# Patient Record
Sex: MALE | Race: WHITE | Employment: OTHER | URBAN - METROPOLITAN AREA
[De-identification: names, ages, dates, MRNs, and addresses within clinical notes are randomized per-mention and may not be internally consistent; named-entity substitution may affect disease eponyms.]

---

## 2017-02-21 ENCOUNTER — HOSPITAL ENCOUNTER (EMERGENCY)
Age: 82
Discharge: HOME | End: 2017-02-21
Attending: EMERGENCY MEDICINE | Admitting: EMERGENCY MEDICINE
Payer: MEDICARE

## 2017-02-21 ENCOUNTER — APPOINTMENT (OUTPATIENT)
Dept: CT IMAGING | Age: 82
End: 2017-02-21
Attending: EMERGENCY MEDICINE
Payer: MEDICARE

## 2017-02-21 VITALS
SYSTOLIC BLOOD PRESSURE: 137 MMHG | HEART RATE: 76 BPM | OXYGEN SATURATION: 96 % | BODY MASS INDEX: 27.85 KG/M2 | TEMPERATURE: 97.6 F | WEIGHT: 188 LBS | DIASTOLIC BLOOD PRESSURE: 39 MMHG | HEIGHT: 69 IN | RESPIRATION RATE: 21 BRPM

## 2017-02-21 DIAGNOSIS — R42 VERTIGO: Primary | ICD-10-CM

## 2017-02-21 LAB
ANION GAP BLD CALC-SCNC: 6 MMOL/L (ref 5–15)
BUN SERPL-MCNC: 25 MG/DL (ref 6–20)
BUN/CREAT SERPL: 26 (ref 12–20)
CALCIUM SERPL-MCNC: 8.9 MG/DL (ref 8.5–10.1)
CHLORIDE SERPL-SCNC: 99 MMOL/L (ref 97–108)
CO2 SERPL-SCNC: 32 MMOL/L (ref 21–32)
CREAT SERPL-MCNC: 0.97 MG/DL (ref 0.7–1.3)
GLUCOSE SERPL-MCNC: 149 MG/DL (ref 65–100)
POTASSIUM SERPL-SCNC: 4.3 MMOL/L (ref 3.5–5.1)
SODIUM SERPL-SCNC: 137 MMOL/L (ref 136–145)

## 2017-02-21 PROCEDURE — 99285 EMERGENCY DEPT VISIT HI MDM: CPT

## 2017-02-21 PROCEDURE — 74011250636 HC RX REV CODE- 250/636: Performed by: EMERGENCY MEDICINE

## 2017-02-21 PROCEDURE — 96361 HYDRATE IV INFUSION ADD-ON: CPT

## 2017-02-21 PROCEDURE — 70450 CT HEAD/BRAIN W/O DYE: CPT

## 2017-02-21 PROCEDURE — 96374 THER/PROPH/DIAG INJ IV PUSH: CPT

## 2017-02-21 PROCEDURE — 36415 COLL VENOUS BLD VENIPUNCTURE: CPT | Performed by: EMERGENCY MEDICINE

## 2017-02-21 PROCEDURE — 74011250637 HC RX REV CODE- 250/637: Performed by: EMERGENCY MEDICINE

## 2017-02-21 PROCEDURE — 80048 BASIC METABOLIC PNL TOTAL CA: CPT | Performed by: EMERGENCY MEDICINE

## 2017-02-21 RX ORDER — CETIRIZINE HCL 10 MG
10 TABLET ORAL DAILY
COMMUNITY

## 2017-02-21 RX ORDER — ONDANSETRON 4 MG/1
4 TABLET, ORALLY DISINTEGRATING ORAL
Qty: 30 TAB | Refills: 0 | Status: SHIPPED | OUTPATIENT
Start: 2017-02-21 | End: 2017-02-24 | Stop reason: SDUPTHER

## 2017-02-21 RX ORDER — ONDANSETRON 2 MG/ML
4 INJECTION INTRAMUSCULAR; INTRAVENOUS
Status: COMPLETED | OUTPATIENT
Start: 2017-02-21 | End: 2017-02-21

## 2017-02-21 RX ORDER — MECLIZINE HYDROCHLORIDE 25 MG/1
25 TABLET ORAL
Status: COMPLETED | OUTPATIENT
Start: 2017-02-21 | End: 2017-02-21

## 2017-02-21 RX ORDER — MECLIZINE HYDROCHLORIDE 25 MG/1
25 TABLET ORAL
Qty: 20 TAB | Refills: 0 | Status: SHIPPED | OUTPATIENT
Start: 2017-02-21 | End: 2017-02-24 | Stop reason: SDUPTHER

## 2017-02-21 RX ADMIN — MECLIZINE 25 MG: 25 TABLET ORAL at 12:57

## 2017-02-21 RX ADMIN — SODIUM CHLORIDE 1000 ML: 900 INJECTION, SOLUTION INTRAVENOUS at 13:12

## 2017-02-21 RX ADMIN — ONDANSETRON 4 MG: 2 INJECTION INTRAMUSCULAR; INTRAVENOUS at 13:18

## 2017-02-21 NOTE — DISCHARGE INSTRUCTIONS
Vertigo: Care Instructions  Your Care Instructions  Vertigo is the feeling that you or your surroundings are moving when there is no actual movement. It is often described as a feeling of spinning, whirling, falling, or tilting. Vertigo may make you vomit or feel nauseated. You may have trouble standing or walking and may lose your balance. Vertigo is often related to an inner ear problem, but it can have other more serious causes. If vertigo continues, you may need more tests to find its cause. Follow-up care is a key part of your treatment and safety. Be sure to make and go to all appointments, and call your doctor if you are having problems. Its also a good idea to know your test results and keep a list of the medicines you take. How can you care for yourself at home? · Do not lie flat on your back. Prop yourself up slightly. This may reduce the spinning feeling. Keep your eyes open. · Move slowly so that you do not fall. · If your doctor recommends medicine, take it exactly as directed. · Do not drive while you are having vertigo. Certain exercises, called Patton-Daroff exercises, can help decrease vertigo. To do Patton-Daroff exercises:  · Sit on the edge of a bed or sofa and quickly lie down on the side that causes the worst vertigo. Lie on your side with your ear down. · Stay in this position for at least 30 seconds or until the vertigo goes away. · Sit up. If this causes vertigo, wait for it to stop. · Repeat the procedure on the other side. · Repeat this 10 times. Do these exercises 2 times a day until the vertigo is gone. When should you call for help? Call 911 anytime you think you may need emergency care. For example, call if:  · You passed out (lost consciousness). · You have symptoms of a stroke. These may include:  ¨ Sudden numbness, tingling, weakness, or loss of movement in your face, arm, or leg, especially on only one side of your body. ¨ Sudden vision changes.   ¨ Sudden trouble speaking. ¨ Sudden confusion or trouble understanding simple statements. ¨ Sudden problems with walking or balance. ¨ A sudden, severe headache that is different from past headaches. Call your doctor now or seek immediate medical care if:  · Vertigo occurs with a fever, a headache, or ringing in your ears. · You have new or increased nausea and vomiting. Watch closely for changes in your health, and be sure to contact your doctor if:  · Vertigo gets worse or happens more often. · Vertigo has not gotten better after 2 weeks. Where can you learn more? Go to http://cierraPingMDshruthi.info/. Enter G175 in the search box to learn more about \"Vertigo: Care Instructions. \"  Current as of: July 29, 2016  Content Version: 11.1  © 9287-6992 [employer]. Care instructions adapted under license by [employer] (which disclaims liability or warranty for this information). If you have questions about a medical condition or this instruction, always ask your healthcare professional. [place] [address] any warranty or liability for your use of this information. We hope that we have addressed all of your medical concerns. The examination and treatment you received in the Emergency Department were for an emergent problem and were not intended as complete care. It is important that you follow up with your healthcare provider(s) for ongoing care. If your symptoms worsen or do not improve as expected, and you are unable to reach your usual health care provider(s), you should return to the Emergency Department. Today's healthcare is undergoing tremendous change, and patient satisfaction surveys are one of the many tools to assess the quality of medical care. You may receive a survey from the [employer] regarding your experience in the Emergency Department.   I hope that your experience has been completely positive, particularly the medical care that I provided. As such, please participate in the survey; anything less than excellent does not meet my expectations or intentions. [facility] and [facility] participate in nationally recognized quality of care measures. If your blood pressure is greater than 120/80, as reported below, we urge that you seek medical care to address the potential of high blood pressure, commonly known as hypertension. Hypertension can be hereditary or can be caused by certain medical conditions, pain, stress, or \"white coat syndrome. \"       Please make an appointment with your health care provider(s) for follow up of your Emergency Department visit. VITALS:   Patient Vitals for the past 8 hrs:   Temp Pulse Resp BP SpO2   02/21/17 1417 - 73 21 158/66 95 %   02/21/17 1345 - 72 20 (!) 121/39 97 %   02/21/17 1330 - 67 17 134/50 97 %   02/21/17 1315 - 70 23 151/63 95 %   02/21/17 1300 - 73 18 139/63 95 %   02/21/17 1245 - 72 21 150/62 94 %   02/21/17 1232 97.6 °F (36.4 °C) 71 17 133/62 97 %   02/21/17 1230 - - - 133/62 98 %          Thank you for allowing us to provide you with medical care today. We realize that you have many choices for your emergency care needs. Please choose us in the future for any continued health care needs. Negrita Siu, [address] [place] [address].   Office: [phone]            Recent Results (from the past 24 hour(s))   METABOLIC PANEL, BASIC    Collection Time: 02/21/17 12:53 PM   Result Value Ref Range    Sodium 137 136 - 145 mmol/L    Potassium 4.3 3.5 - 5.1 mmol/L    Chloride 99 97 - 108 mmol/L    CO2 32 21 - 32 mmol/L    Anion gap 6 5 - 15 mmol/L    Glucose 149 (H) 65 - 100 mg/dL    BUN 25 (H) 6 - 20 MG/DL    Creatinine 0.97 0.70 - 1.30 MG/DL    BUN/Creatinine ratio 26 (H) 12 - 20      GFR est AA >60 >60 ml/min/1.73m2    GFR est non-AA >60 >60 ml/min/1.73m2    Calcium 8.9 8.5 - 10.1 MG/DL Ct Head Wo Cont    Result Date: 2/21/2017  INDICATION:   acute vertigo, no hx of it EXAM:  HEAD CT WITHOUT CONTRAST COMPARISON:  July 28, 2015 TECHNIQUE:  Routine noncontrast axial head CT was performed. Sagittal and coronal reconstructions were generated. CT dose reduction was achieved through use of a standardized protocol tailored for this examination and automatic exposure control for dose modulation. FINDINGS: Ventricles are midline without hydrocephalus. Prominent extra-axial CSF in the bifrontal regions is similar to the prior study, as well as along the convexity of the brain. There is no acute intra or extra-axial hemorrhage. There is no significant white matter disease. The basal cisterns are patent. The paranasal sinuses are clear. IMPRESSION: No acute process.

## 2017-02-21 NOTE — ED TRIAGE NOTES
Pt arrived via EMS for dizziness that started today, worse with movement, +vomiting. Onset 0800 this AM. 4mg of Zofran given PTA via IV. Denies pain. Denies diarrhea. O2 6L via NC r/t pulmonary fibrosis.

## 2017-02-21 NOTE — ED NOTES
Patient continues to experience nausea and dizziness. HOB decreased to about 30 degrees for patient comfort. Lights dimmed.

## 2017-02-21 NOTE — ED PROVIDER NOTES
HPI Comments: 80 y.o. male with extensive past medical history, please see list, significant for pulmonary fibrosis, COPD, pneumothorax, DM, diverticulosis, CAD, GERD, PUD, and pneumonia who presents to the ED via EMS with chief complaint of dizziness. Pt reports dizziness exacerbated by standing onset today accompanied by nausea, 2 episodes of vomiting, and weakness. Pt reports when he stands he becomes nauseated, says he \"felt like he was spinning\" and was \"unable to ambulate because he could not maintain his balance. \" Pt states he called his PCP and was referred to the ED for further evaluation. Pt denies hx of similar sx. Pt denies hx of vertigo. Per EMS, pt was given 4 mg Zofran en route. Per EMS, pt wears 6L O2 NC at baseline. Pt denies chest pain or SOB. There are no other acute medical complaints voiced at this time. Social Hx: . PCP: Julieta Brooke MD    Note written by Prince Dennis, as dictated by Umm Tay MD 12:30 PM     The history is provided by the patient and the EMS personnel. Past Medical History:   Diagnosis Date    CAD (coronary artery disease) 2004     4 stents placed in heart    Chronic obstructive pulmonary disease ([address]      EMPHYSEMA SEEN ON CXR    Colon polyp      Three tubular adenomas excised colonoscopically on 3/17/2011.  COPD (chronic obstructive pulmonary disease) ([address] 2/4/2014    Diverticulosis of colon (without mention of hemorrhage)     DM (diabetes mellitus) ([address] 4/3/2012    GERD (gastroesophageal reflux disease)      OCC    History of pneumothorax 2/2014    Hypercholesteremia     Pneumonia 2004    PUD (peptic ulcer disease) 1955    Pulmonary fibrosis ([address]        Past Surgical History:   Procedure Laterality Date    Hx gi  circa 2000     Laparoscopic cholecystectomy.  Endoscopy, colon, diagnostic  3/2011     Polyps. Tics. Rep 3 yrs.     Hx hernia repair  1988     left inguinal hernia repair    Hx tonsillectomy  age 29    Hx coronary stent placement  2004     4 cardiac stents    Hx lumbar laminectomy  11/20/2013     L4 L5 Laminectomy    Hx other surgical  2/7/2014     LVATS, bronch, talc pleurodesis, Resection of ruptured bulla         Family History:   Problem Relation Age of Onset    Adopted: Yes    Other Other      patient was adopted       Social History     Social History    Marital status:      Spouse name: N/A    Number of children: N/A    Years of education: N/A     Occupational History    Not on file. Social History Main Topics    Smoking status: Former Smoker     Packs/day: 1.50     Years: 36.00     Types: Cigarettes     Quit date: 1/1/1987    Smokeless tobacco: Never Used    Alcohol use No    Drug use: No    Sexual activity: Not on file     Other Topics Concern    Not on file     Social History Narrative    ** Merged History Encounter **         ** Data from: 4/30/12 Enc Dept: [facility]         ** Data from: 4/5/12 Enc Dept: [facility]    ** Merged History Encounter **              ALLERGIES: Bactrim [sulfamethoprim]; Statins-hmg-coa reductase inhibitors; and Sulfa (sulfonamide antibiotics)    Review of Systems   Constitutional: Negative for chills and fever. HENT: Negative for ear pain and sore throat. Eyes: Negative for photophobia and pain. Respiratory: Negative for chest tightness and shortness of breath. Cardiovascular: Negative for chest pain and leg swelling. Gastrointestinal: Positive for nausea and vomiting. Negative for abdominal pain. Genitourinary: Negative for dysuria and flank pain. Musculoskeletal: Positive for gait problem (off balance). Negative for back pain and neck pain. Skin: Negative for rash and wound. Neurological: Positive for dizziness and weakness. Negative for light-headedness and headaches. All other systems reviewed and are negative.       Vitals:    02/21/17 1232   BP: 133/62   Pulse: 71   Resp: 17   Temp: 97.6 °F (36.4 °C)   SpO2: 97%   Weight: 85.3 kg (188 lb)   Height: 5' 9\" (1.753 m)            Physical Exam   Constitutional: He is oriented to person, place, and time. He appears well-developed and well-nourished. No distress. HENT:   Head: Normocephalic and atraumatic. Right Ear: Tympanic membrane and ear canal normal.   Left Ear: Tympanic membrane and ear canal normal.   Nose: Nose normal.   Mouth/Throat: Uvula is midline and oropharynx is clear and moist.   Eyes: Conjunctivae and EOM are normal. Pupils are equal, round, and reactive to light. Neck: Normal range of motion. Cardiovascular: Normal rate, regular rhythm, normal heart sounds and intact distal pulses. No murmur heard. Pulmonary/Chest: Effort normal and breath sounds normal. No stridor. No respiratory distress. Abdominal: Soft. Bowel sounds are normal. There is no tenderness. Musculoskeletal: Normal range of motion. He exhibits no edema or tenderness. Neurological: He is alert and oriented to person, place, and time. No cranial nerve deficit. Skin: Skin is warm and dry. He is not diaphoretic. Psychiatric: He has a normal mood and affect. Nursing note and vitals reviewed. MDM  Number of Diagnoses or Management Options  Vertigo:   Diagnosis management comments: Patient with acute onset dizziness today - seems more c/w vertigo than dehydration - though could be a combination. Check labs, give IVF and meds. Head CT as patient with no hx of this problem.     1445 - patient improved, but symptoms have not completely resolved, worse with head movement and turning, refer to ENT - Dr. Carlie Favre       Amount and/or Complexity of Data Reviewed  Clinical lab tests: ordered and reviewed  Tests in the radiology section of CPT®: ordered and reviewed  Obtain history from someone other than the patient: yes (spouse)  Independent visualization of images, tracings, or specimens: yes      ED Course       Procedures    INDICATION: acute vertigo, no hx of it     EXAM: HEAD CT WITHOUT CONTRAST     COMPARISON: July 28, 2015     TECHNIQUE: Routine noncontrast axial head CT was performed. Sagittal and  coronal reconstructions were generated.      CT dose reduction was achieved through use of a standardized protocol tailored  for this examination and automatic exposure control for dose modulation.     FINDINGS:     Ventricles are midline without hydrocephalus. Prominent extra-axial CSF in the  bifrontal regions is similar to the prior study, as well as along the convexity  of the brain. There is no acute intra or extra-axial hemorrhage. There is no  significant white matter disease. The basal cisterns are patent.  The paranasal  sinuses are clear.      IMPRESSION  IMPRESSION:

## 2017-02-21 NOTE — ED NOTES
Dr. NANCE reviewed discharge instructions with patient and spouse per AVS summary. Patient verbalized understanding of instructions and agrees to follow-up care as recommended by provider. Patient in no apparent distress and vital signs are within normal limits with reassessment of pain noted under 'vital signs' area of chart. Patient wheeled in wheelchair out of ED accompanied by wife/staff.

## 2017-05-10 ENCOUNTER — HOSPITAL ENCOUNTER (OUTPATIENT)
Dept: GENERAL RADIOLOGY | Age: 82
Discharge: HOME | End: 2017-05-10
Attending: INTERNAL MEDICINE
Payer: MEDICARE

## 2017-05-10 DIAGNOSIS — R05.9 COUGH: ICD-10-CM

## 2017-05-10 PROCEDURE — 71020 XR CHEST PA LAT: CPT

## 2018-01-01 ENCOUNTER — APPOINTMENT (OUTPATIENT)
Dept: GENERAL RADIOLOGY | Age: 83
DRG: 196 | End: 2018-01-01
Payer: MEDICARE

## 2018-01-01 ENCOUNTER — HOSPICE ADMISSION (OUTPATIENT)
Dept: HOSPICE | Facility: HOSPICE | Age: 83
End: 2018-01-01

## 2018-01-01 ENCOUNTER — APPOINTMENT (OUTPATIENT)
Dept: GENERAL RADIOLOGY | Age: 83
DRG: 196 | End: 2018-01-01
Attending: INTERNAL MEDICINE
Payer: MEDICARE

## 2018-01-01 ENCOUNTER — HOSPITAL ENCOUNTER (INPATIENT)
Age: 83
LOS: 28 days | DRG: 196 | End: 2018-09-23
Attending: EMERGENCY MEDICINE | Admitting: INTERNAL MEDICINE
Payer: MEDICARE

## 2018-01-01 VITALS
RESPIRATION RATE: 27 BRPM | DIASTOLIC BLOOD PRESSURE: 55 MMHG | OXYGEN SATURATION: 76 % | HEART RATE: 103 BPM | BODY MASS INDEX: 23.38 KG/M2 | SYSTOLIC BLOOD PRESSURE: 136 MMHG | WEIGHT: 163.3 LBS | TEMPERATURE: 97.1 F | HEIGHT: 70 IN

## 2018-01-01 DIAGNOSIS — R41.0 CONFUSION: ICD-10-CM

## 2018-01-01 DIAGNOSIS — J96.21 ACUTE ON CHRONIC RESPIRATORY FAILURE WITH HYPOXIA (HCC): Primary | ICD-10-CM

## 2018-01-01 DIAGNOSIS — R45.1 RESTLESSNESS AND AGITATION: ICD-10-CM

## 2018-01-01 DIAGNOSIS — J84.10 PULMONARY FIBROSIS (HCC): ICD-10-CM

## 2018-01-01 DIAGNOSIS — R94.31 ABNORMAL EKG: ICD-10-CM

## 2018-01-01 DIAGNOSIS — R06.02 SHORTNESS OF BREATH: ICD-10-CM

## 2018-01-01 DIAGNOSIS — R53.81 DEBILITY: ICD-10-CM

## 2018-01-01 DIAGNOSIS — Z71.89 GOALS OF CARE, COUNSELING/DISCUSSION: ICD-10-CM

## 2018-01-01 DIAGNOSIS — F41.9 ANXIETY: ICD-10-CM

## 2018-01-01 LAB
ANION GAP SERPL CALC-SCNC: 7 MMOL/L (ref 5–15)
ANION GAP SERPL CALC-SCNC: 7 MMOL/L (ref 5–15)
ANION GAP SERPL CALC-SCNC: 8 MMOL/L (ref 5–15)
ANION GAP SERPL CALC-SCNC: 8 MMOL/L (ref 5–15)
ANION GAP SERPL CALC-SCNC: 9 MMOL/L (ref 5–15)
ATRIAL RATE: 114 BPM
BASOPHILS # BLD: 0 K/UL (ref 0–0.1)
BASOPHILS NFR BLD: 1 % (ref 0–1)
BUN SERPL-MCNC: 15 MG/DL (ref 6–20)
BUN SERPL-MCNC: 16 MG/DL (ref 6–20)
BUN SERPL-MCNC: 20 MG/DL (ref 6–20)
BUN SERPL-MCNC: 24 MG/DL (ref 6–20)
BUN SERPL-MCNC: 28 MG/DL (ref 6–20)
BUN/CREAT SERPL: 18 (ref 12–20)
BUN/CREAT SERPL: 19 (ref 12–20)
BUN/CREAT SERPL: 21 (ref 12–20)
BUN/CREAT SERPL: 21 (ref 12–20)
BUN/CREAT SERPL: 23 (ref 12–20)
CALCIUM SERPL-MCNC: 8.5 MG/DL (ref 8.5–10.1)
CALCIUM SERPL-MCNC: 9 MG/DL (ref 8.5–10.1)
CALCIUM SERPL-MCNC: 9 MG/DL (ref 8.5–10.1)
CALCIUM SERPL-MCNC: 9.1 MG/DL (ref 8.5–10.1)
CALCIUM SERPL-MCNC: 9.3 MG/DL (ref 8.5–10.1)
CALCULATED P AXIS, ECG09: 19 DEGREES
CALCULATED R AXIS, ECG10: -146 DEGREES
CALCULATED T AXIS, ECG11: 12 DEGREES
CHLORIDE SERPL-SCNC: 100 MMOL/L (ref 97–108)
CHLORIDE SERPL-SCNC: 101 MMOL/L (ref 97–108)
CHLORIDE SERPL-SCNC: 98 MMOL/L (ref 97–108)
CHLORIDE SERPL-SCNC: 98 MMOL/L (ref 97–108)
CHLORIDE SERPL-SCNC: 99 MMOL/L (ref 97–108)
CO2 SERPL-SCNC: 26 MMOL/L (ref 21–32)
CO2 SERPL-SCNC: 26 MMOL/L (ref 21–32)
CO2 SERPL-SCNC: 28 MMOL/L (ref 21–32)
CO2 SERPL-SCNC: 29 MMOL/L (ref 21–32)
CO2 SERPL-SCNC: 29 MMOL/L (ref 21–32)
CREAT SERPL-MCNC: 0.7 MG/DL (ref 0.7–1.3)
CREAT SERPL-MCNC: 0.86 MG/DL (ref 0.7–1.3)
CREAT SERPL-MCNC: 0.89 MG/DL (ref 0.7–1.3)
CREAT SERPL-MCNC: 1.25 MG/DL (ref 0.7–1.3)
CREAT SERPL-MCNC: 1.32 MG/DL (ref 0.7–1.3)
DIAGNOSIS, 93000: NORMAL
DIFFERENTIAL METHOD BLD: NORMAL
EOSINOPHIL # BLD: 0.1 K/UL (ref 0–0.4)
EOSINOPHIL NFR BLD: 1 % (ref 0–7)
ERYTHROCYTE [DISTWIDTH] IN BLOOD BY AUTOMATED COUNT: 12.2 % (ref 11.5–14.5)
ERYTHROCYTE [DISTWIDTH] IN BLOOD BY AUTOMATED COUNT: 12.4 % (ref 11.5–14.5)
ERYTHROCYTE [DISTWIDTH] IN BLOOD BY AUTOMATED COUNT: 12.8 % (ref 11.5–14.5)
EST. AVERAGE GLUCOSE BLD GHB EST-MCNC: 148 MG/DL
GLUCOSE BLD STRIP.AUTO-MCNC: 148 MG/DL (ref 65–100)
GLUCOSE BLD STRIP.AUTO-MCNC: 157 MG/DL (ref 65–100)
GLUCOSE BLD STRIP.AUTO-MCNC: 158 MG/DL (ref 65–100)
GLUCOSE BLD STRIP.AUTO-MCNC: 199 MG/DL (ref 65–100)
GLUCOSE BLD STRIP.AUTO-MCNC: 223 MG/DL (ref 65–100)
GLUCOSE SERPL-MCNC: 154 MG/DL (ref 65–100)
GLUCOSE SERPL-MCNC: 160 MG/DL (ref 65–100)
GLUCOSE SERPL-MCNC: 164 MG/DL (ref 65–100)
GLUCOSE SERPL-MCNC: 251 MG/DL (ref 65–100)
GLUCOSE SERPL-MCNC: 78 MG/DL (ref 65–100)
HBA1C MFR BLD: 6.8 % (ref 4.2–6.3)
HCT VFR BLD AUTO: 39.4 % (ref 36.6–50.3)
HCT VFR BLD AUTO: 41 % (ref 36.6–50.3)
HCT VFR BLD AUTO: 42 % (ref 36.6–50.3)
HGB BLD-MCNC: 13.1 G/DL (ref 12.1–17)
HGB BLD-MCNC: 13.2 G/DL (ref 12.1–17)
HGB BLD-MCNC: 13.9 G/DL (ref 12.1–17)
IMM GRANULOCYTES # BLD: 0 K/UL (ref 0–0.04)
IMM GRANULOCYTES NFR BLD AUTO: 0 % (ref 0–0.5)
LYMPHOCYTES # BLD: 2.3 K/UL (ref 0.8–3.5)
LYMPHOCYTES NFR BLD: 30 % (ref 12–49)
MAGNESIUM SERPL-MCNC: 1.7 MG/DL (ref 1.6–2.4)
MAGNESIUM SERPL-MCNC: 1.7 MG/DL (ref 1.6–2.4)
MAGNESIUM SERPL-MCNC: 2 MG/DL (ref 1.6–2.4)
MAGNESIUM SERPL-MCNC: 2 MG/DL (ref 1.6–2.4)
MCH RBC QN AUTO: 30.4 PG (ref 26–34)
MCH RBC QN AUTO: 30.5 PG (ref 26–34)
MCH RBC QN AUTO: 31 PG (ref 26–34)
MCHC RBC AUTO-ENTMCNC: 32.2 G/DL (ref 30–36.5)
MCHC RBC AUTO-ENTMCNC: 33.1 G/DL (ref 30–36.5)
MCHC RBC AUTO-ENTMCNC: 33.2 G/DL (ref 30–36.5)
MCV RBC AUTO: 91.8 FL (ref 80–99)
MCV RBC AUTO: 91.9 FL (ref 80–99)
MCV RBC AUTO: 96.2 FL (ref 80–99)
MONOCYTES # BLD: 0.7 K/UL (ref 0–1)
MONOCYTES NFR BLD: 9 % (ref 5–13)
NEUTS SEG # BLD: 4.5 K/UL (ref 1.8–8)
NEUTS SEG NFR BLD: 60 % (ref 32–75)
NRBC # BLD: 0 K/UL (ref 0–0.01)
NRBC BLD-RTO: 0 PER 100 WBC
P-R INTERVAL, ECG05: 208 MS
PHOSPHATE SERPL-MCNC: 3.9 MG/DL (ref 2.6–4.7)
PLATELET # BLD AUTO: 267 K/UL (ref 150–400)
PLATELET # BLD AUTO: 274 K/UL (ref 150–400)
PLATELET # BLD AUTO: 297 K/UL (ref 150–400)
PMV BLD AUTO: 8.9 FL (ref 8.9–12.9)
PMV BLD AUTO: 9 FL (ref 8.9–12.9)
PMV BLD AUTO: 9.3 FL (ref 8.9–12.9)
POTASSIUM SERPL-SCNC: 3.6 MMOL/L (ref 3.5–5.1)
POTASSIUM SERPL-SCNC: 4 MMOL/L (ref 3.5–5.1)
POTASSIUM SERPL-SCNC: 4.3 MMOL/L (ref 3.5–5.1)
POTASSIUM SERPL-SCNC: 4.8 MMOL/L (ref 3.5–5.1)
POTASSIUM SERPL-SCNC: 4.9 MMOL/L (ref 3.5–5.1)
Q-T INTERVAL, ECG07: 322 MS
QRS DURATION, ECG06: 86 MS
QTC CALCULATION (BEZET), ECG08: 443 MS
RBC # BLD AUTO: 4.26 M/UL (ref 4.1–5.7)
RBC # BLD AUTO: 4.29 M/UL (ref 4.1–5.7)
RBC # BLD AUTO: 4.57 M/UL (ref 4.1–5.7)
SERVICE CMNT-IMP: ABNORMAL
SODIUM SERPL-SCNC: 132 MMOL/L (ref 136–145)
SODIUM SERPL-SCNC: 134 MMOL/L (ref 136–145)
SODIUM SERPL-SCNC: 134 MMOL/L (ref 136–145)
SODIUM SERPL-SCNC: 136 MMOL/L (ref 136–145)
SODIUM SERPL-SCNC: 137 MMOL/L (ref 136–145)
TROPONIN I SERPL-MCNC: 3.53 NG/ML
TROPONIN I SERPL-MCNC: 4.46 NG/ML
VENTRICULAR RATE, ECG03: 114 BPM
WBC # BLD AUTO: 11.7 K/UL (ref 4.1–11.1)
WBC # BLD AUTO: 7.6 K/UL (ref 4.1–11.1)
WBC # BLD AUTO: 8.3 K/UL (ref 4.1–11.1)

## 2018-01-01 PROCEDURE — 94640 AIRWAY INHALATION TREATMENT: CPT

## 2018-01-01 PROCEDURE — 74011000250 HC RX REV CODE- 250: Performed by: PHYSICIAN ASSISTANT

## 2018-01-01 PROCEDURE — 74011250637 HC RX REV CODE- 250/637: Performed by: SPECIALIST

## 2018-01-01 PROCEDURE — 80048 BASIC METABOLIC PNL TOTAL CA: CPT | Performed by: INTERNAL MEDICINE

## 2018-01-01 PROCEDURE — 83735 ASSAY OF MAGNESIUM: CPT | Performed by: INTERNAL MEDICINE

## 2018-01-01 PROCEDURE — 74011250637 HC RX REV CODE- 250/637: Performed by: INTERNAL MEDICINE

## 2018-01-01 PROCEDURE — 94660 CPAP INITIATION&MGMT: CPT

## 2018-01-01 PROCEDURE — 74011250636 HC RX REV CODE- 250/636: Performed by: INTERNAL MEDICINE

## 2018-01-01 PROCEDURE — 74011000250 HC RX REV CODE- 250: Performed by: INTERNAL MEDICINE

## 2018-01-01 PROCEDURE — 65660000000 HC RM CCU STEPDOWN

## 2018-01-01 PROCEDURE — 85027 COMPLETE CBC AUTOMATED: CPT | Performed by: INTERNAL MEDICINE

## 2018-01-01 PROCEDURE — 93306 TTE W/DOPPLER COMPLETE: CPT

## 2018-01-01 PROCEDURE — 74011250636 HC RX REV CODE- 250/636: Performed by: FAMILY MEDICINE

## 2018-01-01 PROCEDURE — 36415 COLL VENOUS BLD VENIPUNCTURE: CPT | Performed by: INTERNAL MEDICINE

## 2018-01-01 PROCEDURE — 77030013032 HC MSK BPAP/CPAP FISP -B

## 2018-01-01 PROCEDURE — 77030011256 HC DRSG MEPILEX <16IN NO BORD MOLN -A

## 2018-01-01 PROCEDURE — 74011250637 HC RX REV CODE- 250/637: Performed by: FAMILY MEDICINE

## 2018-01-01 PROCEDURE — 77010033711 HC HIGH FLOW OXYGEN

## 2018-01-01 PROCEDURE — 77010033678 HC OXYGEN DAILY

## 2018-01-01 PROCEDURE — 82962 GLUCOSE BLOOD TEST: CPT

## 2018-01-01 PROCEDURE — 97110 THERAPEUTIC EXERCISES: CPT

## 2018-01-01 PROCEDURE — 99285 EMERGENCY DEPT VISIT HI MDM: CPT

## 2018-01-01 PROCEDURE — 97530 THERAPEUTIC ACTIVITIES: CPT

## 2018-01-01 PROCEDURE — 74011250637 HC RX REV CODE- 250/637: Performed by: PHYSICIAN ASSISTANT

## 2018-01-01 PROCEDURE — 51798 US URINE CAPACITY MEASURE: CPT

## 2018-01-01 PROCEDURE — 93005 ELECTROCARDIOGRAM TRACING: CPT

## 2018-01-01 PROCEDURE — 84100 ASSAY OF PHOSPHORUS: CPT | Performed by: INTERNAL MEDICINE

## 2018-01-01 PROCEDURE — 77030013048 HC MSK CPAP W/HDGR FISP -B

## 2018-01-01 PROCEDURE — 71045 X-RAY EXAM CHEST 1 VIEW: CPT

## 2018-01-01 PROCEDURE — 77030013033 HC MSK BPAP/CPAP MMKA -B

## 2018-01-01 PROCEDURE — 74011250637 HC RX REV CODE- 250/637: Performed by: NURSE PRACTITIONER

## 2018-01-01 PROCEDURE — 76450000000

## 2018-01-01 PROCEDURE — 85025 COMPLETE CBC W/AUTO DIFF WBC: CPT | Performed by: INTERNAL MEDICINE

## 2018-01-01 PROCEDURE — 84484 ASSAY OF TROPONIN QUANT: CPT | Performed by: INTERNAL MEDICINE

## 2018-01-01 PROCEDURE — 5A09557 ASSISTANCE WITH RESPIRATORY VENTILATION, GREATER THAN 96 CONSECUTIVE HOURS, CONTINUOUS POSITIVE AIRWAY PRESSURE: ICD-10-PCS | Performed by: EMERGENCY MEDICINE

## 2018-01-01 PROCEDURE — 94761 N-INVAS EAR/PLS OXIMETRY MLT: CPT

## 2018-01-01 PROCEDURE — 77030012793 HC CIRC VNTLTR FISP -B

## 2018-01-01 PROCEDURE — 94760 N-INVAS EAR/PLS OXIMETRY 1: CPT

## 2018-01-01 PROCEDURE — 83036 HEMOGLOBIN GLYCOSYLATED A1C: CPT | Performed by: INTERNAL MEDICINE

## 2018-01-01 PROCEDURE — 97162 PT EVAL MOD COMPLEX 30 MIN: CPT

## 2018-01-01 PROCEDURE — 77030013140 HC MSK NEB VYRM -A

## 2018-01-01 RX ORDER — LORAZEPAM 2 MG/ML
1 INJECTION INTRAMUSCULAR ONCE
Status: COMPLETED | OUTPATIENT
Start: 2018-01-01 | End: 2018-01-01

## 2018-01-01 RX ORDER — LORAZEPAM 1 MG/1
1 TABLET ORAL
Status: DISCONTINUED | OUTPATIENT
Start: 2018-01-01 | End: 2018-01-01

## 2018-01-01 RX ORDER — INSULIN LISPRO 100 [IU]/ML
INJECTION, SOLUTION INTRAVENOUS; SUBCUTANEOUS
Status: DISCONTINUED | OUTPATIENT
Start: 2018-01-01 | End: 2018-01-01

## 2018-01-01 RX ORDER — CETIRIZINE HCL 10 MG
10 TABLET ORAL DAILY
Status: DISCONTINUED | OUTPATIENT
Start: 2018-01-01 | End: 2018-01-01

## 2018-01-01 RX ORDER — GUAIFENESIN 600 MG/1
600 TABLET, EXTENDED RELEASE ORAL EVERY 12 HOURS
Status: DISCONTINUED | OUTPATIENT
Start: 2018-01-01 | End: 2018-01-01

## 2018-01-01 RX ORDER — SODIUM CHLORIDE 0.9 % (FLUSH) 0.9 %
5-10 SYRINGE (ML) INJECTION EVERY 8 HOURS
Status: DISCONTINUED | OUTPATIENT
Start: 2018-01-01 | End: 2018-01-01

## 2018-01-01 RX ORDER — SODIUM CHLORIDE 0.9 % (FLUSH) 0.9 %
5-10 SYRINGE (ML) INJECTION AS NEEDED
Status: DISCONTINUED | OUTPATIENT
Start: 2018-01-01 | End: 2018-01-01 | Stop reason: HOSPADM

## 2018-01-01 RX ORDER — LORAZEPAM 2 MG/ML
0.5 INJECTION INTRAMUSCULAR
Status: DISCONTINUED | OUTPATIENT
Start: 2018-01-01 | End: 2018-01-01

## 2018-01-01 RX ORDER — GLYCOPYRROLATE 0.2 MG/ML
0.2 INJECTION INTRAMUSCULAR; INTRAVENOUS
Status: DISCONTINUED | OUTPATIENT
Start: 2018-01-01 | End: 2018-01-01 | Stop reason: HOSPADM

## 2018-01-01 RX ORDER — DEXTROSE 50 % IN WATER (D50W) INTRAVENOUS SYRINGE
25-50 AS NEEDED
Status: DISCONTINUED | OUTPATIENT
Start: 2018-01-01 | End: 2018-01-01

## 2018-01-01 RX ORDER — KETOROLAC TROMETHAMINE 30 MG/ML
30 INJECTION, SOLUTION INTRAMUSCULAR; INTRAVENOUS
Status: ACTIVE | OUTPATIENT
Start: 2018-01-01 | End: 2018-01-01

## 2018-01-01 RX ORDER — LORAZEPAM 2 MG/ML
1 INJECTION INTRAMUSCULAR
Status: DISCONTINUED | OUTPATIENT
Start: 2018-01-01 | End: 2018-01-01 | Stop reason: HOSPADM

## 2018-01-01 RX ORDER — QUETIAPINE FUMARATE 25 MG/1
25 TABLET, FILM COATED ORAL
Status: DISCONTINUED | OUTPATIENT
Start: 2018-01-01 | End: 2018-01-01

## 2018-01-01 RX ORDER — HYDROMORPHONE HCL IN 0.9% NACL 15 MG/30ML
PATIENT CONTROLLED ANALGESIA VIAL INTRAVENOUS CONTINUOUS
Status: DISCONTINUED | OUTPATIENT
Start: 2018-01-01 | End: 2018-01-01 | Stop reason: HOSPADM

## 2018-01-01 RX ORDER — MORPHINE SULFATE 4 MG/ML
1 INJECTION, SOLUTION INTRAMUSCULAR; INTRAVENOUS
Status: DISCONTINUED | OUTPATIENT
Start: 2018-01-01 | End: 2018-01-01

## 2018-01-01 RX ORDER — GABAPENTIN 300 MG/1
300 CAPSULE ORAL
Status: DISCONTINUED | OUTPATIENT
Start: 2018-01-01 | End: 2018-01-01

## 2018-01-01 RX ORDER — MAGNESIUM SULFATE 100 %
4 CRYSTALS MISCELLANEOUS AS NEEDED
Status: DISCONTINUED | OUTPATIENT
Start: 2018-01-01 | End: 2018-01-01

## 2018-01-01 RX ORDER — MORPHINE SULFATE 4 MG/ML
2 INJECTION, SOLUTION INTRAMUSCULAR; INTRAVENOUS
Status: DISCONTINUED | OUTPATIENT
Start: 2018-01-01 | End: 2018-01-01

## 2018-01-01 RX ORDER — TAMSULOSIN HYDROCHLORIDE 0.4 MG/1
0.4 CAPSULE ORAL DAILY
Status: DISCONTINUED | OUTPATIENT
Start: 2018-01-01 | End: 2018-01-01

## 2018-01-01 RX ORDER — QUETIAPINE FUMARATE 25 MG/1
12.5 TABLET, FILM COATED ORAL
Status: DISCONTINUED | OUTPATIENT
Start: 2018-01-01 | End: 2018-01-01

## 2018-01-01 RX ORDER — LORAZEPAM 1 MG/1
2 TABLET ORAL
Status: DISCONTINUED | OUTPATIENT
Start: 2018-01-01 | End: 2018-01-01

## 2018-01-01 RX ORDER — METOPROLOL TARTRATE 25 MG/1
12.5 TABLET, FILM COATED ORAL 2 TIMES DAILY
Status: DISCONTINUED | OUTPATIENT
Start: 2018-01-01 | End: 2018-01-01

## 2018-01-01 RX ORDER — QUETIAPINE FUMARATE 25 MG/1
50 TABLET, FILM COATED ORAL
Status: DISCONTINUED | OUTPATIENT
Start: 2018-01-01 | End: 2018-01-01

## 2018-01-01 RX ORDER — SCOLOPAMINE TRANSDERMAL SYSTEM 1 MG/1
1 PATCH, EXTENDED RELEASE TRANSDERMAL
Status: DISCONTINUED | OUTPATIENT
Start: 2018-01-01 | End: 2018-01-01 | Stop reason: HOSPADM

## 2018-01-01 RX ORDER — FINASTERIDE 5 MG/1
5 TABLET, FILM COATED ORAL DAILY
Status: DISCONTINUED | OUTPATIENT
Start: 2018-01-01 | End: 2018-01-01

## 2018-01-01 RX ORDER — OMEPRAZOLE 20 MG/1
20 CAPSULE, DELAYED RELEASE ORAL DAILY
COMMUNITY

## 2018-01-01 RX ORDER — LORAZEPAM 0.5 MG/1
0.5 TABLET ORAL
Status: DISCONTINUED | OUTPATIENT
Start: 2018-01-01 | End: 2018-01-01

## 2018-01-01 RX ORDER — GABAPENTIN 300 MG/1
600 CAPSULE ORAL
Status: DISCONTINUED | OUTPATIENT
Start: 2018-01-01 | End: 2018-01-01

## 2018-01-01 RX ORDER — LORAZEPAM 2 MG/ML
2 INJECTION INTRAMUSCULAR
Status: DISCONTINUED | OUTPATIENT
Start: 2018-01-01 | End: 2018-01-01

## 2018-01-01 RX ORDER — GUAIFENESIN 100 MG/5ML
81 LIQUID (ML) ORAL DAILY
Status: DISCONTINUED | OUTPATIENT
Start: 2018-01-01 | End: 2018-01-01

## 2018-01-01 RX ORDER — PIRFENIDONE 267 MG/1
801 TABLET, FILM COATED ORAL 3 TIMES DAILY
Status: DISCONTINUED | OUTPATIENT
Start: 2018-01-01 | End: 2018-01-01

## 2018-01-01 RX ORDER — GABAPENTIN 600 MG/1
600 TABLET ORAL
COMMUNITY

## 2018-01-01 RX ORDER — LORAZEPAM 0.5 MG/1
0.5 TABLET ORAL EVERY 24 HOURS
Status: DISCONTINUED | OUTPATIENT
Start: 2018-01-01 | End: 2018-01-01

## 2018-01-01 RX ORDER — IPRATROPIUM BROMIDE AND ALBUTEROL SULFATE 2.5; .5 MG/3ML; MG/3ML
3 SOLUTION RESPIRATORY (INHALATION)
Status: DISCONTINUED | OUTPATIENT
Start: 2018-01-01 | End: 2018-01-01

## 2018-01-01 RX ORDER — ENOXAPARIN SODIUM 100 MG/ML
40 INJECTION SUBCUTANEOUS EVERY 24 HOURS
Status: DISCONTINUED | OUTPATIENT
Start: 2018-01-01 | End: 2018-01-01

## 2018-01-01 RX ORDER — MORPHINE SULFATE 4 MG/ML
4 INJECTION, SOLUTION INTRAMUSCULAR; INTRAVENOUS ONCE
Status: COMPLETED | OUTPATIENT
Start: 2018-01-01 | End: 2018-01-01

## 2018-01-01 RX ORDER — ARFORMOTEROL TARTRATE 15 UG/2ML
15 SOLUTION RESPIRATORY (INHALATION)
Status: DISCONTINUED | OUTPATIENT
Start: 2018-01-01 | End: 2018-01-01

## 2018-01-01 RX ORDER — FLUTICASONE PROPIONATE 50 MCG
2 SPRAY, SUSPENSION (ML) NASAL DAILY
Status: DISCONTINUED | OUTPATIENT
Start: 2018-01-01 | End: 2018-01-01

## 2018-01-01 RX ORDER — PANTOPRAZOLE SODIUM 40 MG/1
40 TABLET, DELAYED RELEASE ORAL
Status: DISCONTINUED | OUTPATIENT
Start: 2018-01-01 | End: 2018-01-01

## 2018-01-01 RX ORDER — PIRFENIDONE 267 MG/1
801 TABLET, FILM COATED ORAL 3 TIMES DAILY
COMMUNITY

## 2018-01-01 RX ORDER — GABAPENTIN 300 MG/1
300 CAPSULE ORAL
COMMUNITY

## 2018-01-01 RX ORDER — LORAZEPAM 2 MG/ML
1 INJECTION INTRAMUSCULAR EVERY 4 HOURS
Status: DISCONTINUED | OUTPATIENT
Start: 2018-01-01 | End: 2018-01-01

## 2018-01-01 RX ORDER — BUDESONIDE 0.5 MG/2ML
500 INHALANT ORAL
Status: DISCONTINUED | OUTPATIENT
Start: 2018-01-01 | End: 2018-01-01

## 2018-01-01 RX ORDER — MORPHINE SULFATE IN 0.9 % NACL 150MG/30ML
PATIENT CONTROLLED ANALGESIA SYRINGE INTRAVENOUS CONTINUOUS
Status: DISCONTINUED | OUTPATIENT
Start: 2018-01-01 | End: 2018-01-01

## 2018-01-01 RX ORDER — MORPHINE SULFATE 4 MG/ML
2 INJECTION, SOLUTION INTRAMUSCULAR; INTRAVENOUS
Status: DISCONTINUED | OUTPATIENT
Start: 2018-01-01 | End: 2018-01-01 | Stop reason: HOSPADM

## 2018-01-01 RX ORDER — LORAZEPAM 2 MG/ML
1 INJECTION INTRAMUSCULAR
Status: DISCONTINUED | OUTPATIENT
Start: 2018-01-01 | End: 2018-01-01

## 2018-01-01 RX ORDER — GUAIFENESIN 100 MG/5ML
162 LIQUID (ML) ORAL DAILY
Status: DISCONTINUED | OUTPATIENT
Start: 2018-01-01 | End: 2018-01-01

## 2018-01-01 RX ADMIN — PANTOPRAZOLE SODIUM 40 MG: 40 TABLET, DELAYED RELEASE ORAL at 09:18

## 2018-01-01 RX ADMIN — PIRFENIDONE 801 MG: 267 TABLET, FILM COATED ORAL at 22:04

## 2018-01-01 RX ADMIN — ASPIRIN 81 MG 81 MG: 81 TABLET ORAL at 09:53

## 2018-01-01 RX ADMIN — IPRATROPIUM BROMIDE AND ALBUTEROL SULFATE 3 ML: .5; 3 SOLUTION RESPIRATORY (INHALATION) at 19:49

## 2018-01-01 RX ADMIN — PIRFENIDONE 801 MG: 267 TABLET, FILM COATED ORAL at 08:37

## 2018-01-01 RX ADMIN — ASPIRIN 81 MG 81 MG: 81 TABLET ORAL at 09:55

## 2018-01-01 RX ADMIN — GABAPENTIN 300 MG: 300 CAPSULE ORAL at 13:22

## 2018-01-01 RX ADMIN — PIRFENIDONE 801 MG: 267 TABLET, FILM COATED ORAL at 17:23

## 2018-01-01 RX ADMIN — FINASTERIDE 5 MG: 5 TABLET, FILM COATED ORAL at 09:54

## 2018-01-01 RX ADMIN — ASPIRIN 81 MG 81 MG: 81 TABLET ORAL at 10:57

## 2018-01-01 RX ADMIN — MORPHINE SULFATE 2 MG: 4 INJECTION, SOLUTION INTRAMUSCULAR; INTRAVENOUS at 01:52

## 2018-01-01 RX ADMIN — LORAZEPAM 0.5 MG: 2 INJECTION INTRAMUSCULAR; INTRAVENOUS at 10:08

## 2018-01-01 RX ADMIN — BUDESONIDE 500 MCG: 0.5 INHALANT RESPIRATORY (INHALATION) at 07:44

## 2018-01-01 RX ADMIN — MORPHINE SULFATE 1 MG: 4 INJECTION, SOLUTION INTRAMUSCULAR; INTRAVENOUS at 22:32

## 2018-01-01 RX ADMIN — GABAPENTIN 600 MG: 300 CAPSULE ORAL at 20:25

## 2018-01-01 RX ADMIN — METHYLPREDNISOLONE SODIUM SUCCINATE 60 MG: 40 INJECTION, POWDER, FOR SOLUTION INTRAMUSCULAR; INTRAVENOUS at 05:40

## 2018-01-01 RX ADMIN — BUDESONIDE 500 MCG: 0.5 INHALANT RESPIRATORY (INHALATION) at 07:50

## 2018-01-01 RX ADMIN — PANTOPRAZOLE SODIUM 40 MG: 40 TABLET, DELAYED RELEASE ORAL at 10:56

## 2018-01-01 RX ADMIN — LORAZEPAM 1 MG: 1 TABLET ORAL at 21:34

## 2018-01-01 RX ADMIN — BUDESONIDE 500 MCG: 0.5 INHALANT RESPIRATORY (INHALATION) at 08:17

## 2018-01-01 RX ADMIN — IPRATROPIUM BROMIDE AND ALBUTEROL SULFATE 3 ML: .5; 3 SOLUTION RESPIRATORY (INHALATION) at 00:18

## 2018-01-01 RX ADMIN — Medication 10 ML: at 21:28

## 2018-01-01 RX ADMIN — LORAZEPAM 2 MG: 1 TABLET ORAL at 19:52

## 2018-01-01 RX ADMIN — PANTOPRAZOLE SODIUM 40 MG: 40 TABLET, DELAYED RELEASE ORAL at 09:58

## 2018-01-01 RX ADMIN — PANTOPRAZOLE SODIUM 40 MG: 40 TABLET, DELAYED RELEASE ORAL at 09:53

## 2018-01-01 RX ADMIN — IPRATROPIUM BROMIDE AND ALBUTEROL SULFATE 3 ML: .5; 3 SOLUTION RESPIRATORY (INHALATION) at 11:45

## 2018-01-01 RX ADMIN — MORPHINE SULFATE 2 MG: 4 INJECTION, SOLUTION INTRAMUSCULAR; INTRAVENOUS at 20:47

## 2018-01-01 RX ADMIN — IPRATROPIUM BROMIDE AND ALBUTEROL SULFATE 3 ML: .5; 3 SOLUTION RESPIRATORY (INHALATION) at 16:00

## 2018-01-01 RX ADMIN — LORAZEPAM 0.5 MG: 2 INJECTION INTRAMUSCULAR; INTRAVENOUS at 16:02

## 2018-01-01 RX ADMIN — TAMSULOSIN HYDROCHLORIDE 0.4 MG: 0.4 CAPSULE ORAL at 09:42

## 2018-01-01 RX ADMIN — Medication 10 ML: at 15:35

## 2018-01-01 RX ADMIN — IPRATROPIUM BROMIDE AND ALBUTEROL SULFATE 3 ML: .5; 3 SOLUTION RESPIRATORY (INHALATION) at 20:15

## 2018-01-01 RX ADMIN — ARFORMOTEROL TARTRATE 15 MCG: 15 SOLUTION RESPIRATORY (INHALATION) at 08:37

## 2018-01-01 RX ADMIN — IPRATROPIUM BROMIDE AND ALBUTEROL SULFATE 3 ML: .5; 3 SOLUTION RESPIRATORY (INHALATION) at 03:31

## 2018-01-01 RX ADMIN — BUDESONIDE 500 MCG: 0.5 INHALANT RESPIRATORY (INHALATION) at 18:26

## 2018-01-01 RX ADMIN — GABAPENTIN 600 MG: 300 CAPSULE ORAL at 20:08

## 2018-01-01 RX ADMIN — PIRFENIDONE 801 MG: 267 TABLET, FILM COATED ORAL at 19:01

## 2018-01-01 RX ADMIN — METOPROLOL TARTRATE 12.5 MG: 25 TABLET ORAL at 17:00

## 2018-01-01 RX ADMIN — PANTOPRAZOLE SODIUM 40 MG: 40 TABLET, DELAYED RELEASE ORAL at 08:36

## 2018-01-01 RX ADMIN — LORAZEPAM 1 MG: 2 INJECTION INTRAMUSCULAR; INTRAVENOUS at 13:17

## 2018-01-01 RX ADMIN — IPRATROPIUM BROMIDE AND ALBUTEROL SULFATE 3 ML: .5; 3 SOLUTION RESPIRATORY (INHALATION) at 23:30

## 2018-01-01 RX ADMIN — FINASTERIDE 5 MG: 5 TABLET, FILM COATED ORAL at 08:07

## 2018-01-01 RX ADMIN — ASPIRIN 81 MG 81 MG: 81 TABLET ORAL at 09:27

## 2018-01-01 RX ADMIN — GUAIFENESIN 600 MG: 600 TABLET, EXTENDED RELEASE ORAL at 09:02

## 2018-01-01 RX ADMIN — TAMSULOSIN HYDROCHLORIDE 0.4 MG: 0.4 CAPSULE ORAL at 09:26

## 2018-01-01 RX ADMIN — METHYLPREDNISOLONE SODIUM SUCCINATE 60 MG: 40 INJECTION, POWDER, FOR SOLUTION INTRAMUSCULAR; INTRAVENOUS at 19:01

## 2018-01-01 RX ADMIN — FINASTERIDE 5 MG: 5 TABLET, FILM COATED ORAL at 09:53

## 2018-01-01 RX ADMIN — ENOXAPARIN SODIUM 40 MG: 100 INJECTION SUBCUTANEOUS at 20:55

## 2018-01-01 RX ADMIN — GABAPENTIN 300 MG: 300 CAPSULE ORAL at 12:47

## 2018-01-01 RX ADMIN — IPRATROPIUM BROMIDE AND ALBUTEROL SULFATE 3 ML: .5; 3 SOLUTION RESPIRATORY (INHALATION) at 04:00

## 2018-01-01 RX ADMIN — LORAZEPAM 1 MG: 2 INJECTION INTRAMUSCULAR; INTRAVENOUS at 00:57

## 2018-01-01 RX ADMIN — BUDESONIDE 500 MCG: 0.5 INHALANT RESPIRATORY (INHALATION) at 08:07

## 2018-01-01 RX ADMIN — IPRATROPIUM BROMIDE AND ALBUTEROL SULFATE 3 ML: .5; 3 SOLUTION RESPIRATORY (INHALATION) at 20:09

## 2018-01-01 RX ADMIN — GABAPENTIN 300 MG: 300 CAPSULE ORAL at 11:33

## 2018-01-01 RX ADMIN — PIRFENIDONE 801 MG: 267 TABLET, FILM COATED ORAL at 18:51

## 2018-01-01 RX ADMIN — BUDESONIDE 500 MCG: 0.5 INHALANT RESPIRATORY (INHALATION) at 08:01

## 2018-01-01 RX ADMIN — Medication 10 ML: at 20:15

## 2018-01-01 RX ADMIN — PANTOPRAZOLE SODIUM 40 MG: 40 TABLET, DELAYED RELEASE ORAL at 08:07

## 2018-01-01 RX ADMIN — IPRATROPIUM BROMIDE AND ALBUTEROL SULFATE 3 ML: .5; 3 SOLUTION RESPIRATORY (INHALATION) at 23:33

## 2018-01-01 RX ADMIN — LORAZEPAM 0.5 MG: 0.5 TABLET ORAL at 21:38

## 2018-01-01 RX ADMIN — IPRATROPIUM BROMIDE AND ALBUTEROL SULFATE 3 ML: .5; 3 SOLUTION RESPIRATORY (INHALATION) at 00:21

## 2018-01-01 RX ADMIN — ASPIRIN 81 MG 81 MG: 81 TABLET ORAL at 09:01

## 2018-01-01 RX ADMIN — GABAPENTIN 300 MG: 300 CAPSULE ORAL at 11:11

## 2018-01-01 RX ADMIN — Medication 10 ML: at 22:14

## 2018-01-01 RX ADMIN — IPRATROPIUM BROMIDE AND ALBUTEROL SULFATE 3 ML: .5; 3 SOLUTION RESPIRATORY (INHALATION) at 23:29

## 2018-01-01 RX ADMIN — MORPHINE SULFATE 2 MG: 4 INJECTION, SOLUTION INTRAMUSCULAR; INTRAVENOUS at 18:18

## 2018-01-01 RX ADMIN — PIRFENIDONE 801 MG: 267 TABLET, FILM COATED ORAL at 12:50

## 2018-01-01 RX ADMIN — LORAZEPAM 1 MG: 2 INJECTION INTRAMUSCULAR; INTRAVENOUS at 08:29

## 2018-01-01 RX ADMIN — METHYLPREDNISOLONE SODIUM SUCCINATE 40 MG: 40 INJECTION, POWDER, FOR SOLUTION INTRAMUSCULAR; INTRAVENOUS at 20:26

## 2018-01-01 RX ADMIN — PIRFENIDONE 801 MG: 267 TABLET, FILM COATED ORAL at 13:10

## 2018-01-01 RX ADMIN — GABAPENTIN 600 MG: 300 CAPSULE ORAL at 21:03

## 2018-01-01 RX ADMIN — LORAZEPAM 1 MG: 1 TABLET ORAL at 22:41

## 2018-01-01 RX ADMIN — IPRATROPIUM BROMIDE AND ALBUTEROL SULFATE 3 ML: .5; 3 SOLUTION RESPIRATORY (INHALATION) at 11:38

## 2018-01-01 RX ADMIN — TAMSULOSIN HYDROCHLORIDE 0.4 MG: 0.4 CAPSULE ORAL at 09:18

## 2018-01-01 RX ADMIN — PIRFENIDONE 801 MG: 267 TABLET, FILM COATED ORAL at 20:32

## 2018-01-01 RX ADMIN — LORAZEPAM 1 MG: 2 INJECTION INTRAMUSCULAR; INTRAVENOUS at 18:38

## 2018-01-01 RX ADMIN — LORAZEPAM 0.5 MG: 2 INJECTION INTRAMUSCULAR; INTRAVENOUS at 09:20

## 2018-01-01 RX ADMIN — IPRATROPIUM BROMIDE AND ALBUTEROL SULFATE 3 ML: .5; 3 SOLUTION RESPIRATORY (INHALATION) at 06:08

## 2018-01-01 RX ADMIN — IPRATROPIUM BROMIDE AND ALBUTEROL SULFATE 3 ML: .5; 3 SOLUTION RESPIRATORY (INHALATION) at 20:31

## 2018-01-01 RX ADMIN — BUDESONIDE 500 MCG: 0.5 INHALANT RESPIRATORY (INHALATION) at 07:54

## 2018-01-01 RX ADMIN — BUDESONIDE 500 MCG: 0.5 INHALANT RESPIRATORY (INHALATION) at 19:51

## 2018-01-01 RX ADMIN — IPRATROPIUM BROMIDE AND ALBUTEROL SULFATE 3 ML: .5; 3 SOLUTION RESPIRATORY (INHALATION) at 15:59

## 2018-01-01 RX ADMIN — LORAZEPAM 1 MG: 2 INJECTION INTRAMUSCULAR; INTRAVENOUS at 01:00

## 2018-01-01 RX ADMIN — CETIRIZINE HYDROCHLORIDE 10 MG: 10 TABLET, FILM COATED ORAL at 08:03

## 2018-01-01 RX ADMIN — GUAIFENESIN 600 MG: 600 TABLET, EXTENDED RELEASE ORAL at 20:46

## 2018-01-01 RX ADMIN — Medication 10 ML: at 05:45

## 2018-01-01 RX ADMIN — GUAIFENESIN 600 MG: 600 TABLET, EXTENDED RELEASE ORAL at 20:08

## 2018-01-01 RX ADMIN — METHYLPREDNISOLONE SODIUM SUCCINATE 40 MG: 40 INJECTION, POWDER, FOR SOLUTION INTRAMUSCULAR; INTRAVENOUS at 06:39

## 2018-01-01 RX ADMIN — IPRATROPIUM BROMIDE AND ALBUTEROL SULFATE 3 ML: .5; 3 SOLUTION RESPIRATORY (INHALATION) at 11:19

## 2018-01-01 RX ADMIN — FINASTERIDE 5 MG: 5 TABLET, FILM COATED ORAL at 10:45

## 2018-01-01 RX ADMIN — IPRATROPIUM BROMIDE AND ALBUTEROL SULFATE 3 ML: .5; 3 SOLUTION RESPIRATORY (INHALATION) at 00:47

## 2018-01-01 RX ADMIN — MORPHINE SULFATE 2 MG: 4 INJECTION, SOLUTION INTRAMUSCULAR; INTRAVENOUS at 22:29

## 2018-01-01 RX ADMIN — ENOXAPARIN SODIUM 40 MG: 100 INJECTION SUBCUTANEOUS at 22:16

## 2018-01-01 RX ADMIN — IPRATROPIUM BROMIDE AND ALBUTEROL SULFATE 3 ML: .5; 3 SOLUTION RESPIRATORY (INHALATION) at 08:39

## 2018-01-01 RX ADMIN — GABAPENTIN 600 MG: 300 CAPSULE ORAL at 20:37

## 2018-01-01 RX ADMIN — IPRATROPIUM BROMIDE AND ALBUTEROL SULFATE 3 ML: .5; 3 SOLUTION RESPIRATORY (INHALATION) at 04:31

## 2018-01-01 RX ADMIN — PANTOPRAZOLE SODIUM 40 MG: 40 TABLET, DELAYED RELEASE ORAL at 08:17

## 2018-01-01 RX ADMIN — METOPROLOL TARTRATE 12.5 MG: 25 TABLET ORAL at 18:09

## 2018-01-01 RX ADMIN — PIRFENIDONE 801 MG: 267 TABLET, FILM COATED ORAL at 13:44

## 2018-01-01 RX ADMIN — TAMSULOSIN HYDROCHLORIDE 0.4 MG: 0.4 CAPSULE ORAL at 09:55

## 2018-01-01 RX ADMIN — METOPROLOL TARTRATE 12.5 MG: 25 TABLET ORAL at 16:49

## 2018-01-01 RX ADMIN — FLUTICASONE PROPIONATE 2 SPRAY: 50 SPRAY, METERED NASAL at 11:31

## 2018-01-01 RX ADMIN — PIRFENIDONE 801 MG: 267 TABLET, FILM COATED ORAL at 16:55

## 2018-01-01 RX ADMIN — MORPHINE SULFATE 1 MG: 4 INJECTION, SOLUTION INTRAMUSCULAR; INTRAVENOUS at 02:01

## 2018-01-01 RX ADMIN — METOPROLOL TARTRATE 12.5 MG: 25 TABLET ORAL at 09:53

## 2018-01-01 RX ADMIN — GABAPENTIN 600 MG: 300 CAPSULE ORAL at 20:55

## 2018-01-01 RX ADMIN — TAMSULOSIN HYDROCHLORIDE 0.4 MG: 0.4 CAPSULE ORAL at 10:58

## 2018-01-01 RX ADMIN — MORPHINE SULFATE 2 MG: 4 INJECTION, SOLUTION INTRAMUSCULAR; INTRAVENOUS at 11:20

## 2018-01-01 RX ADMIN — QUETIAPINE FUMARATE 50 MG: 25 TABLET ORAL at 22:13

## 2018-01-01 RX ADMIN — PIRFENIDONE 801 MG: 267 TABLET, FILM COATED ORAL at 16:49

## 2018-01-01 RX ADMIN — IPRATROPIUM BROMIDE AND ALBUTEROL SULFATE 3 ML: .5; 3 SOLUTION RESPIRATORY (INHALATION) at 04:06

## 2018-01-01 RX ADMIN — GABAPENTIN 600 MG: 300 CAPSULE ORAL at 20:10

## 2018-01-01 RX ADMIN — IPRATROPIUM BROMIDE AND ALBUTEROL SULFATE 3 ML: .5; 3 SOLUTION RESPIRATORY (INHALATION) at 04:02

## 2018-01-01 RX ADMIN — FLUTICASONE PROPIONATE 2 SPRAY: 50 SPRAY, METERED NASAL at 09:53

## 2018-01-01 RX ADMIN — LORAZEPAM 1 MG: 2 INJECTION INTRAMUSCULAR; INTRAVENOUS at 22:05

## 2018-01-01 RX ADMIN — FINASTERIDE 5 MG: 5 TABLET, FILM COATED ORAL at 10:11

## 2018-01-01 RX ADMIN — PANTOPRAZOLE SODIUM 40 MG: 40 TABLET, DELAYED RELEASE ORAL at 09:01

## 2018-01-01 RX ADMIN — TAMSULOSIN HYDROCHLORIDE 0.4 MG: 0.4 CAPSULE ORAL at 08:07

## 2018-01-01 RX ADMIN — IPRATROPIUM BROMIDE AND ALBUTEROL SULFATE 3 ML: .5; 3 SOLUTION RESPIRATORY (INHALATION) at 15:42

## 2018-01-01 RX ADMIN — IPRATROPIUM BROMIDE AND ALBUTEROL SULFATE 3 ML: .5; 3 SOLUTION RESPIRATORY (INHALATION) at 22:06

## 2018-01-01 RX ADMIN — PIRFENIDONE 801 MG: 267 TABLET, FILM COATED ORAL at 20:19

## 2018-01-01 RX ADMIN — ENOXAPARIN SODIUM 40 MG: 100 INJECTION SUBCUTANEOUS at 21:00

## 2018-01-01 RX ADMIN — QUETIAPINE FUMARATE 50 MG: 25 TABLET ORAL at 21:37

## 2018-01-01 RX ADMIN — ARFORMOTEROL TARTRATE 15 MCG: 15 SOLUTION RESPIRATORY (INHALATION) at 19:44

## 2018-01-01 RX ADMIN — GUAIFENESIN 600 MG: 600 TABLET, EXTENDED RELEASE ORAL at 09:26

## 2018-01-01 RX ADMIN — BUDESONIDE 500 MCG: 0.5 INHALANT RESPIRATORY (INHALATION) at 20:07

## 2018-01-01 RX ADMIN — GUAIFENESIN 600 MG: 600 TABLET, EXTENDED RELEASE ORAL at 21:48

## 2018-01-01 RX ADMIN — METHYLPREDNISOLONE SODIUM SUCCINATE 60 MG: 40 INJECTION, POWDER, FOR SOLUTION INTRAMUSCULAR; INTRAVENOUS at 12:12

## 2018-01-01 RX ADMIN — IPRATROPIUM BROMIDE AND ALBUTEROL SULFATE 3 ML: .5; 3 SOLUTION RESPIRATORY (INHALATION) at 11:08

## 2018-01-01 RX ADMIN — GUAIFENESIN 600 MG: 600 TABLET, EXTENDED RELEASE ORAL at 20:39

## 2018-01-01 RX ADMIN — GUAIFENESIN 600 MG: 600 TABLET, EXTENDED RELEASE ORAL at 20:55

## 2018-01-01 RX ADMIN — GUAIFENESIN 600 MG: 600 TABLET, EXTENDED RELEASE ORAL at 20:02

## 2018-01-01 RX ADMIN — IPRATROPIUM BROMIDE AND ALBUTEROL SULFATE 3 ML: .5; 3 SOLUTION RESPIRATORY (INHALATION) at 03:50

## 2018-01-01 RX ADMIN — LORAZEPAM 2 MG: 1 TABLET ORAL at 22:36

## 2018-01-01 RX ADMIN — IPRATROPIUM BROMIDE AND ALBUTEROL SULFATE 3 ML: .5; 3 SOLUTION RESPIRATORY (INHALATION) at 23:38

## 2018-01-01 RX ADMIN — Medication 10 ML: at 05:59

## 2018-01-01 RX ADMIN — Medication 10 ML: at 05:18

## 2018-01-01 RX ADMIN — GABAPENTIN 600 MG: 300 CAPSULE ORAL at 20:39

## 2018-01-01 RX ADMIN — Medication 10 ML: at 18:10

## 2018-01-01 RX ADMIN — MORPHINE SULFATE 2 MG: 4 INJECTION, SOLUTION INTRAMUSCULAR; INTRAVENOUS at 22:47

## 2018-01-01 RX ADMIN — IPRATROPIUM BROMIDE AND ALBUTEROL SULFATE 3 ML: .5; 3 SOLUTION RESPIRATORY (INHALATION) at 15:24

## 2018-01-01 RX ADMIN — GABAPENTIN 600 MG: 300 CAPSULE ORAL at 20:17

## 2018-01-01 RX ADMIN — ASPIRIN 81 MG 81 MG: 81 TABLET ORAL at 09:28

## 2018-01-01 RX ADMIN — LORAZEPAM 1 MG: 2 INJECTION INTRAMUSCULAR; INTRAVENOUS at 12:28

## 2018-01-01 RX ADMIN — PIRFENIDONE 801 MG: 267 TABLET, FILM COATED ORAL at 13:28

## 2018-01-01 RX ADMIN — MORPHINE SULFATE 4 MG: 4 INJECTION, SOLUTION INTRAMUSCULAR; INTRAVENOUS at 12:28

## 2018-01-01 RX ADMIN — LORAZEPAM 1 MG: 1 TABLET ORAL at 21:19

## 2018-01-01 RX ADMIN — IPRATROPIUM BROMIDE AND ALBUTEROL SULFATE 3 ML: .5; 3 SOLUTION RESPIRATORY (INHALATION) at 11:23

## 2018-01-01 RX ADMIN — GUAIFENESIN 600 MG: 600 TABLET, EXTENDED RELEASE ORAL at 21:37

## 2018-01-01 RX ADMIN — IPRATROPIUM BROMIDE AND ALBUTEROL SULFATE 3 ML: .5; 3 SOLUTION RESPIRATORY (INHALATION) at 04:15

## 2018-01-01 RX ADMIN — IPRATROPIUM BROMIDE AND ALBUTEROL SULFATE 3 ML: .5; 3 SOLUTION RESPIRATORY (INHALATION) at 19:56

## 2018-01-01 RX ADMIN — Medication 10 ML: at 06:39

## 2018-01-01 RX ADMIN — IPRATROPIUM BROMIDE AND ALBUTEROL SULFATE 3 ML: .5; 3 SOLUTION RESPIRATORY (INHALATION) at 08:38

## 2018-01-01 RX ADMIN — GABAPENTIN 300 MG: 300 CAPSULE ORAL at 13:28

## 2018-01-01 RX ADMIN — CETIRIZINE HYDROCHLORIDE 10 MG: 10 TABLET, FILM COATED ORAL at 09:28

## 2018-01-01 RX ADMIN — LORAZEPAM 0.5 MG: 0.5 TABLET ORAL at 16:49

## 2018-01-01 RX ADMIN — Medication 10 ML: at 05:29

## 2018-01-01 RX ADMIN — IPRATROPIUM BROMIDE AND ALBUTEROL SULFATE 3 ML: .5; 3 SOLUTION RESPIRATORY (INHALATION) at 11:41

## 2018-01-01 RX ADMIN — FINASTERIDE 5 MG: 5 TABLET, FILM COATED ORAL at 09:28

## 2018-01-01 RX ADMIN — IPRATROPIUM BROMIDE AND ALBUTEROL SULFATE 3 ML: .5; 3 SOLUTION RESPIRATORY (INHALATION) at 08:24

## 2018-01-01 RX ADMIN — GUAIFENESIN 600 MG: 600 TABLET, EXTENDED RELEASE ORAL at 13:28

## 2018-01-01 RX ADMIN — IPRATROPIUM BROMIDE AND ALBUTEROL SULFATE 3 ML: .5; 3 SOLUTION RESPIRATORY (INHALATION) at 20:27

## 2018-01-01 RX ADMIN — LORAZEPAM 0.5 MG: 0.5 TABLET ORAL at 02:16

## 2018-01-01 RX ADMIN — BUDESONIDE 500 MCG: 0.5 INHALANT RESPIRATORY (INHALATION) at 20:41

## 2018-01-01 RX ADMIN — QUETIAPINE FUMARATE 12.5 MG: 25 TABLET ORAL at 23:21

## 2018-01-01 RX ADMIN — LORAZEPAM 0.5 MG: 0.5 TABLET ORAL at 21:24

## 2018-01-01 RX ADMIN — LORAZEPAM 0.5 MG: 0.5 TABLET ORAL at 18:09

## 2018-01-01 RX ADMIN — IPRATROPIUM BROMIDE AND ALBUTEROL SULFATE 3 ML: .5; 3 SOLUTION RESPIRATORY (INHALATION) at 15:26

## 2018-01-01 RX ADMIN — PANTOPRAZOLE SODIUM 40 MG: 40 TABLET, DELAYED RELEASE ORAL at 12:49

## 2018-01-01 RX ADMIN — GABAPENTIN 600 MG: 300 CAPSULE ORAL at 21:36

## 2018-01-01 RX ADMIN — ARFORMOTEROL TARTRATE 15 MCG: 15 SOLUTION RESPIRATORY (INHALATION) at 20:41

## 2018-01-01 RX ADMIN — BUDESONIDE 500 MCG: 0.5 INHALANT RESPIRATORY (INHALATION) at 07:57

## 2018-01-01 RX ADMIN — Medication 10 ML: at 14:34

## 2018-01-01 RX ADMIN — ASPIRIN 81 MG 81 MG: 81 TABLET ORAL at 08:53

## 2018-01-01 RX ADMIN — CETIRIZINE HYDROCHLORIDE 10 MG: 10 TABLET, FILM COATED ORAL at 13:28

## 2018-01-01 RX ADMIN — PIRFENIDONE 801 MG: 267 TABLET, FILM COATED ORAL at 09:00

## 2018-01-01 RX ADMIN — Medication 10 ML: at 23:05

## 2018-01-01 RX ADMIN — GUAIFENESIN 600 MG: 600 TABLET, EXTENDED RELEASE ORAL at 20:14

## 2018-01-01 RX ADMIN — LORAZEPAM 1 MG: 2 INJECTION INTRAMUSCULAR; INTRAVENOUS at 10:29

## 2018-01-01 RX ADMIN — GUAIFENESIN 600 MG: 600 TABLET, EXTENDED RELEASE ORAL at 09:53

## 2018-01-01 RX ADMIN — BUDESONIDE 500 MCG: 0.5 INHALANT RESPIRATORY (INHALATION) at 08:12

## 2018-01-01 RX ADMIN — BUDESONIDE 500 MCG: 0.5 INHALANT RESPIRATORY (INHALATION) at 20:32

## 2018-01-01 RX ADMIN — PIRFENIDONE 801 MG: 267 TABLET, FILM COATED ORAL at 18:37

## 2018-01-01 RX ADMIN — METOPROLOL TARTRATE 12.5 MG: 25 TABLET ORAL at 13:28

## 2018-01-01 RX ADMIN — TAMSULOSIN HYDROCHLORIDE 0.4 MG: 0.4 CAPSULE ORAL at 10:45

## 2018-01-01 RX ADMIN — GUAIFENESIN 600 MG: 600 TABLET, EXTENDED RELEASE ORAL at 22:41

## 2018-01-01 RX ADMIN — BUDESONIDE 500 MCG: 0.5 INHALANT RESPIRATORY (INHALATION) at 07:35

## 2018-01-01 RX ADMIN — FINASTERIDE 5 MG: 5 TABLET, FILM COATED ORAL at 09:21

## 2018-01-01 RX ADMIN — PANTOPRAZOLE SODIUM 40 MG: 40 TABLET, DELAYED RELEASE ORAL at 05:50

## 2018-01-01 RX ADMIN — Medication 10 ML: at 22:09

## 2018-01-01 RX ADMIN — ASPIRIN 81 MG 81 MG: 81 TABLET ORAL at 16:59

## 2018-01-01 RX ADMIN — LORAZEPAM 0.5 MG: 2 INJECTION INTRAMUSCULAR; INTRAVENOUS at 12:58

## 2018-01-01 RX ADMIN — IPRATROPIUM BROMIDE AND ALBUTEROL SULFATE 3 ML: .5; 3 SOLUTION RESPIRATORY (INHALATION) at 03:01

## 2018-01-01 RX ADMIN — Medication 10 ML: at 06:53

## 2018-01-01 RX ADMIN — GUAIFENESIN 600 MG: 600 TABLET, EXTENDED RELEASE ORAL at 08:06

## 2018-01-01 RX ADMIN — METOPROLOL TARTRATE 12.5 MG: 25 TABLET ORAL at 11:11

## 2018-01-01 RX ADMIN — GUAIFENESIN 600 MG: 600 TABLET, EXTENDED RELEASE ORAL at 10:57

## 2018-01-01 RX ADMIN — LORAZEPAM 1 MG: 2 INJECTION INTRAMUSCULAR; INTRAVENOUS at 18:18

## 2018-01-01 RX ADMIN — IPRATROPIUM BROMIDE AND ALBUTEROL SULFATE 3 ML: .5; 3 SOLUTION RESPIRATORY (INHALATION) at 07:14

## 2018-01-01 RX ADMIN — IPRATROPIUM BROMIDE AND ALBUTEROL SULFATE 3 ML: .5; 3 SOLUTION RESPIRATORY (INHALATION) at 01:12

## 2018-01-01 RX ADMIN — METOPROLOL TARTRATE 12.5 MG: 25 TABLET ORAL at 18:00

## 2018-01-01 RX ADMIN — IPRATROPIUM BROMIDE AND ALBUTEROL SULFATE 3 ML: .5; 3 SOLUTION RESPIRATORY (INHALATION) at 20:21

## 2018-01-01 RX ADMIN — IPRATROPIUM BROMIDE AND ALBUTEROL SULFATE 3 ML: .5; 3 SOLUTION RESPIRATORY (INHALATION) at 04:40

## 2018-01-01 RX ADMIN — GUAIFENESIN 600 MG: 600 TABLET, EXTENDED RELEASE ORAL at 21:04

## 2018-01-01 RX ADMIN — PIRFENIDONE 801 MG: 267 TABLET, FILM COATED ORAL at 11:31

## 2018-01-01 RX ADMIN — LORAZEPAM 0.5 MG: 2 INJECTION INTRAMUSCULAR; INTRAVENOUS at 19:37

## 2018-01-01 RX ADMIN — IPRATROPIUM BROMIDE AND ALBUTEROL SULFATE 3 ML: .5; 3 SOLUTION RESPIRATORY (INHALATION) at 03:48

## 2018-01-01 RX ADMIN — Medication 10 ML: at 15:51

## 2018-01-01 RX ADMIN — PIRFENIDONE 801 MG: 267 TABLET, FILM COATED ORAL at 22:40

## 2018-01-01 RX ADMIN — PIRFENIDONE 801 MG: 267 TABLET, FILM COATED ORAL at 17:00

## 2018-01-01 RX ADMIN — Medication 10 ML: at 17:52

## 2018-01-01 RX ADMIN — Medication 10 ML: at 12:58

## 2018-01-01 RX ADMIN — CETIRIZINE HYDROCHLORIDE 10 MG: 10 TABLET, FILM COATED ORAL at 08:07

## 2018-01-01 RX ADMIN — Medication 10 ML: at 21:26

## 2018-01-01 RX ADMIN — ARFORMOTEROL TARTRATE 15 MCG: 15 SOLUTION RESPIRATORY (INHALATION) at 20:06

## 2018-01-01 RX ADMIN — PIRFENIDONE 801 MG: 267 TABLET, FILM COATED ORAL at 21:51

## 2018-01-01 RX ADMIN — QUETIAPINE FUMARATE 25 MG: 25 TABLET ORAL at 23:33

## 2018-01-01 RX ADMIN — METOPROLOL TARTRATE 12.5 MG: 25 TABLET ORAL at 17:21

## 2018-01-01 RX ADMIN — ARFORMOTEROL TARTRATE 15 MCG: 15 SOLUTION RESPIRATORY (INHALATION) at 07:54

## 2018-01-01 RX ADMIN — PIRFENIDONE 801 MG: 267 TABLET, FILM COATED ORAL at 11:58

## 2018-01-01 RX ADMIN — IPRATROPIUM BROMIDE AND ALBUTEROL SULFATE 3 ML: .5; 3 SOLUTION RESPIRATORY (INHALATION) at 07:54

## 2018-01-01 RX ADMIN — LORAZEPAM 1 MG: 2 INJECTION INTRAMUSCULAR; INTRAVENOUS at 16:55

## 2018-01-01 RX ADMIN — IPRATROPIUM BROMIDE AND ALBUTEROL SULFATE 3 ML: .5; 3 SOLUTION RESPIRATORY (INHALATION) at 11:00

## 2018-01-01 RX ADMIN — ARFORMOTEROL TARTRATE 15 MCG: 15 SOLUTION RESPIRATORY (INHALATION) at 08:00

## 2018-01-01 RX ADMIN — Medication 10 ML: at 23:16

## 2018-01-01 RX ADMIN — GUAIFENESIN 600 MG: 600 TABLET, EXTENDED RELEASE ORAL at 09:55

## 2018-01-01 RX ADMIN — QUETIAPINE FUMARATE 50 MG: 25 TABLET ORAL at 22:08

## 2018-01-01 RX ADMIN — PIRFENIDONE 801 MG: 267 TABLET, FILM COATED ORAL at 18:06

## 2018-01-01 RX ADMIN — PANTOPRAZOLE SODIUM 40 MG: 40 TABLET, DELAYED RELEASE ORAL at 09:26

## 2018-01-01 RX ADMIN — FLUTICASONE PROPIONATE 2 SPRAY: 50 SPRAY, METERED NASAL at 09:55

## 2018-01-01 RX ADMIN — METOPROLOL TARTRATE 12.5 MG: 25 TABLET ORAL at 09:41

## 2018-01-01 RX ADMIN — FLUTICASONE PROPIONATE 2 SPRAY: 50 SPRAY, METERED NASAL at 09:44

## 2018-01-01 RX ADMIN — BUDESONIDE 500 MCG: 0.5 INHALANT RESPIRATORY (INHALATION) at 08:38

## 2018-01-01 RX ADMIN — CETIRIZINE HYDROCHLORIDE 10 MG: 10 TABLET, FILM COATED ORAL at 11:33

## 2018-01-01 RX ADMIN — IPRATROPIUM BROMIDE AND ALBUTEROL SULFATE 3 ML: .5; 3 SOLUTION RESPIRATORY (INHALATION) at 15:28

## 2018-01-01 RX ADMIN — BUDESONIDE 500 MCG: 0.5 INHALANT RESPIRATORY (INHALATION) at 07:40

## 2018-01-01 RX ADMIN — GUAIFENESIN 600 MG: 600 TABLET, EXTENDED RELEASE ORAL at 19:58

## 2018-01-01 RX ADMIN — MORPHINE SULFATE 2 MG: 4 INJECTION, SOLUTION INTRAMUSCULAR; INTRAVENOUS at 23:38

## 2018-01-01 RX ADMIN — IPRATROPIUM BROMIDE AND ALBUTEROL SULFATE 3 ML: .5; 3 SOLUTION RESPIRATORY (INHALATION) at 08:07

## 2018-01-01 RX ADMIN — ASPIRIN 81 MG 81 MG: 81 TABLET ORAL at 12:47

## 2018-01-01 RX ADMIN — PIRFENIDONE 801 MG: 267 TABLET, FILM COATED ORAL at 09:44

## 2018-01-01 RX ADMIN — IPRATROPIUM BROMIDE AND ALBUTEROL SULFATE 3 ML: .5; 3 SOLUTION RESPIRATORY (INHALATION) at 04:17

## 2018-01-01 RX ADMIN — GABAPENTIN 300 MG: 300 CAPSULE ORAL at 12:16

## 2018-01-01 RX ADMIN — BUDESONIDE 500 MCG: 0.5 INHALANT RESPIRATORY (INHALATION) at 19:56

## 2018-01-01 RX ADMIN — MORPHINE SULFATE 1 MG: 4 INJECTION, SOLUTION INTRAMUSCULAR; INTRAVENOUS at 00:37

## 2018-01-01 RX ADMIN — CETIRIZINE HYDROCHLORIDE 10 MG: 10 TABLET, FILM COATED ORAL at 09:18

## 2018-01-01 RX ADMIN — IPRATROPIUM BROMIDE AND ALBUTEROL SULFATE 3 ML: .5; 3 SOLUTION RESPIRATORY (INHALATION) at 02:46

## 2018-01-01 RX ADMIN — LORAZEPAM 0.5 MG: 0.5 TABLET ORAL at 03:56

## 2018-01-01 RX ADMIN — FINASTERIDE 5 MG: 5 TABLET, FILM COATED ORAL at 09:58

## 2018-01-01 RX ADMIN — BUDESONIDE 500 MCG: 0.5 INHALANT RESPIRATORY (INHALATION) at 20:27

## 2018-01-01 RX ADMIN — BUDESONIDE 500 MCG: 0.5 INHALANT RESPIRATORY (INHALATION) at 21:35

## 2018-01-01 RX ADMIN — LORAZEPAM 0.5 MG: 0.5 TABLET ORAL at 05:10

## 2018-01-01 RX ADMIN — ARFORMOTEROL TARTRATE 15 MCG: 15 SOLUTION RESPIRATORY (INHALATION) at 20:15

## 2018-01-01 RX ADMIN — ENOXAPARIN SODIUM 40 MG: 100 INJECTION SUBCUTANEOUS at 22:05

## 2018-01-01 RX ADMIN — IPRATROPIUM BROMIDE AND ALBUTEROL SULFATE 3 ML: .5; 3 SOLUTION RESPIRATORY (INHALATION) at 23:09

## 2018-01-01 RX ADMIN — Medication 10 ML: at 21:06

## 2018-01-01 RX ADMIN — IPRATROPIUM BROMIDE AND ALBUTEROL SULFATE 3 ML: .5; 3 SOLUTION RESPIRATORY (INHALATION) at 07:50

## 2018-01-01 RX ADMIN — PANTOPRAZOLE SODIUM 40 MG: 40 TABLET, DELAYED RELEASE ORAL at 06:53

## 2018-01-01 RX ADMIN — Medication 10 ML: at 05:30

## 2018-01-01 RX ADMIN — METHYLPREDNISOLONE SODIUM SUCCINATE 40 MG: 40 INJECTION, POWDER, FOR SOLUTION INTRAMUSCULAR; INTRAVENOUS at 08:37

## 2018-01-01 RX ADMIN — MORPHINE SULFATE 1 MG: 4 INJECTION, SOLUTION INTRAMUSCULAR; INTRAVENOUS at 06:55

## 2018-01-01 RX ADMIN — METOPROLOL TARTRATE 12.5 MG: 25 TABLET ORAL at 09:31

## 2018-01-01 RX ADMIN — Medication 10 ML: at 13:28

## 2018-01-01 RX ADMIN — METOPROLOL TARTRATE 12.5 MG: 25 TABLET ORAL at 23:22

## 2018-01-01 RX ADMIN — PIRFENIDONE 801 MG: 267 TABLET, FILM COATED ORAL at 12:16

## 2018-01-01 RX ADMIN — PIRFENIDONE 801 MG: 267 TABLET, FILM COATED ORAL at 09:59

## 2018-01-01 RX ADMIN — LORAZEPAM 0.5 MG: 0.5 TABLET ORAL at 11:42

## 2018-01-01 RX ADMIN — ARFORMOTEROL TARTRATE 15 MCG: 15 SOLUTION RESPIRATORY (INHALATION) at 20:32

## 2018-01-01 RX ADMIN — Medication 10 ML: at 23:52

## 2018-01-01 RX ADMIN — METHYLPREDNISOLONE SODIUM SUCCINATE 40 MG: 40 INJECTION, POWDER, FOR SOLUTION INTRAMUSCULAR; INTRAVENOUS at 08:06

## 2018-01-01 RX ADMIN — ARFORMOTEROL TARTRATE 15 MCG: 15 SOLUTION RESPIRATORY (INHALATION) at 07:57

## 2018-01-01 RX ADMIN — Medication 10 ML: at 21:03

## 2018-01-01 RX ADMIN — CETIRIZINE HYDROCHLORIDE 10 MG: 10 TABLET, FILM COATED ORAL at 09:21

## 2018-01-01 RX ADMIN — PIRFENIDONE 801 MG: 267 TABLET, FILM COATED ORAL at 14:46

## 2018-01-01 RX ADMIN — IPRATROPIUM BROMIDE AND ALBUTEROL SULFATE 3 ML: .5; 3 SOLUTION RESPIRATORY (INHALATION) at 19:51

## 2018-01-01 RX ADMIN — FINASTERIDE 5 MG: 5 TABLET, FILM COATED ORAL at 09:01

## 2018-01-01 RX ADMIN — GUAIFENESIN 600 MG: 600 TABLET, EXTENDED RELEASE ORAL at 08:53

## 2018-01-01 RX ADMIN — PIRFENIDONE 801 MG: 267 TABLET, FILM COATED ORAL at 22:00

## 2018-01-01 RX ADMIN — IPRATROPIUM BROMIDE AND ALBUTEROL SULFATE 3 ML: .5; 3 SOLUTION RESPIRATORY (INHALATION) at 07:27

## 2018-01-01 RX ADMIN — LORAZEPAM 0.5 MG: 0.5 TABLET ORAL at 12:46

## 2018-01-01 RX ADMIN — LORAZEPAM 0.5 MG: 0.5 TABLET ORAL at 21:37

## 2018-01-01 RX ADMIN — BUDESONIDE 500 MCG: 0.5 INHALANT RESPIRATORY (INHALATION) at 20:10

## 2018-01-01 RX ADMIN — Medication 10 ML: at 21:19

## 2018-01-01 RX ADMIN — ASPIRIN 81 MG 81 MG: 81 TABLET ORAL at 09:42

## 2018-01-01 RX ADMIN — PIRFENIDONE 801 MG: 267 TABLET, FILM COATED ORAL at 19:26

## 2018-01-01 RX ADMIN — IPRATROPIUM BROMIDE AND ALBUTEROL SULFATE 3 ML: .5; 3 SOLUTION RESPIRATORY (INHALATION) at 19:55

## 2018-01-01 RX ADMIN — Medication 10 ML: at 06:51

## 2018-01-01 RX ADMIN — Medication 10 ML: at 14:04

## 2018-01-01 RX ADMIN — IPRATROPIUM BROMIDE AND ALBUTEROL SULFATE 3 ML: .5; 3 SOLUTION RESPIRATORY (INHALATION) at 20:16

## 2018-01-01 RX ADMIN — ARFORMOTEROL TARTRATE 15 MCG: 15 SOLUTION RESPIRATORY (INHALATION) at 20:20

## 2018-01-01 RX ADMIN — PIRFENIDONE 801 MG: 267 TABLET, FILM COATED ORAL at 18:00

## 2018-01-01 RX ADMIN — PIRFENIDONE 801 MG: 267 TABLET, FILM COATED ORAL at 18:09

## 2018-01-01 RX ADMIN — IPRATROPIUM BROMIDE AND ALBUTEROL SULFATE 3 ML: .5; 3 SOLUTION RESPIRATORY (INHALATION) at 11:51

## 2018-01-01 RX ADMIN — METOPROLOL TARTRATE 12.5 MG: 25 TABLET ORAL at 18:06

## 2018-01-01 RX ADMIN — IPRATROPIUM BROMIDE AND ALBUTEROL SULFATE 3 ML: .5; 3 SOLUTION RESPIRATORY (INHALATION) at 00:15

## 2018-01-01 RX ADMIN — QUETIAPINE FUMARATE 50 MG: 25 TABLET ORAL at 22:19

## 2018-01-01 RX ADMIN — METHYLPREDNISOLONE SODIUM SUCCINATE 60 MG: 40 INJECTION, POWDER, FOR SOLUTION INTRAMUSCULAR; INTRAVENOUS at 23:33

## 2018-01-01 RX ADMIN — ASPIRIN 81 MG 81 MG: 81 TABLET ORAL at 11:11

## 2018-01-01 RX ADMIN — PIRFENIDONE 801 MG: 267 TABLET, FILM COATED ORAL at 12:10

## 2018-01-01 RX ADMIN — METOPROLOL TARTRATE 12.5 MG: 25 TABLET ORAL at 08:06

## 2018-01-01 RX ADMIN — PIRFENIDONE 801 MG: 267 TABLET, FILM COATED ORAL at 09:53

## 2018-01-01 RX ADMIN — IPRATROPIUM BROMIDE AND ALBUTEROL SULFATE 3 ML: .5; 3 SOLUTION RESPIRATORY (INHALATION) at 15:31

## 2018-01-01 RX ADMIN — Medication: at 14:46

## 2018-01-01 RX ADMIN — BUDESONIDE 500 MCG: 0.5 INHALANT RESPIRATORY (INHALATION) at 08:08

## 2018-01-01 RX ADMIN — GABAPENTIN 300 MG: 300 CAPSULE ORAL at 14:28

## 2018-01-01 RX ADMIN — IPRATROPIUM BROMIDE AND ALBUTEROL SULFATE 3 ML: .5; 3 SOLUTION RESPIRATORY (INHALATION) at 07:00

## 2018-01-01 RX ADMIN — CETIRIZINE HYDROCHLORIDE 10 MG: 10 TABLET, FILM COATED ORAL at 09:53

## 2018-01-01 RX ADMIN — LORAZEPAM 0.5 MG: 0.5 TABLET ORAL at 00:51

## 2018-01-01 RX ADMIN — IPRATROPIUM BROMIDE AND ALBUTEROL SULFATE 3 ML: .5; 3 SOLUTION RESPIRATORY (INHALATION) at 20:07

## 2018-01-01 RX ADMIN — Medication 10 ML: at 11:42

## 2018-01-01 RX ADMIN — LORAZEPAM 0.5 MG: 0.5 TABLET ORAL at 22:13

## 2018-01-01 RX ADMIN — CETIRIZINE HYDROCHLORIDE 10 MG: 10 TABLET, FILM COATED ORAL at 09:01

## 2018-01-01 RX ADMIN — CETIRIZINE HYDROCHLORIDE 10 MG: 10 TABLET, FILM COATED ORAL at 09:26

## 2018-01-01 RX ADMIN — Medication 10 ML: at 06:00

## 2018-01-01 RX ADMIN — BUDESONIDE 500 MCG: 0.5 INHALANT RESPIRATORY (INHALATION) at 19:55

## 2018-01-01 RX ADMIN — ARFORMOTEROL TARTRATE 15 MCG: 15 SOLUTION RESPIRATORY (INHALATION) at 07:14

## 2018-01-01 RX ADMIN — PIRFENIDONE 801 MG: 267 TABLET, FILM COATED ORAL at 17:10

## 2018-01-01 RX ADMIN — MORPHINE SULFATE 2 MG: 4 INJECTION, SOLUTION INTRAMUSCULAR; INTRAVENOUS at 17:25

## 2018-01-01 RX ADMIN — FLUTICASONE PROPIONATE 2 SPRAY: 50 SPRAY, METERED NASAL at 10:44

## 2018-01-01 RX ADMIN — IPRATROPIUM BROMIDE AND ALBUTEROL SULFATE 3 ML: .5; 3 SOLUTION RESPIRATORY (INHALATION) at 11:16

## 2018-01-01 RX ADMIN — GUAIFENESIN 600 MG: 600 TABLET, EXTENDED RELEASE ORAL at 10:00

## 2018-01-01 RX ADMIN — METOPROLOL TARTRATE 12.5 MG: 25 TABLET ORAL at 08:53

## 2018-01-01 RX ADMIN — TAMSULOSIN HYDROCHLORIDE 0.4 MG: 0.4 CAPSULE ORAL at 09:29

## 2018-01-01 RX ADMIN — PANTOPRAZOLE SODIUM 40 MG: 40 TABLET, DELAYED RELEASE ORAL at 10:11

## 2018-01-01 RX ADMIN — IPRATROPIUM BROMIDE AND ALBUTEROL SULFATE 3 ML: .5; 3 SOLUTION RESPIRATORY (INHALATION) at 15:16

## 2018-01-01 RX ADMIN — METOPROLOL TARTRATE 12.5 MG: 25 TABLET ORAL at 18:51

## 2018-01-01 RX ADMIN — BUDESONIDE 500 MCG: 0.5 INHALANT RESPIRATORY (INHALATION) at 19:18

## 2018-01-01 RX ADMIN — ENOXAPARIN SODIUM 40 MG: 100 INJECTION SUBCUTANEOUS at 21:33

## 2018-01-01 RX ADMIN — CETIRIZINE HYDROCHLORIDE 10 MG: 10 TABLET, FILM COATED ORAL at 09:29

## 2018-01-01 RX ADMIN — PIRFENIDONE 801 MG: 267 TABLET, FILM COATED ORAL at 22:06

## 2018-01-01 RX ADMIN — TAMSULOSIN HYDROCHLORIDE 0.4 MG: 0.4 CAPSULE ORAL at 09:01

## 2018-01-01 RX ADMIN — Medication 10 ML: at 21:54

## 2018-01-01 RX ADMIN — ENOXAPARIN SODIUM 40 MG: 100 INJECTION SUBCUTANEOUS at 20:03

## 2018-01-01 RX ADMIN — Medication 10 ML: at 15:34

## 2018-01-01 RX ADMIN — IPRATROPIUM BROMIDE AND ALBUTEROL SULFATE 3 ML: .5; 3 SOLUTION RESPIRATORY (INHALATION) at 08:12

## 2018-01-01 RX ADMIN — Medication 10 ML: at 14:00

## 2018-01-01 RX ADMIN — IPRATROPIUM BROMIDE AND ALBUTEROL SULFATE 3 ML: .5; 3 SOLUTION RESPIRATORY (INHALATION) at 16:15

## 2018-01-01 RX ADMIN — TAMSULOSIN HYDROCHLORIDE 0.4 MG: 0.4 CAPSULE ORAL at 11:33

## 2018-01-01 RX ADMIN — FLUTICASONE PROPIONATE 2 SPRAY: 50 SPRAY, METERED NASAL at 14:46

## 2018-01-01 RX ADMIN — IPRATROPIUM BROMIDE AND ALBUTEROL SULFATE 3 ML: .5; 3 SOLUTION RESPIRATORY (INHALATION) at 15:35

## 2018-01-01 RX ADMIN — ARFORMOTEROL TARTRATE 15 MCG: 15 SOLUTION RESPIRATORY (INHALATION) at 08:07

## 2018-01-01 RX ADMIN — IPRATROPIUM BROMIDE AND ALBUTEROL SULFATE 3 ML: .5; 3 SOLUTION RESPIRATORY (INHALATION) at 07:56

## 2018-01-01 RX ADMIN — IPRATROPIUM BROMIDE AND ALBUTEROL SULFATE 3 ML: .5; 3 SOLUTION RESPIRATORY (INHALATION) at 11:25

## 2018-01-01 RX ADMIN — IPRATROPIUM BROMIDE AND ALBUTEROL SULFATE 3 ML: .5; 3 SOLUTION RESPIRATORY (INHALATION) at 15:34

## 2018-01-01 RX ADMIN — MORPHINE SULFATE 2 MG: 4 INJECTION, SOLUTION INTRAMUSCULAR; INTRAVENOUS at 22:50

## 2018-01-01 RX ADMIN — PIRFENIDONE 801 MG: 267 TABLET, FILM COATED ORAL at 15:35

## 2018-01-01 RX ADMIN — MORPHINE SULFATE 2 MG: 4 INJECTION, SOLUTION INTRAMUSCULAR; INTRAVENOUS at 05:45

## 2018-01-01 RX ADMIN — LORAZEPAM 0.5 MG: 0.5 TABLET ORAL at 12:34

## 2018-01-01 RX ADMIN — IPRATROPIUM BROMIDE AND ALBUTEROL SULFATE 3 ML: .5; 3 SOLUTION RESPIRATORY (INHALATION) at 07:32

## 2018-01-01 RX ADMIN — IPRATROPIUM BROMIDE AND ALBUTEROL SULFATE 3 ML: .5; 3 SOLUTION RESPIRATORY (INHALATION) at 19:44

## 2018-01-01 RX ADMIN — ENOXAPARIN SODIUM 40 MG: 100 INJECTION SUBCUTANEOUS at 21:25

## 2018-01-01 RX ADMIN — IPRATROPIUM BROMIDE AND ALBUTEROL SULFATE 3 ML: .5; 3 SOLUTION RESPIRATORY (INHALATION) at 08:14

## 2018-01-01 RX ADMIN — MORPHINE SULFATE 1 MG: 4 INJECTION, SOLUTION INTRAMUSCULAR; INTRAVENOUS at 12:53

## 2018-01-01 RX ADMIN — PIRFENIDONE 801 MG: 267 TABLET, FILM COATED ORAL at 15:48

## 2018-01-01 RX ADMIN — IPRATROPIUM BROMIDE AND ALBUTEROL SULFATE 3 ML: .5; 3 SOLUTION RESPIRATORY (INHALATION) at 11:43

## 2018-01-01 RX ADMIN — Medication 10 ML: at 05:31

## 2018-01-01 RX ADMIN — GABAPENTIN 600 MG: 300 CAPSULE ORAL at 21:26

## 2018-01-01 RX ADMIN — GABAPENTIN 600 MG: 300 CAPSULE ORAL at 22:15

## 2018-01-01 RX ADMIN — TAMSULOSIN HYDROCHLORIDE 0.4 MG: 0.4 CAPSULE ORAL at 13:29

## 2018-01-01 RX ADMIN — MORPHINE SULFATE 2 MG: 4 INJECTION, SOLUTION INTRAMUSCULAR; INTRAVENOUS at 09:20

## 2018-01-01 RX ADMIN — IPRATROPIUM BROMIDE AND ALBUTEROL SULFATE 3 ML: .5; 3 SOLUTION RESPIRATORY (INHALATION) at 07:47

## 2018-01-01 RX ADMIN — GUAIFENESIN 600 MG: 600 TABLET, EXTENDED RELEASE ORAL at 20:44

## 2018-01-01 RX ADMIN — IPRATROPIUM BROMIDE AND ALBUTEROL SULFATE 3 ML: .5; 3 SOLUTION RESPIRATORY (INHALATION) at 17:03

## 2018-01-01 RX ADMIN — LORAZEPAM 0.5 MG: 2 INJECTION INTRAMUSCULAR; INTRAVENOUS at 03:14

## 2018-01-01 RX ADMIN — GABAPENTIN 600 MG: 300 CAPSULE ORAL at 21:33

## 2018-01-01 RX ADMIN — METOPROLOL TARTRATE 12.5 MG: 25 TABLET ORAL at 09:01

## 2018-01-01 RX ADMIN — CETIRIZINE HYDROCHLORIDE 10 MG: 10 TABLET, FILM COATED ORAL at 09:54

## 2018-01-01 RX ADMIN — IPRATROPIUM BROMIDE AND ALBUTEROL SULFATE 3 ML: .5; 3 SOLUTION RESPIRATORY (INHALATION) at 04:39

## 2018-01-01 RX ADMIN — LORAZEPAM 0.5 MG: 0.5 TABLET ORAL at 16:44

## 2018-01-01 RX ADMIN — PANTOPRAZOLE SODIUM 40 MG: 40 TABLET, DELAYED RELEASE ORAL at 08:03

## 2018-01-01 RX ADMIN — METOPROLOL TARTRATE 12.5 MG: 25 TABLET ORAL at 18:08

## 2018-01-01 RX ADMIN — DEXTRAN 70 AND HYPROMELLOSE 2910 2 DROP: 1; 3 SOLUTION/ DROPS OPHTHALMIC at 16:03

## 2018-01-01 RX ADMIN — ARFORMOTEROL TARTRATE 15 MCG: 15 SOLUTION RESPIRATORY (INHALATION) at 07:35

## 2018-01-01 RX ADMIN — GABAPENTIN 600 MG: 300 CAPSULE ORAL at 20:44

## 2018-01-01 RX ADMIN — PANTOPRAZOLE SODIUM 40 MG: 40 TABLET, DELAYED RELEASE ORAL at 09:54

## 2018-01-01 RX ADMIN — Medication 10 ML: at 21:18

## 2018-01-01 RX ADMIN — METOPROLOL TARTRATE 12.5 MG: 25 TABLET ORAL at 10:45

## 2018-01-01 RX ADMIN — ENOXAPARIN SODIUM 40 MG: 100 INJECTION SUBCUTANEOUS at 20:10

## 2018-01-01 RX ADMIN — Medication: at 16:49

## 2018-01-01 RX ADMIN — LORAZEPAM 1 MG: 2 INJECTION INTRAMUSCULAR; INTRAVENOUS at 15:33

## 2018-01-01 RX ADMIN — PIRFENIDONE 801 MG: 267 TABLET, FILM COATED ORAL at 18:15

## 2018-01-01 RX ADMIN — PIRFENIDONE 801 MG: 267 TABLET, FILM COATED ORAL at 22:08

## 2018-01-01 RX ADMIN — MORPHINE SULFATE 2 MG: 4 INJECTION, SOLUTION INTRAMUSCULAR; INTRAVENOUS at 02:29

## 2018-01-01 RX ADMIN — ARFORMOTEROL TARTRATE 15 MCG: 15 SOLUTION RESPIRATORY (INHALATION) at 20:27

## 2018-01-01 RX ADMIN — PIRFENIDONE 801 MG: 267 TABLET, FILM COATED ORAL at 20:00

## 2018-01-01 RX ADMIN — Medication 10 ML: at 21:23

## 2018-01-01 RX ADMIN — BUDESONIDE 500 MCG: 0.5 INHALANT RESPIRATORY (INHALATION) at 08:41

## 2018-01-01 RX ADMIN — MORPHINE SULFATE 2 MG: 4 INJECTION, SOLUTION INTRAMUSCULAR; INTRAVENOUS at 00:14

## 2018-01-01 RX ADMIN — GUAIFENESIN 600 MG: 600 TABLET, EXTENDED RELEASE ORAL at 09:59

## 2018-01-01 RX ADMIN — BUDESONIDE 500 MCG: 0.5 INHALANT RESPIRATORY (INHALATION) at 07:00

## 2018-01-01 RX ADMIN — ARFORMOTEROL TARTRATE 15 MCG: 15 SOLUTION RESPIRATORY (INHALATION) at 19:35

## 2018-01-01 RX ADMIN — Medication 10 ML: at 22:24

## 2018-01-01 RX ADMIN — IPRATROPIUM BROMIDE AND ALBUTEROL SULFATE 3 ML: .5; 3 SOLUTION RESPIRATORY (INHALATION) at 07:40

## 2018-01-01 RX ADMIN — IPRATROPIUM BROMIDE AND ALBUTEROL SULFATE 3 ML: .5; 3 SOLUTION RESPIRATORY (INHALATION) at 16:16

## 2018-01-01 RX ADMIN — CETIRIZINE HYDROCHLORIDE 10 MG: 10 TABLET, FILM COATED ORAL at 09:52

## 2018-01-01 RX ADMIN — LORAZEPAM 0.5 MG: 0.5 TABLET ORAL at 21:25

## 2018-01-01 RX ADMIN — METOPROLOL TARTRATE 12.5 MG: 25 TABLET ORAL at 19:00

## 2018-01-01 RX ADMIN — LORAZEPAM 0.5 MG: 0.5 TABLET ORAL at 22:15

## 2018-01-01 RX ADMIN — Medication 10 ML: at 05:51

## 2018-01-01 RX ADMIN — LORAZEPAM 0.5 MG: 0.5 TABLET ORAL at 21:46

## 2018-01-01 RX ADMIN — BUDESONIDE 500 MCG: 0.5 INHALANT RESPIRATORY (INHALATION) at 07:27

## 2018-01-01 RX ADMIN — METOPROLOL TARTRATE 12.5 MG: 25 TABLET ORAL at 17:20

## 2018-01-01 RX ADMIN — ASPIRIN 81 MG 81 MG: 81 TABLET ORAL at 09:59

## 2018-01-01 RX ADMIN — ENOXAPARIN SODIUM 40 MG: 100 INJECTION SUBCUTANEOUS at 20:37

## 2018-01-01 RX ADMIN — GABAPENTIN 600 MG: 300 CAPSULE ORAL at 19:58

## 2018-01-01 RX ADMIN — METOPROLOL TARTRATE 12.5 MG: 25 TABLET ORAL at 09:28

## 2018-01-01 RX ADMIN — PIRFENIDONE 801 MG: 267 TABLET, FILM COATED ORAL at 17:48

## 2018-01-01 RX ADMIN — QUETIAPINE FUMARATE 25 MG: 25 TABLET ORAL at 21:24

## 2018-01-01 RX ADMIN — PIRFENIDONE 801 MG: 267 TABLET, FILM COATED ORAL at 11:13

## 2018-01-01 RX ADMIN — ENOXAPARIN SODIUM 40 MG: 100 INJECTION SUBCUTANEOUS at 20:25

## 2018-01-01 RX ADMIN — PIRFENIDONE 801 MG: 267 TABLET, FILM COATED ORAL at 09:55

## 2018-01-01 RX ADMIN — LORAZEPAM 1 MG: 2 INJECTION INTRAMUSCULAR; INTRAVENOUS at 08:46

## 2018-01-01 RX ADMIN — ENOXAPARIN SODIUM 40 MG: 100 INJECTION SUBCUTANEOUS at 20:46

## 2018-01-01 RX ADMIN — LORAZEPAM 0.5 MG: 0.5 TABLET ORAL at 19:58

## 2018-01-01 RX ADMIN — CETIRIZINE HYDROCHLORIDE 10 MG: 10 TABLET, FILM COATED ORAL at 09:58

## 2018-01-01 RX ADMIN — IPRATROPIUM BROMIDE AND ALBUTEROL SULFATE 3 ML: .5; 3 SOLUTION RESPIRATORY (INHALATION) at 20:40

## 2018-01-01 RX ADMIN — METOPROLOL TARTRATE 12.5 MG: 25 TABLET ORAL at 17:31

## 2018-01-01 RX ADMIN — IPRATROPIUM BROMIDE AND ALBUTEROL SULFATE 3 ML: .5; 3 SOLUTION RESPIRATORY (INHALATION) at 11:59

## 2018-01-01 RX ADMIN — IPRATROPIUM BROMIDE AND ALBUTEROL SULFATE 3 ML: .5; 3 SOLUTION RESPIRATORY (INHALATION) at 07:43

## 2018-01-01 RX ADMIN — GABAPENTIN 300 MG: 300 CAPSULE ORAL at 12:23

## 2018-01-01 RX ADMIN — MORPHINE SULFATE 1 MG: 4 INJECTION, SOLUTION INTRAMUSCULAR; INTRAVENOUS at 01:25

## 2018-01-01 RX ADMIN — IPRATROPIUM BROMIDE AND ALBUTEROL SULFATE 3 ML: .5; 3 SOLUTION RESPIRATORY (INHALATION) at 08:17

## 2018-01-01 RX ADMIN — GABAPENTIN 300 MG: 300 CAPSULE ORAL at 12:52

## 2018-01-01 RX ADMIN — BUDESONIDE 500 MCG: 0.5 INHALANT RESPIRATORY (INHALATION) at 20:22

## 2018-01-01 RX ADMIN — QUETIAPINE FUMARATE 25 MG: 25 TABLET ORAL at 23:03

## 2018-01-01 RX ADMIN — IPRATROPIUM BROMIDE AND ALBUTEROL SULFATE 3 ML: .5; 3 SOLUTION RESPIRATORY (INHALATION) at 00:02

## 2018-01-01 RX ADMIN — Medication 10 ML: at 13:29

## 2018-01-01 RX ADMIN — GUAIFENESIN 600 MG: 600 TABLET, EXTENDED RELEASE ORAL at 20:10

## 2018-01-01 RX ADMIN — GABAPENTIN 300 MG: 300 CAPSULE ORAL at 13:44

## 2018-01-01 RX ADMIN — ENOXAPARIN SODIUM 40 MG: 100 INJECTION SUBCUTANEOUS at 20:14

## 2018-01-01 RX ADMIN — BUDESONIDE 500 MCG: 0.5 INHALANT RESPIRATORY (INHALATION) at 20:15

## 2018-01-01 RX ADMIN — FLUTICASONE PROPIONATE 2 SPRAY: 50 SPRAY, METERED NASAL at 12:47

## 2018-01-01 RX ADMIN — ASPIRIN 81 MG 81 MG: 81 TABLET ORAL at 09:29

## 2018-01-01 RX ADMIN — PIRFENIDONE 801 MG: 267 TABLET, FILM COATED ORAL at 08:55

## 2018-01-01 RX ADMIN — BUDESONIDE 500 MCG: 0.5 INHALANT RESPIRATORY (INHALATION) at 19:49

## 2018-01-01 RX ADMIN — LORAZEPAM 1 MG: 2 INJECTION INTRAMUSCULAR; INTRAVENOUS at 05:28

## 2018-01-01 RX ADMIN — BUDESONIDE 500 MCG: 0.5 INHALANT RESPIRATORY (INHALATION) at 08:04

## 2018-01-01 RX ADMIN — BUDESONIDE 500 MCG: 0.5 INHALANT RESPIRATORY (INHALATION) at 19:35

## 2018-01-01 RX ADMIN — TAMSULOSIN HYDROCHLORIDE 0.4 MG: 0.4 CAPSULE ORAL at 09:28

## 2018-01-01 RX ADMIN — FINASTERIDE 5 MG: 5 TABLET, FILM COATED ORAL at 08:53

## 2018-01-01 RX ADMIN — GABAPENTIN 600 MG: 300 CAPSULE ORAL at 20:16

## 2018-01-01 RX ADMIN — PIRFENIDONE 801 MG: 267 TABLET, FILM COATED ORAL at 21:32

## 2018-01-01 RX ADMIN — IPRATROPIUM BROMIDE AND ALBUTEROL SULFATE 3 ML: .5; 3 SOLUTION RESPIRATORY (INHALATION) at 15:11

## 2018-01-01 RX ADMIN — PIRFENIDONE 801 MG: 267 TABLET, FILM COATED ORAL at 15:34

## 2018-01-01 RX ADMIN — BUDESONIDE 500 MCG: 0.5 INHALANT RESPIRATORY (INHALATION) at 20:31

## 2018-01-01 RX ADMIN — LORAZEPAM 0.5 MG: 0.5 TABLET ORAL at 02:15

## 2018-01-01 RX ADMIN — LORAZEPAM 2 MG: 1 TABLET ORAL at 05:29

## 2018-01-01 RX ADMIN — IPRATROPIUM BROMIDE AND ALBUTEROL SULFATE 3 ML: .5; 3 SOLUTION RESPIRATORY (INHALATION) at 19:18

## 2018-01-01 RX ADMIN — GABAPENTIN 300 MG: 300 CAPSULE ORAL at 20:22

## 2018-01-01 RX ADMIN — FINASTERIDE 5 MG: 5 TABLET, FILM COATED ORAL at 09:42

## 2018-01-01 RX ADMIN — Medication 10 ML: at 03:56

## 2018-01-01 RX ADMIN — BUDESONIDE 500 MCG: 0.5 INHALANT RESPIRATORY (INHALATION) at 07:32

## 2018-01-01 RX ADMIN — FINASTERIDE 5 MG: 5 TABLET, FILM COATED ORAL at 08:03

## 2018-01-01 RX ADMIN — IPRATROPIUM BROMIDE AND ALBUTEROL SULFATE 3 ML: .5; 3 SOLUTION RESPIRATORY (INHALATION) at 15:46

## 2018-01-01 RX ADMIN — IPRATROPIUM BROMIDE AND ALBUTEROL SULFATE 3 ML: .5; 3 SOLUTION RESPIRATORY (INHALATION) at 04:35

## 2018-01-01 RX ADMIN — LORAZEPAM 0.5 MG: 0.5 TABLET ORAL at 04:47

## 2018-01-01 RX ADMIN — IPRATROPIUM BROMIDE AND ALBUTEROL SULFATE 3 ML: .5; 3 SOLUTION RESPIRATORY (INHALATION) at 15:07

## 2018-01-01 RX ADMIN — IPRATROPIUM BROMIDE AND ALBUTEROL SULFATE 3 ML: .5; 3 SOLUTION RESPIRATORY (INHALATION) at 07:51

## 2018-01-01 RX ADMIN — PANTOPRAZOLE SODIUM 40 MG: 40 TABLET, DELAYED RELEASE ORAL at 10:45

## 2018-01-01 RX ADMIN — GUAIFENESIN 600 MG: 600 TABLET, EXTENDED RELEASE ORAL at 21:00

## 2018-01-01 RX ADMIN — FINASTERIDE 5 MG: 5 TABLET, FILM COATED ORAL at 12:50

## 2018-01-01 RX ADMIN — QUETIAPINE FUMARATE 12.5 MG: 25 TABLET ORAL at 21:33

## 2018-01-01 RX ADMIN — IPRATROPIUM BROMIDE AND ALBUTEROL SULFATE 3 ML: .5; 3 SOLUTION RESPIRATORY (INHALATION) at 11:32

## 2018-01-01 RX ADMIN — ENOXAPARIN SODIUM 40 MG: 100 INJECTION SUBCUTANEOUS at 22:41

## 2018-01-01 RX ADMIN — IPRATROPIUM BROMIDE AND ALBUTEROL SULFATE 3 ML: .5; 3 SOLUTION RESPIRATORY (INHALATION) at 20:06

## 2018-01-01 RX ADMIN — IPRATROPIUM BROMIDE AND ALBUTEROL SULFATE 3 ML: .5; 3 SOLUTION RESPIRATORY (INHALATION) at 11:53

## 2018-01-01 RX ADMIN — Medication 10 ML: at 07:03

## 2018-01-01 RX ADMIN — ASPIRIN 81 MG 81 MG: 81 TABLET ORAL at 08:03

## 2018-01-01 RX ADMIN — FLUTICASONE PROPIONATE 2 SPRAY: 50 SPRAY, METERED NASAL at 11:11

## 2018-01-01 RX ADMIN — TAMSULOSIN HYDROCHLORIDE 0.4 MG: 0.4 CAPSULE ORAL at 09:58

## 2018-01-01 RX ADMIN — QUETIAPINE FUMARATE 50 MG: 25 TABLET ORAL at 21:25

## 2018-01-01 RX ADMIN — IPRATROPIUM BROMIDE AND ALBUTEROL SULFATE 3 ML: .5; 3 SOLUTION RESPIRATORY (INHALATION) at 20:23

## 2018-01-01 RX ADMIN — LORAZEPAM 1 MG: 2 INJECTION INTRAMUSCULAR; INTRAVENOUS at 21:49

## 2018-01-01 RX ADMIN — IPRATROPIUM BROMIDE AND ALBUTEROL SULFATE 3 ML: .5; 3 SOLUTION RESPIRATORY (INHALATION) at 03:57

## 2018-01-01 RX ADMIN — FLUTICASONE PROPIONATE 2 SPRAY: 50 SPRAY, METERED NASAL at 08:06

## 2018-01-01 RX ADMIN — PIRFENIDONE 801 MG: 267 TABLET, FILM COATED ORAL at 17:20

## 2018-01-01 RX ADMIN — Medication 10 ML: at 15:17

## 2018-01-01 RX ADMIN — GUAIFENESIN 600 MG: 600 TABLET, EXTENDED RELEASE ORAL at 09:18

## 2018-01-01 RX ADMIN — MORPHINE SULFATE 2 MG: 4 INJECTION, SOLUTION INTRAMUSCULAR; INTRAVENOUS at 23:15

## 2018-01-01 RX ADMIN — Medication 10 ML: at 07:11

## 2018-01-01 RX ADMIN — LORAZEPAM 1 MG: 2 INJECTION INTRAMUSCULAR; INTRAVENOUS at 02:41

## 2018-01-01 RX ADMIN — PIRFENIDONE 801 MG: 267 TABLET, FILM COATED ORAL at 20:12

## 2018-01-01 RX ADMIN — GUAIFENESIN 600 MG: 600 TABLET, EXTENDED RELEASE ORAL at 21:33

## 2018-01-01 RX ADMIN — ARFORMOTEROL TARTRATE 15 MCG: 15 SOLUTION RESPIRATORY (INHALATION) at 19:56

## 2018-01-01 RX ADMIN — PIRFENIDONE 801 MG: 267 TABLET, FILM COATED ORAL at 16:02

## 2018-01-01 RX ADMIN — GABAPENTIN 300 MG: 300 CAPSULE ORAL at 12:20

## 2018-01-01 RX ADMIN — FINASTERIDE 5 MG: 5 TABLET, FILM COATED ORAL at 11:11

## 2018-01-01 RX ADMIN — IPRATROPIUM BROMIDE AND ALBUTEROL SULFATE 3 ML: .5; 3 SOLUTION RESPIRATORY (INHALATION) at 15:27

## 2018-01-01 RX ADMIN — MORPHINE SULFATE 2 MG: 4 INJECTION, SOLUTION INTRAMUSCULAR; INTRAVENOUS at 07:41

## 2018-01-01 RX ADMIN — FINASTERIDE 5 MG: 5 TABLET, FILM COATED ORAL at 12:49

## 2018-01-01 RX ADMIN — ARFORMOTEROL TARTRATE 15 MCG: 15 SOLUTION RESPIRATORY (INHALATION) at 08:04

## 2018-01-01 RX ADMIN — IPRATROPIUM BROMIDE AND ALBUTEROL SULFATE 3 ML: .5; 3 SOLUTION RESPIRATORY (INHALATION) at 11:28

## 2018-01-01 RX ADMIN — METOPROLOL TARTRATE 12.5 MG: 25 TABLET ORAL at 08:36

## 2018-01-01 RX ADMIN — ARFORMOTEROL TARTRATE 15 MCG: 15 SOLUTION RESPIRATORY (INHALATION) at 07:01

## 2018-01-01 RX ADMIN — Medication 10 ML: at 06:57

## 2018-01-01 RX ADMIN — CETIRIZINE HYDROCHLORIDE 10 MG: 10 TABLET, FILM COATED ORAL at 08:36

## 2018-01-01 RX ADMIN — LORAZEPAM 0.5 MG: 0.5 TABLET ORAL at 22:26

## 2018-01-01 RX ADMIN — LORAZEPAM 0.5 MG: 2 INJECTION INTRAMUSCULAR; INTRAVENOUS at 05:45

## 2018-01-01 RX ADMIN — METHYLPREDNISOLONE SODIUM SUCCINATE 40 MG: 40 INJECTION, POWDER, FOR SOLUTION INTRAMUSCULAR; INTRAVENOUS at 22:36

## 2018-01-01 RX ADMIN — GABAPENTIN 300 MG: 300 CAPSULE ORAL at 11:42

## 2018-01-01 RX ADMIN — IPRATROPIUM BROMIDE AND ALBUTEROL SULFATE 3 ML: .5; 3 SOLUTION RESPIRATORY (INHALATION) at 21:35

## 2018-01-01 RX ADMIN — LORAZEPAM 0.5 MG: 0.5 TABLET ORAL at 16:55

## 2018-01-01 RX ADMIN — LORAZEPAM 1 MG: 2 INJECTION INTRAMUSCULAR; INTRAVENOUS at 12:06

## 2018-01-01 RX ADMIN — Medication 10 ML: at 22:26

## 2018-01-01 RX ADMIN — PIRFENIDONE 801 MG: 267 TABLET, FILM COATED ORAL at 21:24

## 2018-01-01 RX ADMIN — PIRFENIDONE 801 MG: 267 TABLET, FILM COATED ORAL at 09:51

## 2018-01-01 RX ADMIN — Medication 10 ML: at 18:06

## 2018-01-01 RX ADMIN — IPRATROPIUM BROMIDE AND ALBUTEROL SULFATE 3 ML: .5; 3 SOLUTION RESPIRATORY (INHALATION) at 11:57

## 2018-01-01 RX ADMIN — PIRFENIDONE 801 MG: 267 TABLET, FILM COATED ORAL at 21:38

## 2018-01-01 RX ADMIN — ASPIRIN 81 MG 81 MG: 81 TABLET ORAL at 13:28

## 2018-01-01 RX ADMIN — MORPHINE SULFATE 2 MG: 4 INJECTION, SOLUTION INTRAMUSCULAR; INTRAVENOUS at 10:08

## 2018-01-01 RX ADMIN — PIRFENIDONE 801 MG: 267 TABLET, FILM COATED ORAL at 08:07

## 2018-01-01 RX ADMIN — CETIRIZINE HYDROCHLORIDE 10 MG: 10 TABLET, FILM COATED ORAL at 12:49

## 2018-01-01 RX ADMIN — BUDESONIDE 500 MCG: 0.5 INHALANT RESPIRATORY (INHALATION) at 08:40

## 2018-01-01 RX ADMIN — FLUTICASONE PROPIONATE 2 SPRAY: 50 SPRAY, METERED NASAL at 09:51

## 2018-01-01 RX ADMIN — METOPROLOL TARTRATE 12.5 MG: 25 TABLET ORAL at 09:20

## 2018-01-01 RX ADMIN — LORAZEPAM 0.5 MG: 2 INJECTION INTRAMUSCULAR; INTRAVENOUS at 00:54

## 2018-01-01 RX ADMIN — BUDESONIDE 500 MCG: 0.5 INHALANT RESPIRATORY (INHALATION) at 07:56

## 2018-01-01 RX ADMIN — LORAZEPAM 1 MG: 2 INJECTION INTRAMUSCULAR; INTRAVENOUS at 06:20

## 2018-01-01 RX ADMIN — TAMSULOSIN HYDROCHLORIDE 0.4 MG: 0.4 CAPSULE ORAL at 10:11

## 2018-01-01 RX ADMIN — Medication 10 ML: at 09:55

## 2018-01-01 RX ADMIN — BUDESONIDE 500 MCG: 0.5 INHALANT RESPIRATORY (INHALATION) at 20:23

## 2018-01-01 RX ADMIN — PIRFENIDONE 801 MG: 267 TABLET, FILM COATED ORAL at 18:31

## 2018-01-01 RX ADMIN — GABAPENTIN 300 MG: 300 CAPSULE ORAL at 15:48

## 2018-01-01 RX ADMIN — BUDESONIDE 500 MCG: 0.5 INHALANT RESPIRATORY (INHALATION) at 20:06

## 2018-01-01 RX ADMIN — GUAIFENESIN 600 MG: 600 TABLET, EXTENDED RELEASE ORAL at 21:23

## 2018-01-01 RX ADMIN — LORAZEPAM 0.5 MG: 0.5 TABLET ORAL at 17:09

## 2018-01-01 RX ADMIN — IPRATROPIUM BROMIDE AND ALBUTEROL SULFATE 3 ML: .5; 3 SOLUTION RESPIRATORY (INHALATION) at 20:41

## 2018-01-01 RX ADMIN — MORPHINE SULFATE 2 MG: 4 INJECTION, SOLUTION INTRAMUSCULAR; INTRAVENOUS at 19:00

## 2018-01-01 RX ADMIN — IPRATROPIUM BROMIDE AND ALBUTEROL SULFATE 3 ML: .5; 3 SOLUTION RESPIRATORY (INHALATION) at 15:29

## 2018-01-01 RX ADMIN — LORAZEPAM 0.5 MG: 0.5 TABLET ORAL at 20:14

## 2018-01-01 RX ADMIN — TAMSULOSIN HYDROCHLORIDE 0.4 MG: 0.4 CAPSULE ORAL at 11:11

## 2018-01-01 RX ADMIN — METOPROLOL TARTRATE 12.5 MG: 25 TABLET ORAL at 08:07

## 2018-01-01 RX ADMIN — CETIRIZINE HYDROCHLORIDE 10 MG: 10 TABLET, FILM COATED ORAL at 11:11

## 2018-01-01 RX ADMIN — BUDESONIDE 500 MCG: 0.5 INHALANT RESPIRATORY (INHALATION) at 20:21

## 2018-01-01 RX ADMIN — FLUTICASONE PROPIONATE 2 SPRAY: 50 SPRAY, METERED NASAL at 08:11

## 2018-01-01 RX ADMIN — QUETIAPINE FUMARATE 50 MG: 25 TABLET ORAL at 21:06

## 2018-01-01 RX ADMIN — BUDESONIDE 500 MCG: 0.5 INHALANT RESPIRATORY (INHALATION) at 20:17

## 2018-01-01 RX ADMIN — IPRATROPIUM BROMIDE AND ALBUTEROL SULFATE 3 ML: .5; 3 SOLUTION RESPIRATORY (INHALATION) at 15:22

## 2018-01-01 RX ADMIN — ASPIRIN 81 MG 81 MG: 81 TABLET ORAL at 09:21

## 2018-01-01 RX ADMIN — Medication 10 ML: at 05:42

## 2018-01-01 RX ADMIN — BUDESONIDE 500 MCG: 0.5 INHALANT RESPIRATORY (INHALATION) at 07:14

## 2018-01-01 RX ADMIN — LORAZEPAM 0.5 MG: 2 INJECTION INTRAMUSCULAR; INTRAVENOUS at 17:25

## 2018-01-01 RX ADMIN — PIRFENIDONE 801 MG: 267 TABLET, FILM COATED ORAL at 21:19

## 2018-01-01 RX ADMIN — GUAIFENESIN 600 MG: 600 TABLET, EXTENDED RELEASE ORAL at 09:21

## 2018-01-01 RX ADMIN — TAMSULOSIN HYDROCHLORIDE 0.4 MG: 0.4 CAPSULE ORAL at 08:53

## 2018-01-01 RX ADMIN — MORPHINE SULFATE 2 MG: 4 INJECTION, SOLUTION INTRAMUSCULAR; INTRAVENOUS at 19:33

## 2018-01-01 RX ADMIN — FINASTERIDE 5 MG: 5 TABLET, FILM COATED ORAL at 11:34

## 2018-01-01 RX ADMIN — IPRATROPIUM BROMIDE AND ALBUTEROL SULFATE 3 ML: .5; 3 SOLUTION RESPIRATORY (INHALATION) at 07:44

## 2018-01-01 RX ADMIN — METOPROLOL TARTRATE 12.5 MG: 25 TABLET ORAL at 12:46

## 2018-01-01 RX ADMIN — IPRATROPIUM BROMIDE AND ALBUTEROL SULFATE 3 ML: .5; 3 SOLUTION RESPIRATORY (INHALATION) at 03:02

## 2018-01-01 RX ADMIN — QUETIAPINE FUMARATE 50 MG: 25 TABLET ORAL at 21:38

## 2018-01-01 RX ADMIN — MORPHINE SULFATE 1 MG: 4 INJECTION, SOLUTION INTRAMUSCULAR; INTRAVENOUS at 10:49

## 2018-01-01 RX ADMIN — ENOXAPARIN SODIUM 40 MG: 100 INJECTION SUBCUTANEOUS at 21:48

## 2018-01-01 RX ADMIN — GABAPENTIN 600 MG: 300 CAPSULE ORAL at 22:40

## 2018-01-01 RX ADMIN — Medication 10 ML: at 16:49

## 2018-01-01 RX ADMIN — PANTOPRAZOLE SODIUM 40 MG: 40 TABLET, DELAYED RELEASE ORAL at 08:05

## 2018-01-01 RX ADMIN — METOPROLOL TARTRATE 12.5 MG: 25 TABLET ORAL at 18:49

## 2018-01-01 RX ADMIN — IPRATROPIUM BROMIDE AND ALBUTEROL SULFATE 3 ML: .5; 3 SOLUTION RESPIRATORY (INHALATION) at 23:45

## 2018-01-01 RX ADMIN — PIRFENIDONE 801 MG: 267 TABLET, FILM COATED ORAL at 15:37

## 2018-01-01 RX ADMIN — ASPIRIN 81 MG 81 MG: 81 TABLET ORAL at 10:11

## 2018-01-01 RX ADMIN — METOPROLOL TARTRATE 12.5 MG: 25 TABLET ORAL at 18:21

## 2018-01-01 RX ADMIN — ENOXAPARIN SODIUM 40 MG: 100 INJECTION SUBCUTANEOUS at 20:57

## 2018-01-01 RX ADMIN — PANTOPRAZOLE SODIUM 40 MG: 40 TABLET, DELAYED RELEASE ORAL at 07:44

## 2018-01-01 RX ADMIN — GUAIFENESIN 600 MG: 600 TABLET, EXTENDED RELEASE ORAL at 09:28

## 2018-01-01 RX ADMIN — MORPHINE SULFATE 2 MG: 4 INJECTION, SOLUTION INTRAMUSCULAR; INTRAVENOUS at 21:16

## 2018-01-01 RX ADMIN — Medication 10 ML: at 05:52

## 2018-01-01 RX ADMIN — PIRFENIDONE 801 MG: 267 TABLET, FILM COATED ORAL at 13:03

## 2018-01-01 RX ADMIN — GUAIFENESIN 600 MG: 600 TABLET, EXTENDED RELEASE ORAL at 21:25

## 2018-01-01 RX ADMIN — LORAZEPAM 2 MG: 2 INJECTION INTRAMUSCULAR; INTRAVENOUS at 18:14

## 2018-01-01 RX ADMIN — GABAPENTIN 300 MG: 300 CAPSULE ORAL at 13:27

## 2018-01-01 RX ADMIN — IPRATROPIUM BROMIDE AND ALBUTEROL SULFATE 3 ML: .5; 3 SOLUTION RESPIRATORY (INHALATION) at 04:52

## 2018-01-01 RX ADMIN — PANTOPRAZOLE SODIUM 40 MG: 40 TABLET, DELAYED RELEASE ORAL at 09:34

## 2018-01-01 RX ADMIN — MORPHINE SULFATE 2 MG: 4 INJECTION, SOLUTION INTRAMUSCULAR; INTRAVENOUS at 23:43

## 2018-01-01 RX ADMIN — IPRATROPIUM BROMIDE AND ALBUTEROL SULFATE 3 ML: .5; 3 SOLUTION RESPIRATORY (INHALATION) at 12:15

## 2018-01-01 RX ADMIN — QUETIAPINE FUMARATE 50 MG: 25 TABLET ORAL at 21:03

## 2018-01-01 RX ADMIN — METOPROLOL TARTRATE 12.5 MG: 25 TABLET ORAL at 10:11

## 2018-01-01 RX ADMIN — TAMSULOSIN HYDROCHLORIDE 0.4 MG: 0.4 CAPSULE ORAL at 12:46

## 2018-01-01 RX ADMIN — ENOXAPARIN SODIUM 40 MG: 100 INJECTION SUBCUTANEOUS at 21:04

## 2018-01-01 RX ADMIN — LORAZEPAM 1 MG: 2 INJECTION INTRAMUSCULAR; INTRAVENOUS at 14:49

## 2018-01-01 RX ADMIN — PIRFENIDONE 801 MG: 267 TABLET, FILM COATED ORAL at 13:18

## 2018-01-01 RX ADMIN — IPRATROPIUM BROMIDE AND ALBUTEROL SULFATE 3 ML: .5; 3 SOLUTION RESPIRATORY (INHALATION) at 23:53

## 2018-01-01 RX ADMIN — BUDESONIDE 500 MCG: 0.5 INHALANT RESPIRATORY (INHALATION) at 08:14

## 2018-01-01 RX ADMIN — ARFORMOTEROL TARTRATE 15 MCG: 15 SOLUTION RESPIRATORY (INHALATION) at 07:50

## 2018-01-01 RX ADMIN — LORAZEPAM 0.5 MG: 0.5 TABLET ORAL at 01:34

## 2018-01-01 RX ADMIN — IPRATROPIUM BROMIDE AND ALBUTEROL SULFATE 3 ML: .5; 3 SOLUTION RESPIRATORY (INHALATION) at 07:58

## 2018-01-01 RX ADMIN — PIRFENIDONE 801 MG: 267 TABLET, FILM COATED ORAL at 17:31

## 2018-01-01 RX ADMIN — IPRATROPIUM BROMIDE AND ALBUTEROL SULFATE 3 ML: .5; 3 SOLUTION RESPIRATORY (INHALATION) at 15:48

## 2018-01-01 RX ADMIN — Medication 10 ML: at 06:10

## 2018-01-01 RX ADMIN — Medication 10 ML: at 21:38

## 2018-01-01 RX ADMIN — IPRATROPIUM BROMIDE AND ALBUTEROL SULFATE 3 ML: .5; 3 SOLUTION RESPIRATORY (INHALATION) at 01:03

## 2018-01-01 RX ADMIN — IPRATROPIUM BROMIDE AND ALBUTEROL SULFATE 3 ML: .5; 3 SOLUTION RESPIRATORY (INHALATION) at 19:35

## 2018-01-01 RX ADMIN — IPRATROPIUM BROMIDE AND ALBUTEROL SULFATE 3 ML: .5; 3 SOLUTION RESPIRATORY (INHALATION) at 23:11

## 2018-01-01 RX ADMIN — ASPIRIN 81 MG 81 MG: 81 TABLET ORAL at 08:07

## 2018-01-01 RX ADMIN — TAMSULOSIN HYDROCHLORIDE 0.4 MG: 0.4 CAPSULE ORAL at 08:36

## 2018-01-01 RX ADMIN — IPRATROPIUM BROMIDE AND ALBUTEROL SULFATE 3 ML: .5; 3 SOLUTION RESPIRATORY (INHALATION) at 15:21

## 2018-01-01 RX ADMIN — IPRATROPIUM BROMIDE AND ALBUTEROL SULFATE 3 ML: .5; 3 SOLUTION RESPIRATORY (INHALATION) at 15:00

## 2018-01-01 RX ADMIN — Medication 10 ML: at 15:18

## 2018-01-01 RX ADMIN — GABAPENTIN 600 MG: 300 CAPSULE ORAL at 20:57

## 2018-01-01 RX ADMIN — MORPHINE SULFATE 2 MG: 4 INJECTION, SOLUTION INTRAMUSCULAR; INTRAVENOUS at 02:40

## 2018-01-01 RX ADMIN — LORAZEPAM 1 MG: 2 INJECTION INTRAMUSCULAR; INTRAVENOUS at 22:02

## 2018-01-01 RX ADMIN — LORAZEPAM 0.5 MG: 2 INJECTION INTRAMUSCULAR; INTRAVENOUS at 20:00

## 2018-01-01 RX ADMIN — MORPHINE SULFATE 2 MG: 4 INJECTION, SOLUTION INTRAMUSCULAR; INTRAVENOUS at 00:54

## 2018-01-01 RX ADMIN — LORAZEPAM 0.5 MG: 0.5 TABLET ORAL at 12:23

## 2018-01-01 RX ADMIN — IPRATROPIUM BROMIDE AND ALBUTEROL SULFATE 3 ML: .5; 3 SOLUTION RESPIRATORY (INHALATION) at 00:50

## 2018-01-01 RX ADMIN — TAMSULOSIN HYDROCHLORIDE 0.4 MG: 0.4 CAPSULE ORAL at 09:53

## 2018-01-01 RX ADMIN — METOPROLOL TARTRATE 12.5 MG: 25 TABLET ORAL at 09:18

## 2018-01-01 RX ADMIN — IPRATROPIUM BROMIDE AND ALBUTEROL SULFATE 3 ML: .5; 3 SOLUTION RESPIRATORY (INHALATION) at 23:36

## 2018-01-01 RX ADMIN — GUAIFENESIN 600 MG: 600 TABLET, EXTENDED RELEASE ORAL at 12:49

## 2018-01-01 RX ADMIN — BUDESONIDE 500 MCG: 0.5 INHALANT RESPIRATORY (INHALATION) at 19:44

## 2018-01-01 RX ADMIN — PIRFENIDONE 801 MG: 267 TABLET, FILM COATED ORAL at 21:55

## 2018-01-01 RX ADMIN — GUAIFENESIN 600 MG: 600 TABLET, EXTENDED RELEASE ORAL at 12:46

## 2018-01-01 RX ADMIN — GABAPENTIN 300 MG: 300 CAPSULE ORAL at 12:50

## 2018-01-01 RX ADMIN — IPRATROPIUM BROMIDE AND ALBUTEROL SULFATE 3 ML: .5; 3 SOLUTION RESPIRATORY (INHALATION) at 07:23

## 2018-01-01 RX ADMIN — PIRFENIDONE 801 MG: 267 TABLET, FILM COATED ORAL at 21:33

## 2018-01-01 RX ADMIN — PIRFENIDONE 801 MG: 267 TABLET, FILM COATED ORAL at 14:02

## 2018-01-01 RX ADMIN — IPRATROPIUM BROMIDE AND ALBUTEROL SULFATE 3 ML: .5; 3 SOLUTION RESPIRATORY (INHALATION) at 08:41

## 2018-01-01 RX ADMIN — GABAPENTIN 600 MG: 300 CAPSULE ORAL at 20:14

## 2018-01-01 RX ADMIN — IPRATROPIUM BROMIDE AND ALBUTEROL SULFATE 3 ML: .5; 3 SOLUTION RESPIRATORY (INHALATION) at 00:00

## 2018-01-01 RX ADMIN — IPRATROPIUM BROMIDE AND ALBUTEROL SULFATE 3 ML: .5; 3 SOLUTION RESPIRATORY (INHALATION) at 20:22

## 2018-01-01 RX ADMIN — ASPIRIN 81 MG 81 MG: 81 TABLET ORAL at 12:49

## 2018-01-01 RX ADMIN — METOPROLOL TARTRATE 12.5 MG: 25 TABLET ORAL at 18:29

## 2018-01-01 RX ADMIN — PIRFENIDONE 801 MG: 267 TABLET, FILM COATED ORAL at 08:08

## 2018-01-01 RX ADMIN — IPRATROPIUM BROMIDE AND ALBUTEROL SULFATE 3 ML: .5; 3 SOLUTION RESPIRATORY (INHALATION) at 18:26

## 2018-01-01 RX ADMIN — IPRATROPIUM BROMIDE AND ALBUTEROL SULFATE 3 ML: .5; 3 SOLUTION RESPIRATORY (INHALATION) at 00:06

## 2018-01-01 RX ADMIN — Medication 10 ML: at 20:40

## 2018-01-01 RX ADMIN — ASPIRIN 81 MG 81 MG: 81 TABLET ORAL at 08:37

## 2018-01-01 RX ADMIN — IPRATROPIUM BROMIDE AND ALBUTEROL SULFATE 3 ML: .5; 3 SOLUTION RESPIRATORY (INHALATION) at 03:10

## 2018-01-01 RX ADMIN — ARFORMOTEROL TARTRATE 15 MCG: 15 SOLUTION RESPIRATORY (INHALATION) at 07:56

## 2018-01-01 RX ADMIN — GABAPENTIN 300 MG: 300 CAPSULE ORAL at 11:37

## 2018-01-01 RX ADMIN — FINASTERIDE 5 MG: 5 TABLET, FILM COATED ORAL at 09:29

## 2018-01-01 RX ADMIN — ENOXAPARIN SODIUM 40 MG: 100 INJECTION SUBCUTANEOUS at 20:44

## 2018-01-01 RX ADMIN — METOPROLOL TARTRATE 12.5 MG: 25 TABLET ORAL at 18:52

## 2018-01-01 RX ADMIN — LORAZEPAM 1 MG: 2 INJECTION INTRAMUSCULAR; INTRAVENOUS at 11:35

## 2018-01-01 RX ADMIN — ENOXAPARIN SODIUM 40 MG: 100 INJECTION SUBCUTANEOUS at 20:00

## 2018-01-01 RX ADMIN — CETIRIZINE HYDROCHLORIDE 10 MG: 10 TABLET, FILM COATED ORAL at 08:53

## 2018-01-01 RX ADMIN — GUAIFENESIN 600 MG: 600 TABLET, EXTENDED RELEASE ORAL at 20:16

## 2018-01-01 RX ADMIN — GUAIFENESIN 600 MG: 600 TABLET, EXTENDED RELEASE ORAL at 09:42

## 2018-01-01 RX ADMIN — MORPHINE SULFATE 2 MG: 4 INJECTION, SOLUTION INTRAMUSCULAR; INTRAVENOUS at 03:13

## 2018-01-01 RX ADMIN — Medication 10 ML: at 21:59

## 2018-01-01 RX ADMIN — PANTOPRAZOLE SODIUM 40 MG: 40 TABLET, DELAYED RELEASE ORAL at 16:59

## 2018-01-01 RX ADMIN — PIRFENIDONE 801 MG: 267 TABLET, FILM COATED ORAL at 13:07

## 2018-01-01 RX ADMIN — MORPHINE SULFATE 2 MG: 4 INJECTION, SOLUTION INTRAMUSCULAR; INTRAVENOUS at 22:23

## 2018-01-01 RX ADMIN — BUDESONIDE 500 MCG: 0.5 INHALANT RESPIRATORY (INHALATION) at 07:23

## 2018-01-01 RX ADMIN — IPRATROPIUM BROMIDE AND ALBUTEROL SULFATE 3 ML: .5; 3 SOLUTION RESPIRATORY (INHALATION) at 00:36

## 2018-01-01 RX ADMIN — FINASTERIDE 5 MG: 5 TABLET, FILM COATED ORAL at 09:34

## 2018-01-01 RX ADMIN — QUETIAPINE FUMARATE 25 MG: 25 TABLET ORAL at 22:36

## 2018-01-01 RX ADMIN — QUETIAPINE FUMARATE 50 MG: 25 TABLET ORAL at 22:25

## 2018-01-01 RX ADMIN — CETIRIZINE HYDROCHLORIDE 10 MG: 10 TABLET, FILM COATED ORAL at 09:42

## 2018-01-01 RX ADMIN — GABAPENTIN 600 MG: 300 CAPSULE ORAL at 21:32

## 2018-01-01 RX ADMIN — METOPROLOL TARTRATE 12.5 MG: 25 TABLET ORAL at 10:57

## 2018-01-01 RX ADMIN — TAMSULOSIN HYDROCHLORIDE 0.4 MG: 0.4 CAPSULE ORAL at 09:52

## 2018-01-01 RX ADMIN — GABAPENTIN 600 MG: 300 CAPSULE ORAL at 22:05

## 2018-01-01 RX ADMIN — GABAPENTIN 600 MG: 300 CAPSULE ORAL at 21:23

## 2018-01-01 RX ADMIN — ENOXAPARIN SODIUM 40 MG: 100 INJECTION SUBCUTANEOUS at 20:17

## 2018-01-01 RX ADMIN — ENOXAPARIN SODIUM 40 MG: 100 INJECTION SUBCUTANEOUS at 20:38

## 2018-01-01 RX ADMIN — Medication 10 ML: at 21:32

## 2018-01-01 RX ADMIN — PANTOPRAZOLE SODIUM 40 MG: 40 TABLET, DELAYED RELEASE ORAL at 07:00

## 2018-01-01 RX ADMIN — ENOXAPARIN SODIUM 40 MG: 100 INJECTION SUBCUTANEOUS at 20:08

## 2018-01-01 RX ADMIN — GABAPENTIN 600 MG: 300 CAPSULE ORAL at 21:48

## 2018-01-01 RX ADMIN — FINASTERIDE 5 MG: 5 TABLET, FILM COATED ORAL at 10:57

## 2018-01-01 RX ADMIN — GABAPENTIN 300 MG: 300 CAPSULE ORAL at 18:51

## 2018-01-01 RX ADMIN — PIRFENIDONE 801 MG: 267 TABLET, FILM COATED ORAL at 11:12

## 2018-01-01 RX ADMIN — ENOXAPARIN SODIUM 40 MG: 100 INJECTION SUBCUTANEOUS at 20:16

## 2018-01-01 RX ADMIN — METHYLPREDNISOLONE SODIUM SUCCINATE 60 MG: 40 INJECTION, POWDER, FOR SOLUTION INTRAMUSCULAR; INTRAVENOUS at 12:09

## 2018-01-01 RX ADMIN — ARFORMOTEROL TARTRATE 15 MCG: 15 SOLUTION RESPIRATORY (INHALATION) at 08:13

## 2018-01-01 RX ADMIN — ARFORMOTEROL TARTRATE 15 MCG: 15 SOLUTION RESPIRATORY (INHALATION) at 21:35

## 2018-01-01 RX ADMIN — IPRATROPIUM BROMIDE AND ALBUTEROL SULFATE 3 ML: .5; 3 SOLUTION RESPIRATORY (INHALATION) at 00:35

## 2018-01-01 RX ADMIN — BUDESONIDE 500 MCG: 0.5 INHALANT RESPIRATORY (INHALATION) at 07:47

## 2018-01-01 RX ADMIN — METOPROLOL TARTRATE 12.5 MG: 25 TABLET ORAL at 12:50

## 2018-01-01 RX ADMIN — CETIRIZINE HYDROCHLORIDE 10 MG: 10 TABLET, FILM COATED ORAL at 10:11

## 2018-01-01 RX ADMIN — PIRFENIDONE 801 MG: 267 TABLET, FILM COATED ORAL at 09:27

## 2018-01-01 RX ADMIN — FINASTERIDE 5 MG: 5 TABLET, FILM COATED ORAL at 09:26

## 2018-01-01 RX ADMIN — METOPROLOL TARTRATE 12.5 MG: 25 TABLET ORAL at 11:32

## 2018-01-01 RX ADMIN — PANTOPRAZOLE SODIUM 40 MG: 40 TABLET, DELAYED RELEASE ORAL at 09:42

## 2018-01-01 RX ADMIN — IPRATROPIUM BROMIDE AND ALBUTEROL SULFATE 3 ML: .5; 3 SOLUTION RESPIRATORY (INHALATION) at 03:18

## 2018-01-01 RX ADMIN — IPRATROPIUM BROMIDE AND ALBUTEROL SULFATE 3 ML: .5; 3 SOLUTION RESPIRATORY (INHALATION) at 12:13

## 2018-01-01 RX ADMIN — ASPIRIN 81 MG 81 MG: 81 TABLET ORAL at 09:18

## 2018-01-01 RX ADMIN — LORAZEPAM 0.5 MG: 2 INJECTION INTRAMUSCULAR; INTRAVENOUS at 01:17

## 2018-01-01 RX ADMIN — FLUTICASONE PROPIONATE 2 SPRAY: 50 SPRAY, METERED NASAL at 08:55

## 2018-01-01 RX ADMIN — LORAZEPAM 0.5 MG: 0.5 TABLET ORAL at 21:03

## 2018-01-01 RX ADMIN — IPRATROPIUM BROMIDE AND ALBUTEROL SULFATE 3 ML: .5; 3 SOLUTION RESPIRATORY (INHALATION) at 00:46

## 2018-01-01 RX ADMIN — LORAZEPAM 0.5 MG: 0.5 TABLET ORAL at 15:20

## 2018-01-01 RX ADMIN — FINASTERIDE 5 MG: 5 TABLET, FILM COATED ORAL at 08:37

## 2018-01-01 RX ADMIN — IPRATROPIUM BROMIDE AND ALBUTEROL SULFATE 3 ML: .5; 3 SOLUTION RESPIRATORY (INHALATION) at 16:17

## 2018-01-01 RX ADMIN — IPRATROPIUM BROMIDE AND ALBUTEROL SULFATE 3 ML: .5; 3 SOLUTION RESPIRATORY (INHALATION) at 23:20

## 2018-01-01 RX ADMIN — Medication 10 ML: at 17:23

## 2018-01-01 RX ADMIN — MORPHINE SULFATE 2 MG: 4 INJECTION, SOLUTION INTRAMUSCULAR; INTRAVENOUS at 14:48

## 2018-01-01 RX ADMIN — GABAPENTIN 300 MG: 300 CAPSULE ORAL at 11:54

## 2018-01-01 RX ADMIN — GUAIFENESIN 600 MG: 600 TABLET, EXTENDED RELEASE ORAL at 22:05

## 2018-01-01 RX ADMIN — IPRATROPIUM BROMIDE AND ALBUTEROL SULFATE 3 ML: .5; 3 SOLUTION RESPIRATORY (INHALATION) at 00:14

## 2018-01-01 RX ADMIN — PIRFENIDONE 801 MG: 267 TABLET, FILM COATED ORAL at 12:47

## 2018-01-01 RX ADMIN — Medication 10 ML: at 20:58

## 2018-01-01 RX ADMIN — IPRATROPIUM BROMIDE AND ALBUTEROL SULFATE 3 ML: .5; 3 SOLUTION RESPIRATORY (INHALATION) at 08:04

## 2018-01-01 RX ADMIN — LORAZEPAM 0.5 MG: 0.5 TABLET ORAL at 15:16

## 2018-01-01 RX ADMIN — LORAZEPAM 0.5 MG: 2 INJECTION INTRAMUSCULAR; INTRAVENOUS at 12:54

## 2018-01-01 RX ADMIN — METOPROLOL TARTRATE 12.5 MG: 25 TABLET ORAL at 09:54

## 2018-01-01 RX ADMIN — GABAPENTIN 300 MG: 300 CAPSULE ORAL at 12:10

## 2018-01-01 RX ADMIN — METOPROLOL TARTRATE 12.5 MG: 25 TABLET ORAL at 17:48

## 2018-01-01 RX ADMIN — PIRFENIDONE 801 MG: 267 TABLET, FILM COATED ORAL at 10:43

## 2018-01-01 RX ADMIN — BUDESONIDE 500 MCG: 0.5 INHALANT RESPIRATORY (INHALATION) at 07:51

## 2018-01-01 RX ADMIN — LORAZEPAM 0.5 MG: 2 INJECTION INTRAMUSCULAR; INTRAVENOUS at 09:50

## 2018-01-01 RX ADMIN — CETIRIZINE HYDROCHLORIDE 10 MG: 10 TABLET, FILM COATED ORAL at 10:45

## 2018-01-01 RX ADMIN — Medication 10 ML: at 06:41

## 2018-01-01 RX ADMIN — Medication 10 ML: at 22:41

## 2018-01-01 RX ADMIN — LORAZEPAM 0.5 MG: 2 INJECTION INTRAMUSCULAR; INTRAVENOUS at 15:18

## 2018-01-01 RX ADMIN — Medication 10 ML: at 09:50

## 2018-01-01 RX ADMIN — Medication 10 ML: at 00:42

## 2018-01-01 RX ADMIN — LORAZEPAM 0.5 MG: 2 INJECTION INTRAMUSCULAR; INTRAVENOUS at 07:41

## 2018-01-01 RX ADMIN — LORAZEPAM 2 MG: 1 TABLET ORAL at 10:11

## 2018-01-01 RX ADMIN — IPRATROPIUM BROMIDE AND ALBUTEROL SULFATE 3 ML: .5; 3 SOLUTION RESPIRATORY (INHALATION) at 20:32

## 2018-01-01 RX ADMIN — PIRFENIDONE 801 MG: 267 TABLET, FILM COATED ORAL at 08:06

## 2018-01-01 RX ADMIN — GABAPENTIN 600 MG: 300 CAPSULE ORAL at 20:02

## 2018-01-01 RX ADMIN — TAMSULOSIN HYDROCHLORIDE 0.4 MG: 0.4 CAPSULE ORAL at 09:21

## 2018-01-01 RX ADMIN — TAMSULOSIN HYDROCHLORIDE 0.4 MG: 0.4 CAPSULE ORAL at 08:03

## 2018-01-01 RX ADMIN — METOPROLOL TARTRATE 12.5 MG: 25 TABLET ORAL at 09:59

## 2018-01-01 RX ADMIN — METOPROLOL TARTRATE 12.5 MG: 25 TABLET ORAL at 18:15

## 2018-01-01 RX ADMIN — TAMSULOSIN HYDROCHLORIDE 0.4 MG: 0.4 CAPSULE ORAL at 12:50

## 2018-01-01 RX ADMIN — PIRFENIDONE 801 MG: 267 TABLET, FILM COATED ORAL at 11:00

## 2018-01-01 RX ADMIN — BUDESONIDE 500 MCG: 0.5 INHALANT RESPIRATORY (INHALATION) at 20:40

## 2018-01-01 RX ADMIN — TAMSULOSIN HYDROCHLORIDE 0.4 MG: 0.4 CAPSULE ORAL at 08:05

## 2018-01-01 RX ADMIN — LORAZEPAM 2 MG: 2 INJECTION INTRAMUSCULAR; INTRAVENOUS at 09:29

## 2018-01-01 RX ADMIN — LORAZEPAM 1 MG: 2 INJECTION INTRAMUSCULAR; INTRAVENOUS at 21:21

## 2018-01-01 RX ADMIN — IPRATROPIUM BROMIDE AND ALBUTEROL SULFATE 3 ML: .5; 3 SOLUTION RESPIRATORY (INHALATION) at 08:01

## 2018-01-01 RX ADMIN — GABAPENTIN 600 MG: 300 CAPSULE ORAL at 20:45

## 2018-01-01 RX ADMIN — ARFORMOTEROL TARTRATE 15 MCG: 15 SOLUTION RESPIRATORY (INHALATION) at 20:21

## 2018-01-01 RX ADMIN — GUAIFENESIN 600 MG: 600 TABLET, EXTENDED RELEASE ORAL at 11:11

## 2018-01-01 RX ADMIN — METOPROLOL TARTRATE 12.5 MG: 25 TABLET ORAL at 09:27

## 2018-01-01 RX ADMIN — Medication 10 ML: at 21:39

## 2018-01-01 RX ADMIN — CETIRIZINE HYDROCHLORIDE 10 MG: 10 TABLET, FILM COATED ORAL at 10:57

## 2018-01-01 RX ADMIN — MORPHINE SULFATE 2 MG: 4 INJECTION, SOLUTION INTRAMUSCULAR; INTRAVENOUS at 12:54

## 2018-01-01 RX ADMIN — ARFORMOTEROL TARTRATE 15 MCG: 15 SOLUTION RESPIRATORY (INHALATION) at 20:17

## 2018-01-01 RX ADMIN — Medication 10 ML: at 05:41

## 2018-01-01 RX ADMIN — GUAIFENESIN 600 MG: 600 TABLET, EXTENDED RELEASE ORAL at 22:15

## 2018-01-01 RX ADMIN — MORPHINE SULFATE 2 MG: 4 INJECTION, SOLUTION INTRAMUSCULAR; INTRAVENOUS at 14:49

## 2018-01-01 RX ADMIN — GUAIFENESIN 600 MG: 600 TABLET, EXTENDED RELEASE ORAL at 21:32

## 2018-01-01 RX ADMIN — PANTOPRAZOLE SODIUM 40 MG: 40 TABLET, DELAYED RELEASE ORAL at 11:32

## 2018-01-01 RX ADMIN — Medication 10 ML: at 08:09

## 2018-01-01 RX ADMIN — GABAPENTIN 300 MG: 300 CAPSULE ORAL at 11:58

## 2018-01-01 RX ADMIN — MORPHINE SULFATE 2 MG: 4 INJECTION, SOLUTION INTRAMUSCULAR; INTRAVENOUS at 11:22

## 2018-01-01 RX ADMIN — CETIRIZINE HYDROCHLORIDE 10 MG: 10 TABLET, FILM COATED ORAL at 12:46

## 2018-01-01 RX ADMIN — GABAPENTIN 300 MG: 300 CAPSULE ORAL at 15:16

## 2018-01-01 RX ADMIN — IPRATROPIUM BROMIDE AND ALBUTEROL SULFATE 3 ML: .5; 3 SOLUTION RESPIRATORY (INHALATION) at 07:35

## 2018-01-01 RX ADMIN — ENOXAPARIN SODIUM 40 MG: 100 INJECTION SUBCUTANEOUS at 21:32

## 2018-01-01 RX ADMIN — PIRFENIDONE 801 MG: 267 TABLET, FILM COATED ORAL at 21:43

## 2018-01-01 RX ADMIN — MORPHINE SULFATE 2 MG: 4 INJECTION, SOLUTION INTRAMUSCULAR; INTRAVENOUS at 11:58

## 2018-01-01 RX ADMIN — ASPIRIN 81 MG 81 MG: 81 TABLET ORAL at 10:45

## 2018-01-01 RX ADMIN — IPRATROPIUM BROMIDE AND ALBUTEROL SULFATE 3 ML: .5; 3 SOLUTION RESPIRATORY (INHALATION) at 03:24

## 2018-01-01 RX ADMIN — BUDESONIDE 500 MCG: 0.5 INHALANT RESPIRATORY (INHALATION) at 08:24

## 2018-01-01 RX ADMIN — GABAPENTIN 300 MG: 300 CAPSULE ORAL at 13:03

## 2018-01-01 RX ADMIN — ENOXAPARIN SODIUM 40 MG: 100 INJECTION SUBCUTANEOUS at 21:23

## 2018-01-01 RX ADMIN — METOPROLOL TARTRATE 12.5 MG: 25 TABLET ORAL at 18:48

## 2018-01-01 RX ADMIN — ASPIRIN 81 MG 81 MG: 81 TABLET ORAL at 11:34

## 2018-01-01 RX ADMIN — LORAZEPAM 1 MG: 2 INJECTION INTRAMUSCULAR; INTRAVENOUS at 19:30

## 2018-01-01 RX ADMIN — Medication 10 ML: at 22:37

## 2018-01-01 RX ADMIN — GUAIFENESIN 600 MG: 600 TABLET, EXTENDED RELEASE ORAL at 09:34

## 2018-01-01 RX ADMIN — PIRFENIDONE 801 MG: 267 TABLET, FILM COATED ORAL at 16:03

## 2018-01-01 RX ADMIN — GUAIFENESIN 600 MG: 600 TABLET, EXTENDED RELEASE ORAL at 11:34

## 2018-01-01 RX ADMIN — GABAPENTIN 600 MG: 300 CAPSULE ORAL at 21:00

## 2018-01-01 RX ADMIN — LORAZEPAM 2 MG: 1 TABLET ORAL at 21:33

## 2018-01-01 RX ADMIN — METOPROLOL TARTRATE 12.5 MG: 25 TABLET ORAL at 18:31

## 2018-01-01 RX ADMIN — GUAIFENESIN 600 MG: 600 TABLET, EXTENDED RELEASE ORAL at 08:02

## 2018-01-01 RX ADMIN — FINASTERIDE 5 MG: 5 TABLET, FILM COATED ORAL at 09:18

## 2018-08-25 NOTE — Clinical Note
Status[de-identified] Inpatient [101] Type of Bed: Medical [8] Inpatient Hospitalization Certified Necessary for the Following Reasons: 3. Patient receiving treatment that can only be provided in an inpatient setting (further clarification in H&P documentation) Admitting Diagnosis: Acute on chronic respiratory failure ([facility] [[id number]] Admitting Physician: She Francois Attending Physician: She Francois Estimated Length of Stay: 2 Midnights Discharge Plan[de-identified] Other (Specify)

## 2018-08-26 PROBLEM — J96.20 ACUTE ON CHRONIC RESPIRATORY FAILURE (HCC): Status: ACTIVE | Noted: 2018-01-01

## 2018-08-26 NOTE — PROGRESS NOTES
Advance Care Planning (ACP) Provider Note - Comprehensive      Date of ACP Conversation: 08/26/18    Persons included in Conversation:  patient and family  Length of ACP Conversation in minutes:  20 minutes     Authorized Decision Maker (if patient is incapable of making informed decisions): This person is: Other Legally Authorized Decision Maker (e.g. Next of Kin)       General ACP for ALL Patients with Decision Making Capacity:   Importance of advance care planning, including choosing a healthcare agent to communicate patient's healthcare decisions if patient lost the ability to make decisions, such as after a sudden illness or accident  Exploration of values, goals, and preferences if recovery is not expected, even with continued medical treatment in the event of: cardiopulmonary arrest, imminent death  \"In these circumstances, what matters most to you? \"  Care focused more on quality of life. Pt and wife made it very clear to me that he would not heroic measures for resuscitation in the event of cardiopulmonary arrest, including intubation/mechanical ventilation.      Review of Existing Advance Directive:  Patient does have a current advance directive however not available for review     For Serious or Chronic Illness:  Met with wife and pt at length re: goals of care. She informs me at this time she is uncertain on whether or not she wants to pursue hospice. She had a lot of questions regarding the \"patient's heart\", EKG findings, and whether or not his heart is contributing to his symptoms. Pt has no chest pain, tightness, heaviness, squeezing. Noted EKG findings, and changes are likely driven by respiratory failure. Pt and wife want to get the patient home, but he is obviously not stable to do so as he is BiPAP dependent right now. On chart review, no labs, no CXR obtained.  We discussed adding bronchodilators and steroids to see if these measures would help with pt's s/sx, so that we could at least get the patient off NPPV and on nasal cannula as goal is able to get patient home, likely with home hospice. Palliative care and hospice consulted. Will also consult pulmonology for input.     Interventions Provided:  Referral made for ACP follow-up assistance to:  Palliative care, pulmonology, hospice  Confirmed DNR

## 2018-08-26 NOTE — PROGRESS NOTES
has attempted FV 2x. Pt has been preoccupied with staff at various points throughout the day. Spiritual care will continue to follow as able and as needed.      [address] John Grace M.Div, M.S, [place] 603 available at [facility]([phone])

## 2018-08-26 NOTE — ED NOTES
on phone requests to speak with wife at this time, Wife ambulatory out to nurses station to speak with MD.

## 2018-08-26 NOTE — PROGRESS NOTES
Bakari Mejia [facility]  [address] [place] [address], [address] [place] [address]  [phone]      Medical Progress Note      NAME: Karrie Christopher   :  1935  MRM:  [id number]    Date/Time: 2018          Subjective:     Chief Complaint:  F/U resp failure    Chart/notes/labs/studies reviewed, patient examined at bedside. Feels better on BiPAP. Felt comfortable earlier today. No chest pain. No fevers or chills. No cough          Objective:       Vitals:          Last 24hrs VS reviewed since prior progress note. Most recent are:    Visit Vitals    /58 (BP 1 Location: Right arm, BP Patient Position: At rest)    Pulse 90    Temp 97.9 °F (36.6 °C)    Resp 24    Ht 5' 10\" (1.778 m)    Wt 77.1 kg (170 lb)    SpO2 (!) 87%    BMI 24.39 kg/m2     SpO2 Readings from Last 6 Encounters:   18 (!) 87%   17 96%   10/13/14 94%   14 95%   14 93%   14 96%    O2 Flow Rate (L/min): 100 l/min   No intake or output data in the 24 hours ending 18 1440       Exam:     Physical Exam:    Gen:  Well-developed, well-nourished, chronically ill-appearing  HEENT:  Sclerae nonicteric, hearing intact to voice, mucous membranes moist  Neck:  Supple, without masses. Resp:  +accessory muscle use, increased WOB. Diminished air movement, no wheezes, rales, or rhonchi  Card: RRR, without m/r/g. No LE edema. Abd:  +bowel sounds, soft, NTTP, nondistended. Neuro: Face symmetric, tongue midline, speech fluent, follows commands appropriately  Psych:  Alert, oriented x 3.  Fair insight     Medications Reviewed: (see below)    Lab Data Reviewed: (see below)    ______________________________________________________________________    Medications:     Current Facility-Administered Medications   Medication Dose Route Frequency    sodium chloride (NS) flush 5-10 mL  5-10 mL IntraVENous Q8H    sodium chloride (NS) flush 5-10 mL  5-10 mL IntraVENous PRN    morphine injection 2 mg  2 mg IntraVENous Q3H PRN    LORazepam (ATIVAN) injection 2 mg  2 mg IntraVENous Q2H PRN    albuterol-ipratropium (DUO-NEB) 2.5 MG-0.5 MG/3 ML  3 mL Nebulization Q4H RT    methylPREDNISolone (PF) (SOLU-MEDROL) injection 60 mg  60 mg IntraVENous Q6H    [START ON 8/27/2018] cetirizine (ZYRTEC) tablet 10 mg  10 mg Oral DAILY    [START ON 8/27/2018] finasteride (PROSCAR) tablet 5 mg  5 mg Oral DAILY    budesonide (PULMICORT) 500 mcg/2 ml nebulizer suspension  500 mcg Nebulization BID RT    gabapentin (NEURONTIN) capsule 300 mg  300 mg Oral DAILY WITH LUNCH    gabapentin (NEURONTIN) capsule 600 mg  600 mg Oral QHS    [START ON 8/27/2018] pantoprazole (PROTONIX) tablet 40 mg  40 mg Oral ACB    pirfenidone tab 801 mg (Patient Supplied)  801 mg Oral TID    [START ON 8/27/2018] tamsulosin (FLOMAX) capsule 0.4 mg  0.4 mg Oral DAILY    [START ON 8/27/2018] aspirin chewable tablet 162 mg  162 mg Oral DAILY    enoxaparin (LOVENOX) injection 40 mg  40 mg SubCUTAneous Q24H            Lab Review:     Recent Labs      08/26/18   1306   WBC  7.6   HGB  13.2   HCT  41.0   PLT  267     Recent Labs      08/26/18   1306   NA  136   K  4.8   CL  100   CO2  29   GLU  78   BUN  28*   CREA  1.32*   CA  9.0   MG  2.0     No components found for: GLPOC  No results for input(s): PH, PCO2, PO2, HCO3, FIO2 in the last 72 hours. No results for input(s): INR in the last 72 hours. No lab exists for component: INREXT  Lab Results   Component Value Date/Time    Specimen Description: NARES 02/04/2014 05:20 AM    Specimen Description: NARES 11/01/2013 03:55 PM     Lab Results   Component Value Date/Time    Culture result:  02/04/2014 05:20 AM     MRSA NOT PRESENT      Screening of patient nares for MRSA is for surveillance purposes and, if positive, to facilitate isolation considerations in high risk settings.  It is not intended for automatic decolonization interventions per se as regimens are not sufficiently effective to warrant routine use.    Culture result:  11/01/2013 03:55 PM     MRSA NOT PRESENT  Apparent Staphylococcus aureus (not MRSA) noted. Screening of patient nares for MRSA is for surveillance purposes and, if positive, to facilitate isolation considerations in high risk settings. It is not intended for automatic decolonization interventions per se as regimens are not sufficiently effective to warrant routine use. Assessment / Plan:       Acute on chronic respiratory failure: severe lung disease at baseline, with pulmonary fibrosis and COPD. Was followed at advanced lung disease clinic in [place]. Pt and wife decided against intubation in ED last night as he became hypoxic to the 50s. On high O2 support at home (20-30L/min). Disease appears end-stage, and he is certainly hospice-appropriate. Lengthy discussion with pt and wife this morning, and Mrs. Day Arnett now tells me at this time she is uncertain on whether or not she is ready to pursue hospice. She had a lot of questions regarding the \"patient's heart\", EKG findings, and whether or not his heart is contributing to his symptoms. Pt has no chest pain, tightness, heaviness, squeezing. Noted EKG findings and elevated troponin, and changes are likely driven by respiratory failure resulting in type 2 MI. Pt and wife want to get the patient home, but Mr. Day Arnett is obviously not stable to do so as he is BiPAP-dependent right now. We discussed adding bronchodilators and steroids to see if these measures would help with pt's s/sx, so that we could at least get the patient off NPPV and on nasal cannula as goal is able to get patient home, likely with home hospice. Wife was agreeable to meeting with hospice at least for an informational session meeting. Prognosis is poor. Resume pirfenidone. Will consult consult pulmonology and palliative care for input.  Consult Dr. Anuel Samuel at family request.       CAD (coronary artery disease) (): with type 2 MI driven by respiratory failure. Added ASA. Cardiology consulted     DM II (diabetes mellitus, type II), controlled ([facility] (2/4/2014): would not       Total time spent: 35 minutes. 10:15am to 10:50am.   Time spent in the care of this patient including reviewing records, discussing with nursing and/or other providers on the treatment team, obtaining history and examining the patient, and discussing treatment plans.                   Care Plan discussed with: Patient, Nursing Staff and >50% of time spent in counseling and coordination of care    Discussed:  Code Status, Care Plan and D/C Planning    Prophylaxis:  Lovenox    Disposition:  TBD           ___________________________________________________    Attending Physician: Nghia Avery MD

## 2018-08-26 NOTE — CONSULTS
Johanna Mcgrath MD [facility]  Office [phone]  Mobile [phone]    Date of  Admission: 8/25/2018 11:44 PM  PCP- Zohra Rodriguez MD    Claudia Mejia is a 80 y.o. male admitted for Acute on chronic respiratory failure ([facility] Acute on chronic*. Consult requested by Gardenia Hogan MD for nonSTEMI    Assessment  · NonSTEMI, likely type 2 MI due to hypoxemia/tachycardia  · CAD s/p remote PCI > 10 years ago  · Severe hypoxic respiratory failure due to pulmonary fibrosis/COPD  · Severe COPD  · Pulmonary fibrosis        Discussion/Recommendations:  Tough case. No sx of acute myocardial ischemia but clearly tachycardia coupled with profound hypoxemia created supply demand mismatch. In light of his advanced pulmonary condition, would manage MI conservatively - aspirin, low dose metoprolol. He is intolerant of statins. Will get echo to evaluate regional/global LV function and PA pressures given lung disease. Discussed in detail with patient and his family - they agree    Subjective:  Known CAD s/p PCI 2004, normal lexiscan cardiolite 2016 (Desi Cruz) now admitted with progressive hypoxic resp failure due to advancing lung disease - pulmonary fibrosis and COPD. Previously followed by lung team at [facility]. EKG showed lateral ST sagging. Troponin + 3. No chest pain. No evidence of HF. Currently oximetry 70% on high flow nasal oxygen. Activity very limited due to dyspnea. Not taking aspirin PTA  No statins due to intolerance. On fenofibrates. Past Medical History:   Diagnosis Date    CAD (coronary artery disease) 2004    4 stents placed in heart    Chronic obstructive pulmonary disease ([facility]     EMPHYSEMA SEEN ON CXR    Colon polyp     Three tubular adenomas excised colonoscopically on 3/17/2011.     COPD (chronic obstructive pulmonary disease) ([facility] 2/4/2014    Diverticulosis of colon (without mention of hemorrhage)     DM (diabetes mellitus) ([facility] 4/3/2012    GERD (gastroesophageal reflux disease)     OCC    History of pneumothorax 2/2014    Hypercholesteremia     Pneumonia 2004    PUD (peptic ulcer disease) 1955    Pulmonary fibrosis ([facility]       Past Surgical History:   Procedure Laterality Date    ENDOSCOPY, COLON, DIAGNOSTIC  3/2011    Polyps. Tics. Rep 3 yrs.  HX CORONARY STENT PLACEMENT  2004    4 cardiac stents    HX GI  circa 2000    Laparoscopic cholecystectomy.  HX HERNIA REPAIR  1988    left inguinal hernia repair    HX LUMBAR LAMINECTOMY  11/20/2013    L4 L5 Laminectomy    HX OTHER SURGICAL  2/7/2014    LVATS, bronch, talc pleurodesis, Resection of ruptured bulla    HX TONSILLECTOMY  age 29     No current facility-administered medications on file prior to encounter. Current Outpatient Prescriptions on File Prior to Encounter   Medication Sig Dispense Refill    LORazepam (ATIVAN) 0.5 mg tablet Take 1 Tab by mouth four (4) times daily as needed for Anxiety. Max Daily Amount: 2 mg. 60 Tab 2    nitroglycerin (NITROSTAT) 0.4 mg SL tablet TAKE 1 TABLET SUBLINGALLY AS NEEDED FOR CHEST PAIN 100 Tab 3    tamsulosin (FLOMAX) 0.4 mg capsule TAKE ONE CAPSULE BY MOUTH EVERY DAY 90 Cap 3    finasteride (PROSCAR) 5 mg tablet TAKE 1 TABLET BY MOUTH DAILY 30 Tab 12    fenofibrate (LOFIBRA) 160 mg tablet TAKE 1 TABLET BY MOUTH DAILY. 90 Tab 3    VITAMIN D3 400 unit tab tablet TAKE 2 TABLETS BY MOUTH EVERY  Tab 3    albuterol (VENTOLIN HFA) 90 mcg/actuation inhaler Take 2 Puffs by inhalation every six (6) hours as needed for Wheezing or Shortness of Breath.  glimepiride (AMARYL) 1 mg tablet Take 1 mg by mouth two (2) times a day.  FLOVENT  mcg/actuation inhaler INHALE 2 PUFFS BY MOUTH TWICE DAILY 12 Inhaler 11    cetirizine (ZYRTEC) 10 mg tablet Take 10 mg by mouth daily.  tiotropium (SPIRIVA WITH HANDIHALER) 18 mcg inhalation capsule Take 1 capsule by inhalation daily.       metFORMIN (GLUMETZA ER) 500 mg TG24 24 hour tablet Take 500 mg by mouth daily (with lunch). Allergies   Allergen Reactions    Bactrim [Sulfamethoprim] Unknown (comments)     FLU LIKE SYMPTOMS    Statins-Hmg-Coa Reductase Inhibitors Myalgia    Sulfa (Sulfonamide Antibiotics) Other (comments)     Bactrim causes flu like symptoms             Review of Symptoms:  Constitutional: negative for fevers and chills  Respiratory: negative for hemoptysis or pleurisy/chest pain  Gastrointestinal: negative for dysphagia, odynophagia and abdominal pain  Musculoskeletal:negative  Neurological: negative  Other systems reviewed and negative except as above. Physical Exam    Visit Vitals    /58 (BP 1 Location: Right arm, BP Patient Position: At rest)    Pulse 90    Temp 97.9 °F (36.6 °C)    Resp 24    Ht 5' 10\" (1.778 m)    Wt 170 lb (77.1 kg)    SpO2 (!) 70%    BMI 24.39 kg/m2     Visit Vitals    /58 (BP 1 Location: Right arm, BP Patient Position: At rest)    Pulse 90    Temp 97.9 °F (36.6 °C)    Resp 24    Ht 5' 10\" (1.778 m)    Wt 170 lb (77.1 kg)    SpO2 (!) 70%    BMI 24.39 kg/m2     General Appearance:  Well developed, well nourished,alert and oriented x 3, and individual tachypneic   Ears/Nose/Mouth/Throat:   Hearing grossly normal.         Neck: Supple. Chest:   Lungs dry rales at bases   Cardiovascular:  Regular rate and rhythm, S1, S2 normal, no murmur. Abdomen:   Soft, non-tender, bowel sounds are active. Extremities: No edema bilaterally. Skin: Warm and dry. Cardiographics    Telemetry: normal sinus rhythm  ECG: sinus, inferior T wave inversions, lateral ST sagging.  No previous EKG      Recent Results (from the past 12 hour(s))   METABOLIC PANEL, BASIC    Collection Time: 08/26/18  1:06 PM   Result Value Ref Range    Sodium 136 136 - 145 mmol/L    Potassium 4.8 3.5 - 5.1 mmol/L    Chloride 100 97 - 108 mmol/L    CO2 29 21 - 32 mmol/L    Anion gap 7 5 - 15 mmol/L    Glucose 78 65 - 100 mg/dL    BUN 28 (H) 6 - 20 MG/DL    Creatinine 1.32 (H) 0.70 - 1.30 MG/DL    BUN/Creatinine ratio 21 (H) 12 - 20      GFR est AA >60 >60 ml/min/1.73m2    GFR est non-AA 52 (L) >60 ml/min/1.73m2    Calcium 9.0 8.5 - 10.1 MG/DL   CBC WITH AUTOMATED DIFF    Collection Time: 08/26/18  1:06 PM   Result Value Ref Range    WBC 7.6 4.1 - 11.1 K/uL    RBC 4.26 4.10 - 5.70 M/uL    HGB 13.2 12.1 - 17.0 g/dL    HCT 41.0 36.6 - 50.3 %    MCV 96.2 80.0 - 99.0 FL    MCH 31.0 26.0 - 34.0 PG    MCHC 32.2 30.0 - 36.5 g/dL    RDW 12.8 11.5 - 14.5 %    PLATELET 979 712 - 664 K/uL    MPV 8.9 8.9 - 12.9 FL    NRBC 0.0 0  WBC    ABSOLUTE NRBC 0.00 0.00 - 0.01 K/uL    NEUTROPHILS 60 32 - 75 %    LYMPHOCYTES 30 12 - 49 %    MONOCYTES 9 5 - 13 %    EOSINOPHILS 1 0 - 7 %    BASOPHILS 1 0 - 1 %    IMMATURE GRANULOCYTES 0 0.0 - 0.5 %    ABS. NEUTROPHILS 4.5 1.8 - 8.0 K/UL    ABS. LYMPHOCYTES 2.3 0.8 - 3.5 K/UL    ABS. MONOCYTES 0.7 0.0 - 1.0 K/UL    ABS. EOSINOPHILS 0.1 0.0 - 0.4 K/UL    ABS. BASOPHILS 0.0 0.0 - 0.1 K/UL    ABS. IMM.  GRANS. 0.0 0.00 - 0.04 K/UL    DF AUTOMATED     MAGNESIUM    Collection Time: 08/26/18  1:06 PM   Result Value Ref Range    Magnesium 2.0 1.6 - 2.4 mg/dL   TROPONIN I    Collection Time: 08/26/18  1:06 PM   Result Value Ref Range    Troponin-I, Qt. 3.53 (H) <0.05 ng/mL

## 2018-08-26 NOTE — ROUTINE PROCESS
TRANSFER - OUT REPORT:    Verbal report given to [facility] Nurse(name) on Liza Hylton  [facility] in condition. Declining respiratory status. Report consisted of patients Situation, Background, Assessment and   Recommendations(SBAR). Information from the following report(s) SBAR and MAR was reviewed with the receiving nurse. Lines:   Peripheral IV 08/25/18 Left Forearm (Active)   Site Assessment Clean, dry, & intact 8/26/2018  9:00 AM   Phlebitis Assessment 0 8/26/2018  9:00 AM   Infiltration Assessment 0 8/26/2018  9:00 AM   Dressing Status Clean, dry, & intact 8/26/2018  9:00 AM   Dressing Type Transparent 8/26/2018  9:00 AM   Hub Color/Line Status Pink 8/26/2018  9:00 AM   Alcohol Cap Used Yes 8/26/2018  9:00 AM        Opportunity for questions and clarification was provided.       Patient transported with:   O2 @ BIPAP liters

## 2018-08-26 NOTE — PROGRESS NOTES
Placed patient on HighFlow 100% 50Liters. .checked oxygen saturations 77% initially increased flow to 55Liters with minimum improvement of approx 80% saturation. .Talked with  stated increase Liter Flow to Max and he was okay with Oxygen levels in mid 80 range.  Pt currently on 60Liters 100%

## 2018-08-26 NOTE — PROGRESS NOTES
[facility] Liaison note:    Received message to call spouse to discuss care via telephone. Wife is currently speaking with CM and I will call back in fifteen minutes. 26 Spoke with wife and patient via telephone and answered their questions concerning hospice care at home. They would like to see results of testing and hear prognosis from MD. They are considering a more palliative route than that of hospice care as they believe they are \"not there yet. \" I have offered our number and let them know we are available whenever they are ready or if they have any other questions.      Thank you,    Vincenzo Smith RN  [facility]

## 2018-08-26 NOTE — ROUTINE PROCESS
Pt and wife concerned about why pt is NPO because he is very thirsty. Talked to respiratory. Respiratory stated the bipap may be drying out the patient. Called physician. Physician explained he is on bipap and he cannot go without mask for very long or else sats will drop. Physician stated that he can have water if thirsty and tomorrow if he is hungry we can change his order to regular diet.

## 2018-08-26 NOTE — H&P
[address]  [phone]    Admission History and Physical      NAME:  Jose Angel Perea   :   1935   MRN:  [id number]     PCP:  Guicho Burns MD     Date/Time:  2018         Subjective:     CHIEF COMPLAINT: SOB     HISTORY OF PRESENT ILLNESS:     Mr. Nash Lazaro is a 80 y.o.  male who is admitted with acute on chronic respiratory failure. Mr. Nash Lazaro with PMH of COPD, pulmonary fibrosis, CAD, DM, HTN, GERD, PUD was brought to ER c/o SOB. According to his wife, after dinner, he said he was not feeling well. Later his SAO2 dropped and his HR increased. When they helped him to transfer to his bed his SAO2 dropped more and HR increased. He became very uncomfortable and was brought to ER. He usually uses 20 LPM of O2. Recently has bee weak and stopped seeing his pulmonologist. They had discussion few time about comfort care and hospice. Past Medical History:   Diagnosis Date    CAD (coronary artery disease)     4 stents placed in heart    Chronic obstructive pulmonary disease ([address]     EMPHYSEMA SEEN ON CXR    Colon polyp     Three tubular adenomas excised colonoscopically on 3/17/2011.  COPD (chronic obstructive pulmonary disease) ([address] 2014    Diverticulosis of colon (without mention of hemorrhage)     DM (diabetes mellitus) ([address] 4/3/2012    GERD (gastroesophageal reflux disease)     OCC    History of pneumothorax 2014    Hypercholesteremia     Pneumonia     PUD (peptic ulcer disease)     Pulmonary fibrosis ([address]         Past Surgical History:   Procedure Laterality Date    ENDOSCOPY, COLON, DIAGNOSTIC  3/2011    Polyps. Tics. Rep 3 yrs.  HX CORONARY STENT PLACEMENT      4 cardiac stents    HX GI  circa     Laparoscopic cholecystectomy.     HX HERNIA REPAIR      left inguinal hernia repair    HX LUMBAR LAMINECTOMY  2013    L4 L5 Laminectomy    HX OTHER SURGICAL  2/7/2014    LVATS, bronch, talc pleurodesis, Resection of ruptured bulla    HX TONSILLECTOMY  age 29       Social History   Substance Use Topics    Smoking status: Former Smoker     Packs/day: 1.50     Years: 36.00     Types: Cigarettes     Quit date: 1/1/1987    Smokeless tobacco: Never Used    Alcohol use No        Family History   Problem Relation Age of Onset    Adopted: Yes    Other Other      patient was adopted        Allergies   Allergen Reactions    Bactrim [Sulfamethoprim] Unknown (comments)     FLU LIKE SYMPTOMS    Statins-Hmg-Coa Reductase Inhibitors Myalgia    Sulfa (Sulfonamide Antibiotics) Other (comments)     Bactrim causes flu like symptoms        Prior to Admission medications    Medication Sig Start Date End Date Taking? Authorizing Provider   LORazepam (ATIVAN) 0.5 mg tablet Take 1 Tab by mouth four (4) times daily as needed for Anxiety. Max Daily Amount: 2 mg. 7/18/18   Greyson Jimenez MD   ALPRAZolam Alonza Settle) 0.5 mg tablet Take 0.5-1 Tabs by mouth four (4) times daily as needed for Anxiety. Max Daily Amount: 2 mg. 7/11/18   Greyson Jimenez MD   gabapentin (NEURONTIN) 300 mg capsule TAKE TWO CAPSULEs BY MOUTH TWICE A DAY 6/5/18   Greyson Jimenez MD   nitroglycerin (NITROSTAT) 0.4 mg SL tablet TAKE 1 TABLET SUBLINGALLY AS NEEDED FOR CHEST PAIN 6/5/18   Greyson Jimenez MD   tamsulosin (FLOMAX) 0.4 mg capsule TAKE ONE CAPSULE BY MOUTH EVERY DAY 6/3/18   Greyson Jimenez MD   finasteride (PROSCAR) 5 mg tablet TAKE 1 TABLET BY MOUTH DAILY 5/8/18   Greyson Jimenez MD   fenofibrate (LOFIBRA) 160 mg tablet TAKE 1 TABLET BY MOUTH DAILY. 5/7/18   Greyson Jimenez MD   VITAMIN D3 400 unit tab tablet TAKE 2 TABLETS BY MOUTH EVERY DAY 4/30/18   Greyson Jimenez MD   albuterol (VENTOLIN HFA) 90 mcg/actuation inhaler Take  by inhalation. Historical Provider   glimepiride (AMARYL) 1 mg tablet Take 1 mg by mouth every morning. Historical Provider   [address] [place] [address]  mcg/actuation inhaler INHALE 2 PUFFS BY MOUTH TWICE DAILY 9/4/17   Kathy Kimble MD   cetirizine (ZYRTEC) 10 mg tablet Take 10 mg by mouth daily. Yumiko Carbajal MD   PIRFENIDONE (ESBRIET PO) Take  by mouth. Historical Provider   cyclobenzaprine (FLEXERIL) 10 mg tablet Take 1 tablet by mouth three (3) times daily as needed for Muscle Spasm(s). 1/16/15   Kathy Kimble MD   tiotropium (SPIRIVA WITH HANDIHALER) 18 mcg inhalation capsule Take 1 capsule by inhalation daily. Historical Provider   omeprazole (PRILOSEC) 40 mg capsule Take 1 Cap by mouth daily. 7/2/14   Kathy Kimble MD   [facility]) 500 mg TG24 24 hour tablet Take 500 mg by mouth daily.     Historical Provider         Review of Systems:  (bold if positive, if negative)    Gen:  Eyes:  ENT:  CVS:  Pulm:  dyspneaGI:    :    MS:  Skin:  Psych:  Endo:    Hem:  Renal:    Neuro:            Objective:      VITALS:    Vital signs reviewed; most recent are:    Visit Vitals    /49    Pulse (!) 113    Temp 97.9 °F (36.6 °C)    Resp (!) 32    Ht 5' 10\" (1.778 m)    Wt 77.1 kg (170 lb)    SpO2 (!) 64%    BMI 24.39 kg/m2     SpO2 Readings from Last 6 Encounters:   08/26/18 (!) 64%   02/21/17 96%   10/13/14 94%   07/28/14 95%   05/01/14 93%   02/26/14 96%        No intake or output data in the 24 hours ending 08/26/18 0033         Exam:     Physical Exam:    Gen:  Well-developed, well-nourished, in no acute distress  HEENT:  Pink conjunctivae, PERRL, hearing intact to voice, moist mucous membranes  Neck:  Supple, without masses, thyroid non-tender  Resp:    accessory muscle use, rales BL  Card:  No murmurs, normal S1, S2 without thrills, bruits or peripheral edema  Abd:  Soft, non-tender, non-distended, normoactive bowel sounds are present, no palpable organomegaly and no detectable hernias  Lymph:  No cervical or inguinal adenopathy  Musc:  No cyanosis or clubbing  Skin:  No rashes or ulcers, skin turgor is good  Neuro:  Cranial nerves are grossly intact, no focal motor weakness, follows commands appropriately  Psych:  Good insight, oriented to person, place and time, alert       Labs:    No results for input(s): WBC, HGB, HCT, PLT, HGBEXT, HCTEXT, PLTEXT in the last 72 hours. No results for input(s): NA, K, CL, CO2, GLU, BUN, CREA, CA, MG, PHOS, ALB, TBIL, TBILI, SGOT, ALT in the last 72 hours. Lab Results   Component Value Date/Time    Glucose (POC) 114 (H) 02/11/2014 11:49 AM    Glucose (POC) 173 (H) 02/11/2014 08:26 AM     No results for input(s): PH, PCO2, PO2, HCO3, FIO2 in the last 72 hours. No results for input(s): INR in the last 72 hours. No lab exists for component: INREXT    Telemetry reviewed:          Assessment/Plan:    1. Acute on chronic respiratory failure ([facility]) (8/26/2018)/ COPD (chronic obstructive pulmonary disease) ([facility]) (2/4/2014)/Pulmonary fibrosis ([facility] [address] [address] Pt's SAO2> in the 50s on BiPAP in ER. The option of intubation was discussed with wife and pt by ER physician. Also this was discussed with his PCP. Pt and wife decided comfort care. I spoke with wife and pt and all in agreement for comfort care. Continue BiPAP for comfort and consult hospice care in am. Continue comfort measures. Stop all medication.          Code status: DNR       Previous medical records reviewed     Risk of deterioration: medium      Total time spent with patient: 48 [address] [facility] discussed with: Patient, Family, Nursing Staff and >50% of time spent in counseling and coordination of care    Discussed:  Care Plan    Prophylaxis:  none    Probable Disposition:  hospice care            ___________________________________________________    Attending Physician: Hilton Soto MD

## 2018-08-26 NOTE — ED NOTES
TRANSFER - OUT REPORT:    Verbal report given to Alejandra Almazan RN(name) on Carol Medeiros  being transferred to 8(unit) for routine progression of care       Report consisted of patients Situation, Background, Assessment and   Recommendations(SBAR). Information from the following report(s) SBAR, Kardex, ED Summary and MAR was reviewed with the receiving nurse. Lines:   Peripheral IV 08/25/18 Left Forearm (Active)   Site Assessment Clean, dry, & intact 8/25/2018 11:54 PM   Phlebitis Assessment 0 8/25/2018 11:54 PM   Infiltration Assessment 0 8/25/2018 11:54 PM   Dressing Status Clean, dry, & intact 8/25/2018 11:54 PM   Dressing Type Transparent 8/25/2018 11:54 PM   Hub Color/Line Status Pink 8/25/2018 11:54 PM        Opportunity for questions and clarification was provided.       Patient transported with:   O2 @ 100% liters  Registered Nurse  [facility]

## 2018-08-26 NOTE — ROUTINE PROCESS
TRANSFER - IN REPORT:    Verbal report received from Radha(name) on [employer]  being received from ED(unit) for routine progression of care      Report consisted of patients Situation, Background, Assessment and   Recommendations(SBAR). Information from the following report(s) SBAR, Kardex, ED Summary, Intake/Output, MAR, Accordion and Recent Results was reviewed with the receiving nurse. Opportunity for questions and clarification was provided. Assessment completed upon patients arrival to unit and care assumed.        Primary Nurse Madelin Miller and Lord Gaston RN performed a dual skin assessment on this patient No impairment noted  Vasile score is 19

## 2018-08-26 NOTE — PROGRESS NOTES
Admission Medication Reconciliation:    Reviewed patient profile for medication reconciliation due to admission to [facility]. Will defer interviewing patient at this time due to plans for comfort care with pending consult to hospice. Please contact pharmacy if medication reconciliation is necessary.     Thank you,  Hyu Sadler, PHARMD

## 2018-08-26 NOTE — PROGRESS NOTES
Admission Medication Reconciliation:    Comments/Recommendations:  -Medication history obtained on the 5th floor (room 508) after being consulted by Dr. Anisha Rincon  -Patient's wife present and helpful during most of interview regarding doses of medication and patient confirmed as appropriate  -Of note, patient takes pirfenidone (Esbriet) 801 mg three times daily (three 267 mg tablets three times daily) for idiopathic pulmonary fibrosis. Patient's wife states that if this medication is continued during inpatient stay, she will be able to bring in. Medications added: None  Medications removed: Xanax, cyclobenzaprine  Medications changed: Gabapentin dosing, glimepiride dosing, metformin dosing, omeprazole dosing, pirfenidone dosing    Information obtained from: Patient, Patient's wife, [employer]Query    Significant PMH/Disease States:   Past Medical History:   Diagnosis Date    CAD (coronary artery disease) 2004 4 stents placed in heart    Chronic obstructive pulmonary disease ([facility]     EMPHYSEMA SEEN ON CXR    Colon polyp     Three tubular adenomas excised colonoscopically on 3/17/2011.  COPD (chronic obstructive pulmonary disease) ([facility] 2/4/2014    Diverticulosis of colon (without mention of hemorrhage)     DM (diabetes mellitus) ([facility] 4/3/2012    GERD (gastroesophageal reflux disease)     OCC    History of pneumothorax 2/2014    Hypercholesteremia     Pneumonia 2004    PUD (peptic ulcer disease) 1955    Pulmonary fibrosis ([facility]        Chief Complaint for this Admission:    Chief Complaint   Patient presents with    Respiratory Distress       Allergies:  Bactrim [sulfamethoprim]; Statins-hmg-coa reductase inhibitors; and Sulfa (sulfonamide antibiotics)    Prior to Admission Medications:   Prior to Admission Medications   Prescriptions Last Dose Informant Patient Reported? Taking?    FLOVENT  mcg/actuation inhaler   No Yes   Sig: INHALE 2 PUFFS BY MOUTH TWICE DAILY   LORazepam (ATIVAN) 0.5 mg tablet   No Yes   Sig: Take 1 Tab by mouth four (4) times daily as needed for Anxiety. Max Daily Amount: 2 mg. VITAMIN D3 400 unit tab tablet   No Yes   Sig: TAKE 2 TABLETS BY MOUTH EVERY DAY   albuterol (VENTOLIN HFA) 90 mcg/actuation inhaler   Yes Yes   Sig: Take 2 Puffs by inhalation every six (6) hours as needed for Wheezing or Shortness of Breath. cetirizine (ZYRTEC) 10 mg tablet   Yes Yes   Sig: Take 10 mg by mouth daily. fenofibrate (LOFIBRA) 160 mg tablet   No Yes   Sig: TAKE 1 TABLET BY MOUTH DAILY. finasteride (PROSCAR) 5 mg tablet   No Yes   Sig: TAKE 1 TABLET BY MOUTH DAILY   gabapentin (NEURONTIN) 300 mg capsule   Yes Yes   Sig: Take 300 mg by mouth daily (with lunch). gabapentin (NEURONTIN) 600 mg tablet   Yes Yes   Sig: Take 600 mg by mouth nightly. glimepiride (AMARYL) 1 mg tablet   Yes Yes   Sig: Take 1 mg by mouth two (2) times a day. metFORMIN (GLUMETZA ER) 500 mg TG24 24 hour tablet   Yes Yes   Sig: Take 500 mg by mouth daily (with lunch). nitroglycerin (NITROSTAT) 0.4 mg SL tablet   No Yes   Sig: TAKE 1 TABLET SUBLINGALLY AS NEEDED FOR CHEST PAIN   omeprazole (PRILOSEC) 20 mg capsule   Yes Yes   Sig: Take 20 mg by mouth daily. pirfenidone 267 mg tab   Yes Yes   Sig: Take 801 mg by mouth three (3) times daily. tamsulosin (FLOMAX) 0.4 mg capsule   No Yes   Sig: TAKE ONE CAPSULE BY MOUTH EVERY DAY   tiotropium (SPIRIVA WITH HANDIHALER) 18 mcg inhalation capsule   Yes Yes   Sig: Take 1 capsule by inhalation daily.       Facility-Administered Medications: None         Thank you for consulting pharmacy services,    ETHAN Calle

## 2018-08-26 NOTE — ED TRIAGE NOTES
Patient here with Resp distress, O2 sats in 40's upon arrival to home per EMS. Patient wears home o2 at 23% per mask.  Patient in resp distress upon arrival

## 2018-08-26 NOTE — PROGRESS NOTES
Spiritual Care Assessment/Progress Note  [address] [place] [address] Dr      NAME: Jose Angel Perea      MRN: [id number]  AGE: 80 y.o. SEX: male  Orthodox Affiliation: Zoroastrian   Language: English     8/26/2018     Total Time (in minutes): 20     Spiritual Assessment begun in [facility] EMERGENCY DEPT through conversation with:         [x]Patient        [x] Family    [] Friend(s)        Reason for Consult: End-of-life support     Spiritual beliefs: (Please include comment if needed)     [x] Identifies with a sapna tradition:  Zoroastrian      [] Supported by a sapna community:            [] Claims no spiritual orientation:           [] Seeking spiritual identity:                [] Adheres to an individual form of spirituality:           [] Not able to assess:                           Identified resources for coping:      [x] Prayer                               [] Music                  [] Guided Imagery     [x] Family/friends                 [] Pet visits     [] Devotional reading                         [] Unknown     [] Other:                                              Interventions offered during this visit: (See comments for more details)    Patient Interventions: Affirmation of sapna, Affirmation of emotions/emotional suffering, Iconic (affirming the presence of God/Higher Power), Initial visit, Prayer (actual), Other (comment) (initial spiritual assessment - ED)     Family/Friend(s):  Affirmation of aspna, Affirmation of emotions/emotional suffering, Iconic (affirming the presence of God/Higher Power), Initial Assessment, Prayer (actual)     Plan of Care:     [x] Support spiritual and/or cultural needs    [] Support AMD and/or advance care planning process      [] Support grieving process   [] Coordinate Rites and/or Rituals    [] Coordination with community clergy   [] No spiritual needs identified at this time   [] Detailed Plan of Care below (See Comments)  [] Make referral to Music Therapy  [] Make referral to Pet Therapy     [] Make referral to Addiction services  [] Make referral to [facility]  [] Make referral to Spiritual Care Partner  [] No future visits requested        [] Follow up visits as needed     Comments: I was paged by staff requesting support for Mr. Jeromy Mitchell and his wife as they were deciding on the best course of action concerning his declining condition. When I arrived I was informed that he had made the decision to go on comfort care. He was on the bipap when I arrived and I was able to converse with him. I spoke with his wife at the bedside and offered a prayer. [facility]) is an important resource for them and I informed them of my ongoing availability throughout the night if needed. Spiritual Care remains available as needed. Rev. Juliet Ceballos M.Div, [facility]

## 2018-08-26 NOTE — PROGRESS NOTES
TRANSFER - IN REPORT:    Verbal report received from RACHELκαφίδια ROZINA Pineda(name) on Joanna Luke  being received from 5th Floor(unit) for urgent transfer      Report consisted of patients Situation, Background, Assessment and   Recommendations(SBAR). Information from the following report(s) SBAR, Kardex, Procedure Summary, Intake/Output, MAR, Recent Results, Med Rec Status and Cardiac Rhythm NSR/ST was reviewed with the receiving nurse. Opportunity for questions and clarification was provided. Assessment completed upon patients arrival to unit and care assumed. 7:55 PM  Bedside and Verbal shift change report given to Aimee Trinh RN (oncoming nurse) by Diego Branham RN (offgoing nurse). Report included the following information SBAR, Kardex, Procedure Summary, Intake/Output, MAR, Recent Results, Med Rec Status and Cardiac Rhythm NSR/ST.

## 2018-08-26 NOTE — ROUTINE PROCESS
Bedside shift change report given to Nasrin (oncoming nurse) by Maile Jackson (offgoing nurse). Report included the following information SBAR, Kardex, Intake/Output, MAR, Accordion, Recent Results and Med Rec Status.

## 2018-08-26 NOTE — PROGRESS NOTES
Bedside and Verbal shift change report given to [facility] (oncoming nurse) by Sherrie Milner (offgoing nurse). Report included the following information SBAR, Kardex, ED Summary, Procedure Summary, Intake/Output, MAR, Accordion, Recent Results, Med Rec Status, Cardiac Rhythm NSR and Alarm Parameters . 1931: Pt SPO2 82-85% on Hi-Shaji. Pt appears comfortable and not in any acute distress. Was told that Dr. Rafal Umaña wanted to transition patient to high flow from BIPAP. I do not see goals/parameters for SPO2 in MD notes. P/c to Dr. Jenni Hatfield to ask for goals/parameters for SPO2. For now, keep SPO >85%. I will continue to monitor. 1950: Read RT note from 8/26 at 1603: RT Pauline spoke with Dr. Rafal Umaña and per the note, he ok with O2 levels in the mid 80 range. Patient SPO2 in the mid-high 70's, I went into patient's room, wife stated that he had been trying to get up. I repositioned patient in bed, elevated his head, and stayed with him until SP02 increased to the 80's. Patient refuses BIPAP at this time.

## 2018-08-26 NOTE — PROGRESS NOTES
8/26/2018  10 AM    CM responded to consult for hospice and met with patient, who gave verbal consent to continue conversation in front of his wife. CM discussed the consult for hospice and educated the patient/family on the process as well as provided them with a list of available hospice providers. Patient's wife expressed concerns with pursuing hospice prior to speaking to the MD regarding the results from labs/tests in the ED. Patient and wife stated their preference was in-home hospice. Writer relayed these concerns to MD.  Patient and wife stated they preferred [facility]. CM contacted [facility] on-call nurses regarding patient's interest in speaking to them and getting questions answered. [facility] agreed to contact patient but stated no formal referral was needed at this time to have this conversation. 11:45am  CM was contacted by MD that he had spoken to patient. CM returned to meet with patient, who gave verbal consent to continue conversation in front of his son and daughter-in-law who had arrived. CM informed them that [facility] would be contacting them directly to answer questions about their service. CM informed family reiterated the referral process. Wife requested time to consider options and communicate with other family members, and agreed to contact CM if they wanted to follow through with a formal referral to hospice care.     CM remains available to assist.

## 2018-08-26 NOTE — ED NOTES
Patient stating \"No\" when wife asking regarding intubation but she requests that Dr. Aida Reynaga call his PCP \" Dr. Purvi Kapadia on the phone.

## 2018-08-26 NOTE — ED PROVIDER NOTES
HPI Comments: 80 y.o. male with past medical history significant for hypercholesterolemia, DM, CAD with h/o stent placement, COPD and end-stage pulmonary fibrosis, who presents via EMS with chief complaint of SOB. Patient developed the acute onset of shortness of breath this evening. He has a history of end-stage pulmonary fibrosis and is normally on 23L O2 via Venturi mask constantly. Wife noticed a decrease in SpO2 throughout the course of the night. She gave him two different doses of Ativan throughout the day for increased work of breathing, and his last dose of Ativan was at 1700. Prior to arrival, patient's SpO2 decreased into the low 70s, and wife was unable to increase his oxygen saturation with PRN nebs so she called EMS. EMS reports that when they arrived on the scene, patient had good wave form with correlating sats in the 40s. Patient was placed on 100% O2 and noticed some improvement, but was only able to increase sats into the 70s. Patient specifically denies any fevers, chills, nausea, or vomiting, but HPI is overall limited due to the acuity of patient's condition. Social hx: Negative for Tobacco use (former smoker); Negative for EtOH use; Negative for Illicit Drug Abuse    PCP: Sandi Castaneda MD    Full history, physical exam, and ROS unable to be obtained due to: critical illness. Note written by Meri Diego. [facility] Person, as dictated by Yohannes Treadwell MD 11:50 PM     The history is provided by the patient, the EMS personnel, the spouse and medical records. The history is limited by the condition of the patient. No  was used. Past Medical History:   Diagnosis Date    CAD (coronary artery disease) 2004    4 stents placed in heart    Chronic obstructive pulmonary disease ([facility]     EMPHYSEMA SEEN ON CXR    Colon polyp     Three tubular adenomas excised colonoscopically on 3/17/2011.     COPD (chronic obstructive pulmonary disease) ([facility] 2/4/2014  Diverticulosis of colon (without mention of hemorrhage)     DM (diabetes mellitus) ([facility] 4/3/2012    GERD (gastroesophageal reflux disease)     OCC    History of pneumothorax 2/2014    Hypercholesteremia     Pneumonia 2004    PUD (peptic ulcer disease) 1955    Pulmonary fibrosis ([facility]        Past Surgical History:   Procedure Laterality Date    ENDOSCOPY, COLON, DIAGNOSTIC  3/2011    Polyps. Tics. Rep 3 yrs.  HX CORONARY STENT PLACEMENT  2004    4 cardiac stents    HX GI  circa 2000    Laparoscopic cholecystectomy.  HX HERNIA REPAIR  1988    left inguinal hernia repair    HX LUMBAR LAMINECTOMY  11/20/2013    L4 L5 Laminectomy    HX OTHER SURGICAL  2/7/2014    LVATS, bronch, talc pleurodesis, Resection of ruptured bulla    HX TONSILLECTOMY  age 29         Family History:   Problem Relation Age of Onset    Adopted: Yes    Other Other      patient was adopted       Social History     Social History    Marital status:      Spouse name: N/A    Number of children: N/A    Years of education: N/A     Occupational History    Not on file. Social History Main Topics    Smoking status: Former Smoker     Packs/day: 1.50     Years: 36.00     Types: Cigarettes     Quit date: 1/1/1987    Smokeless tobacco: Never Used    Alcohol use No    Drug use: No    Sexual activity: Not on file     Other Topics Concern    Not on file     Social History Narrative    ** Merged History Encounter **         ** Data from: 4/30/12 Enc Dept: [facility]         ** Data from: 4/5/12 Enc Dept: [facility] INTERNAL MEDICINE    ** Merged History Encounter **              ALLERGIES: Bactrim [sulfamethoprim];  Statins-hmg-coa reductase inhibitors; and Sulfa (sulfonamide antibiotics)    Review of Systems   Unable to perform ROS: Severe respiratory distress       Vitals:    08/26/18 0030 08/26/18 0040 08/26/18 0053 08/26/18 0057   BP:       Pulse: (!) 103 (!) 102 100 (!) 102   Resp: 20 24 21 28   Temp:       SpO2: (!) 86% (!) 88% 93% 92%   Weight:       Height:                Physical Exam   Constitutional: He is oriented to person, place, and time. He appears well-developed and well-nourished. HENT:   Head: Normocephalic and atraumatic. Eyes: Conjunctivae are normal.   Neck: Normal range of motion. Neck supple. Cardiovascular: Regular rhythm and normal heart sounds. Tachycardia present. Pulmonary/Chest: Tachypnea noted. He is in respiratory distress. Coarse breath sounds throughout   Abdominal: Soft. There is no tenderness. Musculoskeletal: Normal range of motion. He exhibits no edema or tenderness. Neurological: He is alert and oriented to person, place, and time. Unable to perform full neurologic examination secondary to condition   Skin: Skin is warm and dry. Nursing note and vitals reviewed. Note written by Adam Newton. Pamela Forman, as dictated by Ariadna Villarreal MD 11:50 PM      MDM  Number of Diagnoses or Management Options  Acute on chronic respiratory failure with hypoxia [facility]):   Critical Care  Total time providing critical care: 30-74 minutes (40 minutes)        ED Course       Procedures     ED EKG interpretation:  Rhythm: sinus tachycardia; and regular . Rate (approx.): 115; Axis: RAD; P wave: normal; QRS interval: normal ; ST/T wave: ST depressoin I, II, avL. ST elevation III, avR. EKG documented by Ariadna Villarreal MD, scribe, as interpreted by Carlos Dacosta MD, ED MD.    PROGRESS NOTE:  11:55 PM   Patient's SpO2 only in the 50% range with BiPAP. Discussed possible care plan with wife and patient, including the possibility of intubation. Due to his history of pulmonary fibrosis, will speak with PCP as patient is under his [employer] service. CONSULT NOTE:  11:58 PM Ariadna Villarreal MD spoke with Dr. Amber Avina MD, Consult for PCP. Discussed available diagnostic tests and clinical findings.   After speaking with the patient's wife, Dr. Esteban Rose and wife have decided to opt for comfort care in the ED. Will keep patient on BiPAP and choose not to intubate. The patient can be removed from BiPap if he chooses. Wife and PCP in agreement with plan. CONSULT NOTE:  12:11 AM Kavya Jameson MD spoke with Dr. Emma Alvarez, Consult for Hospitalist.  Discussed available diagnostic tests and clinical findings. Dr. Emma Alvarez will evaluate the patient for admission to the hospital.     12:12 AM  Patient is being admitted to the hospital.  The results of their tests and reasons for their admission have been discussed with them and/or available family. They convey agreement and understanding for the need to be admitted and for their admission diagnosis. Consultation will be made now with the inpatient physician for hospitalization. At this time the patient is comfort care, therefore no labs or further x-rays will be pursued. A/P:  1. Acute on chronic respiratory failure - the patient has end-stage lung disease. Intubation would likely prolong his suffering as he cannot be cured.  He has been made comfort care at PCP and wife's request.  2. Abnormal EKG

## 2018-08-27 NOTE — PROGRESS NOTES
CM attempted to meet with pt earlier today. Pt was asleep and his son was at bedside. Son asked that I return at a later time when pt's wife was here.   CM attempted to meet with pt but he is currently meeting with the palliative team.  Brook Gilford, LCSW

## 2018-08-27 NOTE — PROGRESS NOTES
Palliative Medicine      Code Status: DNR    Advance Care Planning:  Advance Care Planning 2018   Patient's Healthcare Decision Maker is: Named in scanned ACP document   Primary Decision Maker Name Sergio Santana   Primary Decision Maker Phone Number [phone]   Primary Decision Maker Relationship to Patient Spouse   Secondary Decision Maker Name Son Helen Jon and/or dtr [address] [facility] Phone Number [phone], [phone]   Confirm Advance Directive Yes, on file       Patient / Family Encounter Documentation    Participants (names): Pt, wife Ed Bounds (Dr. Ralph Gutierrez, [address])    Narrative:  Pt was awake in bed with hi-flow O2 in place, wife Donell Forth at bedside. Pt was irritable, expressed frustration/annoyance with questions. Confusion noted, which wife attributed to steroids, reports pt is mentally sharp at home, handles his own finances. Pt reports he has not been walking for the past 3-4 weeks. Pt expressed belief that he came to the hospital for a scheduled test, expressed belief that unnamed test was started but not completed, wished to speak with Attending Physician re: whether or not to resume \"test.\"  Pt did not wish to speak with Palliative team at this time. Wife spoke with Hospice representative by phone on , stated she is familiar with the differences between Hospice and Palliative Medicine from prior experience, stated \"it's his decision. \"    Psychosocial Issues Identified/ Resilience Factors: Stress re: pt's medical condition, wife is taking time for herself, pt's son was in earlier while wife stepped out. Goals of Care / Plan: Family is not ready to pursue hospice at this time, pt not receptive to Palliative visit. Will attempt follow up at later time if pt/family open to discussion. Pt has AMD on file, will need DDNR completed prior to discharge. Discussed with Attending Physician, Pulmonary PA, RN and Care Manager. Will follow for support. Thank you for including Palliative Medicine in the care of Mr. Angie Moran.     Juliet Cabrera LCSW, [facility]-  [phone])

## 2018-08-27 NOTE — PROGRESS NOTES
Charge nurse chart review. 1258:  MEWs score review. RR of 35 has decreased to 20 at most recent check.

## 2018-08-27 NOTE — PROGRESS NOTES
[facility]  [address] [facility], [address] [place] [address]  [phone]      Medical Progress Note      NAME: Eboni Shahid   :  1935  MRM:  [id number]    Date/Time: 2018          Subjective:     Chief Complaint:  F/U resp failure    Chart/notes/labs/studies reviewed, patient examined at bedside. Comfortable on high-flow this morning. No CP. No fevers or chills. +SOB          Objective:       Vitals:          Last 24hrs VS reviewed since prior progress note. Most recent are:    Visit Vitals    /77 (BP 1 Location: Left arm, BP Patient Position: At rest)    Pulse 95    Temp 97.4 °F (36.3 °C)    Resp 20    Ht 5' 10\" (1.778 m)    Wt 77.1 kg (170 lb)    SpO2 92%    BMI 24.39 kg/m2     SpO2 Readings from Last 6 Encounters:   18 92%   17 96%   10/13/14 94%   14 95%   14 93%   14 96%    O2 Flow Rate (L/min): 60 l/min       Intake/Output Summary (Last 24 hours) at 18 1431  Last data filed at 18 0500   Gross per 24 hour   Intake              480 ml   Output              925 ml   Net             -445 ml          Exam:     Physical Exam:    Gen:  Well-developed, well-nourished, chronically ill-appearing  HEENT:  Sclerae nonicteric, hearing intact to voice, on high-flow  Neck:  Supple, without masses. Resp:  +accessory muscle use, increased WOB. Diminished air movement, no wheezes, rales, or rhonchi  Card: RRR, without m/r/g. No LE edema. Abd:  +bowel sounds, soft, NTTP, nondistended. Neuro: Face symmetric, tongue midline, speech fluent, follows commands appropriately  Psych:  Somnolent, easily arousable, oriented to self.  Fair insight     Medications Reviewed: (see below)    Lab Data Reviewed: (see below)    ______________________________________________________________________    Medications:     Current Facility-Administered Medications   Medication Dose Route Frequency    arformoterol (BROVANA) neb solution 15 mcg 15 mcg Nebulization BID RT    LORazepam (ATIVAN) injection 2 mg  2 mg IntraVENous Q6H PRN    morphine injection 2 mg  2 mg IntraVENous Q4H PRN    sodium chloride (NS) flush 5-10 mL  5-10 mL IntraVENous Q8H    sodium chloride (NS) flush 5-10 mL  5-10 mL IntraVENous PRN    albuterol-ipratropium (DUO-NEB) 2.5 MG-0.5 MG/3 ML  3 mL Nebulization Q4H RT    methylPREDNISolone (PF) (SOLU-MEDROL) injection 60 mg  60 mg IntraVENous Q6H    cetirizine (ZYRTEC) tablet 10 mg  10 mg Oral DAILY    finasteride (PROSCAR) tablet 5 mg  5 mg Oral DAILY    budesonide (PULMICORT) 500 mcg/2 ml nebulizer suspension  500 mcg Nebulization BID RT    gabapentin (NEURONTIN) capsule 300 mg  300 mg Oral DAILY WITH LUNCH    gabapentin (NEURONTIN) capsule 600 mg  600 mg Oral QHS    pirfenidone tab 801 mg  (Patient Supplied)  801 mg Oral TID    tamsulosin (FLOMAX) capsule 0.4 mg  0.4 mg Oral DAILY    enoxaparin (LOVENOX) injection 40 mg  40 mg SubCUTAneous Q24H    aspirin chewable tablet 81 mg  81 mg Oral DAILY    metoprolol tartrate (LOPRESSOR) tablet 12.5 mg  12.5 mg Oral BID    pantoprazole (PROTONIX) tablet 40 mg  40 mg Oral ACB            Lab Review:     Recent Labs      08/26/18   1306   WBC  7.6   HGB  13.2   HCT  41.0   PLT  267     Recent Labs      08/27/18   0030  08/26/18   1306   NA  132*  136   K  4.9  4.8   CL  98  100   CO2  26  29   GLU  251*  78   BUN  24*  28*   CREA  1.25  1.32*   CA  9.0  9.0   MG  2.0  2.0     No components found for: GLPOC  No results for input(s): PH, PCO2, PO2, HCO3, FIO2 in the last 72 hours. No results for input(s): INR in the last 72 hours.     No lab exists for component: Haze Asp  Lab Results   Component Value Date/Time    Specimen Description: BRAD 02/04/2014 05:20 AM    Specimen Description: BRAD 11/01/2013 03:55 PM     Lab Results   Component Value Date/Time    Culture result:  02/04/2014 05:20 AM     MRSA NOT PRESENT      Screening of patient nares for MRSA is for surveillance purposes and, if positive, to facilitate isolation considerations in high risk settings. It is not intended for automatic decolonization interventions per se as regimens are not sufficiently effective to warrant routine use. Culture result:  11/01/2013 03:55 PM     MRSA NOT PRESENT  Apparent Staphylococcus aureus (not MRSA) noted. Screening of patient nares for MRSA is for surveillance purposes and, if positive, to facilitate isolation considerations in high risk settings. It is not intended for automatic decolonization interventions per se as regimens are not sufficiently effective to warrant routine use. Assessment / Plan:       Acute on chronic respiratory failure: severe chronic lung disease at baseline, with pulmonary fibrosis and COPD. Was followed at advanced lung disease clinic in [place]. On high O2 support at home (20-30L/min). Disease appears end-stage, and he is certainly hospice-appropriate however patient and his wife are not ready to make that transition. Lengthy discussion with pt and wife yesterday, and again with pt's son this morning, who clearly  understands the severity of patient's illness. Currently on bronchodilators and IV steroids and pirfenidone. Pt's type 2 MI POA was clearly driven by his respiratory failure. [address] cardiology and pulmonology. Palliative care consulted as well      CAD (coronary artery disease) (): with type 2 MI POA clearly precipitated by respiratory failure. Added ASA. Cardiology on board. Echo showed preserved EF w/o RWMA     DM II (diabetes mellitus, type II), controlled ([facility] (2/4/2014): BG elevated from steroids but minimize accuchecks for comfort      Total time spent: 35 minutes, d/w son at bedside, d/w CM  Time spent in the care of this patient including reviewing records, discussing with nursing and/or other providers on the treatment team, obtaining history and examining the patient, and discussing treatment plans.                   Care Plan discussed with: Patient, Nursing Staff and >50% of time spent in counseling and coordination of care    Discussed:  Code Status, Care Plan and D/C Planning    Prophylaxis:  Lovenox    Disposition:  TBD           ___________________________________________________    Attending Physician: Mirza Miranda MD

## 2018-08-27 NOTE — CONSULTS
Name: Janet Jean: [address]   : 1935 Admit Date: 2018   Phone: [phone]  Room: [facility]   PCP: Emily Arce MD  MRN: [id number]   Date: 2018  Code: DNR          Chart and notes reviewed. Data reviewed. I review the patient's current medications in the medical record at each encounter. I have evaluated and examined the patient. HPI:    10:58 AM       History was obtained from patient, spouse, and chart. I was asked by Kristopher Moreau MD to see Liza Alexandrajasmyn in consultation for a chief complaint of pulmonary fibrosis. Mr. Richard Ji is a pleasant 80year old male with end-stage pulmonary fibrosis and COPD who presented to the [facility] ED with worsening SOB and hypoxia. States at his baseline, he uses 23L of O2 (combination of NC and oxygen mask), with the ability to increase an additional 15L if needed with activity. She states over the weekend, he sats have dropped as low as the 50s with subsequent tachycardia. Was found to have NSTEMI after admission and cardiology has evaluated. ECHO larfely unremarkable and plan is for medical management with metoprolol. Patient intolerant of statins. Has not been able to get in to see his usual pulmonologist, Dr. Michelle Cervantes, in about a year due to worsening hypoxia. At that time, he was on 6L O2 at rest and 1)l with activity. Was also previously followed by Dr. Maryjane Souza at [facility] and was involved in a research study, but that has ended. Patient is currently on HF 60L FiO2 100% with sats in the low to mid 80s. States he did not tolerate BiPAP. On Esbriet and Spiriva at baseline. CXR personally visualized and compared to prior images. Report reviewed. There is some increase in the density at the left lung base. The possibility of developing airspace process superimposed on chronic changes cannot be excluded.      WBC 7.6  Hgb 13.2  Na 132  Trop 4.46 (up from 3.53)    ECHO: EF 65%; severe pulm HTN (RVSP 89.6)      Past Medical History:   Diagnosis Date    CAD (coronary artery disease) 2004    4 stents placed in heart    Chronic obstructive pulmonary disease ([facility]     EMPHYSEMA SEEN ON CXR    Colon polyp     Three tubular adenomas excised colonoscopically on 3/17/2011.  COPD (chronic obstructive pulmonary disease) ([facility] 2/4/2014    Diverticulosis of colon (without mention of hemorrhage)     DM (diabetes mellitus) ([facility] 4/3/2012    GERD (gastroesophageal reflux disease)     OCC    History of pneumothorax 2/2014    Hypercholesteremia     Pneumonia 2004    PUD (peptic ulcer disease) 1955    Pulmonary fibrosis ([facility]        Past Surgical History:   Procedure Laterality Date    ENDOSCOPY, COLON, DIAGNOSTIC  3/2011    Polyps. Tics. Rep 3 yrs.  HX CORONARY STENT PLACEMENT  2004    4 cardiac stents    HX GI  circa 2000    Laparoscopic cholecystectomy.  HX HERNIA REPAIR  1988    left inguinal hernia repair    HX LUMBAR LAMINECTOMY  11/20/2013    L4 L5 Laminectomy    HX OTHER SURGICAL  2/7/2014    LVATS, bronch, talc pleurodesis, Resection of ruptured bulla    HX TONSILLECTOMY  age 29       Family History   Problem Relation Age of Onset    Adopted:  Yes    Other Other      patient was adopted       Social History   Substance Use Topics    Smoking status: Former Smoker     Packs/day: 1.50     Years: 36.00     Types: Cigarettes     Quit date: 1/1/1987    Smokeless tobacco: Never Used    Alcohol use No       Allergies   Allergen Reactions    Bactrim [Sulfamethoprim] Unknown (comments)     FLU LIKE SYMPTOMS    Statins-Hmg-Coa Reductase Inhibitors Myalgia    Sulfa (Sulfonamide Antibiotics) Other (comments)     Bactrim causes flu like symptoms       Current Facility-Administered Medications   Medication Dose Route Frequency    sodium chloride (NS) flush 5-10 mL  5-10 mL IntraVENous Q8H    sodium chloride (NS) flush 5-10 mL  5-10 mL IntraVENous PRN    morphine injection 2 mg  2 mg IntraVENous Q3H PRN    LORazepam (ATIVAN) injection 2 mg  2 mg IntraVENous Q2H PRN    albuterol-ipratropium (DUO-NEB) 2.5 MG-0.5 MG/3 ML  3 mL Nebulization Q4H RT    methylPREDNISolone (PF) (SOLU-MEDROL) injection 60 mg  60 mg IntraVENous Q6H    cetirizine (ZYRTEC) tablet 10 mg  10 mg Oral DAILY    finasteride (PROSCAR) tablet 5 mg  5 mg Oral DAILY    budesonide (PULMICORT) 500 mcg/2 ml nebulizer suspension  500 mcg Nebulization BID RT    gabapentin (NEURONTIN) capsule 300 mg  300 mg Oral DAILY WITH LUNCH    gabapentin (NEURONTIN) capsule 600 mg  600 mg Oral QHS    pirfenidone tab 801 mg  (Patient Supplied)  801 mg Oral TID    tamsulosin (FLOMAX) capsule 0.4 mg  0.4 mg Oral DAILY    enoxaparin (LOVENOX) injection 40 mg  40 mg SubCUTAneous Q24H    aspirin chewable tablet 81 mg  81 mg Oral DAILY    metoprolol tartrate (LOPRESSOR) tablet 12.5 mg  12.5 mg Oral BID    pantoprazole (PROTONIX) tablet 40 mg  40 mg Oral ACB         REVIEW OF SYSTEMS   12 point ROS negative except as stated in the HPI. Physical Exam:   Visit Vitals    /68 (BP 1 Location: Left arm, BP Patient Position: At rest)    Pulse 97    Temp 98.9 °F (37.2 °C)    Resp 19    Ht 5' 10\" (1.778 m)    Wt 77.1 kg (170 lb)    SpO2 (!) 83%    BMI 24.39 kg/m2       General:  Alert, cooperative, + resp distress, sitting on bedside commode, appears stated age. Head:  Normocephalic, without obvious abnormality, atraumatic. Eyes:  Conjunctivae/corneas clear. Nose: Nares normal. Septum midline. Mucosa normal.    Throat: Lips, mucosa, and tongue normal.    Neck: Supple, symmetrical, trachea midline, no adenopathy. Lungs:   Diminished air movement with bibasilar crackles, increased WOB, and + accessory muscle use. Chest wall:  No tenderness or deformity. Heart:  Regular rate and rhythm, S1, S2 normal, no murmur, click, rub or gallop. Abdomen:   Soft, non-tender. Bowel sounds normal. No masses,  No organomegaly.    Extremities: Extremities normal, atraumatic, no cyanosis or edema. Pulses: 2+ and symmetric all extremities. Skin: Skin color, texture, turgor normal. No rashes or lesions   Lymph nodes: Cervical, supraclavicular nodes normal.   Neurologic: Grossly nonfocal       Lab Results   Component Value Date/Time    Sodium 132 (L) 08/27/2018 12:30 AM    Potassium 4.9 08/27/2018 12:30 AM    Chloride 98 08/27/2018 12:30 AM    CO2 26 08/27/2018 12:30 AM    BUN 24 (H) 08/27/2018 12:30 AM    Creatinine 1.25 08/27/2018 12:30 AM    Glucose 251 (H) 08/27/2018 12:30 AM    Calcium 9.0 08/27/2018 12:30 AM    Magnesium 2.0 08/27/2018 12:30 AM    Phosphorus 2.7 02/05/2014 04:36 AM       Lab Results   Component Value Date/Time    WBC 7.6 08/26/2018 01:06 PM    HGB 13.2 08/26/2018 01:06 PM    PLATELET 213 69/43/4754 01:06 PM    MCV 96.2 08/26/2018 01:06 PM       Lab Results   Component Value Date/Time    INR 1.0 11/01/2013 04:13 PM    AST (SGOT) 18 01/26/2018 02:19 PM    Alk. phosphatase 27 (L) 01/26/2018 02:19 PM    Protein, total 7.6 01/26/2018 02:19 PM    Albumin 4.3 01/26/2018 02:19 PM    Globulin 3.6 02/05/2014 04:36 AM       No results found for: IRON, FE, TIBC, IBCT, PSAT, FERR    Lab Results   Component Value Date/Time    Sed rate (ESR) 9 07/22/2015 12:34 PM    TSH 3.230 03/10/2015 02:51 PM        No results found for: PH, PHI, PCO2, PCO2I, PO2, PO2I, HCO3, HCO3I, FIO2, FIO2I    Lab Results   Component Value Date/Time    Troponin-I, Qt. 4.46 (H) 08/27/2018 12:30 AM        Lab Results   Component Value Date/Time    Culture result:  02/04/2014 05:20 AM     MRSA NOT PRESENT      Screening of patient nares for MRSA is for surveillance purposes and, if positive, to facilitate isolation considerations in high risk settings. It is not intended for automatic decolonization interventions per se as regimens are not sufficiently effective to warrant routine use.     Culture result:  11/01/2013 03:55 PM     MRSA NOT PRESENT  Apparent Staphylococcus aureus (not MRSA) noted.      Screening of patient nares for MRSA is for surveillance purposes and, if positive, to facilitate isolation considerations in high risk settings. It is not intended for automatic decolonization interventions per se as regimens are not sufficiently effective to warrant routine use. No results found for: TOXA1, RPR, HBCM, HBSAG, HAAB, HCAB1, HAAT, G6PD, CRYAC, HIVGT, HIVR, HIV1, HIV12, HIVPC, HIVRPI    No results found for: VANCT, CPK    Lab Results   Component Value Date/Time    Color YELLOW/STRAW 02/04/2014 08:30 AM    Appearance CLEAR 02/04/2014 08:30 AM    pH (UA) 5.0 02/04/2014 08:30 AM    Protein TRACE (A) 02/04/2014 08:30 AM    Glucose 250 (A) 02/04/2014 08:30 AM    Ketone NEGATIVE  02/04/2014 08:30 AM    Bilirubin NEGATIVE  02/04/2014 08:30 AM    Blood NEGATIVE  02/04/2014 08:30 AM    Urobilinogen 0.2 02/04/2014 08:30 AM    Nitrites NEGATIVE  02/04/2014 08:30 AM    Leukocyte Esterase NEGATIVE  02/04/2014 08:30 AM    WBC 0-4 02/04/2014 08:30 AM    RBC 0-5 02/04/2014 08:30 AM    Bacteria NEGATIVE  02/04/2014 08:30 AM       IMPRESSION  · Acute on chronic hypoxic respiratory failure  · Advanced end stage pulmonary fibrosis  · COPD  · NSTEMI/CAD  · DM    PLAN  · Continue HF O2 and wean flow and FiO2 as able to keep sats > 85%  · Continue IV steroids, Duonebs scheduled, and Pulmicort nebs. Add on Brovana nebs. · Continue home Esbriet   · Metoprolol per Cardiology  · Agree with hospice and Palliative Care consults. Patient with significant increased in baseline O2 requirements since last year and now, unable to even keep pulmonary outpatient appointments due to severe hypoxia. Wife, however, does not seem ready to make the transition to hospice at this time. · GI prophylaxis: Protonix  · DVT prophylaxis: Lovenox    Patient critically ill requiring continuous HF O2 due to severe hypoxia and respiratory distress and is high risk for continued decompensation. Discussed with cardiology.   CC time EOP 40 min. Thank you for allowing us to participate in the care of this patient.       Pb Alvarez

## 2018-08-27 NOTE — CONSULTS
Palliative Medicine Consult  David: [phone] ([phone])    Patient Name: Joanna Carlson  YOB: 1935    Date of Initial Consult: 8/27/18  Reason for Consult: Goals-of-care  Requesting Provider: Aroldo Reagan MD  Primary Care Physician: Kathy Kimble MD     SUMMARY:   Joanna Carlson is a 80 y.o. with a end-stage COPD and pulmonary fibrosis, O2-dependent at 20L/minute at baseline; CAD who was admitted on 8/25/2018 from home with a diagnosis of acute-on-chronic respiratory failure and NSTEMI due to tachycardia and hypoxia. Current medical issues leading to Palliative Medicine involvement include: shortness of breath, anxiety/agitation, confusion in setting of steroids and hypoxia, goals of care. PALLIATIVE DIAGNOSES:   1. Shortness of breath  2. Anxiety  3. Confusion/altered mental status  4. Goals-of-care discussion       PLAN:   1. Continue lorazepam 2-mg IV every 6 hours as needed for anxiety, agitation  2. Add lorazepam 2-mg po every 6 hours as needed  3. Continue IV morphine at 1-mg every 4 hours as needed  4. Hospice informational session earlier yesterday by phone  5. Family not ready for hospice  6. I reviewed role of Palliative Medicine in chronic disease  7. Wife and patient not open today for more in depth conversation  8. Will follow-up tomorrow when family may be more receptive to visit  9. Initial consult note routed to primary continuity provider  10.  Communicated plan of care with: Palliative IDT       GOALS OF CARE / TREATMENT PREFERENCES:     GOALS OF CARE: continue current therapies with goal of returning home          TREATMENT PREFERENCES:   Code Status: DNR    Advance Care Planning:  Advance Care Planning 8/27/2018   Patient's Healthcare Decision Maker is: Named in scanned ACP document   Primary Decision Maker Name Stephanie Rashid   Primary Decision Maker Phone Number [phone]   Primary Decision Maker Relationship to Patient Spouse   Secondary Decision Maker Name Son Glenny Lacey and/or dtr Jamel Brown   Secondary Decision Maker Phone Number [phone], [phone]   Confirm Advance Directive Yes, on file           Other Instructions: Other:    As far as possible, the palliative care team has discussed with patient / health care proxy about goals of care / treatment preferences for patient. HISTORY:     History obtained from: patient, wife, chart    CHIEF COMPLAINT: \"I don't know what's going on\"    HPI/SUBJECTIVE:    The patient is:   [x] Verbal and participatory - limited due to confusion; most history from wife  [] Non-participatory due to:     He came to the hospital because he became more short of breath. He was totally lucid when he came in. He's sharp as a tack normally, he does his own finances. He's not himself from the steroids. He averages 23L/minute at home. They have 2 concentrators and he uses a face mask also. He spends most of his time in his chair. He's confused. He doesn't know what's going on.      Clinical Pain Assessment (nonverbal scale for severity on nonverbal patients):              Duration: for how long has pt been experiencing pain (e.g., 2 days, 1 month, years)  Frequency: how often pain is an issue (e.g., several times per day, once every few days, constant)     FUNCTIONAL ASSESSMENT:     Palliative Performance Scale (PPS):  PPS: 50       PSYCHOSOCIAL/SPIRITUAL SCREENING:     Palliative IDT has assessed this patient for cultural preferences / practices and a referral made as appropriate to needs (Cultural Services, Patient Advocacy, Ethics, etc.)    Advance Care Planning:  Advance Care Planning 2018   Patient's Healthcare Decision Maker is: Named in scanned ACP document   Primary Decision Maker Name Yaw Almanza   Primary Decision Maker Phone Number [phone]   Primary Decision Maker Relationship to Patient Spouse   Secondary Decision Maker Name Son Glenny Lacey and/or dtr Jamel Brown Secondary Decision Maker Phone Number [phone], [phone]   Confirm Advance Directive Yes, on file       Any spiritual / Adventist concerns:  [] Yes /  [x] No    Caregiver Burnout:  [] Yes /  [] No /  [] No Caregiver Present      Anticipatory grief assessment:   [] Normal  / [] Maladaptive       ESAS Anxiety: Anxiety: 4    ESAS Depression:          REVIEW OF SYSTEMS:     Positive and pertinent negative findings in ROS are noted above in HPI. The following systems were [] reviewed / [] unable to be reviewed as noted in HPI  Other findings are noted below. Systems: constitutional, ears/nose/mouth/throat, respiratory, gastrointestinal, genitourinary, musculoskeletal, integumentary, neurologic, psychiatric, endocrine. Positive findings noted below. Modified ESAS Completed by: provider               Anxiety: 4 Nausea: 0   Anorexia: 0       Constipation: No     Stool Occurrence(s): 1        PHYSICAL EXAM:     From RN flowsheet:  Wt Readings from Last 3 Encounters:   18 170 lb (77.1 kg)   10/20/17 178 lb (80.7 kg)   17 184 lb (83.5 kg)     Blood pressure 131/68, pulse 88, temperature 97.6 °F (36.4 °C), resp. rate 26, height 5' 10\" (1.778 m), weight 170 lb (77.1 kg), SpO2 91 %.     Pain Scale 1: Numeric (0 - 10)  Pain Intensity 1: 0                 Last bowel movement, if known: today    Constitutional: sitting up in bed, hi-blayne nasal cannula in place  Eyes: pupils equal, anicteric  ENMT: no nasal discharge, moist mucous membranes  Cardiovascular: regular rhythm, distal pulses intact  Respiratory: breathing not labored, symmetric  Gastrointestinal: soft non-tender, +bowel sounds  Musculoskeletal: no deformity, no tenderness to palpation  Skin: warm, dry  Neurologic: confused, following commands, moving all extremities  Psychiatric: anxious affect  Other:       HISTORY:     Active Problems:    CAD (coronary artery disease) ()      Overview: cardiac stentd (4)      COPD (chronic obstructive pulmonary disease) ([address] (2/4/2014)      DM II (diabetes mellitus, type II), controlled ([address] (2/4/2014)      Pulmonary fibrosis (HCC) ()      Acute on chronic respiratory failure ([address] (8/26/2018)      Past Medical History:   Diagnosis Date    CAD (coronary artery disease) 2004    4 stents placed in heart    Chronic obstructive pulmonary disease (HCC)     EMPHYSEMA SEEN ON CXR    Colon polyp     Three tubular adenomas excised colonoscopically on 3/17/2011.  COPD (chronic obstructive pulmonary disease) ([address] 2/4/2014    Diverticulosis of colon (without mention of hemorrhage)     DM (diabetes mellitus) ([facility] 4/3/2012    GERD (gastroesophageal reflux disease)     OCC    History of pneumothorax 2/2014    Hypercholesteremia     Pneumonia 2004    PUD (peptic ulcer disease) 1955    Pulmonary fibrosis ([facility]       Past Surgical History:   Procedure Laterality Date    ENDOSCOPY, COLON, DIAGNOSTIC  3/2011    Polyps. Tics. Rep 3 yrs.  HX CORONARY STENT PLACEMENT  2004    4 cardiac stents    HX GI  circa 2000    Laparoscopic cholecystectomy.  HX HERNIA REPAIR  1988    left inguinal hernia repair    HX LUMBAR LAMINECTOMY  11/20/2013    L4 L5 Laminectomy    HX OTHER SURGICAL  2/7/2014    LVATS, bronch, talc pleurodesis, Resection of ruptured bulla    HX TONSILLECTOMY  age 29      Family History   Problem Relation Age of Onset    Adopted: Yes    Other Other      patient was adopted      History reviewed, no pertinent family history.   Social History   Substance Use Topics    Smoking status: Former Smoker     Packs/day: 1.50     Years: 36.00     Types: Cigarettes     Quit date: 1/1/1987    Smokeless tobacco: Never Used    Alcohol use No     Allergies   Allergen Reactions    Bactrim [Sulfamethoprim] Unknown (comments)     FLU LIKE SYMPTOMS    Statins-Hmg-Coa Reductase Inhibitors Myalgia    Sulfa (Sulfonamide Antibiotics) Other (comments)     Bactrim causes flu like symptoms      Current Facility-Administered Medications   Medication Dose Route Frequency    arformoterol (BROVANA) neb solution 15 mcg  15 mcg Nebulization BID RT    LORazepam (ATIVAN) injection 2 mg  2 mg IntraVENous Q6H PRN    morphine injection 2 mg  2 mg IntraVENous Q4H PRN    sodium chloride (NS) flush 5-10 mL  5-10 mL IntraVENous Q8H    sodium chloride (NS) flush 5-10 mL  5-10 mL IntraVENous PRN    albuterol-ipratropium (DUO-NEB) 2.5 MG-0.5 MG/3 ML  3 mL Nebulization Q4H RT    methylPREDNISolone (PF) (SOLU-MEDROL) injection 60 mg  60 mg IntraVENous Q6H    cetirizine (ZYRTEC) tablet 10 mg  10 mg Oral DAILY    finasteride (PROSCAR) tablet 5 mg  5 mg Oral DAILY    budesonide (PULMICORT) 500 mcg/2 ml nebulizer suspension  500 mcg Nebulization BID RT    gabapentin (NEURONTIN) capsule 300 mg  300 mg Oral DAILY WITH LUNCH    gabapentin (NEURONTIN) capsule 600 mg  600 mg Oral QHS    pirfenidone tab 801 mg  (Patient Supplied)  801 mg Oral TID    tamsulosin (FLOMAX) capsule 0.4 mg  0.4 mg Oral DAILY    enoxaparin (LOVENOX) injection 40 mg  40 mg SubCUTAneous Q24H    aspirin chewable tablet 81 mg  81 mg Oral DAILY    metoprolol tartrate (LOPRESSOR) tablet 12.5 mg  12.5 mg Oral BID    pantoprazole (PROTONIX) tablet 40 mg  40 mg Oral ACB          LAB AND IMAGING FINDINGS:     Lab Results   Component Value Date/Time    WBC 7.6 08/26/2018 01:06 PM    HGB 13.2 08/26/2018 01:06 PM    PLATELET 774 88/64/2319 01:06 PM     Lab Results   Component Value Date/Time    Sodium 132 (L) 08/27/2018 12:30 AM    Potassium 4.9 08/27/2018 12:30 AM    Chloride 98 08/27/2018 12:30 AM    CO2 26 08/27/2018 12:30 AM    BUN 24 (H) 08/27/2018 12:30 AM    Creatinine 1.25 08/27/2018 12:30 AM    Calcium 9.0 08/27/2018 12:30 AM    Magnesium 2.0 08/27/2018 12:30 AM    Phosphorus 2.7 02/05/2014 04:36 AM      Lab Results   Component Value Date/Time    AST (SGOT) 18 01/26/2018 02:19 PM    Alk.  phosphatase 27 (L) 01/26/2018 02:19 PM    Protein, total 7.6 01/26/2018 02:19 PM    Albumin 4.3 01/26/2018 02:19 PM    Globulin 3.6 02/05/2014 04:36 AM     Lab Results   Component Value Date/Time    INR 1.0 11/01/2013 04:13 PM    Prothrombin time 10.4 11/01/2013 04:13 PM      No results found for: IRON, FE, TIBC, IBCT, PSAT, FERR   Lab Results   Component Value Date/Time    pH 7.39 02/04/2014 02:00 AM    PCO2 31 (L) 02/04/2014 02:00 AM    PO2 90 02/04/2014 02:00 AM     No components found for: GLPOC   No results found for: CPK, CKMB             Total time:   Counseling / coordination time, spent as noted above:   > 50% counseling / coordination?:     Prolonged service was provided for  []30 min   []75 min in face to face time in the presence of the patient, spent as noted above. Time Start:   Time End:   Note: this can only be billed with 98388 (initial) or 61693 (follow up). If multiple start / stop times, list each separately.

## 2018-08-27 NOTE — PROGRESS NOTES
is visiting for follow up support with pt and family in room 334. Support provided by bedside through active listening and pt and spouse shared some of their concerns and frustrations from the afternoon. Pt appeared to attempt an explanation of what had been occurring with him, and while seeming cogent, also appeared slightly confused about details. Pt's spouse processed some of her stress from not knowing what's to come next for her . Pt and spouse shared they are [place] and attended [facility], when they were more able to get to [employer].  offered a listening presence, conversation, and prayer.      [address] John Grace M.Div, M.S, [place] 607 available at [address] [place] [phone])

## 2018-08-27 NOTE — PROGRESS NOTES
Bedside and Verbal shift change report given to Bunny Mabry RN (oncoming nurse) by Moses Arriaga RN (offgoing nurse). Report included the following information SBAR, Kardex, ED Summary, Intake/Output, Recent Results, Med Rec Status and Cardiac Rhythm NSR.     0040: Pt alert, confused, agitated; Spo2 at 64%; pt trying to get out of bed; repositioned pt; removed Hi-Flow and placed pt on Bi-Pap; pt resistive; but pt wife supportive. 0046: Pt alert, calm, Spo2 @ 88% on Bi-Pap. Pt agreeable to wear Bi-Pap at this time. 0120: Pt becoming increasingly agitated; removing Bi-Pap; Spo2 dipping down to 77-82. Administered morphine 1 mg IV. 0130: Pt cooperating on Hi-Flow 60L at 100%, Spo2 at 84-88%. Pt wife at bedside expresses agreement with nurses care and assistance. 0430: Pt sleeping with Hi-Flow; tolerating well with Spo2 at 91-92%. Bedside and Verbal shift change report given to Brittny Brewer RN (oncoming nurse) by Bunny Mabry RN (offgoing nurse). Report included the following information SBAR, Kardex, ED Summary, Intake/Output, Recent Results, Med Rec Status and Cardiac Rhythm NSR with Inverted T wave.

## 2018-08-27 NOTE — PROGRESS NOTES
Nutrition:    MST received for weight loss, poor appetite. Pt on comfort measures only at this time. Continue regular diet. Consult RD if any specific nutrition needs arise or any ONS are needed. Will follow as appropriate. Kerri Bauer, [address]

## 2018-08-27 NOTE — PROGRESS NOTES
Was asked to meet with Mr. Lor Loving and his wife Baby Mates) again. Lengthy discussion held at bedside with both, and later with [place] alone. Pt is awake, alert, asking appropriate questions however on occasions becoming nonsensical in our discussion. Appears comfortable on high flow. SpO2 90%. Again discussed pt's hospital course, overall deterioration over the past few months. Patient has been declining since April, becoming too dyspneic and hypoxic to even make it to his pulmonology appointments. At home, Mr. Lor Loving has 2 concentrators that are able to deliver 10L/min O2 each, and they have adapted the device to feed into one nasal cannula on top of which there is an additional face mask that is able to deliver more O2. Despite multiple providers having discussed pt's poor prognosis, and continued decline with Mr. Lor Loving, he is not ready to make the transition to hospice. Mr. Lor Loving and his wife made it clear that his goal is to be able to go home, but I explained given his O2 requirement and progression of pulmonary fibrosis, I am not sure it would be possible to get him back to his baseline level of O2 requirement at home. I later met with Sandee individually, and explained that Mr. Lor Loving' mentation will likely continue to decline, and at some point she would have to serve as his surrogate and make decisions on his behalf. Nayeli Coel, a retired nurse who has done a tremendous job of caring for Mr. Lor Loving at home (has not had ANY hospitalizations in recent years), has very good understanding of pt's condition and expressed that though her goals would be to keep patient comfortable and control his symptoms alone, she has to respect Mr. Lor Loving' wishes at this point as he is simply not ready to transition to hospice. She notes that Mr. Lor Loving would be more open to meeting with palliative care to discuss symptom control, and not strictly hospice.     Nayeli Cole also noted that Mr. Lor Loving has in the past become agitated with use of steroids. As such, will wean him off of this. Will continue goals of care discussions with pt and family tomorrow. Additional 45 minutes was spent in the care of this patient including pt/family discussion, reviewing records, discussing with nursing.  5:45 - 6:30pm    Danyel Tate MD

## 2018-08-27 NOTE — PROGRESS NOTES
Mario Luis MD [facility] [address]   Office [phone]  Mobile [phone]            NAME:  Drake Severance   :   1935   MRN:   [id number]     Assessment/Plan:   · NonSTEMI, likely type 2 MI due to hypoxemia/tachycardia  · CAD s/p remote PCI > 10 years ago  · Severe hypoxic respiratory failure due to pulmonary fibrosis/COPD  · Severe COPD  · Pulmonary fibrosis     Persistent hypxoemia  Rhythm fine  Continue metoprolol, low dose  Echo today    Subjective:   Cardiac ROS: remains tachypneic, sats 80% on high flow oxygen. Patient denies any exertional chest pain, , palpitations, syncope, orthopnea, edema or paroxysmal nocturnal dyspnea. Review of Systems: No nausea, indigestion, vomiting, pain, cough, sputum. No bleeding. Taking po. Appetite fair    He has a good sense of humor despite his condition  Objective:     Visit Vitals    /58    Pulse 88    Temp 97.7 °F (36.5 °C)    Resp 23    Ht 5' 10\" (1.778 m)    Wt 170 lb (77.1 kg)    SpO2 (!) 89%    BMI 24.39 kg/m2    O2 Flow Rate (L/min): 60 l/min O2 Device: Hi flow nasal cannula    Temp (24hrs), Av °F (36.7 °C), Min:97.7 °F (36.5 °C), Max:98.7 °F (37.1 °C)            1901 -  0700  In: 480 [P.O.:480]  Out: 925 [Urine:925]    TELE: SR 90s    General: AAOx3 cooperative, tachypneic  HEENT: Atraumatic. Pink and moist.  Anicteric sclerae. Neck : Supple, no thyromegaly. Lungs: dry rales  Heart: Regular rhythm, no murmur, no rubs, no gallops. No JVD. No carotid bruits. Abdomen: Soft, non-distended, non-tender. + Bowel sounds. No bruits. Extremities: No edema, no clubbing, no cyanosis. No calf tenderness  Neurologic: Grossly intact. Alert and oriented X 3. No acute neurological distress. Psych: Good insight. Not anxious nor agitated.     Additional comments: None    Care Plan discussed with:    Comments   Patient x    Family  x    RN     Care Manager                    Consultant: Data Review:     EKG    Echo    No results for input(s): TNIPOC in the last 72 hours. No lab exists for component: ITNL   Recent Labs      08/27/18   0030  08/26/18   1306   TROIQ  4.46*  3.53*     Recent Labs      08/27/18   0030  08/26/18   1306   NA  132*  136   K  4.9  4.8   CL  98  100   CO2  26  29   BUN  24*  28*   CREA  1.25  1.32*   GLU  251*  78   MG  2.0  2.0   WBC   --   7.6   HGB   --   13.2   HCT   --   41.0   PLT   --   267     No results for input(s): INR, PTP, APTT in the last 72 hours.     No lab exists for component: INREXT    Medications reviewed  Current Facility-Administered Medications   Medication Dose Route Frequency    sodium chloride (NS) flush 5-10 mL  5-10 mL IntraVENous Q8H    sodium chloride (NS) flush 5-10 mL  5-10 mL IntraVENous PRN    morphine injection 2 mg  2 mg IntraVENous Q3H PRN    LORazepam (ATIVAN) injection 2 mg  2 mg IntraVENous Q2H PRN    albuterol-ipratropium (DUO-NEB) 2.5 MG-0.5 MG/3 ML  3 mL Nebulization Q4H RT    methylPREDNISolone (PF) (SOLU-MEDROL) injection 60 mg  60 mg IntraVENous Q6H    cetirizine (ZYRTEC) tablet 10 mg  10 mg Oral DAILY    finasteride (PROSCAR) tablet 5 mg  5 mg Oral DAILY    budesonide (PULMICORT) 500 mcg/2 ml nebulizer suspension  500 mcg Nebulization BID RT    gabapentin (NEURONTIN) capsule 300 mg  300 mg Oral DAILY WITH LUNCH    gabapentin (NEURONTIN) capsule 600 mg  600 mg Oral QHS    pirfenidone tab 801 mg  (Patient Supplied)  801 mg Oral TID    tamsulosin (FLOMAX) capsule 0.4 mg  0.4 mg Oral DAILY    enoxaparin (LOVENOX) injection 40 mg  40 mg SubCUTAneous Q24H    aspirin chewable tablet 81 mg  81 mg Oral DAILY    metoprolol tartrate (LOPRESSOR) tablet 12.5 mg  12.5 mg Oral BID    pantoprazole (PROTONIX) tablet 40 mg  40 mg Oral ACB         Erin Block MD

## 2018-08-27 NOTE — PROGRESS NOTES
Shift Summary  8/27/2018  [id number]    0700 Pt awake at time of bedside shift report received from [facility].     1333 AM vitals, assessment, and meds complete without difficulty, AM rhythm strip shows NSR. Reviewed today's plan of care and goals with patient/family; plan of care today includes monitor respiratory status. 1030 Assisted pt to [facility]. Pt had BM, bathed by PCT and returned to bed. Pt remained on 60L 100%FIO2 hi flow NC and sats dropped as low as 65%, took aprox. 10-15 minutes to return to > 80% after activity. 1209 In room to administer 1200 dose of neurontin, pt is asleep and family asks me to please not wake. Will attempt later. 0 Pt remains asleep, family wishes to not waken for PO meds. 5 Family rang call bell asking for assistance; pt sitting on side of bed at the foot of the bed, urine on floor, nasal cannula off, O2 sats in 50's, pt's face visibly cyanotic, he is confused and yelling \"stop it! \" when staff attempts to assist. Calmed and reoriented patient, reapplied O2 cannula, repositioned in bed. Sats samantha to 83% within 5 minutes, cyanosis resolved. Housekeeping notified to mop floor. Attempted to give dose of IV ativan, but pt jerked arm and IV came out. Did not attempt to replace IV access at this time as patient is now resting in bed calmly and requests staff not touch him at this time. 1545 Pt's sats have remained 70-83% since episode of confusion documented above. Spoke w/both Dr. Cristin Dong and Dr. Oscar Weber via telephone regarding this. No new orders or interventions deemed neccessary at this time. 80 Pt and wife requesting to speak w/Dr. Cristin Dong who was contacted via [email or web address]. [address] [facility] Dr. Cristin Dong at bedside. 1845 Pt agreeable to new IV started by Guerrero Duncan RN. 1900 Bedside and Verbal shift change report given to Peña Dunlap (oncoming nurse) by [address] (offgoing nurse).  Report included the following information SBAR, Intake/Output, MAR, Recent Results and Cardiac Rhythm NSR.

## 2018-08-28 NOTE — PROGRESS NOTES
Bakari Mejia [facility] [address] 
[address] [facility], [address] [place] [address] 
[phone] Medical Progress Note NAME: Todd Ramirez :  1935 MRM:  [id number] Date/Time: 2018 Subjective: Chief Complaint:  F/U resp failure Chart/notes/labs/studies reviewed, patient examined at bedside. Comfortable on high-flow this morning. No CP. No f/c. Objective:  
 
 
Vitals:  
 
 
  
Last 24hrs VS reviewed since prior progress note. Most recent are: 
 
Visit Vitals  /83  Pulse 98  Temp 97.6 °F (36.4 °C)  Resp (!) 42  
 Ht 5' 10\" (1.778 m)  Wt 77.1 kg (170 lb)  SpO2 (!) 79%  BMI 24.39 kg/m2 SpO2 Readings from Last 6 Encounters:  
18 (!) 79% 17 96% 10/13/14 94% 14 95% 14 93% 14 96% O2 Flow Rate (L/min): 60 l/min Intake/Output Summary (Last 24 hours) at 18 1544 Last data filed at 18 1351 Gross per 24 hour Intake                0 ml Output             1750 ml Net            -1750 ml Exam:  
 
Physical Exam: 
 
Gen:  Well-developed, well-nourished, chronically ill-appearing. NAD HEENT:  Sclerae nonicteric, hearing intact to voice, on high-flow Neck:  Supple, without masses. Resp:  +accessory muscle use, increased WOB. Diminished air movement, fine crackles. No wheezes or rhonchi 
Card: RRR, without m/r/g. No LE edema. Abd:  +bowel sounds, soft, NTTP, nondistended. Neuro: Face symmetric, tongue midline, speech fluent, follows commands appropriately Psych:  Awake, alert, oriented to self, hospital, situation. Fair insight Medications Reviewed: (see below) Lab Data Reviewed: (see below) 
 
______________________________________________________________________ Medications:  
 
Current Facility-Administered Medications Medication Dose Route Frequency  methylPREDNISolone (PF) (SOLU-MEDROL) injection 40 mg  40 mg IntraVENous Q12H  arformoterol (BROVANA) neb solution 15 mcg  15 mcg Nebulization BID RT  
 LORazepam (ATIVAN) injection 2 mg  2 mg IntraVENous Q6H PRN  
 LORazepam (ATIVAN) tablet 2 mg  2 mg Oral Q6H PRN  
 morphine injection 1 mg  1 mg IntraVENous Q4H PRN  
 sodium chloride (NS) flush 5-10 mL  5-10 mL IntraVENous Q8H  
 sodium chloride (NS) flush 5-10 mL  5-10 mL IntraVENous PRN  
 albuterol-ipratropium (DUO-NEB) 2.5 MG-0.5 MG/3 ML  3 mL Nebulization Q4H RT  
 cetirizine (ZYRTEC) tablet 10 mg  10 mg Oral DAILY  finasteride (PROSCAR) tablet 5 mg  5 mg Oral DAILY  budesonide (PULMICORT) 500 mcg/2 ml nebulizer suspension  500 mcg Nebulization BID RT  
 gabapentin (NEURONTIN) capsule 300 mg  300 mg Oral DAILY WITH LUNCH  
 gabapentin (NEURONTIN) capsule 600 mg  600 mg Oral QHS  pirfenidone tab 801 mg   (Patient Supplied)  801 mg Oral TID  tamsulosin (FLOMAX) capsule 0.4 mg  0.4 mg Oral DAILY  enoxaparin (LOVENOX) injection 40 mg  40 mg SubCUTAneous Q24H  
 aspirin chewable tablet 81 mg  81 mg Oral DAILY  metoprolol tartrate (LOPRESSOR) tablet 12.5 mg  12.5 mg Oral BID  pantoprazole (PROTONIX) tablet 40 mg  40 mg Oral ACB Lab Review:  
 
Recent Labs  
   08/26/18 
 1306 WBC  7.6 HGB  13.2 HCT  41.0  
PLT  267 Recent Labs  
   08/27/18 
 0030  08/26/18 
 1306 NA  132*  136  
K  4.9  4.8  
CL  98  100 CO2  26  29 GLU  251*  78 BUN  24*  28* CREA  1.25  1.32* CA  9.0  9.0 MG  2.0  2.0 No components found for: [place] No results for input(s): PH, PCO2, PO2, HCO3, FIO2 in the last 72 hours. No results for input(s): INR in the last 72 hours. No lab exists for component: INREXT, INREXT Lab Results Component Value Date/Time Specimen Description: BRAD 02/04/2014 05:20 AM  
 Specimen Description: CONNIEKERA 11/01/2013 03:55 PM  
 
Lab Results Component Value Date/Time  Culture result:  02/04/2014 05:20 AM  
  MRSA NOT PRESENT 
 Screening of patient nares for MRSA is for surveillance purposes and, if positive, to facilitate isolation considerations in high risk settings. It is not intended for automatic decolonization interventions per se as regimens are not sufficiently effective to warrant routine use. Culture result:  11/01/2013 03:55 PM  
  MRSA NOT PRESENT Apparent Staphylococcus aureus (not MRSA) noted. Screening of patient nares for MRSA is for surveillance purposes and, if positive, to facilitate isolation considerations in high risk settings. It is not intended for automatic decolonization interventions per se as regimens are not sufficiently effective to warrant routine use. Assessment / Plan:  
 
  Acute on chronic respiratory failure: severe chronic lung disease at baseline, with pulmonary fibrosis and COPD. Was followed at advanced lung disease clinic in [place]. On high O2 support at home (20-30L/min). Disease is end-stage, and hospice-appropriate however patient is not receptive to hospice services. Lengthy discussion with pt and wife daily, and pt is not ready to make the transition to hospice. On bronchodilators, weaning IV steroids. Continue pirfenidone. Pt's type 2 MI POA was clearly driven by his respiratory failure. Appreciate cardiology. Pulmonology following. Palliative care consulted, discussed with Dr. Afia Lynne  
  
 CAD (coronary artery disease) (): with type 2 MI POA clearly precipitated by respiratory failure. On ASA. Cardiology on board. TTE showed preserved EF w/o RWMA 
 
 DM II (diabetes mellitus, type II), controlled ([facility] [address] (2/4/2014): BG elevated from steroids but minimize accuchecks for comfort Total time spent: 25 minutes, d/w wife at bedside, d/w cards Time spent in the care of this patient including reviewing records, discussing with nursing and/or other providers on the treatment team, obtaining history and examining the patient, and discussing treatment plans. Care Plan discussed with: Patient, Nursing Staff and >50% of time spent in counseling and coordination of care Discussed:  Code Status, Care Plan and D/C Planning Prophylaxis:  Lovenox Disposition:  TBD 
        
___________________________________________________ Attending Physician: Gardenia Hogan MD

## 2018-08-28 NOTE — PROGRESS NOTES
Cardiology Progress Note [address] [facility]. [address], [address] [facility] Phone [phone]; Fax [phone] 
 
 
 
2018 9:30 AM  
 
Admit Date:           2018 Admit Diagnosis:  Acute on chronic respiratory failure ([facility] [address] Acute on chronic respiratory failure ([facility] [address] :          1935 MRN:          [id number] ASSESSMENT/RECOMMENDATION:  
NSTEMI/Type 2 MI d/t hypoxia and tachycardia: in the setting of advanced pulmonary fibrosis and severe pulmonary hypertension requiring high amount of O2. 
-cont BB 
-reviewed echo with his wife- pEF, mild TR/aortic sclerosis/ severe PH 
 
CAD s/p remote PCI > 10 years ago:  ASA/statin/BB ARF d/t pulmonary fibrosis/COPD/severe PH: pulmonary following - palliative care consulted  07 - 1900 In: -  
Out: 100 [Urine:100] Last 3 Recorded Weights in this Encounter 18 Weight: 170 lb (77.1 kg) 1901 -  07 In: 240 [P.O.:240] Out: 1425 [[id number]] SUBJECTIVE Leanord Khoi per his wife he had a rough night with difficulty breathing/confusion. O2 sat dropping to 60-70 with minimal activity Current Facility-Administered Medications Medication Dose Route Frequency  arformoterol (BROVANA) neb solution 15 mcg  15 mcg Nebulization BID RT  
 LORazepam (ATIVAN) injection 2 mg  2 mg IntraVENous Q6H PRN  
 LORazepam (ATIVAN) tablet 2 mg  2 mg Oral Q6H PRN  
 morphine injection 1 mg  1 mg IntraVENous Q4H PRN  
 methylPREDNISolone (PF) (SOLU-MEDROL) injection 40 mg  40 mg IntraVENous Q8H  
 sodium chloride (NS) flush 5-10 mL  5-10 mL IntraVENous Q8H  
 sodium chloride (NS) flush 5-10 mL  5-10 mL IntraVENous PRN  
 albuterol-ipratropium (DUO-NEB) 2.5 MG-0.5 MG/3 ML  3 mL Nebulization Q4H RT  
  cetirizine (ZYRTEC) tablet 10 mg  10 mg Oral DAILY  finasteride (PROSCAR) tablet 5 mg  5 mg Oral DAILY  budesonide (PULMICORT) 500 mcg/2 ml nebulizer suspension  500 mcg Nebulization BID RT  
 gabapentin (NEURONTIN) capsule 300 mg  300 mg Oral DAILY WITH LUNCH  
 gabapentin (NEURONTIN) capsule 600 mg  600 mg Oral QHS  pirfenidone tab 801 mg  (Patient Supplied)  801 mg Oral TID  tamsulosin (FLOMAX) capsule 0.4 mg  0.4 mg Oral DAILY  enoxaparin (LOVENOX) injection 40 mg  40 mg SubCUTAneous Q24H  
 aspirin chewable tablet 81 mg  81 mg Oral DAILY  metoprolol tartrate (LOPRESSOR) tablet 12.5 mg  12.5 mg Oral BID  pantoprazole (PROTONIX) tablet 40 mg  40 mg Oral ACB OBJECTIVE Intake/Output Summary (Last 24 hours) at 08/28/18 0930 Last data filed at 08/28/18 5578 Gross per 24 hour Intake                0 ml Output              600 ml Net             -600 ml Review of Systems - History obtained from the patient AS PER  HPI Telemetry NSR 
 
PHYSICAL EXAM  
  
 
Visit Vitals  /73  Pulse 77  Temp 97.6 °F (36.4 °C)  Resp 17  Ht 5' 10\" (1.778 m)  Wt 170 lb (77.1 kg)  SpO2 (!) 89%  BMI 24.39 kg/m2 Gen: Well-developed, well-nourished, in no acute distress  Resting on high flow O2 
HEENT:  Pink conjunctivae, Hearing grossly normal.No scleral icterus or conjunctival, moist mucous membranes Neck: Supple,No JVD Resp: No accessory muscle use, dry crackles. High flow O2 NC Card: Regular Rate,Rythm, No murmurs, rubs or gallop. No thrills. GI:          soft, non-tender MSK: No cyanosis or clubbing, good capillary refill Skin: No rashes or ulcers, no bruising Neuro:  Cranial nerves are grossly intact, moving all four extremities, no focal deficit, follows commands appropriately Psych:  Good insight, oriented to person, place and time, alert, Nml Affect LE: No edema DATA REVIEW Laboratory and Imaging have been reviewed by me and are notable for Recent Labs  
   08/27/18 
 0030  08/26/18 
 1306 TROIQ  4.46*  3.53* Recent Labs  
   08/27/18 
 0030  08/26/18 
 1306 NA  132*  136  
K  4.9  4.8  
CO2  26  29 BUN  24*  28* CREA  1.25  1.32* GLU  251*  78 MG  2.0  2.0 WBC   --   7.6 HGB   --   13.2 HCT   --   41.0  
PLT   --   267 Bunny Patience, NP

## 2018-08-28 NOTE — PROGRESS NOTES
I introduced myself to the patient and his wife. Hospice consult noted and was addressed on 8/26. Pt's wife stated that they are still determining what the plan of care \"looks like, I knew this day was going to come but it's still hard to prepare\". DME - pt has a shower chair, reacher, transport chair and continuous 02 through [address]. I will continue to follow and assist with discharge panning.  Thanks CHUCK Pa

## 2018-08-28 NOTE — PROGRESS NOTES
Name: [address] [place] [address] East: [address]  
: 1935 Admit Date: 2018 Phone: [phone]  Room: [facility]/01 PCP: Karishma Avery MD  MRN: [id number] Date: 2018  Code: DNR Chart and notes reviewed. Data reviewed. I review the patient's current medications in the medical record at each encounter. I have evaluated and examined the patient. Overnight events Afebrile Sats 89% on HF 60L FiO2 100% - at rest; continues to desat to the 60s-70s with any activity Has been more confused, agitated, and combative per patient's wife ROS: Continues to report SOB and ROB. States he did not sleep well last night. Attempting to urinate while sitting on the edge of the bed with minimal UOP. Become quite hypoxic attempting to urinate, but refusing offer of rehman placement. Past Medical History:  
Diagnosis Date  CAD (coronary artery disease)   
 4 stents placed in heart  Chronic obstructive pulmonary disease ([address] EMPHYSEMA SEEN ON CXR  
 Colon polyp Three tubular adenomas excised colonoscopically on 3/17/2011.  COPD (chronic obstructive pulmonary disease) ([address] 2014  Diverticulosis of colon (without mention of hemorrhage)  DM (diabetes mellitus) ([address] 4/3/2012  GERD (gastroesophageal reflux disease) [address]  History of pneumothorax 2014  Hypercholesteremia  Pneumonia   PUD (peptic ulcer disease) 195  Pulmonary fibrosis ([facility] Past Surgical History:  
Procedure Laterality Date  ENDOSCOPY, COLON, DIAGNOSTIC  3/2011 Polyps. Tics. Rep 3 yrs.  HX CORONARY STENT PLACEMENT  2004  
 4 cardiac stents  HX GI  circa  Laparoscopic cholecystectomy. [address] [place] [address] left inguinal hernia repair  HX LUMBAR LAMINECTOMY  2013 L4 L5 Laminectomy  HX OTHER SURGICAL  2014 LVATS, bronch, talc pleurodesis, Resection of ruptured bulla  HX TONSILLECTOMY  age 29 Family History Problem Relation Age of Onset  Adopted: Yes  Other Other   
  patient was adopted Social History Substance Use Topics  Smoking status: Former Smoker Packs/day: 1.50 Years: 36.00 Types: Cigarettes Quit date: 1/1/1987  Smokeless tobacco: Never Used  Alcohol use No  
 
 
Allergies Allergen Reactions  Bactrim [Sulfamethoprim] Unknown (comments) FLU LIKE SYMPTOMS  
 Statins-Hmg-Coa Reductase Inhibitors Myalgia  Sulfa (Sulfonamide Antibiotics) Other (comments) Bactrim causes flu like symptoms Current Facility-Administered Medications Medication Dose Route Frequency  arformoterol (BROVANA) neb solution 15 mcg  15 mcg Nebulization BID RT  
 LORazepam (ATIVAN) injection 2 mg  2 mg IntraVENous Q6H PRN  
 LORazepam (ATIVAN) tablet 2 mg  2 mg Oral Q6H PRN  
 morphine injection 1 mg  1 mg IntraVENous Q4H PRN  
 methylPREDNISolone (PF) (SOLU-MEDROL) injection 40 mg  40 mg IntraVENous Q8H  
 sodium chloride (NS) flush 5-10 mL  5-10 mL IntraVENous Q8H  
 sodium chloride (NS) flush 5-10 mL  5-10 mL IntraVENous PRN  
 albuterol-ipratropium (DUO-NEB) 2.5 MG-0.5 MG/3 ML  3 mL Nebulization Q4H RT  
 cetirizine (ZYRTEC) tablet 10 mg  10 mg Oral DAILY  finasteride (PROSCAR) tablet 5 mg  5 mg Oral DAILY  budesonide (PULMICORT) 500 mcg/2 ml nebulizer suspension  500 mcg Nebulization BID RT  
 gabapentin (NEURONTIN) capsule 300 mg  300 mg Oral DAILY WITH LUNCH  
 gabapentin (NEURONTIN) capsule 600 mg  600 mg Oral QHS  pirfenidone tab 801 mg  (Patient Supplied)  801 mg Oral TID  tamsulosin (FLOMAX) capsule 0.4 mg  0.4 mg Oral DAILY  enoxaparin (LOVENOX) injection 40 mg  40 mg SubCUTAneous Q24H  
 aspirin chewable tablet 81 mg  81 mg Oral DAILY  metoprolol tartrate (LOPRESSOR) tablet 12.5 mg  12.5 mg Oral BID  pantoprazole (PROTONIX) tablet 40 mg  40 mg Oral ACB REVIEW OF SYSTEMS  
 12 point ROS negative except as stated in the HPI. Physical Exam:  
Visit Vitals  /73  Pulse 77  Temp 97.6 °F (36.4 °C)  Resp 17  Ht 5' 10\" (1.778 m)  Wt 77.1 kg (170 lb)  SpO2 (!) 89%  BMI 24.39 kg/m2 General:  Awake, confused, uncooperative, + resp distress, sitting on edge of bed, appears stated age. Head:  Normocephalic, without obvious abnormality, atraumatic. Eyes:  Conjunctivae/corneas clear. Nose: Nares normal. Septum midline. Mucosa normal.   
Throat: Lips, mucosa, and tongue normal.   
Neck: Supple, symmetrical, trachea midline, no adenopathy. Lungs:   Diminished air movement with bibasilar crackles, increased WOB, and + accessory muscle use. Chest wall:  No tenderness or deformity. Heart:  Regular rate and rhythm, S1, S2 normal, no murmur, click, rub or gallop. Abdomen:   Soft, non-tender. Bowel sounds normal. No masses,  No organomegaly. Extremities: Extremities normal, atraumatic, no cyanosis or edema. Pulses: 2+ and symmetric all extremities. Skin: Skin color, texture, turgor normal. No rashes or lesions Lymph nodes: Cervical, supraclavicular nodes normal.  
Neurologic: Moves all extremities, following most commands, some confusion, but grossly nonfocal  
 
 
Lab Results Component Value Date/Time Sodium 132 (L) 08/27/2018 12:30 AM  
 Potassium 4.9 08/27/2018 12:30 AM  
 Chloride 98 08/27/2018 12:30 AM  
 CO2 26 08/27/2018 12:30 AM  
 BUN 24 (H) 08/27/2018 12:30 AM  
 Creatinine 1.25 08/27/2018 12:30 AM  
 Glucose 251 (H) 08/27/2018 12:30 AM  
 Calcium 9.0 08/27/2018 12:30 AM  
 Magnesium 2.0 08/27/2018 12:30 AM  
 Phosphorus 2.7 02/05/2014 04:36 AM  
 
 
Lab Results Component Value Date/Time WBC 7.6 08/26/2018 01:06 PM  
 HGB 13.2 08/26/2018 01:06 PM  
 PLATELET 666 86/80/2910 01:06 PM  
 MCV 96.2 08/26/2018 01:06 PM  
 
 
Lab Results Component Value Date/Time  INR 1.0 11/01/2013 04:13 PM  
 AST (SGOT) 18 01/26/2018 02:19 PM  
 Alk. phosphatase 27 (L) 01/26/2018 02:19 PM  
 Protein, total 7.6 01/26/2018 02:19 PM  
 Albumin 4.3 01/26/2018 02:19 PM  
 Globulin 3.6 02/05/2014 04:36 AM  
 
 
No results found for: IRON, FE, TIBC, IBCT, PSAT, FERR Lab Results Component Value Date/Time Sed rate (ESR) 9 07/22/2015 12:34 PM  
 TSH 3.230 03/10/2015 02:51 PM  
  
 
No results found for: PH, PHI, PCO2, PCO2I, PO2, PO2I, HCO3, HCO3I, FIO2, FIO2I Lab Results Component Value Date/Time Troponin-I, Qt. 4.46 (H) 08/27/2018 12:30 AM  
  
 
Lab Results Component Value Date/Time Culture result:  02/04/2014 05:20 AM  
  MRSA NOT PRESENT Screening of patient nares for MRSA is for surveillance purposes and, if positive, to facilitate isolation considerations in high risk settings. It is not intended for automatic decolonization interventions per se as regimens are not sufficiently effective to warrant routine use. Culture result:  11/01/2013 03:55 PM  
  MRSA NOT PRESENT Apparent Staphylococcus aureus (not MRSA) noted. Screening of patient nares for MRSA is for surveillance purposes and, if positive, to facilitate isolation considerations in high risk settings. It is not intended for automatic decolonization interventions per se as regimens are not sufficiently effective to warrant routine use. No results found for: TOXA1, RPR, HBCM, HBSAG, HAAB, HCAB1, HAAT, G6PD, CRYAC, HIVGT, HIVR, HIV1, HIV12, HIVPC, HIVRPI No results found for: VANCT, CPK Lab Results Component Value Date/Time  Color YELLOW/STRAW 02/04/2014 08:30 AM  
 Appearance CLEAR 02/04/2014 08:30 AM  
 pH (UA) 5.0 02/04/2014 08:30 AM  
 Protein TRACE (A) 02/04/2014 08:30 AM  
 Glucose 250 (A) 02/04/2014 08:30 AM  
 Ketone NEGATIVE  02/04/2014 08:30 AM  
 Bilirubin NEGATIVE  02/04/2014 08:30 AM  
 Blood NEGATIVE  02/04/2014 08:30 AM  
 Urobilinogen 0.2 02/04/2014 08:30 AM  
 Nitrites NEGATIVE  02/04/2014 08:30 AM  
 Leukocyte Esterase NEGATIVE  02/04/2014 08:30 AM  
 WBC 0-4 02/04/2014 08:30 AM  
 RBC 0-5 02/04/2014 08:30 AM  
 Bacteria NEGATIVE  02/04/2014 08:30 AM  
 
 
Images: no new images this morning IMPRESSION 
· Acute on chronic hypoxic respiratory failure · Advanced end stage pulmonary fibrosis · COPD 
· NSTEMI/CAD · Severe pulmonary HTN on ECHO · DM 
 
PLAN 
· Continue HF O2 and wean flow and FiO2 as able to keep sats > 85% · Continue IV steroids. They have been weaned some per primary team to see of that may help improve his agitation and bouts of confusion. Currently on 40mg IV q 8hr. · Continue Duonebs scheduled and Brovana/Pulmicort nebs. · Continue home Esbriet · Metoprolol per Cardiology · Appreciate eval by hospice and Palliative Care team.  Patient with significant increase in baseline O2 requirements since last year and now, unable to even keep outpatient appointments due to severe hypoxia. Lengthy discussion this morning with wife, who is aware of his decline and states, \"I think we need to have a reality check. \", but also wants to honor his wishes and states he is not receptive at this time to hospice or palliative care. We also discuss that should his mental and cognitive status continue to decline, she would need to make some of these decisions of his behalf. I did indicate that it is unlikely that we will be able decrease his O2 requirement to a level acceptable for discharge to home. She is appropriately tearful. States his daughter and adult grandchildren are planning to come in from [place] on Friday and states, \"I hope he makes it that long. \" · GI prophylaxis: Protonix · DVT prophylaxis: Lovenox Patient critically ill requiring continuous HF O2 due to severe hypoxia and respiratory distress and is high risk for continued decompensation. Discussed with cardiology, hospitalist, and Palliative Care team.  CC time EOP 30 min. Additional 15 min spent on advanced care planning discussion with patient and wife as noted above.  
 
 
Pb Gonzales

## 2018-08-28 NOTE — ROUTINE PROCESS
0700: Bedside and Verbal shift change report given to Trudee Schilder, RN (oncoming nurse) by Bud Melendez RN (offgoing nurse). Report included the following information SBAR, Kardex, Procedure Summary, Intake/Output, MAR, Accordion, Recent Results, Med Rec Status, Cardiac Rhythm NSR and Alarm Parameters . 4440: Pt declines to take AM medications 6025: PT declines to take AM medications. 1010: PT gets out of bed and urinates on self while trying to use urinal. I requested to wash pt and change clothes; pt refuses. 1015: PT is agreeable to take AM medications. Remains soiled, and refuses again for a bath, linen change, and change of clothes. 1900: Bedside and Verbal shift change report given to Franklyn Becerra RN (oncoming nurse) by Trudee Schilder, RN (offgoing nurse). Report included the following information SBAR, Kardex, Procedure Summary, Intake/Output, MAR, Accordion, Recent Results, Med Rec Status and Cardiac Rhythm NSR to tachy.

## 2018-08-29 NOTE — PROGRESS NOTES
I met with the pt, pt's wife and pt's son. Patient very tearful and had lots of questions about insurance coverage. I explained to him that he meets medical criteria to be in the hospital. I also explained to him that there is no way to determined if he will have any out of pocket costs as claims will be turned into insurance after he is discharged. Pt and his family verbalized understanding. I will continue to follow and offer support.  CHUCK Louie

## 2018-08-29 NOTE — PROGRESS NOTES
Bedside and Verbal shift change report given to Tony (oncoming nurse) by Joe Fitch  (offgoing nurse). Report included the following information SBAR, Kardex, ED Summary, Intake/Output, MAR, Recent Results and Med Rec Status. 0945: Arrived in room to provide daily bath and change linens. Patient states he just does not feel well enough to be bathed at present and asks if RN can come back in 1 hour. Sats were found to be 83% and when patient stopped talking and began to rest they were above 85% 1330: Patient and wife ask about the patient taking his home Metformin. Dr Deng Rey asked if we should restart Metformin and accuchecks and he states Do not start Metformin and accuchecks unless patient insists. Will update patient. 1605: Called to room as patient is complaining that his left ear sounds like it is \"crackling\" especially when he \"sniffs\" in or out. Dr Deng Rey notified via tiger text. 1900: Bedside and Verbal shift change report given to Joe Fitch (oncoming nurse) by Tony (offgoing nurse). Report included the following information SBAR, Kardex, Procedure Summary, Intake/Output, MAR, Recent Results and Med Rec Status.

## 2018-08-29 NOTE — PROGRESS NOTES
[facility] 
[address] [facility], [address] [place] [address] 
[phone] Medical Progress Note NAME: Hendrick Hashimoto :  1935 MRM:  [id number] Date/Time: 2018 Subjective: Chief Complaint:  F/U resp failure Chart/notes/labs/studies reviewed, patient examined at bedside. Comfortable on high-flow but becomes more dyspneic just with talking. No CP. No f/c. Objective:  
 
 
Vitals:  
 
 
  
Last 24hrs VS reviewed since prior progress note. Most recent are: 
 
Visit Vitals  /75  Pulse 90  Temp 97.7 °F (36.5 °C)  Resp 25  
 Ht 5' 10\" (1.778 m)  Wt 79.6 kg (175 lb 6.4 oz)  SpO2 (!) 89%  BMI 25.17 kg/m2 SpO2 Readings from Last 6 Encounters:  
18 (!) 89% 17 96% 10/13/14 94% 14 95% 14 93% 14 96% O2 Flow Rate (L/min): 60 l/min Intake/Output Summary (Last 24 hours) at 18 1655 Last data filed at 18 1426 Gross per 24 hour Intake              930 ml Output             2450 ml Net            -1520 ml Exam:  
 
Physical Exam: 
 
Gen:  Well-developed, well-nourished, chronically ill-appearing. NAD HEENT:  Sclerae nonicteric, hearing intact to voice, on high-flow Neck:  Supple, without masses. Resp:  +accessory muscle use, increased WOB. Diminished air movement, fine crackles. No wheezes or rhonchi 
Card: RRR, without m/r/g. No LE edema. Abd:  +bowel sounds, soft, NTTP, nondistended. Neuro: Face symmetric, tongue midline, speech fluent, follows commands appropriately Psych:  Awake, alert, oriented to self, hospital, situation. Limited insight Medications Reviewed: (see below) Lab Data Reviewed: (see below) 
 
______________________________________________________________________ Medications:  
 
Current Facility-Administered Medications Medication Dose Route Frequency  methylPREDNISolone (PF) (SOLU-MEDROL) injection 40 mg  40 mg IntraVENous DAILY  arformoterol (BROVANA) neb solution 15 mcg  15 mcg Nebulization BID RT  
 LORazepam (ATIVAN) injection 2 mg  2 mg IntraVENous Q6H PRN  
 LORazepam (ATIVAN) tablet 2 mg  2 mg Oral Q6H PRN  
 morphine injection 1 mg  1 mg IntraVENous Q4H PRN  
 sodium chloride (NS) flush 5-10 mL  5-10 mL IntraVENous Q8H  
 sodium chloride (NS) flush 5-10 mL  5-10 mL IntraVENous PRN  
 albuterol-ipratropium (DUO-NEB) 2.5 MG-0.5 MG/3 ML  3 mL Nebulization Q4H RT  
 cetirizine (ZYRTEC) tablet 10 mg  10 mg Oral DAILY  finasteride (PROSCAR) tablet 5 mg  5 mg Oral DAILY  budesonide (PULMICORT) 500 mcg/2 ml nebulizer suspension  500 mcg Nebulization BID RT  
 gabapentin (NEURONTIN) capsule 300 mg  300 mg Oral DAILY WITH LUNCH  
 gabapentin (NEURONTIN) capsule 600 mg  600 mg Oral QHS  pirfenidone tab 801 mg   (Patient Supplied)  801 mg Oral TID  tamsulosin (FLOMAX) capsule 0.4 mg  0.4 mg Oral DAILY  enoxaparin (LOVENOX) injection 40 mg  40 mg SubCUTAneous Q24H  
 aspirin chewable tablet 81 mg  81 mg Oral DAILY  metoprolol tartrate (LOPRESSOR) tablet 12.5 mg  12.5 mg Oral BID  pantoprazole (PROTONIX) tablet 40 mg  40 mg Oral ACB Lab Review: No results for input(s): WBC, HGB, HCT, PLT, HGBEXT, HCTEXT, PLTEXT, HGBEXT, HCTEXT, PLTEXT in the last 72 hours. Recent Labs  
   08/27/18 
 0030 NA  132* K  4.9 CL  98  
CO2  26 GLU  251* BUN  24* CREA  1.25  
CA  9.0 MG  2.0 No components found for: [place] No results for input(s): PH, PCO2, PO2, HCO3, FIO2 in the last 72 hours. No results for input(s): INR in the last 72 hours. No lab exists for component: INREXT, INREXT Lab Results Component Value Date/Time Specimen Description: BRAD 02/04/2014 05:20 AM  
 Specimen Description: BRAD 11/01/2013 03:55 PM  
 
Lab Results Component Value Date/Time  Culture result:  02/04/2014 05:20 AM  
 MRSA NOT PRESENT Screening of patient nares for MRSA is for surveillance purposes and, if positive, to facilitate isolation considerations in high risk settings. It is not intended for automatic decolonization interventions per se as regimens are not sufficiently effective to warrant routine use. Culture result:  11/01/2013 03:55 PM  
  MRSA NOT PRESENT Apparent Staphylococcus aureus (not MRSA) noted. Screening of patient nares for MRSA is for surveillance purposes and, if positive, to facilitate isolation considerations in high risk settings. It is not intended for automatic decolonization interventions per se as regimens are not sufficiently effective to warrant routine use. Assessment / Plan:  
 
  Acute on chronic respiratory failure: severe pulmonary fibrosis and COPD at baseline. Was too hypoxic and dyspneic to even attend outpatient pulmonology appointments. Was followed at advanced lung disease clinic in [place] and in [place] by Dr. Renetta Escalera (pulmonary associates). On high O2 support at home (20-30L/min). End-stage disease, hospice-appropriate, however patient is not yet receptive to hospice services. Lengthy discussions with pt and wife have not made progress. Mgmt per pulmonology, on bronchodilators, weaning IV steroids. Continue pirfenidone. Palliative care consulted and following 
  
 CAD (coronary artery disease) (): type 2 MI POA was clearly driven by his respiratory failure. Appreciate cardiology. On ASA. Cardiology on board. TTE showed preserved EF w/o RWMA 
 
 DM II (diabetes mellitus, type II), controlled ([facility] (2/4/2014): BG elevated from steroids but would not check accuchecks for comfort Total time spent: 25 minutes, d/w wife at bedside, d/w cards, pulmonology Time spent in the care of this patient including reviewing records, discussing with nursing and/or other providers on the treatment team, obtaining history and examining the patient, and discussing treatment plans. Care Plan discussed with: Patient, Nursing Staff and >50% of time spent in counseling and coordination of care Discussed:  Code Status, Care Plan and D/C Planning Prophylaxis:  Lovenox Disposition:  TBD 
        
___________________________________________________ Attending Physician: Milo Sue MD

## 2018-08-29 NOTE — PROGRESS NOTES
Problem: Pressure Injury - Risk of 
Goal: *Prevention of pressure injury Document Vasile Scale and appropriate interventions in the flowsheet. Outcome: Progressing Towards Goal 
Pressure Injury Interventions: 
Sensory Interventions: Assess changes in LOC, Assess need for specialty bed, Avoid rigorous massage over bony prominences, Chair cushion, Discuss PT/OT consult with provider, Keep linens dry and wrinkle-free, Maintain/enhance activity level, Minimize linen layers, Monitor skin under medical devices, Pad between skin to skin, Pressure redistribution bed/mattress (bed type), Turn and reposition approx. every two hours (pillows and wedges if needed) Activity Interventions: Assess need for specialty bed, Chair cushion, Increase time out of bed, Pressure redistribution bed/mattress(bed type), PT/OT evaluation Mobility Interventions: Assess need for specialty bed, Chair cushion, Float heels, HOB 30 degrees or less, Pressure redistribution bed/mattress (bed type), PT/OT evaluation, Turn and reposition approx. every two hours(pillow and wedges) Nutrition Interventions: Document food/fluid/supplement intake, Discuss nutritional consult with provider, Offer support with meals,snacks and hydration Friction and Shear Interventions: Apply protective barrier, creams and emollients, Feet elevated on foot rest, Foam dressings/transparent film/skin sealants, HOB 30 degrees or less, Lift team/patient mobility team, Minimize layers Problem: Falls - Risk of 
Goal: *Absence of Falls Document Clide Failing Fall Risk and appropriate interventions in the flowsheet.   
Outcome: Progressing Towards Goal 
Fall Risk Interventions: 
Mobility Interventions: Assess mobility with egress test, Bed/chair exit alarm, Communicate number of staff needed for ambulation/transfer, OT consult for ADLs, Patient to call before getting OOB, PT Consult for mobility concerns, PT Consult for assist device competence, Strengthening exercises (ROM-active/passive) Mentation Interventions: Adequate sleep, hydration, pain control, Bed/chair exit alarm, Door open when patient unattended, Evaluate medications/consider consulting pharmacy, Eyeglasses and hearing aids, Family/sitter at bedside, Increase mobility, More frequent rounding, Reorient patient, Room close to nurse's station, Self-releasing belt, Toileting rounds, Update white board Medication Interventions: Assess postural VS orthostatic hypotension, Bed/chair exit alarm, Evaluate medications/consider consulting pharmacy, Patient to call before getting OOB, Teach patient to arise slowly, Utilize gait belt for transfers/ambulation Elimination Interventions: Bed/chair exit alarm, Call light in reach, Elevated toilet seat, Patient to call for help with toileting needs, Toilet paper/wipes in reach, Toileting schedule/hourly rounds, Urinal in reach History of Falls Interventions: Bed/chair exit alarm, Consult care management for discharge planning, Door open when patient unattended, Evaluate medications/consider consulting pharmacy, Investigate reason for fall, Room close to nurse's station

## 2018-08-29 NOTE — ROUTINE PROCESS
Bedside and Verbal shift change report given to ROZINA Jimenez (oncoming nurse) by Tayo Walden RN (offgoing nurse). Report included the following information SBAR, Kardex, Intake/Output, MAR, Accordion, Recent Results, Cardiac Rhythm SR and Alarm Parameters .

## 2018-08-29 NOTE — PROGRESS NOTES
Name: [address] [place] [address] East: [address]  
: 1935 Admit Date: 2018 Phone: [phone]  Room: [facility]/01 PCP: Yuko Pizarro MD  MRN: [id number] Date: 2018  Code: DNR Chart and notes reviewed. Data reviewed. I review the patient's current medications in the medical record at each encounter. I have evaluated and examined the patient. Overnight events Afebrile Sats 85% on HF 60L FiO2 100% - at rest; continues to desat to the 60s-70s with any activity, including being repositioned by the turn team, taking 2-3 min to get back to the mid 80s Mental status improved ROS: SOB and ROB unchanged. Slept better last night after dose of morphine. Had some CP yesterday, better now. Denies fever or chills. Has occasional cough which is nonproductive. Denies abd pain or LE pain/swelling. Fatigues easily and has increased WOB with any movement. Past Medical History:  
Diagnosis Date  CAD (coronary artery disease)   
 4 stents placed in heart  Chronic obstructive pulmonary disease ([address] EMPHYSEMA SEEN ON CXR  
 Colon polyp Three tubular adenomas excised colonoscopically on 3/17/2011.  COPD (chronic obstructive pulmonary disease) ([address] 2014  Diverticulosis of colon (without mention of hemorrhage)  DM (diabetes mellitus) ([address] 4/3/2012  GERD (gastroesophageal reflux disease) [address]  History of pneumothorax 2014  Hypercholesteremia  Pneumonia   PUD (peptic ulcer disease)   Pulmonary fibrosis ([facility] Past Surgical History:  
Procedure Laterality Date  ENDOSCOPY, COLON, DIAGNOSTIC  3/2011 Polyps. Tics. Rep 3 yrs.  HX CORONARY STENT PLACEMENT  2004  
 4 cardiac stents  HX GI  circa  Laparoscopic cholecystectomy. [address] [place] [address] left inguinal hernia repair  HX LUMBAR LAMINECTOMY  2013 L4 L5 Laminectomy  HX OTHER SURGICAL  2014 LVATS, bronch, talc pleurodesis, Resection of ruptured bulla  HX TONSILLECTOMY  age 29 Family History Problem Relation Age of Onset  Adopted: Yes  Other Other   
  patient was adopted Social History Substance Use Topics  Smoking status: Former Smoker Packs/day: 1.50 Years: 36.00 Types: Cigarettes Quit date: 1/1/1987  Smokeless tobacco: Never Used  Alcohol use No  
 
 
Allergies Allergen Reactions  Bactrim [Sulfamethoprim] Unknown (comments) FLU LIKE SYMPTOMS  
 Statins-Hmg-Coa Reductase Inhibitors Myalgia  Sulfa (Sulfonamide Antibiotics) Other (comments) Bactrim causes flu like symptoms Current Facility-Administered Medications Medication Dose Route Frequency  methylPREDNISolone (PF) (SOLU-MEDROL) injection 40 mg  40 mg IntraVENous DAILY  arformoterol (BROVANA) neb solution 15 mcg  15 mcg Nebulization BID RT  
 LORazepam (ATIVAN) injection 2 mg  2 mg IntraVENous Q6H PRN  
 LORazepam (ATIVAN) tablet 2 mg  2 mg Oral Q6H PRN  
 morphine injection 1 mg  1 mg IntraVENous Q4H PRN  
 sodium chloride (NS) flush 5-10 mL  5-10 mL IntraVENous Q8H  
 sodium chloride (NS) flush 5-10 mL  5-10 mL IntraVENous PRN  
 albuterol-ipratropium (DUO-NEB) 2.5 MG-0.5 MG/3 ML  3 mL Nebulization Q4H RT  
 cetirizine (ZYRTEC) tablet 10 mg  10 mg Oral DAILY  finasteride (PROSCAR) tablet 5 mg  5 mg Oral DAILY  budesonide (PULMICORT) 500 mcg/2 ml nebulizer suspension  500 mcg Nebulization BID RT  
 gabapentin (NEURONTIN) capsule 300 mg  300 mg Oral DAILY WITH LUNCH  
 gabapentin (NEURONTIN) capsule 600 mg  600 mg Oral QHS  pirfenidone tab 801 mg   (Patient Supplied)  801 mg Oral TID  tamsulosin (FLOMAX) capsule 0.4 mg  0.4 mg Oral DAILY  enoxaparin (LOVENOX) injection 40 mg  40 mg SubCUTAneous Q24H  
 aspirin chewable tablet 81 mg  81 mg Oral DAILY  metoprolol tartrate (LOPRESSOR) tablet 12.5 mg  12.5 mg Oral BID  
  pantoprazole (PROTONIX) tablet 40 mg  40 mg Oral ACB REVIEW OF SYSTEMS  
12 point ROS negative except as stated in the HPI. Physical Exam:  
Visit Vitals  BP (!) 162/93  Pulse 87  Temp 97.9 °F (36.6 °C)  Resp 22  
 Ht 5' 10\" (1.778 m)  Wt 79.6 kg (175 lb 6.4 oz)  SpO2 (!) 85%  BMI 25.17 kg/m2 General:  Awake, alert, + resp distress, appears stated age. Head:  Normocephalic, without obvious abnormality, atraumatic. Eyes:  Conjunctivae/corneas clear. Nose: Nares normal. Septum midline. Mucosa normal.   
Throat: Lips, mucosa, and tongue normal.   
Neck: Supple, symmetrical, trachea midline, no adenopathy. Lungs:   Diminished air movement with bibasilar crackles, increased WOB, and + accessory muscle use.  + tachypnea after being positioned by turn team.  
Chest wall:  No tenderness or deformity. Heart:  Regular rate and rhythm, S1, S2 normal, no murmur, click, rub or gallop. Abdomen:   Soft, non-tender. Bowel sounds normal. No masses,  No organomegaly. Extremities: Extremities normal, atraumatic, no cyanosis or edema. Pulses: 2+ and symmetric all extremities. Skin: Skin color, texture, turgor normal. No rashes or lesions Lymph nodes: Cervical, supraclavicular nodes normal.  
Neurologic: Moves all extremities, following most commands, some confusion, but grossly nonfocal  
 
 
Lab Results Component Value Date/Time Sodium 132 (L) 08/27/2018 12:30 AM  
 Potassium 4.9 08/27/2018 12:30 AM  
 Chloride 98 08/27/2018 12:30 AM  
 CO2 26 08/27/2018 12:30 AM  
 BUN 24 (H) 08/27/2018 12:30 AM  
 Creatinine 1.25 08/27/2018 12:30 AM  
 Glucose 251 (H) 08/27/2018 12:30 AM  
 Calcium 9.0 08/27/2018 12:30 AM  
 Magnesium 2.0 08/27/2018 12:30 AM  
 Phosphorus 2.7 02/05/2014 04:36 AM  
 
 
Lab Results Component Value Date/Time  WBC 7.6 08/26/2018 01:06 PM  
 HGB 13.2 08/26/2018 01:06 PM  
 PLATELET 229 84/50/8979 01:06 PM  
 MCV 96.2 08/26/2018 01:06 PM  
 
 
 Lab Results Component Value Date/Time INR 1.0 11/01/2013 04:13 PM  
 AST (SGOT) 18 01/26/2018 02:19 PM  
 Alk. phosphatase 27 (L) 01/26/2018 02:19 PM  
 Protein, total 7.6 01/26/2018 02:19 PM  
 Albumin 4.3 01/26/2018 02:19 PM  
 Globulin 3.6 02/05/2014 04:36 AM  
 
 
No results found for: IRON, FE, TIBC, IBCT, PSAT, FERR Lab Results Component Value Date/Time Sed rate (ESR) 9 07/22/2015 12:34 PM  
 TSH 3.230 03/10/2015 02:51 PM  
  
 
No results found for: PH, PHI, PCO2, PCO2I, PO2, PO2I, HCO3, HCO3I, FIO2, FIO2I Lab Results Component Value Date/Time Troponin-I, Qt. 4.46 (H) 08/27/2018 12:30 AM  
  
 
Lab Results Component Value Date/Time Culture result:  02/04/2014 05:20 AM  
  MRSA NOT PRESENT Screening of patient nares for MRSA is for surveillance purposes and, if positive, to facilitate isolation considerations in high risk settings. It is not intended for automatic decolonization interventions per se as regimens are not sufficiently effective to warrant routine use. Culture result:  11/01/2013 03:55 PM  
  MRSA NOT PRESENT Apparent Staphylococcus aureus (not MRSA) noted. Screening of patient nares for MRSA is for surveillance purposes and, if positive, to facilitate isolation considerations in high risk settings. It is not intended for automatic decolonization interventions per se as regimens are not sufficiently effective to warrant routine use. No results found for: TOXA1, RPR, HBCM, HBSAG, HAAB, HCAB1, HAAT, G6PD, CRYAC, HIVGT, HIVR, HIV1, HIV12, HIVPC, HIVRPI No results found for: VANCT, CPK Lab Results Component Value Date/Time  Color YELLOW/STRAW 02/04/2014 08:30 AM  
 Appearance CLEAR 02/04/2014 08:30 AM  
 pH (UA) 5.0 02/04/2014 08:30 AM  
 Protein TRACE (A) 02/04/2014 08:30 AM  
 Glucose 250 (A) 02/04/2014 08:30 AM  
 Ketone NEGATIVE  02/04/2014 08:30 AM  
 Bilirubin NEGATIVE  02/04/2014 08:30 AM  
 Blood NEGATIVE  02/04/2014 08:30 AM  
 Urobilinogen 0.2 02/04/2014 08:30 AM  
 Nitrites NEGATIVE  02/04/2014 08:30 AM  
 Leukocyte Esterase NEGATIVE  02/04/2014 08:30 AM  
 WBC 0-4 02/04/2014 08:30 AM  
 RBC 0-5 02/04/2014 08:30 AM  
 Bacteria NEGATIVE  02/04/2014 08:30 AM  
 
 
Images: no new images this morning IMPRESSION 
· Acute on chronic hypoxic respiratory failure · Advanced end stage pulmonary fibrosis · COPD 
· NSTEMI/CAD · Severe pulmonary HTN on ECHO · DM 
 
PLAN 
· Continue HF O2 and wean flow and FiO2 as able to keep sats > 85%. Have not been able to wean flow or FiO2 at all at this point and patient remians on 60L and 100% · IV steroids have been weaned to 40 mg daily. Decrease in dose seems to have helped his mental status and confusion. Wife states, \"He's back to his old self. \"   
· Continue Duonebs scheduled and Brovana/Pulmicort nebs. · Continue home Esbriet · Metoprolol per Cardiology. No additional recs at this time · Appreciate eval by hospice and Palliative Care team.  Patient with significant increase in baseline O2 requirements since last year and now, unable to even keep outpatient appointments due to severe hypoxia. Continue Ativan and morphine. · Daily discussions with patient and wife, who is aware of the severity of his decline, but also wants to honor his wishes. Patient not receptive at this time to hospice or palliative care. We also discuss that should his mental and cognitive status continue to decline, she would need to make some of these decisions of his behalf. Again discussed that it is unlikely that we will be able decrease his O2 requirement to a level acceptable for discharge to home. States his daughter and adult grandchildren are planning to come in from [place] on Friday. · Patient's wife asking about treating his pulmonary HTN. Discussed that sildenafil is indicated for primary pulmonary HTN and there isn't evidence that it provides significant benefit in secondary pulmonary HTN.   They also ask about adding Ofev to current Esbriet therapy. Discussed that neither of these medications will reverse his pulmonary fibrosis or improve his current lung function, but aim to slow the progression. Advised them I will discuss further with Dr. Presley Cooley. · GI prophylaxis: Protonix · DVT prophylaxis: Lovenox Patient critically ill requiring continuous HF O2 due to severe hypoxia and respiratory distress and is high risk for continued decompensation. Discussed with cardiology, hospitalist, and Palliative Care team.  CC time EOP 35 min.  
 
Pb Hampton

## 2018-08-30 NOTE — DISCHARGE SUMMARY
Physician Interim Discharge Summary Patient ID: Carol Medeiros [id number] 
54 y.o. 
1935 Admit date: 8/25/2018 Discharge date and time: TBD Hospital Course: This is an interim summary and covers the hospitalization from 8/25 to 8/30/2018. For any preceding portion of the patient's hospitalization, please see my partner's notes. Mr. Lor Loving is an 79 yo WM w/ hx of end-stage pulmonary fibrosis on home O2 admitted for dyspnea, acute on chronic respiratory failure Acute on chronic respiratory failure: severe pulmonary fibrosis and COPD at baseline. At home, he was too hypoxic and dyspneic to even attend outpatient pulmonology appointments. Was followed at advanced lung disease clinic in [place] and in [place] by Dr. Batsheva Hu (pulmonary associates). On high O2 support at home (20-30L/min). End-stage disease, hospice-appropriate, however patient is not yet receptive to hospice services. Noted CXR findings from today, defer mgmt to pulmonology, on bronchodilators, wean off IV steroids. Continue pirfenidone. Palliative care consulted and following, discussed with Dr. Cherise Rashid 
 CAD (coronary artery disease) (): type 2 MI POA was clearly driven by his respiratory failure. Appreciate cardiology. On ASA. Cardiology on board. TTE showed preserved EF w/o RWMA 
  
 DM II (diabetes mellitus, type II), controlled ([facility] (2/4/2014): BG elevated from steroids but would not check accuchecks for comfort 
  
  
 
See daily note for details. Final discharge summary will be done by the discharging physician.   
 
 
Signed: 
Leeann Melton MD 
8/30/2018 
4:59 PM

## 2018-08-30 NOTE — PROGRESS NOTES
Problem: Pressure Injury - Risk of 
Goal: *Prevention of pressure injury Document Vasile Scale and appropriate interventions in the flowsheet. Outcome: Progressing Towards Goal 
Pressure Injury Interventions: 
Sensory Interventions: Assess changes in LOC, Assess need for specialty bed, Avoid rigorous massage over bony prominences, Chair cushion, Check visual cues for pain, Discuss PT/OT consult with provider, Keep linens dry and wrinkle-free, Maintain/enhance activity level, Minimize linen layers, Monitor skin under medical devices, Pad between skin to skin, Turn and reposition approx. every two hours (pillows and wedges if needed) Activity Interventions: Assess need for specialty bed, Chair cushion, Increase time out of bed, Pressure redistribution bed/mattress(bed type), PT/OT evaluation Mobility Interventions: Assess need for specialty bed, Chair cushion, Float heels, HOB 30 degrees or less, Pressure redistribution bed/mattress (bed type), PT/OT evaluation, Turn and reposition approx. every two hours(pillow and wedges) Nutrition Interventions: Document food/fluid/supplement intake, Discuss nutritional consult with provider, Offer support with meals,snacks and hydration Friction and Shear Interventions: Apply protective barrier, creams and emollients, Feet elevated on foot rest, Foam dressings/transparent film/skin sealants, HOB 30 degrees or less, Lift team/patient mobility team, Minimize layers Problem: Falls - Risk of 
Goal: *Absence of Falls Document Ashely Irizarry Fall Risk and appropriate interventions in the flowsheet. Outcome: Progressing Towards Goal 
Fall Risk Interventions: 
Mobility Interventions: Assess mobility with egress test, Bed/chair exit alarm, Communicate number of staff needed for ambulation/transfer, OT consult for ADLs, Patient to call before getting OOB, PT Consult for mobility concerns, PT Consult for assist device competence Mentation Interventions: Adequate sleep, hydration, pain control, Bed/chair exit alarm, Door open when patient unattended, Evaluate medications/consider consulting pharmacy, Eyeglasses and hearing aids, Familiar objects from home, Increase mobility, More frequent rounding, Reorient patient, Room close to nurse's station, Toileting rounds, Update white board Medication Interventions: Assess postural VS orthostatic hypotension, Bed/chair exit alarm, Evaluate medications/consider consulting pharmacy, Patient to call before getting OOB, Teach patient to arise slowly Elimination Interventions: Bed/chair exit alarm, Call light in reach, Elevated toilet seat, Patient to call for help with toileting needs, Toilet paper/wipes in reach, Toileting schedule/hourly rounds, Urinal in reach History of Falls Interventions: Bed/chair exit alarm, Consult care management for discharge planning, Door open when patient unattended, Evaluate medications/consider consulting pharmacy, Room close to nurse's station

## 2018-08-30 NOTE — PROGRESS NOTES
Problem: Gas Exchange - Impaired Goal: *Absence of hypoxia Outcome: Not Progressing Towards Goal 
Pt still struggles to keep Sats at ordered levels on hi blayne O2. MD aware.

## 2018-08-30 NOTE — PROGRESS NOTES
Palliative Medicine Code Status: DNR Advance Care Planning: 
Advance Care Planning 2018 Patient's Healthcare Decision Maker is: Named in scanned ACP document Primary Decision Maker Name Farzaneh Anton Primary Decision Maker Phone Number [phone] Primary Decision Maker Relationship to Patient Spouse Secondary Decision Maker Name Son Peyton Whitney and/or dtr Iggy Morales Secondary Decision Maker Phone Number [phone], [phone] Confirm Advance Directive Yes, on file Patient / Family Encounter Documentation Participants (names): Pt, wife Jarad Conde, dtr Janell Lanes, Palliative Medicine (Dr. Jesus Andrews, [address]) Narrative:  Pt was awake in bed on hi-flow O2, appeared weaker than during previous visit but able to engage in conversation, remains rather impatient, irritated by questions, stated several times, \"I'm fine\" but also stated his understanding of current medical condition is that he has \"bought the farm,\" expressed frustration that his condition was not diagnosed sooner and that a cure hasn't been found. Focus of conversation was primarily symptom management, as pt not receptive to goals of care discussion. Wife expressed concern that pt is too drowsy from meds to address SOB, stated pt has been unable to eat or take other necessary day time meds due to lethargy. Psychosocial Issues Identified/ Resilience Factors: Dtr and granddtr arrived yesterday from [place], conflict reportedly exists between dtr and son. Dtr encouraged pt's wife to go home to rest, wife is committed to remaining at bedside, stated she takes her guidance from pt, will sleep when he sleeps. Goals of Care / Plan: Palliative team will continue to follow for ongoing support, management of symptoms, goals of care discussion when pt/family receptive. Discussed with RNs. Thank you for including Palliative Medicine in the care of Mr. Brendan Oviedo. [address], [facility]- 
[phone])

## 2018-08-30 NOTE — CDMP QUERY
Please clarify if this patient is (was) being treated/managed for:  
 
=> Metabolic encephalopathy in the setting of Severe Chronic Lung Disease requiring increased safety precautions. 
=> Other explanation of clinical findings  
=> Clinically Undetermined (no explanation for clinical findings) The medical record reflects the following clinical findings, treatment, and risk factors. Risk Factors:  end stage pulmonary fibrosis and COPD; need high flow O2 support at home (20-30L/min) Clinical Indicators:  AMS, O2 sats in 50's, pt's face visibly cyanotic, he is confused and yelling Treatment: Increased safety precautions: High-fall risk; Bed/Chair exit alarm; Door open when pt. unattended; Activity Level is bedrest  
 
Please clarify and document your clinical opinion in the progress notes and discharge summary including the definitive and/or presumptive diagnosis, (suspected or probable), related to the above clinical findings. Please include clinical findings supporting your diagnosis. Thank [facility] 
[phone]

## 2018-08-30 NOTE — PROGRESS NOTES
Bedside and Verbal shift change report given to Tony (oncoming nurse) by Arlen Steele (offgoing nurse). Report included the following information SBAR, Kardex, ED Summary, Intake/Output, MAR, Recent Results and Med Rec Status. 1149: Dr Ramon Mullins notified of sustained O2 sat 78-80% for about 10 minutes, he states \"That's fine\", no orders received. MD asked for guidance on medication administration: Anxiety, pain: and he states to call Palliative for recs. 1157: Call placed to palliative medicine with a request to call back for recs re comfort. Office states she will put the page out to Dr Kathy Polanco. 4110: Dr Kathy Polanco returns call and states this patient is on his list to see today and he will see sometime this afternoon. 1231: Changed pulse ox sensor to left ear, showed improvement. 1913: Bedside and Verbal shift change report given to Bradly Allen (oncoming nurse) by Tony (offgoing nurse). Report included the following information NSR.

## 2018-08-30 NOTE — PROGRESS NOTES
[address] [place] [address], [address] [place] [address] 
[phone] Medical Progress Note NAME: Liza Hylton :  1935 MRM:  [id number] Date/Time: 2018 Subjective: Chief Complaint:  F/U resp failure Chart/notes/labs/studies reviewed, patient examined at bedside. Drowsy this morning as he received IV Ativan. Per family at bedside, had a rough night, agitated. Objective:  
 
 
Vitals:  
 
 
  
Last 24hrs VS reviewed since prior progress note. Most recent are: 
 
Visit Vitals  /65  Pulse 98  Temp 97.7 °F (36.5 °C)  Resp 30  
 Ht 5' 10\" (1.778 m)  Wt 79.6 kg (175 lb 6.4 oz)  SpO2 94%  BMI 25.17 kg/m2 SpO2 Readings from Last 6 Encounters:  
18 94% 17 96% 10/13/14 94% 14 95% 14 93% 14 96% O2 Flow Rate (L/min): 60 l/min Intake/Output Summary (Last 24 hours) at 18 1436 Last data filed at 18 1055 Gross per 24 hour Intake              300 ml Output             1300 ml Net            -1000 ml Exam:  
 
Physical Exam: 
 
Gen:  Well-developed, well-nourished, chronically ill-appearing. NAD HEENT:  Sclerae nonicteric, hearing intact to voice, on high-flow Neck:  Supple, without masses. Resp:  +accessory muscle use, increased WOB. Diminished air movement, fine crackles. No wheezes or rhonchi 
Card: RRR, without m/r/g. No LE edema. Abd:  +bowel sounds, soft, NTTP, nondistended. Neuro: Face symmetric, tongue midline, speech fluent, follows commands appropriately Psych:  Drowsy, arousable. Oriented to self, hospital, situation. Limited insight Medications Reviewed: (see below) Lab Data Reviewed: (see below) 
 
______________________________________________________________________ Medications:  
 
Current Facility-Administered Medications Medication Dose Route Frequency  guaiFENesin ER (MUCINEX) tablet 600 mg  600 mg Oral Q12H  
 arformoterol (BROVANA) neb solution 15 mcg  15 mcg Nebulization BID RT  
 LORazepam (ATIVAN) injection 2 mg  2 mg IntraVENous Q6H PRN  
 LORazepam (ATIVAN) tablet 2 mg  2 mg Oral Q6H PRN  
 morphine injection 1 mg  1 mg IntraVENous Q4H PRN  
 sodium chloride (NS) flush 5-10 mL  5-10 mL IntraVENous Q8H  
 sodium chloride (NS) flush 5-10 mL  5-10 mL IntraVENous PRN  
 albuterol-ipratropium (DUO-NEB) 2.5 MG-0.5 MG/3 ML  3 mL Nebulization Q4H RT  
 cetirizine (ZYRTEC) tablet 10 mg  10 mg Oral DAILY  finasteride (PROSCAR) tablet 5 mg  5 mg Oral DAILY  budesonide (PULMICORT) 500 mcg/2 ml nebulizer suspension  500 mcg Nebulization BID RT  
 gabapentin (NEURONTIN) capsule 300 mg  300 mg Oral DAILY WITH LUNCH  
 gabapentin (NEURONTIN) capsule 600 mg  600 mg Oral QHS  pirfenidone tab 801 mg   (Patient Supplied)  801 mg Oral TID  tamsulosin (FLOMAX) capsule 0.4 mg  0.4 mg Oral DAILY  enoxaparin (LOVENOX) injection 40 mg  40 mg SubCUTAneous Q24H  
 aspirin chewable tablet 81 mg  81 mg Oral DAILY  metoprolol tartrate (LOPRESSOR) tablet 12.5 mg  12.5 mg Oral BID  pantoprazole (PROTONIX) tablet 40 mg  40 mg Oral ACB Lab Review: No results for input(s): WBC, HGB, HCT, PLT, HGBEXT, HCTEXT, PLTEXT, HGBEXT, HCTEXT, PLTEXT in the last 72 hours. No results for input(s): NA, K, CL, CO2, GLU, BUN, CREA, CA, MG, PHOS, ALB, TBIL, SGOT, ALT, INR in the last 72 hours. No lab exists for component: INREXT, INREXT No components found for: [place] No results for input(s): PH, PCO2, PO2, HCO3, FIO2 in the last 72 hours. No results for input(s): INR in the last 72 hours. No lab exists for component: INREXT, INREXT Lab Results Component Value Date/Time Specimen Description: NARES 02/04/2014 05:20 AM  
 Specimen Description: BRAD 11/01/2013 03:55 PM  
 
Lab Results Component Value Date/Time Culture result:  02/04/2014 05:20 AM  
  MRSA NOT PRESENT Screening of patient nares for MRSA is for surveillance purposes and, if positive, to facilitate isolation considerations in high risk settings. It is not intended for automatic decolonization interventions per se as regimens are not sufficiently effective to warrant routine use. Culture result:  11/01/2013 03:55 PM  
  MRSA NOT PRESENT Apparent Staphylococcus aureus (not MRSA) noted. Screening of patient nares for MRSA is for surveillance purposes and, if positive, to facilitate isolation considerations in high risk settings. It is not intended for automatic decolonization interventions per se as regimens are not sufficiently effective to warrant routine use. Assessment / Plan:  
 
  Acute on chronic respiratory failure: severe pulmonary fibrosis and COPD at baseline. At home, he was too hypoxic and dyspneic to even attend outpatient pulmonology appointments. Was followed at advanced lung disease clinic in [place] and in [place] by Dr. Karrie Harding (pulmonary associates). On high O2 support at home (20-30L/min). End-stage disease, hospice-appropriate, however patient is not yet receptive to hospice services. Noted CXR findings from today, defer mgmt to pulmonology, on bronchodilators, wean off IV steroids. Continue pirfenidone. Palliative care consulted and following, discussed with Dr. Daina Andrews 
  
 CAD (coronary artery disease) (): type 2 MI POA was clearly driven by his respiratory failure. Appreciate cardiology. On ASA. Cardiology on board. TTE showed preserved EF w/o RWMA 
 
 DM II (diabetes mellitus, type II), controlled ([facility] [address] (2/4/2014): BG elevated from steroids but would not check accuchecks for comfort Total time spent: 25 minutes, d/w wife, daughter, granddaughter at bedside, pulmonology Dr. Hieu Tinoco Time spent in the care of this patient including reviewing records, discussing with nursing and/or other providers on the treatment team, obtaining history and examining the patient, and discussing treatment plans. Care Plan discussed with: Patient, Nursing Staff and >50% of time spent in counseling and coordination of care Discussed:  Code Status, Care Plan and D/C Planning Prophylaxis:  Lovenox Disposition:  TBD 
        
___________________________________________________ Attending Physician: Mirza Miranda MD

## 2018-08-30 NOTE — PROGRESS NOTES
Name: [address] [place] [address] East: [address]  
: 1935 Admit Date: 2018 Phone: [phone]  Room: [facility]/ PCP: David Olson MD  MRN: [id number] Date: 2018  Code: DNR Chart and notes reviewed. Data reviewed. I review the patient's current medications in the medical record at each encounter. I have evaluated and examined the patient. Overnight events Afebrile Sats 82% on HF 60L FiO2 100% - during my exam 
Hypotensive this morning; BP was stable overnight Sleepy; received a total of 4 mg Ativan and 2 mg Morphine overnight/early morning ROS: SOB and ROB unchanged. Still very hypoxic with mast any movement/activity. Denies fever or chills. Has occasional cough which is nonproductive. Denies abd pain or LE pain/swelling. Fatigues easily and has increased WOB with any movement. Past Medical History:  
Diagnosis Date  CAD (coronary artery disease)   
 4 stents placed in heart  Chronic obstructive pulmonary disease ([address] EMPHYSEMA SEEN ON CXR  
 Colon polyp Three tubular adenomas excised colonoscopically on 3/17/2011.  COPD (chronic obstructive pulmonary disease) ([address] 2014  Diverticulosis of colon (without mention of hemorrhage)  DM (diabetes mellitus) ([address] 4/3/2012  GERD (gastroesophageal reflux disease) [address]  History of pneumothorax 2014  Hypercholesteremia  Pneumonia   PUD (peptic ulcer disease) 195  Pulmonary fibrosis ([facility] [address] Past Surgical History:  
Procedure Laterality Date  ENDOSCOPY, COLON, DIAGNOSTIC  3/2011 Polyps. Tics. Rep 3 yrs.  HX CORONARY STENT PLACEMENT    
 4 cardiac stents  HX GI  circa  Laparoscopic cholecystectomy. [address] [place] [address] left inguinal hernia repair  HX LUMBAR LAMINECTOMY  2013 L4 L5 Laminectomy  HX OTHER SURGICAL  2014 LVATS, bronch, talc pleurodesis, Resection of ruptured bulla  HX TONSILLECTOMY  age 29 Family History Problem Relation Age of Onset  Adopted: Yes  Other Other   
  patient was adopted Social History Substance Use Topics  Smoking status: Former Smoker Packs/day: 1.50 Years: 36.00 Types: Cigarettes Quit date: 1/1/1987  Smokeless tobacco: Never Used  Alcohol use No  
 
 
Allergies Allergen Reactions  Bactrim [Sulfamethoprim] Unknown (comments) FLU LIKE SYMPTOMS  
 Statins-Hmg-Coa Reductase Inhibitors Myalgia  Sulfa (Sulfonamide Antibiotics) Other (comments) Bactrim causes flu like symptoms Current Facility-Administered Medications Medication Dose Route Frequency  methylPREDNISolone (PF) (SOLU-MEDROL) injection 40 mg  40 mg IntraVENous DAILY  arformoterol (BROVANA) neb solution 15 mcg  15 mcg Nebulization BID RT  
 LORazepam (ATIVAN) injection 2 mg  2 mg IntraVENous Q6H PRN  
 LORazepam (ATIVAN) tablet 2 mg  2 mg Oral Q6H PRN  
 morphine injection 1 mg  1 mg IntraVENous Q4H PRN  
 sodium chloride (NS) flush 5-10 mL  5-10 mL IntraVENous Q8H  
 sodium chloride (NS) flush 5-10 mL  5-10 mL IntraVENous PRN  
 albuterol-ipratropium (DUO-NEB) 2.5 MG-0.5 MG/3 ML  3 mL Nebulization Q4H RT  
 cetirizine (ZYRTEC) tablet 10 mg  10 mg Oral DAILY  finasteride (PROSCAR) tablet 5 mg  5 mg Oral DAILY  budesonide (PULMICORT) 500 mcg/2 ml nebulizer suspension  500 mcg Nebulization BID RT  
 gabapentin (NEURONTIN) capsule 300 mg  300 mg Oral DAILY WITH LUNCH  
 gabapentin (NEURONTIN) capsule 600 mg  600 mg Oral QHS  pirfenidone tab 801 mg   (Patient Supplied)  801 mg Oral TID  tamsulosin (FLOMAX) capsule 0.4 mg  0.4 mg Oral DAILY  enoxaparin (LOVENOX) injection 40 mg  40 mg SubCUTAneous Q24H  
 aspirin chewable tablet 81 mg  81 mg Oral DAILY  metoprolol tartrate (LOPRESSOR) tablet 12.5 mg  12.5 mg Oral BID  pantoprazole (PROTONIX) tablet 40 mg  40 mg Oral ACB REVIEW OF SYSTEMS  
 12 point ROS negative except as stated in the HPI. Physical Exam:  
Visit Vitals  BP 96/59  Pulse 84  Temp 98.6 °F (37 °C)  Resp 23  
 Ht 5' 10\" (1.778 m)  Wt 79.6 kg (175 lb 6.4 oz)  SpO2 91%  BMI 25.17 kg/m2 General:  Sleepy after morphine and ativan + resp distress, appears stated age. Head:  Normocephalic, without obvious abnormality, atraumatic. Eyes:  Conjunctivae/corneas clear. Nose: Nares normal. Septum midline. Mucosa normal.   
Throat: Lips, mucosa, and tongue normal.   
Neck: Supple, symmetrical, trachea midline, no adenopathy. Lungs:   Diminished air movement with bibasilar crackles, increased WOB, and + accessory muscle use. Chest wall:  No tenderness or deformity. Heart:  Regular rate and rhythm, S1, S2 normal, no murmur, click, rub or gallop. Abdomen:   Soft, non-tender. Bowel sounds normal. No masses,  No organomegaly. Extremities: Extremities normal, atraumatic, no cyanosis or edema. Pulses: 2+ and symmetric all extremities. Skin: Skin color, texture, turgor normal. No rashes or lesions Lymph nodes: Cervical, supraclavicular nodes normal.  
Neurologic: Moves all extremities, following most commands, sleepy Lab Results Component Value Date/Time Sodium 132 (L) 08/27/2018 12:30 AM  
 Potassium 4.9 08/27/2018 12:30 AM  
 Chloride 98 08/27/2018 12:30 AM  
 CO2 26 08/27/2018 12:30 AM  
 BUN 24 (H) 08/27/2018 12:30 AM  
 Creatinine 1.25 08/27/2018 12:30 AM  
 Glucose 251 (H) 08/27/2018 12:30 AM  
 Calcium 9.0 08/27/2018 12:30 AM  
 Magnesium 2.0 08/27/2018 12:30 AM  
 Phosphorus 2.7 02/05/2014 04:36 AM  
 
 
Lab Results Component Value Date/Time WBC 7.6 08/26/2018 01:06 PM  
 HGB 13.2 08/26/2018 01:06 PM  
 PLATELET 634 55/93/0423 01:06 PM  
 MCV 96.2 08/26/2018 01:06 PM  
 
 
Lab Results Component Value Date/Time INR 1.0 11/01/2013 04:13 PM  
 AST (SGOT) 18 01/26/2018 02:19 PM  
 Alk.  phosphatase 27 (L) 01/26/2018 02:19 PM  
 Protein, total 7.6 01/26/2018 02:19 PM  
 Albumin 4.3 01/26/2018 02:19 PM  
 Globulin 3.6 02/05/2014 04:36 AM  
 
 
No results found for: IRON, FE, TIBC, IBCT, PSAT, FERR Lab Results Component Value Date/Time Sed rate (ESR) 9 07/22/2015 12:34 PM  
 TSH 3.230 03/10/2015 02:51 PM  
  
 
No results found for: PH, PHI, PCO2, PCO2I, PO2, PO2I, HCO3, HCO3I, FIO2, FIO2I Lab Results Component Value Date/Time Troponin-I, Qt. 4.46 (H) 08/27/2018 12:30 AM  
  
 
Lab Results Component Value Date/Time Culture result:  02/04/2014 05:20 AM  
  MRSA NOT PRESENT Screening of patient nares for MRSA is for surveillance purposes and, if positive, to facilitate isolation considerations in high risk settings. It is not intended for automatic decolonization interventions per se as regimens are not sufficiently effective to warrant routine use. Culture result:  11/01/2013 03:55 PM  
  MRSA NOT PRESENT Apparent Staphylococcus aureus (not MRSA) noted. Screening of patient nares for MRSA is for surveillance purposes and, if positive, to facilitate isolation considerations in high risk settings. It is not intended for automatic decolonization interventions per se as regimens are not sufficiently effective to warrant routine use. No results found for: TOXA1, RPR, HBCM, HBSAG, HAAB, HCAB1, HAAT, G6PD, CRYAC, HIVGT, HIVR, HIV1, HIV12, HIVPC, HIVRPI No results found for: VANCT, CPK Lab Results Component Value Date/Time  Color YELLOW/STRAW 02/04/2014 08:30 AM  
 Appearance CLEAR 02/04/2014 08:30 AM  
 pH (UA) 5.0 02/04/2014 08:30 AM  
 Protein TRACE (A) 02/04/2014 08:30 AM  
 Glucose 250 (A) 02/04/2014 08:30 AM  
 Ketone NEGATIVE  02/04/2014 08:30 AM  
 Bilirubin NEGATIVE  02/04/2014 08:30 AM  
 Blood NEGATIVE  02/04/2014 08:30 AM  
 Urobilinogen 0.2 02/04/2014 08:30 AM  
 Nitrites NEGATIVE  02/04/2014 08:30 AM  
 Leukocyte Esterase NEGATIVE  02/04/2014 08:30 AM  
 WBC 0-4 02/04/2014 08:30 AM  
 RBC 0-5 02/04/2014 08:30 AM  
 Bacteria NEGATIVE  02/04/2014 08:30 AM  
 
 
Images: no new images this morning IMPRESSION 
· Acute on chronic hypoxic respiratory failure · Advanced end stage pulmonary fibrosis · COPD 
· NSTEMI/CAD · Severe pulmonary HTN on ECHO · DM 
 
PLAN 
· Continue HF O2 and wean flow and FiO2 as able to keep sats > 85%. Have not been able to wean flow or FiO2 at all at this point and patient remians on 60L and 100% · Repeat CXR 
· IV steroids have been weaned to 40 mg daily. Decrease in dose seems to have helped his mental status and confusion. · Continue Duonebs scheduled and Brovana/Pulmicort nebs. · Continue home Esbriet · Metoprolol per Cardiology. No additional recs at this time · Appreciate eval by hospice and Palliative Care team.  Patient with significant increase in baseline O2 requirements since last year and now, unable to even keep outpatient appointments due to severe hypoxia. Continue Ativan and morphine prn. · Daily discussions with patient and wife, who is aware of the severity of his decline, but also wants to honor his wishes. Patient not receptive at this time to hospice or palliative care. We also discuss that should his mental and cognitive status continue to decline, she would need to make some of these decisions of his behalf. Again discussed that it is unlikely that we will be able decrease his O2 requirement to a level acceptable for discharge to home. States his daughter and adult grandchildren are planning to come in from [place] on Friday. · Patient's wife asked about treating his pulmonary HTN. Discussed that sildenafil is indicated for primary pulmonary HTN and there isn't evidence that it provides significant benefit in secondary pulmonary HTN. She also asked about adding Ofev to current Esbriet therapy.   Discussed that neither of these medications will reverse his pulmonary fibrosis or improve his current lung function, but aim to slow the progression. · GI prophylaxis: Protonix · DVT prophylaxis: Lovenox Patient critically ill requiring continuous HF O2 due to severe hypoxia and respiratory distress and is high risk for continued decompensation. Discussed with hospitalist and Palliative Care team.  CC time EOP 30 min.  
 
Pb Thrasher

## 2018-08-30 NOTE — PROGRESS NOTES
Palliative Medicine Consult Hernandez: [phone] ([id number]) Patient Name: Natanael Bryan YOB: 1935 Date of Initial Consult: 8/27/18 Reason for Consult: Goals-of-care Requesting Provider: Danyel Tate MD 
Primary Care Physician: Bear Oropeza MD 
 
 SUMMARY:  
Natanael Bryan is a 80 y.o. with a end-stage COPD and pulmonary fibrosis, O2-dependent at 20L/minute at baseline; CAD who was admitted on 8/25/2018 from home with a diagnosis of acute-on-chronic respiratory failure and NSTEMI due to tachycardia and hypoxia. Current medical issues leading to Palliative Medicine involvement include: shortness of breath, anxiety/agitation, confusion in setting of steroids and hypoxia, goals of care. PALLIATIVE DIAGNOSES:  
1. Shortness of breath 2. Anxiety 3. Confusion/altered mental status 4. Debility 5. Goals of care PLAN:  
1. Dennise([facility]) and I met with patient, daughter(from [place]), granddaughter, and spouse. Reviewed the chart and patient continues to do poorly despite maximum medical therapy(remains on 60 liters/100% Fio2). He required several doses of morphine and ativan over the last 24 hours and did sleep a lot of the morning. Today, he states he \"feels fine\" and a bit reluctant to talk with us. Sats rarely in the 90s despite all current tx 2. GOC-he wants to continue attempts at full restorative measures despite clear evidence that he is not improving and likely will not. This is my first day meeting him but he has not wanted to have any discussions about transition to comfort->not sure he will ever agree to those measures and may have to be decided by family when he loses capacity to make decisions. 3. AMD-spouse remains MPOA and AMD in the chart 4. Code status-DNR per prior discussions. If he survives this stay, will need DDNR at discharge 5.  Symptom management-start scheduled ativan 2 mg qhs since this seemed to help him rest. Continue prn dosing of morphine and ativan. 6. Psychosocial-apparently family conflict between son and daughter earlier in the day. Not sure of spouse's relationship with his children but does appear to be strained so will need to navigate. 7. Discussed with Dr. Olga Garcias and bedside nurse 8. Communicated plan of care with: Palliative IDT 
 
 
 GOALS OF CARE / TREATMENT PREFERENCES:  
 
GOALS OF CARE: continue current therapies with goal of returning home TREATMENT PREFERENCES:  
Code Status: DNR Advance Care Planning: 
Advance Care Planning 2018 Patient's Healthcare Decision Maker is: Named in scanned ACP document Primary Decision Maker Name Stephanie Rashid Primary Decision Maker Phone Number [phone] Primary Decision Maker Relationship to Patient Spouse Secondary Decision Maker Name Andre Hernández and/or dtr Myronestefania Carr Secondary Decision Maker Phone Number [phone], [phone] Confirm Advance Directive Yes, on file Other Instructions: Other: As far as possible, the palliative care team has discussed with patient / health care proxy about goals of care / treatment preferences for patient. HISTORY:  
 
History obtained from: patient, wife, chart CHIEF COMPLAINT: anxiety HPI/SUBJECTIVE: The patient is:  
[x] Verbal and participatory -  
[] Non-participatory due to:  
 
Patient is tired. Continues to struggle with SOB even with talking. Denies any pain. Clinical Pain Assessment (nonverbal scale for severity on nonverbal patients): Duration: for how long has pt been experiencing pain (e.g., 2 days, 1 month, years) Frequency: how often pain is an issue (e.g., several times per day, once every few days, constant) FUNCTIONAL ASSESSMENT:  
 
Palliative Performance Scale (PPS): PPS: 50  PSYCHOSOCIAL/SPIRITUAL SCREENING:  
 
 Palliative IDT has assessed this patient for cultural preferences / practices and a referral made as appropriate to needs (Cultural Services, Patient Advocacy, Ethics, etc.) Advance Care Planning: 
Advance Care Planning 2018 Patient's Healthcare Decision Maker is: Named in scanned ACP document Primary Decision Maker Name Bethel Stallworth Primary Decision Maker Phone Number [phone] Primary Decision Maker Relationship to Patient Spouse Secondary Decision Maker Name Son Promise Hines and/or dtr Martina Mcclain Secondary Decision Maker Phone Number [phone], [phone] Confirm Advance Directive Yes, on file Any spiritual / Mandaen concerns: 
[] Yes /  [x] No 
 
Caregiver Burnout: 
[] Yes /  [x] No /  [] No Caregiver Present Anticipatory grief assessment:  
[] Normal  / [x] Maladaptive : component of denial?    
 
ESAS Anxiety: Anxiety: 4 ESAS Depression:    
 
 
 REVIEW OF SYSTEMS:  
 
Positive and pertinent negative findings in ROS are noted above in HPI. The following systems were [] reviewed / [] unable to be reviewed as noted in HPI Other findings are noted below. Systems: constitutional, ears/nose/mouth/throat, respiratory, gastrointestinal, genitourinary, musculoskeletal, integumentary, neurologic, psychiatric, endocrine. Positive findings noted below. Modified ESAS Completed by: provider Anxiety: 4 Nausea: 0 Anorexia: 0 Constipation: No  
  Stool Occurrence(s): 1 PHYSICAL EXAM:  
 
From RN flowsheet: 
Wt Readings from Last 3 Encounters:  
18 175 lb 6.4 oz (79.6 kg) 10/20/17 178 lb (80.7 kg) 17 184 lb (83.5 kg) Blood pressure 119/65, pulse 98, temperature 97.7 °F (36.5 °C), resp. rate 30, height 5' 10\" (1.778 m), weight 175 lb 6.4 oz (79.6 kg), SpO2 94 %. Pain Scale 1: Visual 
Pain Intensity 1: 0 Last bowel movement, if known: today Constitutional: sitting up in bed, hi-blayne nasal cannula in place, appears tired, sats in the low 80s most of the conversation Eyes: pupils equal, anicteric ENMT: no nasal discharge, moist mucous membranes Cardiovascular: regular rhythm, distal pulses intact Respiratory: breathing moderately labored even with talking, symmetric Gastrointestinal: soft non-tender, +bowel sounds Musculoskeletal: no deformity, no tenderness to palpation Skin: warm, dry Neurologic: following commands, moving all extremities Psychiatric: restricted affect, no obvious hallucinations Other: 
 
 
 HISTORY:  
 
Active Problems: 
  CAD (coronary artery disease) () Overview: cardiac stentd (4) COPD (chronic obstructive pulmonary disease) ([address] (2/4/2014) DM II (diabetes mellitus, type II), controlled ([address] (2/4/2014) Pulmonary fibrosis (HCC) () Acute on chronic respiratory failure ([address] (8/26/2018) Past Medical History:  
Diagnosis Date  CAD (coronary artery disease) 2004  
 4 stents placed in heart  Chronic obstructive pulmonary disease ([address] EMPHYSEMA SEEN ON CXR  
 Colon polyp Three tubular adenomas excised colonoscopically on 3/17/2011.  COPD (chronic obstructive pulmonary disease) ([address] 2/4/2014  Diverticulosis of colon (without mention of hemorrhage)  DM (diabetes mellitus) ([address] 4/3/2012  GERD (gastroesophageal reflux disease) [address]  History of pneumothorax 2/2014  Hypercholesteremia  Pneumonia 2004  PUD (peptic ulcer disease) 1955  Pulmonary fibrosis ([address] Past Surgical History:  
Procedure Laterality Date  ENDOSCOPY, COLON, DIAGNOSTIC  3/2011 Polyps. Tics. Rep 3 yrs.  HX CORONARY STENT PLACEMENT  2004  
 4 cardiac stents  HX GI  circa 2000 Laparoscopic cholecystectomy. [address] [place] [address] left inguinal hernia repair  HX LUMBAR LAMINECTOMY  11/20/2013 L4 L5 Laminectomy  HX OTHER SURGICAL  2/7/2014 LVATS, bronch, talc pleurodesis, Resection of ruptured bulla  HX TONSILLECTOMY  age 29 Family History Problem Relation Age of Onset  Adopted: Yes  Other Other   
  patient was adopted History reviewed, no pertinent family history. Social History Substance Use Topics  Smoking status: Former Smoker Packs/day: 1.50 Years: 36.00 Types: Cigarettes Quit date: 1/1/1987  Smokeless tobacco: Never Used  Alcohol use No  
 
Allergies Allergen Reactions  Bactrim [Sulfamethoprim] Unknown (comments) FLU LIKE SYMPTOMS  
 Statins-Hmg-Coa Reductase Inhibitors Myalgia  Sulfa (Sulfonamide Antibiotics) Other (comments) Bactrim causes flu like symptoms Current Facility-Administered Medications Medication Dose Route Frequency  guaiFENesin ER (MUCINEX) tablet 600 mg  600 mg Oral Q12H  
 LORazepam (ATIVAN) tablet 2 mg  2 mg Oral QHS  arformoterol (BROVANA) neb solution 15 mcg  15 mcg Nebulization BID RT  
 LORazepam (ATIVAN) injection 2 mg  2 mg IntraVENous Q6H PRN  
 LORazepam (ATIVAN) tablet 2 mg  2 mg Oral Q6H PRN  
 morphine injection 1 mg  1 mg IntraVENous Q4H PRN  
 sodium chloride (NS) flush 5-10 mL  5-10 mL IntraVENous Q8H  
 sodium chloride (NS) flush 5-10 mL  5-10 mL IntraVENous PRN  
 albuterol-ipratropium (DUO-NEB) 2.5 MG-0.5 MG/3 ML  3 mL Nebulization Q4H RT  
 cetirizine (ZYRTEC) tablet 10 mg  10 mg Oral DAILY  finasteride (PROSCAR) tablet 5 mg  5 mg Oral DAILY  budesonide (PULMICORT) 500 mcg/2 ml nebulizer suspension  500 mcg Nebulization BID RT  
 gabapentin (NEURONTIN) capsule 300 mg  300 mg Oral DAILY WITH LUNCH  
 gabapentin (NEURONTIN) capsule 600 mg  600 mg Oral QHS  pirfenidone tab 801 mg   (Patient Supplied)  801 mg Oral TID  tamsulosin (FLOMAX) capsule 0.4 mg  0.4 mg Oral DAILY  enoxaparin (LOVENOX) injection 40 mg  40 mg SubCUTAneous Q24H  
 aspirin chewable tablet 81 mg  81 mg Oral DAILY  metoprolol tartrate (LOPRESSOR) tablet 12.5 mg  12.5 mg Oral BID  pantoprazole (PROTONIX) tablet 40 mg  40 mg Oral ACB  
 
 
 
 LAB AND IMAGING FINDINGS:  
 
Lab Results Component Value Date/Time WBC 7.6 08/26/2018 01:06 PM  
 HGB 13.2 08/26/2018 01:06 PM  
 PLATELET 274 17/77/8973 01:06 PM  
 
Lab Results Component Value Date/Time Sodium 132 (L) 08/27/2018 12:30 AM  
 Potassium 4.9 08/27/2018 12:30 AM  
 Chloride 98 08/27/2018 12:30 AM  
 CO2 26 08/27/2018 12:30 AM  
 BUN 24 (H) 08/27/2018 12:30 AM  
 Creatinine 1.25 08/27/2018 12:30 AM  
 Calcium 9.0 08/27/2018 12:30 AM  
 Magnesium 2.0 08/27/2018 12:30 AM  
 Phosphorus 2.7 02/05/2014 04:36 AM  
  
Lab Results Component Value Date/Time AST (SGOT) 18 01/26/2018 02:19 PM  
 Alk. phosphatase 27 (L) 01/26/2018 02:19 PM  
 Protein, total 7.6 01/26/2018 02:19 PM  
 Albumin 4.3 01/26/2018 02:19 PM  
 Globulin 3.6 02/05/2014 04:36 AM  
 
Lab Results Component Value Date/Time INR 1.0 11/01/2013 04:13 PM  
 Prothrombin time 10.4 11/01/2013 04:13 PM  
  
No results found for: IRON, FE, TIBC, IBCT, PSAT, FERR Lab Results Component Value Date/Time pH 7.39 02/04/2014 02:00 AM  
 PCO2 31 (L) 02/04/2014 02:00 AM  
 PO2 90 02/04/2014 02:00 AM  
 
No components found for: [place] No results found for: CPK, CKMB Total time: 35 
Counseling / coordination time, spent as noted above:20  
> 50% counseling / coordination?: yes Prolonged service was provided for  []30 min   []75 min in face to face time in the presence of the patient, spent as noted above. Time Start:  
Time End:  
Note: this can only be billed with 71113 (initial) or 39686 (follow up). If multiple start / stop times, list each separately.

## 2018-08-30 NOTE — ROUTINE PROCESS
Bedside and Verbal shift change report given to ROZINA Jimenez (oncoming nurse) by Monae Tamayo RN (offgoing nurse). Report included the following information SBAR, Kardex, Intake/Output, MAR, Accordion, Recent Results, Cardiac Rhythm SR and Alarm Parameters .

## 2018-08-31 NOTE — PROGRESS NOTES
SHIFT CHANGE: 
7:36 AM Report received from Iram Cleary RN. SBAR, Kardex, Procedure Summary, Intake/Output, MAR, Recent Results, Med Rec Status and Cardiac Rhythm NSR were discussed. SHIFT SUMMARY: 
8:30 AM  Patient drowsy and sleeping this morning on Bipap. Per wife she is concerned about the dose of Ativan that he received last night and worried that it might be too much. Will address with MD when rounding. 9:00 AM  Patient remains drowsy and is not ready to eat any food. Some of his medicines he prefers to take while eating so he would like to hold off on those medications now. 3:16 PM Patient on the bedside commode and on Hi-flow. Sats dropping into the 70's. Assisted patient back to bed and placed back on bipap. Sats up to 88% slowly. END OF SHIFT REPORT: 
8:36 PM  Bedside and Verbal shift change report given to Tejas Chu RN (oncoming nurse) by Elham Gonzáles RN (offgoing nurse). Report included the following information SBAR, Kardex, Procedure Summary, Intake/Output, MAR, Recent Results, Med Rec Status and Cardiac Rhythm NSR.

## 2018-08-31 NOTE — PROGRESS NOTES
Name: [address] [place] [address] East: [address]  
: 1935 Admit Date: 2018 Phone: [phone]  Room: [facility]/ PCP: Sandi Castaneda MD  MRN: [id number] Date: 2018  Code: DNR Chart and notes reviewed. Data reviewed. I review the patient's current medications in the medical record at each encounter. I have evaluated and examined the patient. Overnight events Afebrile Sats 100% on BiPAP 16/10 FiO2 90%; sats remained 95% after gradually weaning FiO2 to 60% BP soft Sleepy; received a total of 2 mg Ativan last night ROS: SOB and ROB unchanged. Placed on BiPAP overnight and tolerating after Ativan given. Still very hypoxic with mast any movement/activity. Denies fever or chills. Has occasional cough which is nonproductive. Denies abd pain or LE pain/swelling. Fatigues easily and has increased WOB with any movement. Past Medical History:  
Diagnosis Date  CAD (coronary artery disease)   
 4 stents placed in heart  Chronic obstructive pulmonary disease ([address] EMPHYSEMA SEEN ON CXR  
 Colon polyp Three tubular adenomas excised colonoscopically on 3/17/2011.  COPD (chronic obstructive pulmonary disease) ([facility] 2014  Diverticulosis of colon (without mention of hemorrhage)  DM (diabetes mellitus) ([address] 4/3/2012  GERD (gastroesophageal reflux disease) [address]  History of pneumothorax 2014  Hypercholesteremia  Pneumonia   PUD (peptic ulcer disease) 195  Pulmonary fibrosis ([facility] Past Surgical History:  
Procedure Laterality Date  ENDOSCOPY, COLON, DIAGNOSTIC  3/2011 Polyps. Tics. Rep 3 yrs.  HX CORONARY STENT PLACEMENT  2004  
 4 cardiac stents  HX GI  circa  Laparoscopic cholecystectomy. [address] [facility] left inguinal hernia repair  HX LUMBAR LAMINECTOMY  2013 L4 L5 Laminectomy  HX OTHER SURGICAL  2014 LVATS, bronch, talc pleurodesis, Resection of ruptured bulla  HX TONSILLECTOMY  age 29 Family History Problem Relation Age of Onset  Adopted: Yes  Other Other   
  patient was adopted Social History Substance Use Topics  Smoking status: Former Smoker Packs/day: 1.50 Years: 36.00 Types: Cigarettes Quit date: 1/1/1987  Smokeless tobacco: Never Used  Alcohol use No  
 
 
Allergies Allergen Reactions  Bactrim [Sulfamethoprim] Unknown (comments) FLU LIKE SYMPTOMS  
 Statins-Hmg-Coa Reductase Inhibitors Myalgia  Sulfa (Sulfonamide Antibiotics) Other (comments) Bactrim causes flu like symptoms Current Facility-Administered Medications Medication Dose Route Frequency  guaiFENesin ER (MUCINEX) tablet 600 mg  600 mg Oral Q12H  
 LORazepam (ATIVAN) tablet 2 mg  2 mg Oral QHS  arformoterol (BROVANA) neb solution 15 mcg  15 mcg Nebulization BID RT  
 LORazepam (ATIVAN) injection 2 mg  2 mg IntraVENous Q6H PRN  
 LORazepam (ATIVAN) tablet 2 mg  2 mg Oral Q6H PRN  
 morphine injection 1 mg  1 mg IntraVENous Q4H PRN  
 sodium chloride (NS) flush 5-10 mL  5-10 mL IntraVENous Q8H  
 sodium chloride (NS) flush 5-10 mL  5-10 mL IntraVENous PRN  
 albuterol-ipratropium (DUO-NEB) 2.5 MG-0.5 MG/3 ML  3 mL Nebulization Q4H RT  
 cetirizine (ZYRTEC) tablet 10 mg  10 mg Oral DAILY  finasteride (PROSCAR) tablet 5 mg  5 mg Oral DAILY  budesonide (PULMICORT) 500 mcg/2 ml nebulizer suspension  500 mcg Nebulization BID RT  
 gabapentin (NEURONTIN) capsule 300 mg  300 mg Oral DAILY WITH LUNCH  
 gabapentin (NEURONTIN) capsule 600 mg  600 mg Oral QHS  pirfenidone tab 801 mg   (Patient Supplied)  801 mg Oral TID  tamsulosin (FLOMAX) capsule 0.4 mg  0.4 mg Oral DAILY  enoxaparin (LOVENOX) injection 40 mg  40 mg SubCUTAneous Q24H  
 aspirin chewable tablet 81 mg  81 mg Oral DAILY  metoprolol tartrate (LOPRESSOR) tablet 12.5 mg  12.5 mg Oral BID  pantoprazole (PROTONIX) tablet 40 mg  40 mg Oral ACB REVIEW OF SYSTEMS  
12 point ROS negative except as stated in the HPI. Physical Exam:  
Visit Vitals  /61  Pulse 81  Temp 98 °F (36.7 °C)  Resp 21  
 Ht 5' 10\" (1.778 m)  Wt 79.6 kg (175 lb 6.4 oz)  SpO2 97%  BMI 25.17 kg/m2 General:  Sleepy after ativan, appears stated age. Head:  Normocephalic, without obvious abnormality, atraumatic. Eyes:  Conjunctivae/corneas clear. Nose: Nares normal. Septum midline. Mucosa normal.   
Throat: Lips, mucosa, and tongue normal.   
Neck: Supple, symmetrical, trachea midline, no adenopathy. Lungs:   Diminished air movement with bibasilar crackles, + accessory muscle use. Chest wall:  No tenderness or deformity. Heart:  Regular rate and rhythm, S1, S2 normal, no murmur, click, rub or gallop. Abdomen:   Soft, non-tender. Bowel sounds normal. No masses,  No organomegaly. Extremities: Extremities normal, atraumatic, no cyanosis or edema. Pulses: 2+ and symmetric all extremities. Skin: Skin color, texture, turgor normal. No rashes or lesions Lymph nodes: Cervical, supraclavicular nodes normal.  
Neurologic: Moves all extremities, following most commands, sleepy Lab Results Component Value Date/Time Sodium 132 (L) 08/27/2018 12:30 AM  
 Potassium 4.9 08/27/2018 12:30 AM  
 Chloride 98 08/27/2018 12:30 AM  
 CO2 26 08/27/2018 12:30 AM  
 BUN 24 (H) 08/27/2018 12:30 AM  
 Creatinine 1.25 08/27/2018 12:30 AM  
 Glucose 251 (H) 08/27/2018 12:30 AM  
 Calcium 9.0 08/27/2018 12:30 AM  
 Magnesium 2.0 08/27/2018 12:30 AM  
 Phosphorus 2.7 02/05/2014 04:36 AM  
 
 
Lab Results Component Value Date/Time WBC 7.6 08/26/2018 01:06 PM  
 HGB 13.2 08/26/2018 01:06 PM  
 PLATELET 212 55/14/3048 01:06 PM  
 MCV 96.2 08/26/2018 01:06 PM  
 
 
Lab Results Component Value Date/Time INR 1.0 11/01/2013 04:13 PM  
 AST (SGOT) 18 01/26/2018 02:19 PM  
 Alk. phosphatase 27 (L) 01/26/2018 02:19 PM  
 Protein, total 7.6 01/26/2018 02:19 PM  
 Albumin 4.3 01/26/2018 02:19 PM  
 Globulin 3.6 02/05/2014 04:36 AM  
 
 
No results found for: IRON, FE, TIBC, IBCT, PSAT, FERR Lab Results Component Value Date/Time Sed rate (ESR) 9 07/22/2015 12:34 PM  
 TSH 3.230 03/10/2015 02:51 PM  
  
 
No results found for: PH, PHI, PCO2, PCO2I, PO2, PO2I, HCO3, HCO3I, FIO2, FIO2I Lab Results Component Value Date/Time Troponin-I, Qt. 4.46 (H) 08/27/2018 12:30 AM  
  
 
Lab Results Component Value Date/Time Culture result:  02/04/2014 05:20 AM  
  MRSA NOT PRESENT Screening of patient nares for MRSA is for surveillance purposes and, if positive, to facilitate isolation considerations in high risk settings. It is not intended for automatic decolonization interventions per se as regimens are not sufficiently effective to warrant routine use. Culture result:  11/01/2013 03:55 PM  
  MRSA NOT PRESENT Apparent Staphylococcus aureus (not MRSA) noted. Screening of patient nares for MRSA is for surveillance purposes and, if positive, to facilitate isolation considerations in high risk settings. It is not intended for automatic decolonization interventions per se as regimens are not sufficiently effective to warrant routine use. No results found for: TOXA1, RPR, HBCM, HBSAG, HAAB, HCAB1, HAAT, G6PD, CRYAC, HIVGT, HIVR, HIV1, HIV12, HIVPC, HIVRPI No results found for: VANCT, CPK Lab Results Component Value Date/Time  Color YELLOW/STRAW 02/04/2014 08:30 AM  
 Appearance CLEAR 02/04/2014 08:30 AM  
 pH (UA) 5.0 02/04/2014 08:30 AM  
 Protein TRACE (A) 02/04/2014 08:30 AM  
 Glucose 250 (A) 02/04/2014 08:30 AM  
 Ketone NEGATIVE  02/04/2014 08:30 AM  
 Bilirubin NEGATIVE  02/04/2014 08:30 AM  
 Blood NEGATIVE  02/04/2014 08:30 AM  
 Urobilinogen 0.2 02/04/2014 08:30 AM  
 Nitrites NEGATIVE  02/04/2014 08:30 AM  
 Leukocyte Esterase NEGATIVE  02/04/2014 08:30 AM  
 WBC 0-4 02/04/2014 08:30 AM  
 RBC 0-5 02/04/2014 08:30 AM  
 Bacteria NEGATIVE  02/04/2014 08:30 AM  
 
 
Images: personally visualized and reviewed report CXR (8/30/18): Slight increase in bibasilar interstitial/airspace opacities and probable small effusions. IMPRESSION 
· Acute on chronic hypoxic respiratory failure · Advanced end stage pulmonary fibrosis · COPD 
· NSTEMI/CAD · Severe pulmonary HTN on ECHO · DM 
 
PLAN 
· Continue BiPAP vs HF O2 and wean flow and FiO2 as able to keep sats > 85%. Have not been able to wean flow or FiO2 (requiring 60L and 100%) while on HF. Was able to wean FiO2 to 60% while on BiPAP. · IV steroids have been weaned to 40 mg daily. Decrease in dose seems to have helped his mental status and confusion. · Overall appears dry and BP is borderline, so would be cautious with diuresis. Hold off for now. · Continue Duonebs scheduled and Brovana/Pulmicort nebs. · Continue home Esbriet · Metoprolol per Cardiology. No additional recs at this time · Appreciate eval by hospice and Palliative Care team.  Patient with significant increase in baseline O2 requirements since last year and now, unable to even keep outpatient appointments due to severe hypoxia. Continue Ativan and morphine prn. · Daily discussions with patient and wife, who is aware of the severity of his decline, but also wants to honor his wishes. Patient not receptive at this time to hospice or palliative care. We also discuss that should his mental and cognitive status continue to decline, she would need to make some of these decisions of his behalf. Again discussed that it is unlikely that we will be able decrease his O2 requirement to a level acceptable for discharge to home. Daughter and adult grandchildren came in from [place] on Friday. Son has also been by. · Patient's wife asked about treating his pulmonary HTN. Discussed that sildenafil is indicated for primary pulmonary HTN and there isn't evidence that it provides significant benefit in secondary pulmonary HTN. She also asked about adding Ofev to current Esbriet therapy. Discussed that neither of these medications will reverse his pulmonary fibrosis or improve his current lung function, but aim to slow the progression. · GI prophylaxis: Protonix · DVT prophylaxis: Lovenox Patient critically ill requiring continuous HF O2 due to severe hypoxia and respiratory distress and is high risk for continued decompensation. Discussed with nursing and Palliative Care team.  CC time EOP 30 min.  
 
Manju Shoulder, [address]

## 2018-08-31 NOTE — PROGRESS NOTES
NUTRITION  
RD Screen Pt seen for:   
 
[]  MST for     []   MD Consult   
[]        Supplements  []   PO intake check  
[]        Food Allergies  []   Food Preferences/tolerances   
[]        Rescreen  []   Education []        Diet order clarification []   Other  
[x]  LOS Nutrition Prescription:  
 
80 yr old male admitted for Acute on chronic respiratory failure. Hx pulmonary fibrosis, CAD, DM, COPD. Pt eating well. Appetite good, intakes >75%. Pt with HiFlow NC @ 60L/min. Recommend limiting fluids to 2 L a day or less. Weight stable. No n/v. Last BM 8/29. No food allergies. Monitor intakes, BG - 251, 78 mg/dL. No labs since 8/27. Monitor POC. Encourage adequate intake of meals and supplements Assessment:  
Information obtained from: Chart/patient Cultural, Zoroastrian and ethnic food preferences:  
[x]  None  
[]  Identified and addressed Diet: Regular PO Intake:   [x] Good           [] Fair         []  Poor Patient Vitals for the past 100 hrs: 
 % Diet Eaten 08/30/18 1744 75 % 08/30/18 1549 100 % 08/30/18 1055 0 % 08/29/18 1426 75 % 08/29/18 0842 100 % Wt Readings from Last 5 Encounters:  
08/29/18 79.6 kg (175 lb 6.4 oz) 10/20/17 80.7 kg (178 lb)  
06/22/17 83.5 kg (184 lb) 04/25/17 81.6 kg (180 lb)  
02/21/17 85.3 kg (188 lb) Body mass index is 25.17 kg/(m^2). Skin: sacrum stage 1 BM: 8/29 ABD: WNL Estimated Daily Nutrition Requirements: 
Kcals/day: 1953 Kcals/day (BMR(1502x1.3)) Protein: 80 g (-96g/day(1.0-1.2g/kg)) Fluid:  1950 mL Based On: [place] 1898 Weight Used:  Actual weight 79.6 kg Weight Changes:  
[]   Loss 
[]   Gain 
[]   Stable Nutrition Problems Identified 
[]     None 
[]     Specified food preferences  
[]     Dislikes supplements             
[]     Allergies []     Difficulty chewing    
[]     Dentition  
[]     Nausea/Vomiting 
[]    Constipation []    Diarrhea Nutrition Diagnosis:  
  
Problem:  Increased energy expendeture Etiology: related to increased energy needs Signs/Symptoms: as evidenced by respiratory status - HiFlow 60, Pulm fibrosis Intervention:  
[]    Obtained/adjusted food preferences/tolerances and/or snacks options []    Dislikes supplements will try a substitution []    Modify diet for food allergies []    Adjust texture due to difficulty chewing  
[]    Encourage Fresh vegetables, whole grains, general healthful diet  
[]    Educated patient 
[]    Rescreen per screening protocol [x]    Add Supplements Goal: Pt will consume >50% of meals and ONS within 3-5 days Monitoring/Evaluation:  
Education & Discharge Needs 
[] Nutrition related discharge needs addressed:  
[] Supplements (on d/c instruction &/or coupons provided) [] Education [] No nutrition related discharge needs at this time Rescreen: [x]  At Nutrition Risk  
       []  Not at Nutrition Risk, rescreen per screening protocol Domenic Perdue RD Pager: [phone]

## 2018-08-31 NOTE — PROGRESS NOTES
[facility] [address] 
[address], [place], [address] [facility] 
[phone] Medical Progress Note NAME:         Gianni Escoto :        1935 MRM:        [id number] Date:          2018 Subjective: Patient has been seen and examined as a follow up for multiple medical issues. Chart, labs, diagnostics reviewed. He had been more somnolent as per spouse. Still hypoxic and dyspneic at rest. He denies any chest discomfort Objective: 
 
Vital Signs: 
 
Visit Vitals  /55  Pulse 94  Temp 98.2 °F (36.8 °C)  Resp 27  
 Ht 5' 10\" (1.778 m)  Wt 79.6 kg (175 lb 6.4 oz)  SpO2 95%  BMI 25.17 kg/m2 Intake/Output Summary (Last 24 hours) at 18 1224 Last data filed at 18 0600 Gross per 24 hour Intake              720 ml Output             1375 ml Net             -655 ml Physical Examination: 
 
General:   Weak and ill looking male, appears uncomfortable and in some mild distress Eyes:   pink conjunctivae, PERRLA with no discharge. ENT:   no ottorrhea or rhinorrhea with moist mucous membranes Neck: no masses, thyroid non-tender and trachea central. 
Pulm: + accessory muscle use, generally decreased breath sounds with not much out crackles or wheezes Card:  no JVD or murmurs, has regular and normal S1, S2 without thrills, bruits or peripheral edema Abd:  Soft, non-tender, non-distended, normoactive bowel sounds with no palpable organomegaly Musc:  No cyanosis, clubbing, atrophy or deformities. Skin:  No rashes, bruising or ulcers. Neuro: Awake and alert. Generally a non focal exam. Follows commands appropriately Psych:  Has a fair insight and is oriented x 3 Current Facility-Administered Medications Medication Dose Route Frequency  QUEtiapine (SEROquel) tablet 12.5 mg  12.5 mg Oral QHS  LORazepam (ATIVAN) tablet 0.5 mg  0.5 mg Oral QHS  LORazepam (ATIVAN) tablet 0.5 mg  0.5 mg Oral Q6H PRN  
 guaiFENesin ER (MUCINEX) tablet 600 mg  600 mg Oral Q12H  
 arformoterol (BROVANA) neb solution 15 mcg  15 mcg Nebulization BID RT  
 morphine injection 1 mg  1 mg IntraVENous Q4H PRN  
 sodium chloride (NS) flush 5-10 mL  5-10 mL IntraVENous Q8H  
 sodium chloride (NS) flush 5-10 mL  5-10 mL IntraVENous PRN  
 albuterol-ipratropium (DUO-NEB) 2.5 MG-0.5 MG/3 ML  3 mL Nebulization Q4H RT  
 cetirizine (ZYRTEC) tablet 10 mg  10 mg Oral DAILY  finasteride (PROSCAR) tablet 5 mg  5 mg Oral DAILY  budesonide (PULMICORT) 500 mcg/2 ml nebulizer suspension  500 mcg Nebulization BID RT  
 gabapentin (NEURONTIN) capsule 300 mg  300 mg Oral DAILY WITH LUNCH  
 gabapentin (NEURONTIN) capsule 600 mg  600 mg Oral QHS  pirfenidone tab 801 mg   (Patient Supplied)  801 mg Oral TID  tamsulosin (FLOMAX) capsule 0.4 mg  0.4 mg Oral DAILY  enoxaparin (LOVENOX) injection 40 mg  40 mg SubCUTAneous Q24H  
 aspirin chewable tablet 81 mg  81 mg Oral DAILY  metoprolol tartrate (LOPRESSOR) tablet 12.5 mg  12.5 mg Oral BID  pantoprazole (PROTONIX) tablet 40 mg  40 mg Oral ACB Laboratory data and review: No results for input(s): WBC, HGB, HCT, PLT, HGBEXT, HCTEXT, PLTEXT in the last 72 hours. No results for input(s): NA, K, CL, CO2, GLU, BUN, CREA, CA, MG, PHOS, ALB, TBIL, SGOT, ALT, INR in the last 72 hours. No lab exists for component: INREXT No components found for: [place] Assessment and Plan: 
 
Acute on chronic respiratory failure POA: due to severe pulmonary fibrosis and COPD at baseline. At home, he was too hypoxic and dyspneic to even attend outpatient pulmonology appointments. Was followed at advanced lung disease clinic in [place] and in [place] by Dr. Lynn Reeves (pulmonary associates).  On high O2 support at home at 6 L/min but upto 10L/min with exertion. End-stage disease, hospice-appropriate, however patient is not yet receptive to hospice services. He has been seen and reviewed by palliative care and pulmonology. Continue to wean oxygen as tolerated Advanced Pulmonary fibrosis/ COPD/ Severe pulmonary Hypertension POA: continue bronchodilators, wean off IV steroids. Continue pirfenidone. Palliative care consulted and following. CAD (coronary artery disease) (): type 2 MI POA was clearly driven by his respiratory failure. Appreciate cardiology. On ASA. Cardiology on board. TTE showed preserved EF w/o RWMA 
  
DM II (diabetes mellitus, type II), controlled ([facility] [address] (2/4/2014): BG elevated from steroids but would not check accuchecks for comfort Anxiety POA: a little somnolent on current doses of benzos. Will decrease as needed Lorazepam. Start a low dose of quetiapine QHS. Monitor 
  
Total time spent for the patient's care: 30 Minutes Care Plan discussed with: Patient, Family, Care Manager, Nursing Staff and Consultant/Specialist 
 
Discussed:  Care Plan Prophylaxis:  Lovenox Anticipated Disposition:  Home w/Family 
        
___________________________________________________ Attending Physician:   Albaro Naik MD

## 2018-09-01 NOTE — PROGRESS NOTES
[facility] [address] 
[address], [place], [address] [facility] 
[phone] Medical Progress Note NAME:         Panda Greenberg :        1935 MRM:        [id number] Date:          2018 Subjective: Patient has been seen and examined as a follow up for multiple medical issues. Chart, labs, diagnostics reviewed. He did not rest at all last night. Now restless and worse looking. Still hypoxic and dyspneic at rest. He is too weak to speak. On BiPAP Objective: 
 
Vital Signs: 
 
Visit Vitals  /58  Pulse 95  Temp 98.3 °F (36.8 °C)  Resp 20  
 Ht 5' 10\" (1.778 m)  Wt 79.6 kg (175 lb 6.4 oz)  SpO2 92%  BMI 25.17 kg/m2 Intake/Output Summary (Last 24 hours) at 18 1119 Last data filed at 18 2100 Gross per 24 hour Intake              360 ml Output             1250 ml Net             -890 ml Physical Examination: 
 
General:   Weak and restless. Uncomfortable. Eyes:   pink conjunctivae, PERRLA with no discharge. ENT:   no ottorrhea or rhinorrhea with moist mucous membranes Pulm: + accessory muscle use, generally decreased breath sounds with not much out crackles or wheezes Card:  no JVD or murmurs, has regular and normal S1, S2 without thrills, bruits or peripheral edema Abd:  Soft, non-tender, non-distended, normoactive bowel sounds Musc:  No cyanosis, clubbing, atrophy or deformities. Skin:  No rashes, bruising or ulcers. Neuro:  Feeble, lethargic and restless. Psych:  Has poor insight to illness at this time Current Facility-Administered Medications Medication Dose Route Frequency  morphine injection 1 mg  1 mg IntraVENous Q4H PRN  
 QUEtiapine (SEROquel) tablet 12.5 mg  12.5 mg Oral QHS  LORazepam (ATIVAN) tablet 0.5 mg  0.5 mg Oral QHS  LORazepam (ATIVAN) tablet 0.5 mg  0.5 mg Oral Q6H PRN  
  guaiFENesin ER (MUCINEX) tablet 600 mg  600 mg Oral Q12H  
 arformoterol (BROVANA) neb solution 15 mcg  15 mcg Nebulization BID RT  
 sodium chloride (NS) flush 5-10 mL  5-10 mL IntraVENous Q8H  
 sodium chloride (NS) flush 5-10 mL  5-10 mL IntraVENous PRN  
 albuterol-ipratropium (DUO-NEB) 2.5 MG-0.5 MG/3 ML  3 mL Nebulization Q4H RT  
 cetirizine (ZYRTEC) tablet 10 mg  10 mg Oral DAILY  finasteride (PROSCAR) tablet 5 mg  5 mg Oral DAILY  budesonide (PULMICORT) 500 mcg/2 ml nebulizer suspension  500 mcg Nebulization BID RT  
 gabapentin (NEURONTIN) capsule 300 mg  300 mg Oral DAILY WITH LUNCH  
 gabapentin (NEURONTIN) capsule 600 mg  600 mg Oral QHS  pirfenidone tab 801 mg   (Patient Supplied)  801 mg Oral TID  tamsulosin (FLOMAX) capsule 0.4 mg  0.4 mg Oral DAILY  enoxaparin (LOVENOX) injection 40 mg  40 mg SubCUTAneous Q24H  
 aspirin chewable tablet 81 mg  81 mg Oral DAILY  metoprolol tartrate (LOPRESSOR) tablet 12.5 mg  12.5 mg Oral BID  pantoprazole (PROTONIX) tablet 40 mg  40 mg Oral ACB Laboratory data and review: No results for input(s): WBC, HGB, HCT, PLT, HGBEXT, HCTEXT, PLTEXT, HGBEXT, HCTEXT, PLTEXT in the last 72 hours. No results for input(s): NA, K, CL, CO2, GLU, BUN, CREA, CA, MG, PHOS, ALB, TBIL, SGOT, ALT, INR in the last 72 hours. No lab exists for component: INREXT, INREXT No components found for: [place] Assessment and Plan: 
 
Acute on chronic respiratory failure POA: due to severe pulmonary fibrosis and COPD at baseline. At home, he was too hypoxic and dyspneic to even attend outpatient pulmonology appointments. Was followed at advanced lung disease clinic in [place] and in [place] by Dr. Inés Fritz (pulmonary associates). On high O2 support at home at 6 L/min but upto 10L/min with exertion. End-stage disease, hospice-appropriate, however patient is not yet receptive to hospice services.  He has been seen and reviewed by palliative care and pulmonology. Worse this morning. Continue oxygen as tolerated. Give a dose of morphine for comfort Advanced Pulmonary fibrosis/ COPD/ Severe pulmonary Hypertension POA: continue bronchodilators, now off IV steroids. Continue pirfenidone. Palliative care consulted and following. CAD (coronary artery disease) (): type 2 MI POA was clearly driven by his respiratory failure. Appreciate cardiology. On ASA. Cardiology on board. TTE showed preserved EF w/o RWMA 
  
DM II (diabetes mellitus, type II), controlled ([facility] [address] (2/4/2014): BG elevated from steroids but would not check accuchecks for comfort Anxiety POA: a little somnolent on current doses of benzos. Continue as needed morphine, lorazepam as well as Quetiapine QHS. Monitor 
  
Total time spent for the patient's care: 35 Minutes Care Plan discussed with: Family, Care Manager, Nursing Staff and Consultant/Specialist 
 
Discussed:  Care Plan Prophylaxis:  Lovenox Anticipated Disposition: TBD 
        
___________________________________________________ Attending Physician:   Nan Romo MD

## 2018-09-01 NOTE — HOSPICE
[employer] [facility] 
[phone] Patient Name: Brayan Chun YOB: 1935 Age: 80 y.o. [employer] RN Note:  Hospice consult noted. Chart reviewed. Courtesy visit with wife Laura Brewster outside of pt's room. Discussed Hospice philosophy, general plan of care, services and on call procedures. Family information packet provided. She states she is unsure of discharge as he is dependent on Bipap PRN. Explained that hospice can provide Bipap support at home. She says pt is \"not ready\" to consider hospice but willing to have Palliative care. They have a long relationship with [facility] for respiratory therapy and do not want to change at this time. They will call us when ready for hospice.  
 
Tom Todd RN

## 2018-09-01 NOTE — PROGRESS NOTES
Name: [address] [place] [address]: [address] [place] [address] Dr PIZANOB: 1935 Admit Date: 2018 Phone: [phone]  Room: [facility]/01 PCP: Jef Richards MD  MRN: [id number] Date: 2018  Code: DNR Chart and notes reviewed. Data reviewed. I review the patient's current medications in the medical record at each encounter. I have evaluated and examined the patient. Overnight events Afebrile Sats 100% on BiPAP 16/10 FiO2 70%; 
BP soft Sleepy; received a total of 2 mg Ativan last night ROS: Did well on HF yesterday afternoon. But had a difficulty night and was placed on BiPAP overnight and tolerating after Ativan given. Still very hypoxic with any movement/activity. Denies fever or chills. Has occasional cough which is nonproductive. Denies abd pain or LE pain/swelling. Continues to be very fatigued. Still resistant to the idea that this is his end-stage fibrosis and is hopeful he will recover to a point he can be discharged home. Past Medical History:  
Diagnosis Date  CAD (coronary artery disease)   
 4 stents placed in heart  Chronic obstructive pulmonary disease ([address] EMPHYSEMA SEEN ON CXR  
 Colon polyp Three tubular adenomas excised colonoscopically on 3/17/2011.  COPD (chronic obstructive pulmonary disease) ([address] 2014  Diverticulosis of colon (without mention of hemorrhage)  DM (diabetes mellitus) ([address] 4/3/2012  GERD (gastroesophageal reflux disease) [address]  History of pneumothorax 2014  Hypercholesteremia  Pneumonia   PUD (peptic ulcer disease)   Pulmonary fibrosis ([address] Past Surgical History:  
Procedure Laterality Date  ENDOSCOPY, COLON, DIAGNOSTIC  3/2011 Polyps. Tics. Rep 3 yrs.  HX CORONARY STENT PLACEMENT    
 4 cardiac stents  HX GI  circa  Laparoscopic cholecystectomy. [address] [place] [address] left inguinal hernia repair  HX LUMBAR LAMINECTOMY  2013 L4 L5 Laminectomy  HX OTHER SURGICAL  2/7/2014 LVATS, bronch, talc pleurodesis, Resection of ruptured bulla  HX TONSILLECTOMY  age 29 Family History Problem Relation Age of Onset  Adopted: Yes  Other Other   
  patient was adopted Social History Substance Use Topics  Smoking status: Former Smoker Packs/day: 1.50 Years: 36.00 Types: Cigarettes Quit date: 1/1/1987  Smokeless tobacco: Never Used  Alcohol use No  
 
 
Allergies Allergen Reactions  Bactrim [Sulfamethoprim] Unknown (comments) FLU LIKE SYMPTOMS  
 Statins-Hmg-Coa Reductase Inhibitors Myalgia  Sulfa (Sulfonamide Antibiotics) Other (comments) Bactrim causes flu like symptoms Current Facility-Administered Medications Medication Dose Route Frequency  morphine injection 1 mg  1 mg IntraVENous Q4H PRN  
 QUEtiapine (SEROquel) tablet 12.5 mg  12.5 mg Oral QHS  LORazepam (ATIVAN) tablet 0.5 mg  0.5 mg Oral QHS  LORazepam (ATIVAN) tablet 0.5 mg  0.5 mg Oral Q6H PRN  
 guaiFENesin ER (MUCINEX) tablet 600 mg  600 mg Oral Q12H  
 arformoterol (BROVANA) neb solution 15 mcg  15 mcg Nebulization BID RT  
 sodium chloride (NS) flush 5-10 mL  5-10 mL IntraVENous Q8H  
 sodium chloride (NS) flush 5-10 mL  5-10 mL IntraVENous PRN  
 albuterol-ipratropium (DUO-NEB) 2.5 MG-0.5 MG/3 ML  3 mL Nebulization Q4H RT  
 cetirizine (ZYRTEC) tablet 10 mg  10 mg Oral DAILY  finasteride (PROSCAR) tablet 5 mg  5 mg Oral DAILY  budesonide (PULMICORT) 500 mcg/2 ml nebulizer suspension  500 mcg Nebulization BID RT  
 gabapentin (NEURONTIN) capsule 300 mg  300 mg Oral DAILY WITH LUNCH  
 gabapentin (NEURONTIN) capsule 600 mg  600 mg Oral QHS  pirfenidone tab 801 mg   (Patient Supplied)  801 mg Oral TID  tamsulosin (FLOMAX) capsule 0.4 mg  0.4 mg Oral DAILY  enoxaparin (LOVENOX) injection 40 mg  40 mg SubCUTAneous Q24H  
 aspirin chewable tablet 81 mg  81 mg Oral DAILY  metoprolol tartrate (LOPRESSOR) tablet 12.5 mg  12.5 mg Oral BID  pantoprazole (PROTONIX) tablet 40 mg  40 mg Oral ACB REVIEW OF SYSTEMS  
12 point ROS negative except as stated in the HPI. Physical Exam:  
Visit Vitals  /62 (BP 1 Location: Right arm, BP Patient Position: At rest)  Pulse 82  Temp 97 °F (36.1 °C)  Resp 28  Ht 5' 10\" (1.778 m)  Wt 79.6 kg (175 lb 6.4 oz)  SpO2 96%  BMI 25.17 kg/m2 General:  Alert, appears stated age. Head:  Normocephalic, without obvious abnormality, atraumatic. Eyes:  Conjunctivae/corneas clear. Nose: Nares normal. Septum midline. Mucosa normal.   
Throat: Lips, mucosa, and tongue normal.   
Neck: Supple, symmetrical, trachea midline, no adenopathy. Lungs:   Diminished air movement with bibasilar crackles Chest wall:  No tenderness or deformity. Heart:  Regular rate and rhythm, S1, S2 normal, no murmur, click, rub or gallop. Abdomen:   Soft, non-tender. Bowel sounds normal. No masses,  No organomegaly. Extremities: Extremities normal, atraumatic, no cyanosis or edema. Pulses: 2+ and symmetric all extremities. Skin: Skin color, texture, turgor normal. No rashes or lesions Lymph nodes: Cervical, supraclavicular nodes normal.  
Neurologic: Moves all extremities, no gross focal deficits Lab Results Component Value Date/Time Sodium 132 (L) 08/27/2018 12:30 AM  
 Potassium 4.9 08/27/2018 12:30 AM  
 Chloride 98 08/27/2018 12:30 AM  
 CO2 26 08/27/2018 12:30 AM  
 BUN 24 (H) 08/27/2018 12:30 AM  
 Creatinine 1.25 08/27/2018 12:30 AM  
 Glucose 251 (H) 08/27/2018 12:30 AM  
 Calcium 9.0 08/27/2018 12:30 AM  
 Magnesium 2.0 08/27/2018 12:30 AM  
 Phosphorus 2.7 02/05/2014 04:36 AM  
 
 
Lab Results Component Value Date/Time WBC 7.6 08/26/2018 01:06 PM  
 HGB 13.2 08/26/2018 01:06 PM  
 PLATELET 547 42/96/7046 01:06 PM  
 MCV 96.2 08/26/2018 01:06 PM  
 
 
Lab Results Component Value Date/Time INR 1.0 11/01/2013 04:13 PM  
 AST (SGOT) 18 01/26/2018 02:19 PM  
 Alk. phosphatase 27 (L) 01/26/2018 02:19 PM  
 Protein, total 7.6 01/26/2018 02:19 PM  
 Albumin 4.3 01/26/2018 02:19 PM  
 Globulin 3.6 02/05/2014 04:36 AM  
 
 
No results found for: IRON, FE, TIBC, IBCT, PSAT, FERR Lab Results Component Value Date/Time Sed rate (ESR) 9 07/22/2015 12:34 PM  
 TSH 3.230 03/10/2015 02:51 PM  
  
 
No results found for: PH, PHI, PCO2, PCO2I, PO2, PO2I, HCO3, HCO3I, FIO2, FIO2I Lab Results Component Value Date/Time Troponin-I, Qt. 4.46 (H) 08/27/2018 12:30 AM  
  
 
Lab Results Component Value Date/Time Culture result:  02/04/2014 05:20 AM  
  MRSA NOT PRESENT Screening of patient nares for MRSA is for surveillance purposes and, if positive, to facilitate isolation considerations in high risk settings. It is not intended for automatic decolonization interventions per se as regimens are not sufficiently effective to warrant routine use. Culture result:  11/01/2013 03:55 PM  
  MRSA NOT PRESENT Apparent Staphylococcus aureus (not MRSA) noted. Screening of patient nares for MRSA is for surveillance purposes and, if positive, to facilitate isolation considerations in high risk settings. It is not intended for automatic decolonization interventions per se as regimens are not sufficiently effective to warrant routine use. No results found for: TOXA1, RPR, HBCM, HBSAG, HAAB, HCAB1, HAAT, G6PD, CRYAC, HIVGT, HIVR, HIV1, HIV12, HIVPC, HIVRPI No results found for: VANCT, CPK Lab Results Component Value Date/Time  Color YELLOW/STRAW 02/04/2014 08:30 AM  
 Appearance CLEAR 02/04/2014 08:30 AM  
 pH (UA) 5.0 02/04/2014 08:30 AM  
 Protein TRACE (A) 02/04/2014 08:30 AM  
 Glucose 250 (A) 02/04/2014 08:30 AM  
 Ketone NEGATIVE  02/04/2014 08:30 AM  
 Bilirubin NEGATIVE  02/04/2014 08:30 AM  
 Blood NEGATIVE  02/04/2014 08:30 AM  
 Urobilinogen 0.2 02/04/2014 08:30 AM  
 Nitrites NEGATIVE  02/04/2014 08:30 AM  
 Leukocyte Esterase NEGATIVE  02/04/2014 08:30 AM  
 WBC 0-4 02/04/2014 08:30 AM  
 RBC 0-5 02/04/2014 08:30 AM  
 Bacteria NEGATIVE  02/04/2014 08:30 AM  
 
 
Images: personally visualized and reviewed report CXR (8/30/18): Slight increase in bibasilar interstitial/airspace opacities and probable small effusions. IMPRESSION 
· Acute on chronic hypoxic respiratory failure · Advanced end stage pulmonary fibrosis · COPD 
· NSTEMI/CAD · Severe pulmonary HTN on ECHO · DM 
 
PLAN 
· Continue BiPAP vs HF O2 and wean flow and FiO2 as able to keep sats > 85%. Have not been able to wean flow or FiO2 significantly, only as low as 50L and 100%. · IV steroids have been weaned to 40 mg daily. Decrease in dose seems to have helped his mental status and confusion. · Overall appears dry and BP is borderline, so would be cautious with diuresis. Hold off for now. · Continue Duonebs scheduled and Brovana/Pulmicort nebs. · Continue home Esbriet · Metoprolol per Cardiology. No additional recs at this time · Appreciate eval by hospice and Palliative Care team.  Patient with significant increase in baseline O2 requirements since last year and now, unable to even keep outpatient appointments due to severe hypoxia. Continue Ativan and morphine prn. · Daily discussions with patient and wife, who is aware of the severity of his decline, but also wants to honor his wishes. Patient not receptive at this time to hospice. We also discuss that should his mental and cognitive status continue to decline, she would need to make some of these decisions of his behalf. Again discussed that it is unlikely that we will be able decrease his O2 requirement to a level acceptable for discharge to home. Daughter and adult grandchildren came in from [place] on Friday. Son has also been by. · Patient's wife asked about treating his pulmonary HTN.   Discussed that sildenafil is indicated for primary pulmonary HTN and there isn't evidence that it provides significant benefit in secondary pulmonary HTN. She also asked about adding Ofev to current Esbriet therapy. Discussed that neither of these medications will reverse his pulmonary fibrosis or improve his current lung function, but aim to slow the progression. · GI prophylaxis: Protonix · DVT prophylaxis: Lovenox Patient critically ill requiring continuous HF O2 due to severe hypoxia and respiratory distress and is high risk for continued decompensation. Discussed with nursing and Palliative Care team.  CC time EOP 30 min.  
 
Ashley Agosto MD

## 2018-09-01 NOTE — PROGRESS NOTES
SHIFT CHANGE: 
7:49 AM Report received from Heidy Trujillo RN. SBAR, Kardex, Procedure Summary, Intake/Output, MAR, Recent Results, Med Rec Status and Cardiac Rhythm NSR were discussed. SHIFT SUMMARY: 
8:15 AM  Patient is tired this morning from not getting any sleep last night. Doesn't want to take his medications yet this morning. 9:31 AM  Patient agreeable to take his medications. 9:49 AM  Patient SOB while eating and taking his meds. RR 26 and O2 sats dipping to 78% on hi-flow. Patient refusing Bipap at this time. He would like to try and rest as he thinks he won't be able to tolerate the bipap. Will re-evaluate. 10:25 AM  Patient with increased WOB. O2 sats in the 70's. RR 34. Mepilex foam applied to nose for protection and Bipap placed on patient. O2 sats increased to 88% 10:49 AM  Patients RR continues to be elevated to 34. HR up to 109 and patient is restless. Notified Dr. Bambi Marquez and asked if PRN Morphine can be given for SOB. Dr. Bambi Marquez agreeable and PRN reasons changed on order. 1 mg Morphine administered. Dr. Bambi Marquez gave orders to re-evaluate patient in 30 minutes or so and if patient is still SOB then another 1 mg of Morphine may be administered. Will monitor closely 12:53 PM  Patient remains SOB. Extra dose of 1mg Morphine given per Dr. Srikanth Rod order. 2:15PM  Patient resting comfortably on the Bipap. 
 
6:11 PM  Patient taken off Bipap and placed on Hi-flow to eat and take medications. Hi-flow settings changed back to 60L and 100% FIO2 per Dr. Main Carrillo. Redness noted to the bridge of the nose and forehead from the Bipap. Blanchable. Will monitor closely for breakdown. END OF SHIFT REPORT:. 
8:11 PM Bedside and Verbal shift change report given to Heidy Trujillo RN (oncoming nurse) by Karlene Doan RN (offgoing nurse).  Report included the following information SBAR, Kardex, Procedure Summary, Intake/Output, MAR, Recent Results, Med Rec Status and Cardiac Rhythm nsr.

## 2018-09-01 NOTE — PROGRESS NOTES
Bedside and Verbal shift change report given to Vandana Correa (oncoming nurse) by Tigist Teague (offgoing nurse). Report included the following information SBAR, Kardex, ED Summary, Procedure Summary, Intake/Output, MAR, Accordion, Recent Results, Med Rec Status and Cardiac Rhythm NSR.  
 
2133: Seroquel given instead of ativan. Will give ativan later if patient needs it, patient and patient wife agreeable to plan. 0216: 0.5 mg of Ativan given after patient complaint of not being able to sleep and agitated over issues with BIPAP not working and being fixed by RT.  
 
0730: Bedside and Verbal shift change report given to Tiera (oncoming nurse) by Vandana Correa (offgoing nurse). Report included the following information SBAR, Kardex, ED Summary, Intake/Output, MAR, Accordion, Recent Results, Med Rec Status and Cardiac Rhythm NSR.

## 2018-09-01 NOTE — PROGRESS NOTES
Occupational Therapy Chart reviewed, spoke with PT, Referral acknowledged. Spoke with Nursing. Patient with more difficulty breathing today. Concur we will hold intervention and reassess tomorrow. Earl Duncan.  MS Luigi OTR/L

## 2018-09-01 NOTE — PROGRESS NOTES
Physical Therapy Referral acknowledged. Spoke with Nursing. Patient with more difficulty breathing today. Concur we will hold intervention and reassess tomorrow.  
Wes Pardo PT

## 2018-09-02 NOTE — PROGRESS NOTES
Name: [address] [place] [address] East: [address]  
: 1935 Admit Date: 2018 Phone: [phone]  Room: [facility]/01 PCP: Geoffrey Nugent MD  MRN: [id number] Date: 2018  Code: DNR Chart and notes reviewed. Data reviewed. I review the patient's current medications in the medical record at each encounter. I have evaluated and examined the patient. Overnight events Afebrile Sats 92% on HF 60L 100% ROS: Did not sleep well overnight. Was agitated and short of breath. Took HF off at one point and desatted to the 42's. Was on and off BiPAP overnight. Currently on HF and comfortable, but still short of breath and desatting with minimal activity. Spoke with him and his son that he his not getting any better and at times is worse requiring BiPAP. He tells me once again he does not intend to go to hospice but also tells me that he doesn't see what we are doing here for him, so he doesn't know why he can't go home. Went over again that he is requiring levels of O2 that is not able to be given outside of the hospital and he is requiring max or near max O2 support at all times since discharge. Still does not seem to grasp that his disease is end-stage and there is nothing more to do. Past Medical History:  
Diagnosis Date  CAD (coronary artery disease)   
 4 stents placed in heart  Chronic obstructive pulmonary disease ([address] EMPHYSEMA SEEN ON CXR  
 Colon polyp Three tubular adenomas excised colonoscopically on 3/17/2011.  COPD (chronic obstructive pulmonary disease) ([address] 2014  Diverticulosis of colon (without mention of hemorrhage)  DM (diabetes mellitus) ([address] 4/3/2012  GERD (gastroesophageal reflux disease) [address]  History of pneumothorax 2014  Hypercholesteremia  Pneumonia   PUD (peptic ulcer disease)   Pulmonary fibrosis ([address] Past Surgical History:  
Procedure Laterality Date  ENDOSCOPY, COLON, DIAGNOSTIC  3/2011 Polyps. Tics. Rep 3 yrs.  HX CORONARY STENT PLACEMENT  2004  
 4 cardiac stents  HX GI  circa 2000 Laparoscopic cholecystectomy. [address] left inguinal hernia repair  HX LUMBAR LAMINECTOMY  11/20/2013 L4 L5 Laminectomy  HX OTHER SURGICAL  2/7/2014 LVATS, bronch, talc pleurodesis, Resection of ruptured bulla  HX TONSILLECTOMY  age 29 Family History Problem Relation Age of Onset  Adopted: Yes  Other Other   
  patient was adopted Social History Substance Use Topics  Smoking status: Former Smoker Packs/day: 1.50 Years: 36.00 Types: Cigarettes Quit date: 1/1/1987  Smokeless tobacco: Never Used  Alcohol use No  
 
 
Allergies Allergen Reactions  Bactrim [Sulfamethoprim] Unknown (comments) FLU LIKE SYMPTOMS  
 Statins-Hmg-Coa Reductase Inhibitors Myalgia  Sulfa (Sulfonamide Antibiotics) Other (comments) Bactrim causes flu like symptoms Current Facility-Administered Medications Medication Dose Route Frequency  QUEtiapine (SEROquel) tablet 25 mg  25 mg Oral QHS  morphine injection 2 mg  2 mg IntraVENous Q4H PRN  
 LORazepam (ATIVAN) tablet 0.5 mg  0.5 mg Oral QHS  LORazepam (ATIVAN) tablet 0.5 mg  0.5 mg Oral Q6H PRN  
 guaiFENesin ER (MUCINEX) tablet 600 mg  600 mg Oral Q12H  
 arformoterol (BROVANA) neb solution 15 mcg  15 mcg Nebulization BID RT  
 sodium chloride (NS) flush 5-10 mL  5-10 mL IntraVENous Q8H  
 sodium chloride (NS) flush 5-10 mL  5-10 mL IntraVENous PRN  
 albuterol-ipratropium (DUO-NEB) 2.5 MG-0.5 MG/3 ML  3 mL Nebulization Q4H RT  
 cetirizine (ZYRTEC) tablet 10 mg  10 mg Oral DAILY  finasteride (PROSCAR) tablet 5 mg  5 mg Oral DAILY  budesonide (PULMICORT) 500 mcg/2 ml nebulizer suspension  500 mcg Nebulization BID RT  
 gabapentin (NEURONTIN) capsule 300 mg  300 mg Oral DAILY WITH LUNCH  
  gabapentin (NEURONTIN) capsule 600 mg  600 mg Oral QHS  pirfenidone tab 801 mg   (Patient Supplied)  801 mg Oral TID  tamsulosin (FLOMAX) capsule 0.4 mg  0.4 mg Oral DAILY  enoxaparin (LOVENOX) injection 40 mg  40 mg SubCUTAneous Q24H  
 aspirin chewable tablet 81 mg  81 mg Oral DAILY  metoprolol tartrate (LOPRESSOR) tablet 12.5 mg  12.5 mg Oral BID  pantoprazole (PROTONIX) tablet 40 mg  40 mg Oral ACB REVIEW OF SYSTEMS  
12 point ROS negative except as stated in the HPI. Physical Exam:  
Visit Vitals  /54 (BP 1 Location: Left arm, BP Patient Position: At rest)  Pulse 83  Temp 97 °F (36.1 °C)  Resp 21  
 Ht 5' 10\" (1.778 m)  Wt 79.6 kg (175 lb 6.4 oz)  SpO2 100%  BMI 25.17 kg/m2 General:  Alert, appears stated age. Head:  Normocephalic, without obvious abnormality, atraumatic. Eyes:  Conjunctivae/corneas clear. Nose: Nares normal. Septum midline. Mucosa normal.   
Throat: Lips, mucosa, and tongue normal.   
Neck: Supple, symmetrical, trachea midline, no adenopathy. Lungs:   Diminished air movement with bibasilar crackles Chest wall:  No tenderness or deformity. Heart:  Regular rate and rhythm, S1, S2 normal, no murmur, click, rub or gallop. Abdomen:   Soft, non-tender. Bowel sounds normal. No masses,  No organomegaly. Extremities: Extremities normal, atraumatic, no cyanosis or edema. Pulses: 2+ and symmetric all extremities. Skin: Skin color, texture, turgor normal. No rashes or lesions Lymph nodes: Cervical, supraclavicular nodes normal.  
Neurologic: Moves all extremities, no gross focal deficits Lab Results Component Value Date/Time  Sodium 132 (L) 08/27/2018 12:30 AM  
 Potassium 4.9 08/27/2018 12:30 AM  
 Chloride 98 08/27/2018 12:30 AM  
 CO2 26 08/27/2018 12:30 AM  
 BUN 24 (H) 08/27/2018 12:30 AM  
 Creatinine 1.25 08/27/2018 12:30 AM  
 Glucose 251 (H) 08/27/2018 12:30 AM  
 Calcium 9.0 08/27/2018 12:30 AM  
 Magnesium 2.0 08/27/2018 12:30 AM  
 Phosphorus 2.7 02/05/2014 04:36 AM  
 
 
Lab Results Component Value Date/Time WBC 7.6 08/26/2018 01:06 PM  
 HGB 13.2 08/26/2018 01:06 PM  
 PLATELET 107 61/78/9339 01:06 PM  
 MCV 96.2 08/26/2018 01:06 PM  
 
 
Lab Results Component Value Date/Time INR 1.0 11/01/2013 04:13 PM  
 AST (SGOT) 18 01/26/2018 02:19 PM  
 Alk. phosphatase 27 (L) 01/26/2018 02:19 PM  
 Protein, total 7.6 01/26/2018 02:19 PM  
 Albumin 4.3 01/26/2018 02:19 PM  
 Globulin 3.6 02/05/2014 04:36 AM  
 
 
No results found for: IRON, FE, TIBC, IBCT, PSAT, FERR Lab Results Component Value Date/Time Sed rate (ESR) 9 07/22/2015 12:34 PM  
 TSH 3.230 03/10/2015 02:51 PM  
  
 
No results found for: PH, PHI, PCO2, PCO2I, PO2, PO2I, HCO3, HCO3I, FIO2, FIO2I Lab Results Component Value Date/Time Troponin-I, Qt. 4.46 (H) 08/27/2018 12:30 AM  
  
 
Lab Results Component Value Date/Time Culture result:  02/04/2014 05:20 AM  
  MRSA NOT PRESENT Screening of patient nares for MRSA is for surveillance purposes and, if positive, to facilitate isolation considerations in high risk settings. It is not intended for automatic decolonization interventions per se as regimens are not sufficiently effective to warrant routine use. Culture result:  11/01/2013 03:55 PM  
  MRSA NOT PRESENT Apparent Staphylococcus aureus (not MRSA) noted. Screening of patient nares for MRSA is for surveillance purposes and, if positive, to facilitate isolation considerations in high risk settings. It is not intended for automatic decolonization interventions per se as regimens are not sufficiently effective to warrant routine use. No results found for: TOXA1, RPR, HBCM, HBSAG, HAAB, HCAB1, HAAT, G6PD, CRYAC, HIVGT, HIVR, HIV1, HIV12, HIVPC, HIVRPI No results found for: VANCT, CPK Lab Results Component Value Date/Time  Color YELLOW/STRAW 02/04/2014 08:30 AM  
 Appearance CLEAR 02/04/2014 08:30 AM  
 pH (UA) 5.0 02/04/2014 08:30 AM  
 Protein TRACE (A) 02/04/2014 08:30 AM  
 Glucose 250 (A) 02/04/2014 08:30 AM  
 Ketone NEGATIVE  02/04/2014 08:30 AM  
 Bilirubin NEGATIVE  02/04/2014 08:30 AM  
 Blood NEGATIVE  02/04/2014 08:30 AM  
 Urobilinogen 0.2 02/04/2014 08:30 AM  
 Nitrites NEGATIVE  02/04/2014 08:30 AM  
 Leukocyte Esterase NEGATIVE  02/04/2014 08:30 AM  
 WBC 0-4 02/04/2014 08:30 AM  
 RBC 0-5 02/04/2014 08:30 AM  
 Bacteria NEGATIVE  02/04/2014 08:30 AM  
 
 
Images: personally visualized and reviewed report CXR (8/30/18): Slight increase in bibasilar interstitial/airspace opacities and probable small effusions. IMPRESSION 
· Acute on chronic hypoxic respiratory failure · Advanced end stage pulmonary fibrosis · COPD 
· NSTEMI/CAD · Severe pulmonary HTN on ECHO · DM 
 
PLAN 
· Continue BiPAP vs HF O2 and wean flow and FiO2 as able to keep sats > 85%. Have not been able to wean flow or FiO2 significantly, only as low as 50L and 100%. · IV steroids have been weaned to 40 mg daily. Decrease in dose seems to have helped his mental status and confusion. · Overall appears dry and BP is borderline, so would be cautious with diuresis. Hold off for now. · Continue Duonebs scheduled and Brovana/Pulmicort nebs. · Continue home Esbriet · Metoprolol per Cardiology. No additional recs at this time · Appreciate eval by hospice and Palliative Care team.  Patient with significant increase in baseline O2 requirements since last year and now, unable to even keep outpatient appointments due to severe hypoxia. Continue Ativan and morphine prn. · Daily discussions with patient and wife, who is aware of the severity of his decline, but also wants to honor his wishes. Patient not receptive at this time to hospice.   We also discuss that should his mental and cognitive status continue to decline, she would need to make some of these decisions of his behalf. Again discussed that it is unlikely that we will be able decrease his O2 requirement to a level acceptable for discharge to home. Daughter and adult grandchildren came in from [place] on Friday. Son has also been by. · Patient's wife asked about treating his pulmonary HTN. Discussed that sildenafil is indicated for primary pulmonary HTN and there isn't evidence that it provides significant benefit in secondary pulmonary HTN. She also asked about adding Ofev to current Esbriet therapy. Discussed that neither of these medications will reverse his pulmonary fibrosis or improve his current lung function, but aim to slow the progression. · GI prophylaxis: Protonix · DVT prophylaxis: Lovenox Patient critically ill requiring continuous HF O2 due to severe hypoxia and respiratory distress and is high risk for continued decompensation. Discussed with nursing and Primary team.  CC time EOP 30 min.  
 
Bri De La Cruz MD

## 2018-09-02 NOTE — PROGRESS NOTES
Bedside and Verbal shift change report given to Jackeline Higginbotham (oncoming nurse) by Chris Gates (offgoing nurse). Report included the following information SBAR, Kardex, ED Summary, Intake/Output, MAR, Accordion, Recent Results, Med Rec Status and Cardiac Rhythm NSR.  
 
2321: Seroquel given and BIPAP applied by RT w/ breathing tx. I will continue to monitor. 0037: Patient stated that seroquel was not working and requested something to help him sleep. Patient restless and short of breath, morphine administered. 0200: Patient up sitting on the side of the bed, visibly upset with not being able to sleep. I explained to both the patient and the wife that he has received seroquel and morphine and that d/t respiratory status that I was hesitant to give any additional medication at this time. Removed the BIPAP, placed patient back on HI-Flow, assisted him with getting back into bed and repositioned him so that he was comfortable. Informed them that I would speak with the hospitalist to see if something else could be done. 0210: Spoke with Dr. Janet Lamb, ok to give Ativan at this time. I went in to administer medication and patient was asleep. I will continue to monitor. 3074: Noticed pt SPO2 at 50% and decrease into the high 40s. I went into the patient's room and he was sitting on the side of the bed, pale/grey, anxious, dyspneic and without the high flow NC in his nose. Applied Hi-Flow, lifted patient back into bed with another nurse, repositioned him, called RT, applied BIPAP, and administered morphine. Pt SPO2 now in the 90's and patient is resting comfortably. I will continue to monitor. 0715: Bedside and Verbal shift change report given to Tiera (oncoming nurse) by Jackeline Higginbotham (offgoing nurse). Report included the following information SBAR, Kardex, ED Summary, Procedure Summary, Intake/Output, MAR, Accordion, Recent Results, Med Rec Status and Cardiac Rhythm NSR.

## 2018-09-02 NOTE — PROGRESS NOTES
SHIFT CHANGE: 
7:53 AM Report received from Georgina Samuel RN. SBAR, Kardex, Procedure Summary, Intake/Output, MAR, Recent Results, Med Rec Status and Cardiac Rhythm NSR were discussed. SHIFT SUMMARY: 
9:00 AM  Patient sleeping soundly this morning after not much sleep last night. Morning medications held per wife's request until patient wakes up. 10:34 AM  Alerted to the room by patient's wife. Patient sitting on the side of the bed anxious, tachypneic and O2 sats in the low 80's. Bipap in place. Repositioned up higher in the bed. Patient then restless, grabbing at bipap mask and sat up on the side of the bed. RR up to 35. O2 sats up to 100% but patient remaining tachypneic. Suggested taking on Bipap and using Hi-flow if the mask is bothering him but he wants the Bipap on. 
 
11:38 AM  Patient resting comfortably at this time. Still not ready to take morning meds. 12:46 PM  Patient taken off bipap and placed on Hi-flow. Morning medications given. 5:26 PM  Patient remains comfortable on H-flow. END OF SHIFT REPORT: 
 
8:07 PM Bedside and Verbal shift change report given to Georgina Samuel RN (oncoming nurse) by Royal Jian RN (offgoing nurse). Report included the following information SBAR, Kardex, Procedure Summary, Intake/Output, MAR, Recent Results, Med Rec Status and Cardiac Rhythm NSR.

## 2018-09-02 NOTE — PROGRESS NOTES
Bakari Mejia [facility] [address] 
[address] [facility], [address] [place] [address] 
[phone] Medical Progress Note NAME:         Andreia Adams :        1935 MRM:        [id number] Date:          2018 Subjective: Patient has been seen and examined as a follow up for multiple medical issues. Chart, labs, diagnostics reviewed. He still did not rest well last night. Tired and weak this morning. BiPAP dependent. Afebrile. Objective: 
 
Vital Signs: 
 
Visit Vitals  /54  Pulse 89  Temp 97.4 °F (36.3 °C)  Resp 22  
 Ht 5' 10\" (1.778 m)  Wt 79.6 kg (175 lb 6.4 oz)  SpO2 97%  BMI 25.17 kg/m2 Intake/Output Summary (Last 24 hours) at 18 1119 Last data filed at 18 2351 Gross per 24 hour Intake              720 ml Output              100 ml Net              620 ml Physical Examination: 
 
General:   Weak and restless. Uncomfortable. Eyes:   pink conjunctivae, PERRLA with no discharge. ENT:   no ottorrhea or rhinorrhea with moist mucous membranes Pulm: + accessory muscle use, generally decreased breath sounds with not much out crackles Card:  has regular and normal S1, S2 without thrills, bruits or peripheral edema Abd:  Soft, non-tender, non-distended, normoactive bowel sounds Musc:  No cyanosis, clubbing, atrophy or deformities. Skin:  No rashes, bruising or ulcers. Neuro:  Feeble, more alert but still restless. Psych:  Has poor insight to illness at this time Current Facility-Administered Medications Medication Dose Route Frequency  QUEtiapine (SEROquel) tablet 25 mg  25 mg Oral QHS  morphine injection 2 mg  2 mg IntraVENous Q4H PRN  
 LORazepam (ATIVAN) tablet 0.5 mg  0.5 mg Oral QHS  LORazepam (ATIVAN) tablet 0.5 mg  0.5 mg Oral Q6H PRN  
 guaiFENesin ER (MUCINEX) tablet 600 mg  600 mg Oral Q12H  arformoterol (BROVANA) neb solution 15 mcg  15 mcg Nebulization BID RT  
 sodium chloride (NS) flush 5-10 mL  5-10 mL IntraVENous Q8H  
 sodium chloride (NS) flush 5-10 mL  5-10 mL IntraVENous PRN  
 albuterol-ipratropium (DUO-NEB) 2.5 MG-0.5 MG/3 ML  3 mL Nebulization Q4H RT  
 cetirizine (ZYRTEC) tablet 10 mg  10 mg Oral DAILY  finasteride (PROSCAR) tablet 5 mg  5 mg Oral DAILY  budesonide (PULMICORT) 500 mcg/2 ml nebulizer suspension  500 mcg Nebulization BID RT  
 gabapentin (NEURONTIN) capsule 300 mg  300 mg Oral DAILY WITH LUNCH  
 gabapentin (NEURONTIN) capsule 600 mg  600 mg Oral QHS  pirfenidone tab 801 mg   (Patient Supplied)  801 mg Oral TID  tamsulosin (FLOMAX) capsule 0.4 mg  0.4 mg Oral DAILY  enoxaparin (LOVENOX) injection 40 mg  40 mg SubCUTAneous Q24H  
 aspirin chewable tablet 81 mg  81 mg Oral DAILY  metoprolol tartrate (LOPRESSOR) tablet 12.5 mg  12.5 mg Oral BID  pantoprazole (PROTONIX) tablet 40 mg  40 mg Oral ACB Laboratory data and review: No results for input(s): WBC, HGB, HCT, PLT, HGBEXT, HCTEXT, PLTEXT, HGBEXT, HCTEXT, PLTEXT in the last 72 hours. No results for input(s): NA, K, CL, CO2, GLU, BUN, CREA, CA, MG, PHOS, ALB, TBIL, SGOT, ALT, INR in the last 72 hours. No lab exists for component: INREXT, INREXT No components found for: [place] Assessment and Plan: 
 
Acute on chronic respiratory failure POA: due to severe pulmonary fibrosis and COPD at baseline. At home, he was too hypoxic and dyspneic to even attend outpatient pulmonology appointments. Was followed at advanced lung disease clinic in [place] and in [place] by Dr. Tram Schmitt (pulmonary associates). On high O2 support at home at 6 L/min but upto 10L/min with exertion. End-stage disease, hospice-appropriate, however patient is not yet receptive to hospice services. He has been seen and reviewed by palliative care and pulmonology. Remains critical. Continue BiPAP. Continue morphine for comfort Advanced Pulmonary fibrosis/ COPD/ Severe pulmonary Hypertension POA: continue bronchodilators, now off IV steroids. Discussed with patient's wife as she has with Dr Hunter Bennett. Continue pirfenidone. Supportive care. Palliative care following CAD (coronary artery disease) (): type 2 MI POA was clearly driven by his respiratory failure. Appreciate cardiology. On ASA. Cardiology on board. TTE showed preserved EF w/o RWMA. Stable 
  
DM II (diabetes mellitus, type II), controlled ([address] (2/4/2014): BG elevated from steroids but would not check accuchecks for comfort Anxiety POA: Continue as needed morphine, lorazepam as well as increase Quetiapine QHS for comfort,.  
  
Total time spent for the patient's care: 35 Minutes Care Plan discussed with: Family, Care Manager, Nursing Staff and Consultant/Specialist 
 
Discussed:  Care Plan Prophylaxis:  Lovenox Anticipated Disposition: TBD 
        
___________________________________________________ Attending Physician:   Placido Lesch, MD

## 2018-09-02 NOTE — PROGRESS NOTES
Occupational Therapy Chart reviewed and spoke to nursing. RN requested that OT hold evaluation until later. She stated patient had an episode this morning where O2 saturation dropped to the 40's. He was given morphine, put back on bipap and now back in bed. Will re-attempt when appropriate. Thank you, RIN Smalls/SANDRA

## 2018-09-02 NOTE — PROGRESS NOTES
Physical Therapy Continues too acute to participate with therapy. Will follow & intervene when appropriate.  
Bam Saez PT

## 2018-09-03 NOTE — PROGRESS NOTES
Problem: Falls - Risk of 
Goal: *Absence of Falls Document Komal Cruz Fall Risk and appropriate interventions in the flowsheet. Outcome: Progressing Towards Goal 
Fall Risk Interventions: 
Mobility Interventions: Bed/chair exit alarm, Patient to call before getting OOB Mentation Interventions: Adequate sleep, hydration, pain control, Bed/chair exit alarm, Door open when patient unattended, Family/sitter at bedside Medication Interventions: Bed/chair exit alarm, Patient to call before getting OOB Elimination Interventions: Call light in reach, Patient to call for help with toileting needs, Urinal in reach History of Falls Interventions: Bed/chair exit alarm, Door open when patient unattended, Room close to nurse's station

## 2018-09-03 NOTE — PROGRESS NOTES
Bakari Mejia [facility] [address] 
[address], [place], [address] [facility] 
[phone] Medical Progress Note NAME:         Joey Hendrickson :        1935 MRM:        [id number] Date:          9/3/2018 Subjective: Patient has been seen and examined as a follow up for multiple medical issues. Chart, labs, diagnostics reviewed. He slept a little better last night. Remains exhausted. On high flow oxygen this morning. Remains SOB. Objective: 
 
Vital Signs: 
 
Visit Vitals  /69 (BP 1 Location: Right arm, BP Patient Position: At rest)  Pulse 91  Temp 97.7 °F (36.5 °C)  Resp 28  Ht 5' 10\" (1.778 m)  Wt 79.6 kg (175 lb 6.4 oz)  SpO2 96%  BMI 25.17 kg/m2 Intake/Output Summary (Last 24 hours) at 18 1015 Last data filed at 18 1270 Gross per 24 hour Intake              840 ml Output             1425 ml Net             -585 ml Physical Examination: 
 
General:   Weak and restless. Uncomfortable. Eyes:   pink conjunctivae, PERRLA with no discharge. ENT:   no ottorrhea or rhinorrhea with moist mucous membranes Pulm: + accessory muscle use, generally decreased breath sounds Card:  has regular and normal S1, S2 without thrills, bruits or peripheral edema Abd:  Soft, non-tender, non-distended, normoactive bowel sounds Musc:  No cyanosis, clubbing, atrophy or deformities. Neuro:  Feeble, more alert but still restless. Psych:  Has poor insight to illness at this time Current Facility-Administered Medications Medication Dose Route Frequency  QUEtiapine (SEROquel) tablet 25 mg  25 mg Oral QHS  morphine injection 2 mg  2 mg IntraVENous Q4H PRN  
 LORazepam (ATIVAN) tablet 0.5 mg  0.5 mg Oral QHS  LORazepam (ATIVAN) tablet 0.5 mg  0.5 mg Oral Q6H PRN  
 guaiFENesin ER (MUCINEX) tablet 600 mg  600 mg Oral Q12H  arformoterol (BROVANA) neb solution 15 mcg  15 mcg Nebulization BID RT  
 sodium chloride (NS) flush 5-10 mL  5-10 mL IntraVENous Q8H  
 sodium chloride (NS) flush 5-10 mL  5-10 mL IntraVENous PRN  
 albuterol-ipratropium (DUO-NEB) 2.5 MG-0.5 MG/3 ML  3 mL Nebulization Q4H RT  
 cetirizine (ZYRTEC) tablet 10 mg  10 mg Oral DAILY  finasteride (PROSCAR) tablet 5 mg  5 mg Oral DAILY  budesonide (PULMICORT) 500 mcg/2 ml nebulizer suspension  500 mcg Nebulization BID RT  
 gabapentin (NEURONTIN) capsule 300 mg  300 mg Oral DAILY WITH LUNCH  
 gabapentin (NEURONTIN) capsule 600 mg  600 mg Oral QHS  pirfenidone tab 801 mg   (Patient Supplied)  801 mg Oral TID  tamsulosin (FLOMAX) capsule 0.4 mg  0.4 mg Oral DAILY  enoxaparin (LOVENOX) injection 40 mg  40 mg SubCUTAneous Q24H  
 aspirin chewable tablet 81 mg  81 mg Oral DAILY  metoprolol tartrate (LOPRESSOR) tablet 12.5 mg  12.5 mg Oral BID  pantoprazole (PROTONIX) tablet 40 mg  40 mg Oral ACB Laboratory data and review: No results for input(s): WBC, HGB, HCT, PLT, HGBEXT, HCTEXT, PLTEXT, HGBEXT, HCTEXT, PLTEXT in the last 72 hours. No results for input(s): NA, K, CL, CO2, GLU, BUN, CREA, CA, MG, PHOS, ALB, TBIL, SGOT, ALT, INR in the last 72 hours. No lab exists for component: INREXT, INREXT No components found for: [place] Assessment and Plan: 
 
Acute on chronic respiratory failure POA: due to severe pulmonary fibrosis and COPD at baseline. At home, he was too hypoxic and dyspneic to even attend outpatient pulmonology appointments. Was followed at advanced lung disease clinic in [place] and in [place] by Dr. Eli Harding (pulmonary associates). On high O2 support at home at 6 L/min but upto 10L/min with exertion. End-stage disease, hospice-appropriate, however patient is not yet receptive to hospice services. He has been seen and reviewed by palliative care and pulmonology.  Remains critical. Continue high flow oxygen alternating with BiPAP as needed. Continue morphine for comfort Advanced Pulmonary fibrosis/ COPD/ Severe pulmonary Hypertension POA: continue bronchodilators, now off IV steroids. Discussed with patient's wife as she has with Dr Laura Carrillo. Continue pirfenidone. Supportive care. Palliative care also closely following CAD (coronary artery disease) (): type 2 MI POA was clearly driven by his respiratory failure. Appreciate cardiology. On ASA. Cardiology on board. TTE showed preserved EF w/o RWMA. Stable 
  
DM II (diabetes mellitus, type II), controlled ([address] (2/4/2014): BG elevated from steroids but would not check accuchecks for comfort Anxiety POA: Continue as needed morphine, lorazepam as well as increase Quetiapine QHS for comfort,.  
  
Total time spent for the patient's care: 25 Minutes Care Plan discussed with: Family, Care Manager, Nursing Staff and Consultant/Specialist 
 
Discussed:  Care Plan Prophylaxis:  Lovenox Anticipated Disposition: TBD 
        
___________________________________________________ Attending Physician:   Ngoc Sun MD

## 2018-09-03 NOTE — ROUTINE PROCESS
Bedside shift change report given to Earline (oncoming nurse) by Alber Falcon (offgoing nurse). Report included the following information SBAR, Kardex, Intake/Output, MAR, Accordion and Recent Results.

## 2018-09-03 NOTE — PROGRESS NOTES
Name: [address] [place] [address]: [address] [place] [address] Dr POWELL: 1935 Admit Date: 2018 Phone: [phone]  Room: [facility]/01 PCP: Geoffrey Nugent MD  MRN: [id number] Date: 9/3/2018  Code: DNR Chart and notes reviewed. Data reviewed. I review the patient's current medications in the medical record at each encounter. I have evaluated and examined the patient. Overnight events Afebrile Sats 92% on HF 60L 90% ROS: Slept better overnight. Still intermittently requiring BiPAP and desatting with minimal movement. Spoke at length with the patient and his wife as we continue to make significant improvement in his respiratory status. He is still not willing to entertain hospice and is concerned we are bringing this up because we are trying to kick him out of the hospital, I assured him that this is not the case and that I am basing our conversations purely out of concern from a medical standpoint. Past Medical History:  
Diagnosis Date  CAD (coronary artery disease)   
 4 stents placed in heart  Chronic obstructive pulmonary disease ([address] EMPHYSEMA SEEN ON CXR  
 Colon polyp Three tubular adenomas excised colonoscopically on 3/17/2011.  COPD (chronic obstructive pulmonary disease) ([address] 2014  Diverticulosis of colon (without mention of hemorrhage)  DM (diabetes mellitus) ([address] 4/3/2012  GERD (gastroesophageal reflux disease) [address] [facility] [address]  History of pneumothorax 2014  Hypercholesteremia  Pneumonia   PUD (peptic ulcer disease) 195  Pulmonary fibrosis ([address] Past Surgical History:  
Procedure Laterality Date  ENDOSCOPY, COLON, DIAGNOSTIC  3/2011 Polyps. Tics. Rep 3 yrs.  HX CORONARY STENT PLACEMENT    
 4 cardiac stents  HX GI  circa  Laparoscopic cholecystectomy. [address] [place] [address] left inguinal hernia repair  HX LUMBAR LAMINECTOMY  2013 L4 L5 Laminectomy  HX OTHER SURGICAL  2/7/2014 LVATS, bronch, talc pleurodesis, Resection of ruptured bulla  HX TONSILLECTOMY  age 29 Family History Problem Relation Age of Onset  Adopted: Yes  Other Other   
  patient was adopted Social History Substance Use Topics  Smoking status: Former Smoker Packs/day: 1.50 Years: 36.00 Types: Cigarettes Quit date: 1/1/1987  Smokeless tobacco: Never Used  Alcohol use No  
 
 
Allergies Allergen Reactions  Bactrim [Sulfamethoprim] Unknown (comments) FLU LIKE SYMPTOMS  
 Statins-Hmg-Coa Reductase Inhibitors Myalgia  Sulfa (Sulfonamide Antibiotics) Other (comments) Bactrim causes flu like symptoms Current Facility-Administered Medications Medication Dose Route Frequency  QUEtiapine (SEROquel) tablet 25 mg  25 mg Oral QHS  morphine injection 2 mg  2 mg IntraVENous Q4H PRN  
 LORazepam (ATIVAN) tablet 0.5 mg  0.5 mg Oral QHS  LORazepam (ATIVAN) tablet 0.5 mg  0.5 mg Oral Q6H PRN  
 guaiFENesin ER (MUCINEX) tablet 600 mg  600 mg Oral Q12H  
 arformoterol (BROVANA) neb solution 15 mcg  15 mcg Nebulization BID RT  
 sodium chloride (NS) flush 5-10 mL  5-10 mL IntraVENous Q8H  
 sodium chloride (NS) flush 5-10 mL  5-10 mL IntraVENous PRN  
 albuterol-ipratropium (DUO-NEB) 2.5 MG-0.5 MG/3 ML  3 mL Nebulization Q4H RT  
 cetirizine (ZYRTEC) tablet 10 mg  10 mg Oral DAILY  finasteride (PROSCAR) tablet 5 mg  5 mg Oral DAILY  budesonide (PULMICORT) 500 mcg/2 ml nebulizer suspension  500 mcg Nebulization BID RT  
 gabapentin (NEURONTIN) capsule 300 mg  300 mg Oral DAILY WITH LUNCH  
 gabapentin (NEURONTIN) capsule 600 mg  600 mg Oral QHS  pirfenidone tab 801 mg   (Patient Supplied)  801 mg Oral TID  tamsulosin (FLOMAX) capsule 0.4 mg  0.4 mg Oral DAILY  enoxaparin (LOVENOX) injection 40 mg  40 mg SubCUTAneous Q24H  
 aspirin chewable tablet 81 mg  81 mg Oral DAILY  metoprolol tartrate (LOPRESSOR) tablet 12.5 mg  12.5 mg Oral BID  pantoprazole (PROTONIX) tablet 40 mg  40 mg Oral ACB REVIEW OF SYSTEMS  
12 point ROS negative except as stated in the HPI. Physical Exam:  
Visit Vitals  /50 (BP 1 Location: Left arm)  Pulse 91  Temp 97.8 °F (36.6 °C)  Resp 23  
 Ht 5' 10\" (1.778 m)  Wt 79.6 kg (175 lb 6.4 oz)  SpO2 92%  BMI 25.17 kg/m2 General:  Alert, appears stated age. Head:  Normocephalic, without obvious abnormality, atraumatic. Eyes:  Conjunctivae/corneas clear. Nose: Nares normal. Septum midline. Mucosa normal.   
Throat: Lips, mucosa, and tongue normal.   
Neck: Supple, symmetrical, trachea midline, no adenopathy. Lungs:   Diminished air movement with bibasilar crackles Chest wall:  No tenderness or deformity. Heart:  Regular rate and rhythm, S1, S2 normal, no murmur, click, rub or gallop. Abdomen:   Soft, non-tender. Bowel sounds normal. No masses,  No organomegaly. Extremities: Extremities normal, atraumatic, no cyanosis or edema. Pulses: 2+ and symmetric all extremities. Skin: Skin color, texture, turgor normal. No rashes or lesions Lymph nodes: Cervical, supraclavicular nodes normal.  
Neurologic: Moves all extremities, no gross focal deficits Lab Results Component Value Date/Time Sodium 132 (L) 08/27/2018 12:30 AM  
 Potassium 4.9 08/27/2018 12:30 AM  
 Chloride 98 08/27/2018 12:30 AM  
 CO2 26 08/27/2018 12:30 AM  
 BUN 24 (H) 08/27/2018 12:30 AM  
 Creatinine 1.25 08/27/2018 12:30 AM  
 Glucose 251 (H) 08/27/2018 12:30 AM  
 Calcium 9.0 08/27/2018 12:30 AM  
 Magnesium 2.0 08/27/2018 12:30 AM  
 Phosphorus 2.7 02/05/2014 04:36 AM  
 
 
Lab Results Component Value Date/Time WBC 7.6 08/26/2018 01:06 PM  
 HGB 13.2 08/26/2018 01:06 PM  
 PLATELET 649 98/85/1452 01:06 PM  
 MCV 96.2 08/26/2018 01:06 PM  
 
 
Lab Results Component Value Date/Time  INR 1.0 11/01/2013 04:13 PM  
 AST (SGOT) 18 01/26/2018 02:19 PM  
 Alk. phosphatase 27 (L) 01/26/2018 02:19 PM  
 Protein, total 7.6 01/26/2018 02:19 PM  
 Albumin 4.3 01/26/2018 02:19 PM  
 Globulin 3.6 02/05/2014 04:36 AM  
 
 
No results found for: IRON, FE, TIBC, IBCT, PSAT, FERR Lab Results Component Value Date/Time Sed rate (ESR) 9 07/22/2015 12:34 PM  
 TSH 3.230 03/10/2015 02:51 PM  
  
 
No results found for: PH, PHI, PCO2, PCO2I, PO2, PO2I, HCO3, HCO3I, FIO2, FIO2I Lab Results Component Value Date/Time Troponin-I, Qt. 4.46 (H) 08/27/2018 12:30 AM  
  
 
Lab Results Component Value Date/Time Culture result:  02/04/2014 05:20 AM  
  MRSA NOT PRESENT Screening of patient nares for MRSA is for surveillance purposes and, if positive, to facilitate isolation considerations in high risk settings. It is not intended for automatic decolonization interventions per se as regimens are not sufficiently effective to warrant routine use. Culture result:  11/01/2013 03:55 PM  
  MRSA NOT PRESENT Apparent Staphylococcus aureus (not MRSA) noted. Screening of patient nares for MRSA is for surveillance purposes and, if positive, to facilitate isolation considerations in high risk settings. It is not intended for automatic decolonization interventions per se as regimens are not sufficiently effective to warrant routine use. No results found for: TOXA1, RPR, HBCM, HBSAG, HAAB, HCAB1, HAAT, G6PD, CRYAC, HIVGT, HIVR, HIV1, HIV12, HIVPC, HIVRPI No results found for: VANCT, CPK Lab Results Component Value Date/Time  Color YELLOW/STRAW 02/04/2014 08:30 AM  
 Appearance CLEAR 02/04/2014 08:30 AM  
 pH (UA) 5.0 02/04/2014 08:30 AM  
 Protein TRACE (A) 02/04/2014 08:30 AM  
 Glucose 250 (A) 02/04/2014 08:30 AM  
 Ketone NEGATIVE  02/04/2014 08:30 AM  
 Bilirubin NEGATIVE  02/04/2014 08:30 AM  
 Blood NEGATIVE  02/04/2014 08:30 AM  
 Urobilinogen 0.2 02/04/2014 08:30 AM  
 Nitrites NEGATIVE  02/04/2014 08:30 AM  
 Leukocyte Esterase NEGATIVE  02/04/2014 08:30 AM  
 WBC 0-4 02/04/2014 08:30 AM  
 RBC 0-5 02/04/2014 08:30 AM  
 Bacteria NEGATIVE  02/04/2014 08:30 AM  
 
 
Images: personally visualized and reviewed report CXR (8/30/18): Slight increase in bibasilar interstitial/airspace opacities and probable small effusions. IMPRESSION 
· Acute on chronic hypoxic respiratory failure · Advanced end stage pulmonary fibrosis · COPD 
· NSTEMI/CAD · Severe pulmonary HTN on ECHO · DM 
 
PLAN 
· Continue BiPAP vs HF O2 and wean flow and FiO2 as able to keep sats > 85%. Have not been able to wean flow or FiO2 significantly, only as low as 50L and 100%. · IV steroids have been weaned to 40 mg daily. Decrease in dose seems to have helped his mental status and confusion. · Overall appears dry and BP is borderline, so would be cautious with diuresis. Hold off for now. · Continue Duonebs scheduled and Brovana/Pulmicort nebs. · Continue home Esbriet · Metoprolol per Cardiology. No additional recs at this time · Appreciate eval by hospice and Palliative Care team.  Patient with significant increase in baseline O2 requirements since last year and now, unable to even keep outpatient appointments due to severe hypoxia. Continue Ativan and morphine prn. · Daily discussions with patient and wife, who is aware of the severity of his decline, but also wants to honor his wishes. Patient not receptive at this time to hospice. We also discuss that should his mental and cognitive status continue to decline, she would need to make some of these decisions of his behalf. Again discussed that it is unlikely that we will be able decrease his O2 requirement to a level acceptable for discharge to home. Daughter and adult grandchildren came in from [place] on Friday. Son has also been by. · Patient's wife asked about treating his pulmonary HTN.   Discussed that sildenafil is indicated for primary pulmonary HTN and there isn't evidence that it provides significant benefit in secondary pulmonary HTN. She also asked about adding Ofev to current Esbriet therapy. Discussed that neither of these medications will reverse his pulmonary fibrosis or improve his current lung function, but aim to slow the progression. · GI prophylaxis: Protonix · DVT prophylaxis: Lovenox Patient critically ill requiring continuous HF O2 due to severe hypoxia and respiratory distress and is high risk for continued decompensation. Discussed with nursing and Primary team.  CC time EOP 30 min.  
 
Angel Wilson MD

## 2018-09-03 NOTE — PROGRESS NOTES
Occupational Therapy Note:  Chart reviewed and pt cleared for therapy by RN. PT on HF O2 at 60L, 90% with sats 97% at rest.  Pt drowsy as he received Ativan earlier this am, but opens eyes to voice. Educated pt and his wife on role of OT. Pt's wife stated the pt's and family's goals are for the pt to be comfortable. She stated she assists him with ADLs and will continue at home. She stated they are not interested in OT services, only PT services to allow pt to maintain his strength and endurance to be abl to transfer OOB to [facility] and transport w/c. Respect their goals and communicated this to RN and PT. Will complete OT order at this time. Thank you for the consult.  
Papo Singletary, OTR/L, CBIS

## 2018-09-03 NOTE — PROGRESS NOTES
Shift Summary 9/3/2018 
[id number] 
 
0700 Pt asleep at time of bedside shift report received from [address].  
 
4537 AM vitals, assessment, and meds complete without difficulty, AM rhythm strip shows NSR. Reviewed today's plan of care and goals with patient/family; plan of care today includes monitor oxygen/respiratory status, activity as tolerated, safety. [phone] Contacted Dr. Brianne Penaloza via [facility] Text Message regarding weaning of hi-flow O2, she would prefer to wean FIO2 to 80%.

## 2018-09-03 NOTE — PROGRESS NOTES
0000- patient sleeping in bed comfortably with BIPAP on and wife sleeping on cot at bedside 0130- pt requests to have HF oxygen placed 0300- patient sleeping in bed comfortably 0400- tolerating HF well- resting comfortably in bed with no complaints 18- pt awake in bed in no distress with HF on, wife states that she feels he seems anxious and restless in bed. Administered PRN ativan to help pt sleep. Boosted up in bed and turned on his right side.

## 2018-09-03 NOTE — PROGRESS NOTES
Bedside and Verbal shift change report given to Goldy Bach (oncoming nurse) by Abraham Vilchis (offgoing nurse). Report included the following information SBAR, Kardex, ED Summary, Procedure Summary, Intake/Output, MAR, Accordion, Recent Results, Med Rec Status and Cardiac Rhythm NSR. 
 
 
2315: Bedside and Verbal shift change report given to Karlee Walker (oncoming nurse) by Goldy Bach (offgoing nurse). Report included the following information SBAR, Kardex, ED Summary, Procedure Summary, Intake/Output, MAR, Accordion, Recent Results, Med Rec Status and Cardiac Rhythm NSR.

## 2018-09-03 NOTE — PROGRESS NOTES
Physical Therapy orders acknowledged, chart reviewed and discussed with nurse patient still too sleepy to participate with therapy evaluation. Spouse in the room and agreed to just come back when patient more awake and can participate with evaluation. We will continue to follow up with the patient for therapy. Thank you.

## 2018-09-03 NOTE — PROGRESS NOTES
Shift Summary 9/3/2018 
[id number] 
 
0700 Pt asleep at time of bedside shift report received from [address].  
 
1588 AM vitals, assessment, and meds complete without difficulty, AM rhythm strip shows NSR. Reviewed today's plan of care and goals with patient/family; plan of care today includes monitor oxygen/respiratory status, activity as tolerated, safety. [address] [facility] [address] Carrillo via [facility] Text Message regarding weaning of hi-flow O2, she would prefer to wean FIO2 to 80% before attempting to decrease liter flow. FIO2 decreased to 80% at this time. 1615 Pt has been asleep, SpO2 90-94% on 60L, 80% FIO2. Pt rang call bell, wife informs me he is feeling \"like he's not getting enough oxygen. \" SpO2 86% at this time. Wife states he woke up, felt like he wasn't getting enough O2, became anxious, and then SpO2 started to decreased. Increased FIO2 back to 90%. 1645 Pt tolerating O2 at 60L, 90% FIO2. States his breathing feels \"a little better. \" No other significant changes on my shift. 1900 Bedside and Verbal shift change report given to Jada Renee (oncoming nurse) by [address] (offgoing nurse). Report included the following information SBAR, Intake/Output, MAR, Recent Results and Cardiac Rhythm NSR.

## 2018-09-04 NOTE — PROGRESS NOTES
Name: [address] [place] [address] East: [address]  
: 1935 Admit Date: 2018 Phone: [phone]  Room: [facility]/01 PCP: Keith Lopez MD  MRN: [id number] Date: 2018  Code: DNR Chart and notes reviewed. Data reviewed. I review the patient's current medications in the medical record at each encounter. I have evaluated and examined the patient. Overnight events Afebrile Sats 92% on HF 60L 90%: attempted 80% yesterday but he couldn't tolerate it Labs reviewed ROS: Feeling about the same. Slept okay, but still not as well as he would like I spoke at length with him and his wife today concerning his status. They are aware that he is not improving. She and him are not quite ready to talk about hospice but are considering it in the coming days if he does not improve. She would like to talk about logistics with the palliative care team, but not today. Past Medical History:  
Diagnosis Date  CAD (coronary artery disease)   
 4 stents placed in heart  Chronic obstructive pulmonary disease ([address] EMPHYSEMA SEEN ON CXR  
 Colon polyp Three tubular adenomas excised colonoscopically on 3/17/2011.  COPD (chronic obstructive pulmonary disease) ([address] 2014  Diverticulosis of colon (without mention of hemorrhage)  DM (diabetes mellitus) ([address] 4/3/2012  GERD (gastroesophageal reflux disease) [address] [facility] [address]  History of pneumothorax 2014  Hypercholesteremia  Pneumonia   PUD (peptic ulcer disease) 195  Pulmonary fibrosis ([facility] Past Surgical History:  
Procedure Laterality Date  ENDOSCOPY, COLON, DIAGNOSTIC  3/2011 Polyps. Tics. Rep 3 yrs.  HX CORONARY STENT PLACEMENT  2004  
 4 cardiac stents  HX GI  circa  Laparoscopic cholecystectomy. [address] [place] left inguinal hernia repair  HX LUMBAR LAMINECTOMY  2013 L4 L5 Laminectomy  HX OTHER SURGICAL  2014 LVATS, bronch, talc pleurodesis, Resection of ruptured bulla  HX TONSILLECTOMY  age 29 Family History Problem Relation Age of Onset  Adopted: Yes  Other Other   
  patient was adopted Social History Substance Use Topics  Smoking status: Former Smoker Packs/day: 1.50 Years: 36.00 Types: Cigarettes Quit date: 1/1/1987  Smokeless tobacco: Never Used  Alcohol use No  
 
 
Allergies Allergen Reactions  Bactrim [Sulfamethoprim] Unknown (comments) FLU LIKE SYMPTOMS  
 Statins-Hmg-Coa Reductase Inhibitors Myalgia  Sulfa (Sulfonamide Antibiotics) Other (comments) Bactrim causes flu like symptoms Current Facility-Administered Medications Medication Dose Route Frequency  QUEtiapine (SEROquel) tablet 25 mg  25 mg Oral QHS  morphine injection 2 mg  2 mg IntraVENous Q4H PRN  
 LORazepam (ATIVAN) tablet 0.5 mg  0.5 mg Oral QHS  LORazepam (ATIVAN) tablet 0.5 mg  0.5 mg Oral Q6H PRN  
 guaiFENesin ER (MUCINEX) tablet 600 mg  600 mg Oral Q12H  
 arformoterol (BROVANA) neb solution 15 mcg  15 mcg Nebulization BID RT  
 sodium chloride (NS) flush 5-10 mL  5-10 mL IntraVENous Q8H  
 sodium chloride (NS) flush 5-10 mL  5-10 mL IntraVENous PRN  
 albuterol-ipratropium (DUO-NEB) 2.5 MG-0.5 MG/3 ML  3 mL Nebulization Q4H RT  
 cetirizine (ZYRTEC) tablet 10 mg  10 mg Oral DAILY  finasteride (PROSCAR) tablet 5 mg  5 mg Oral DAILY  budesonide (PULMICORT) 500 mcg/2 ml nebulizer suspension  500 mcg Nebulization BID RT  
 gabapentin (NEURONTIN) capsule 300 mg  300 mg Oral DAILY WITH LUNCH  
 gabapentin (NEURONTIN) capsule 600 mg  600 mg Oral QHS  pirfenidone tab 801 mg   (Patient Supplied)  801 mg Oral TID  tamsulosin (FLOMAX) capsule 0.4 mg  0.4 mg Oral DAILY  enoxaparin (LOVENOX) injection 40 mg  40 mg SubCUTAneous Q24H  
 aspirin chewable tablet 81 mg  81 mg Oral DAILY  metoprolol tartrate (LOPRESSOR) tablet 12.5 mg  12.5 mg Oral BID  pantoprazole (PROTONIX) tablet 40 mg  40 mg Oral ACB REVIEW OF SYSTEMS  
12 point ROS negative except as stated in the HPI. Physical Exam:  
Visit Vitals  /61  Pulse 97  Temp 97.9 °F (36.6 °C)  Resp (!) 33  
 Ht 5' 10\" (1.778 m)  Wt 79.6 kg (175 lb 6.4 oz)  SpO2 (!) 86%  BMI 25.17 kg/m2 General:  Alert, appears stated age, on hi flow, dyspneic with speaking Head:  Normocephalic, without obvious abnormality, atraumatic. Eyes:  Conjunctivae/corneas clear. Nose: Nares normal. Septum midline. Mucosa normal.   
Throat: Lips, mucosa, and tongue normal.   
Neck: Supple, symmetrical, trachea midline, no adenopathy. Lungs:   Diminished air movement with bibasilar crackles Chest wall:  No tenderness or deformity. Heart:  Regular rate and rhythm, S1, S2 normal, no murmur, click, rub or gallop. Abdomen:   Soft, non-tender. Bowel sounds normal.   
Extremities: Extremities normal, atraumatic, no cyanosis or edema. Pulses: 2+ and symmetric all extremities. Skin: Skin color, texture, turgor normal. No rashes or lesions Lymph nodes: Cervical, supraclavicular nodes normal.  
Neurologic: Moves all extremities, no gross focal deficits Lab Results Component Value Date/Time Sodium 134 (L) 09/04/2018 04:04 AM  
 Potassium 4.0 09/04/2018 04:04 AM  
 Chloride 99 09/04/2018 04:04 AM  
 CO2 26 09/04/2018 04:04 AM  
 BUN 16 09/04/2018 04:04 AM  
 Creatinine 0.89 09/04/2018 04:04 AM  
 Glucose 160 (H) 09/04/2018 04:04 AM  
 Calcium 8.5 09/04/2018 04:04 AM  
 Magnesium 1.7 09/04/2018 04:04 AM  
 Phosphorus 3.9 09/04/2018 04:04 AM  
 
 
Lab Results Component Value Date/Time WBC 7.6 08/26/2018 01:06 PM  
 HGB 13.2 08/26/2018 01:06 PM  
 PLATELET 104 81/61/5062 01:06 PM  
 MCV 96.2 08/26/2018 01:06 PM  
 
 
Lab Results Component Value Date/Time  INR 1.0 11/01/2013 04:13 PM  
 AST (SGOT) 18 01/26/2018 02:19 PM  
 Alk. phosphatase 27 (L) 01/26/2018 02:19 PM  
 Protein, total 7.6 01/26/2018 02:19 PM  
 Albumin 4.3 01/26/2018 02:19 PM  
 Globulin 3.6 02/05/2014 04:36 AM  
 
 
No results found for: IRON, FE, TIBC, IBCT, PSAT, FERR Lab Results Component Value Date/Time Sed rate (ESR) 9 07/22/2015 12:34 PM  
 TSH 3.230 03/10/2015 02:51 PM  
  
 
No results found for: PH, PHI, PCO2, PCO2I, PO2, PO2I, HCO3, HCO3I, FIO2, FIO2I Lab Results Component Value Date/Time Troponin-I, Qt. 4.46 (H) 08/27/2018 12:30 AM  
  
 
Lab Results Component Value Date/Time Culture result:  02/04/2014 05:20 AM  
  MRSA NOT PRESENT Screening of patient nares for MRSA is for surveillance purposes and, if positive, to facilitate isolation considerations in high risk settings. It is not intended for automatic decolonization interventions per se as regimens are not sufficiently effective to warrant routine use. Culture result:  11/01/2013 03:55 PM  
  MRSA NOT PRESENT Apparent Staphylococcus aureus (not MRSA) noted. Screening of patient nares for MRSA is for surveillance purposes and, if positive, to facilitate isolation considerations in high risk settings. It is not intended for automatic decolonization interventions per se as regimens are not sufficiently effective to warrant routine use. No results found for: TOXA1, RPR, HBCM, HBSAG, HAAB, HCAB1, HAAT, G6PD, CRYAC, HIVGT, HIVR, HIV1, HIV12, HIVPC, HIVRPI No results found for: VANCT, CPK Lab Results Component Value Date/Time  Color YELLOW/STRAW 02/04/2014 08:30 AM  
 Appearance CLEAR 02/04/2014 08:30 AM  
 pH (UA) 5.0 02/04/2014 08:30 AM  
 Protein TRACE (A) 02/04/2014 08:30 AM  
 Glucose 250 (A) 02/04/2014 08:30 AM  
 Ketone NEGATIVE  02/04/2014 08:30 AM  
 Bilirubin NEGATIVE  02/04/2014 08:30 AM  
 Blood NEGATIVE  02/04/2014 08:30 AM  
 Urobilinogen 0.2 02/04/2014 08:30 AM  
 Nitrites NEGATIVE  02/04/2014 08:30 AM  
 Leukocyte Esterase NEGATIVE  02/04/2014 08:30 AM  
 WBC 0-4 02/04/2014 08:30 AM  
 RBC 0-5 02/04/2014 08:30 AM  
 Bacteria NEGATIVE  02/04/2014 08:30 AM  
 
 
Images: personally visualized and reviewed report CXR (8/30/18): Slight increase in bibasilar interstitial/airspace opacities and probable small effusions. IMPRESSION 
· Acute on chronic hypoxic respiratory failure · Advanced end stage pulmonary fibrosis · COPD 
· NSTEMI/CAD · Severe pulmonary HTN on ECHO · DM 
 
PLAN 
· Continue BiPAP vs HF O2 and wean flow and FiO2 as able to keep sats > 85%. Have not been able to wean flow or FiO2 significantly, only as low as 50L and 100%. · IV steroids discontinued · Overall appears dry and BP is borderline, so would be cautious with diuresis. Hold off for now. · Continue Duonebs scheduled and Brovana/Pulmicort nebs. · Continue home Esbriet · Metoprolol per Cardiology. No additional recs at this time · Appreciate eval by hospice and Palliative Care team.  Patient with significant increase in baseline O2 requirements since last year and now, unable to even keep outpatient appointments due to severe hypoxia. Continue Ativan and morphine prn. · Daily discussions with patient and wife, who is aware of the severity of his decline, but also wants to honor his wishes. Patient not receptive to hospice yet, but is considering it more openly. We also discuss that should his mental and cognitive status continue to decline, she would need to make some of these decisions of his behalf. Again discussed that it is unlikely that we will be able decrease his O2 requirement to a level acceptable for discharge to home. Daughter and adult grandchildren came in from [address] [place] [address] on Friday. Son has also been by. · Patient's wife asked about treating his pulmonary HTN.   Discussed that sildenafil is indicated for primary pulmonary HTN and there isn't evidence that it provides significant benefit in secondary pulmonary HTN. She also asked about adding Ofev to current Esbriet therapy. Discussed that neither of these medications will reverse his pulmonary fibrosis or improve his current lung function, but aim to slow the progression. · GI prophylaxis: Protonix · DVT prophylaxis: Lovenox Patient critically ill requiring continuous HF O2 due to severe hypoxia and respiratory distress and is high risk for continued decompensation. Discussed with nursing. CC time EOP 39 min.  
 
Yesenia Malloy, KAROLINA

## 2018-09-04 NOTE — PROGRESS NOTES
[facility] [place] [facility] 
[address] [facility], [address] [place] [address] 
[phone] Medical Progress Note NAME: Domenica Helm :  1935 MRM:  [id number] Date/Time: 2018 Subjective: Chief Complaint:  Patient was seen and examined by me. Chart reviewed. Still very dyspneic, tired. Discussed case with wife at bedside Objective:  
 
 
Vitals:  
 
 
Last 24hrs VS reviewed since prior progress note. Most recent are: 
 
Visit Vitals  /61 (BP 1 Location: Right arm, BP Patient Position: At rest)  Pulse 89  Temp 97.9 °F (36.6 °C)  Resp 28  Ht 5' 10\" (1.778 m)  Wt 79.6 kg (175 lb 6.4 oz)  SpO2 92%  BMI 25.17 kg/m2 SpO2 Readings from Last 6 Encounters:  
18 92% 17 96% 10/13/14 94% 14 95% 14 93% 14 96% O2 Flow Rate (L/min): 60 l/min Intake/Output Summary (Last 24 hours) at 18 1021 Last data filed at 18 5063 Gross per 24 hour Intake              300 ml Output             1850 ml Net            -1550 ml Exam:  
 
Physical Exam: 
 
Gen:  Disheveled, frail, mod distress HEENT:  Pink conjunctivae, PERRL, hearing intact to voice, moist mucous membranes Neck:  Supple, without masses, thyroid non-tender Resp:  ++ accessory muscle use, bilateral fine rales Card:  No murmurs, normal S1, S2 without thrills, bruits or peripheral edema Abd:  Soft, non-tender, non-distended, normoactive bowel sounds are present Musc:  No cyanosis or clubbing Skin:  No rashes Neuro:  Cranial nerves 3-12 are grossly intact, follows commands appropriately Psych:  poor insight, oriented to person, place and time, alert Medications Reviewed: (see below) Lab Data Reviewed: (see below) 
 
______________________________________________________________________ Medications:  
 
Current Facility-Administered Medications Medication Dose Route Frequency  QUEtiapine (SEROquel) tablet 25 mg  25 mg Oral QHS  morphine injection 2 mg  2 mg IntraVENous Q4H PRN  
 LORazepam (ATIVAN) tablet 0.5 mg  0.5 mg Oral QHS  LORazepam (ATIVAN) tablet 0.5 mg  0.5 mg Oral Q6H PRN  
 guaiFENesin ER (MUCINEX) tablet 600 mg  600 mg Oral Q12H  
 arformoterol (BROVANA) neb solution 15 mcg  15 mcg Nebulization BID RT  
 sodium chloride (NS) flush 5-10 mL  5-10 mL IntraVENous Q8H  
 sodium chloride (NS) flush 5-10 mL  5-10 mL IntraVENous PRN  
 albuterol-ipratropium (DUO-NEB) 2.5 MG-0.5 MG/3 ML  3 mL Nebulization Q4H RT  
 cetirizine (ZYRTEC) tablet 10 mg  10 mg Oral DAILY  finasteride (PROSCAR) tablet 5 mg  5 mg Oral DAILY  budesonide (PULMICORT) 500 mcg/2 ml nebulizer suspension  500 mcg Nebulization BID RT  
 gabapentin (NEURONTIN) capsule 300 mg  300 mg Oral DAILY WITH LUNCH  
 gabapentin (NEURONTIN) capsule 600 mg  600 mg Oral QHS  pirfenidone tab 801 mg   (Patient Supplied)  801 mg Oral TID  tamsulosin (FLOMAX) capsule 0.4 mg  0.4 mg Oral DAILY  enoxaparin (LOVENOX) injection 40 mg  40 mg SubCUTAneous Q24H  
 aspirin chewable tablet 81 mg  81 mg Oral DAILY  metoprolol tartrate (LOPRESSOR) tablet 12.5 mg  12.5 mg Oral BID  pantoprazole (PROTONIX) tablet 40 mg  40 mg Oral ACB Lab Review: No results for input(s): WBC, HGB, HCT, PLT, HGBEXT, HCTEXT, PLTEXT in the last 72 hours. Recent Labs  
   09/04/18 
 0404 NA  134* K  4.0  
CL  99  
CO2  26 GLU  160* BUN  16  
CREA  0.89 CA  8.5 MG  1.7 PHOS  3.9 Lab Results Component Value Date/Time Glucose (POC) 114 (H) 02/11/2014 11:49 AM  
 Glucose (POC) 173 (H) 02/11/2014 08:26 AM  
 Glucose (POC) 158 (H) 02/10/2014 08:38 PM  
 Glucose (POC) 144 (H) 02/10/2014 04:15 PM  
 Glucose (POC) 156 (H) 02/10/2014 11:08 AM  
 
 
  
Assessment / Plan: Active Problems: 
 
79 yo hx of COPD, CAD, end stage pulm fibrosis on 6L O2, presented w/ resp failure, NSTEMI 
 
 1) Acute on chronic resp failure/hypoxia/end stage pulm fibrosis: poor prognosis. Still on high flow O2. Patient is DNR, but does not want hospice yet. I discussed prognosis at length with patient's wife. Also discussed comfort care. Patient is waiting for several family members to arrive before making any decision. Pulm and Palliative care following. Cont LABA/ICS, duo nebs 2) NSTEMI/CAD: likely type 2 MI from supply demand. Cont ASA, metoprolol. 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 No further management due to potential comfort care 4) Anxiety: due to hypoxia, pulm fibrosis. Use IV ativan, IV morphine prn Code: DNR Total time spent with patient: 35 min Care Plan discussed with: Patient, wife, nursing Discussed:  Care Plan Prophylaxis:  Lovenox Disposition:  hospice 
        
___________________________________________________ Attending Physician: Mary Anne Wright MD

## 2018-09-04 NOTE — ROUTINE PROCESS
Bedside and Verbal shift change report given to Arina Davidson RN (oncoming nurse) by Rosi Salamanca RN (offgoing nurse). Report included the following information SBAR, Kardex, Intake/Output, MAR, Accordion, Recent Results and Cardiac Rhythm SR, ST.

## 2018-09-04 NOTE — PROGRESS NOTES
I spoke to the pt's wife outside of the pt's room. She approached me and stated that her  is \"inquiring about his insurance coverage while he is in the hospital\". She asked if I could stop by later and speak with him, I told her that I would. She also was asking if palliative could meet with them tomorrow. I relayed that information to Claudy Moeller with palliative care. \"We can't make anymore decisions today or talk to anyone else\". I did broach the subject of hospice again and she said, \"we will talk to palliative again first and then decide\". She asked me if there were any oxygen companies that could accommodate the need for 60 liters of 02 at home. She stated that what she had in place at home could not meet his need of 60 liters. I called [address] and spoke to RT Veras. He stated that there are two concentrators in the home that go up to 10 liters. He said that there is a third one in the home that goes up to 10 liters for back up. There are also a couple of 6 foot tanks. Eda stated that he was out a couple of weeks ago to do a service call and he noticed that both concentrators were delivering 02 to the patient with a Y connector. Pt's wife had reported to Eda that the patient was receiving 23 liters of 02. Eda stated that they would have to be using one of the 6 foot tanks if that were the case. Eda explained to the wife and the patient that was not recommended. He stated that he is unaware of any company that can provide 60 liters of 02. I will follow up with the patient and his wife tomorrow after they meet with palliative.  CHUCK Sutherland

## 2018-09-04 NOTE — PROGRESS NOTES
0740 - Bedside and Verbal shift change report given to Cedric Reilly (oncoming nurse) by Rosi Salamanca RN (offgoing nurse). Report included the following information SBAR, Kardex, MAR, Accordion, Recent Results and Cardiac Rhythm NSR-ST. 4164 - Pt refusing to take any morning medications. Dr. Jenifer Mcqueen notified and acknowledged. 1055 - Pt now requesting to take morning medications. 46 - Wife asking about possibility of increasing night time Seroquel dose to help with sleep. Per Dr. Jenifer Mcqueen, ok to increase to 50 mg nightly.  Order RB&V.

## 2018-09-04 NOTE — PROGRESS NOTES
Problem: Pressure Injury - Risk of 
Goal: *Prevention of pressure injury Document Vasile Scale and appropriate interventions in the flowsheet. Outcome: Progressing Towards Goal 
Pressure Injury Interventions: 
Sensory Interventions: Assess changes in LOC, Assess need for specialty bed, Avoid rigorous massage over bony prominences, Chair cushion, Check visual cues for pain, Discuss PT/OT consult with provider, Keep linens dry and wrinkle-free, Minimize linen layers, Monitor skin under medical devices, Pad between skin to skin, Turn and reposition approx. every two hours (pillows and wedges if needed) Activity Interventions: Assess need for specialty bed, Chair cushion, Increase time out of bed, Pressure redistribution bed/mattress(bed type), PT/OT evaluation Mobility Interventions: Assess need for specialty bed, Chair cushion, Float heels, HOB 30 degrees or less, Pressure redistribution bed/mattress (bed type), PT/OT evaluation, Turn and reposition approx. every two hours(pillow and wedges) Nutrition Interventions: Document food/fluid/supplement intake, Discuss nutritional consult with provider, Offer support with meals,snacks and hydration Friction and Shear Interventions: Apply protective barrier, creams and emollients, Feet elevated on foot rest, Foam dressings/transparent film/skin sealants, HOB 30 degrees or less, Lift team/patient mobility team, Minimize layers, Transferring/repositioning devices Problem: Falls - Risk of 
Goal: *Absence of Falls Document Jose Peters Fall Risk and appropriate interventions in the flowsheet.   
Outcome: Progressing Towards Goal 
Fall Risk Interventions: 
Mobility Interventions: Assess mobility with egress test, Bed/chair exit alarm, Communicate number of staff needed for ambulation/transfer, OT consult for ADLs, Patient to call before getting OOB, PT Consult for mobility concerns, PT Consult for assist device competence, Strengthening exercises (ROM-active/passive) Mentation Interventions: Adequate sleep, hydration, pain control, Bed/chair exit alarm, Door open when patient unattended, Evaluate medications/consider consulting pharmacy, Eyeglasses and hearing aids, Family/sitter at bedside, HELP ([address]) if available, Increase mobility, More frequent rounding, Reorient patient, Room close to nurse's station, Toileting rounds, Update white board Medication Interventions: Assess postural VS orthostatic hypotension, Bed/chair exit alarm, Evaluate medications/consider consulting pharmacy, Patient to call before getting OOB, Teach patient to arise slowly Elimination Interventions: Bed/chair exit alarm, Call light in reach, Elevated toilet seat, Patient to call for help with toileting needs, Toilet paper/wipes in reach, Toileting schedule/hourly rounds, Urinal in reach History of Falls Interventions: Bed/chair exit alarm, Consult care management for discharge planning, Door open when patient unattended, Evaluate medications/consider consulting pharmacy, Investigate reason for fall, Room close to nurse's station

## 2018-09-04 NOTE — PROGRESS NOTES
Problem: Mobility Impaired (Adult and Pediatric) Goal: *Acute Goals and Plan of Care (Insert Text) Physical Therapy Goals Initiated 9/4/2018 1. Patient will move from supine to sit and sit to supine  in bed with supervision/set-up within 7 day(s). 2.  Patient will transfer from bed to chair and chair to bed with moderate assistance  using the least restrictive device within 7 day(s). 3.  Patient will perform sit to stand with moderate assistance  within 7 day(s). physical Therapy EVALUATION Patient: Jose Angel Perea (21 y.o. male) Date: 9/4/2018 Primary Diagnosis: Acute on chronic respiratory failure (HCC) Acute on chronic respiratory failure ([facility] [address] Precautions: Falls ASSESSMENT : 
Physical therapy evaluation completed on a 80year old male with end stage pulmonary fibrosis with acute on chronic respiratory failure. Patient lives with his wife in a single story home with 3 steps to enter. Patient was participating in pulmonary rehab up until beginning of April,  but since then has been non-ambulatory and was only performing transfers with stand by assist of wife. No history of falls. Equipment includes tub seat and home O2. Patient received in bed with high flow O2 in place, agreeable to participate but appears sleepy and tends to answer questions with single word. Wife is present and assists with providing history. Patient able to roll to right with min assist and supine to sit with min assist and verbal cuing for sequence. Patient sat of EOB x approx 2 minutes, sats dropped to 85% and was returned to supine. Instructed in nasal breathing and recovered to 93% within 5 minutes. Instructed in beginning bed exercises and patient able to demonstrate x 3 reps each. Based on the objective data described, the patient presents with poor tolerance of activity, impaired ability to  perform bed mobility and transfers, and weakness of LEs impacting level of  function.   Patient has been seen by hospice but is opting for palliative care at this time. Patient will benefit from skilled intervention to address the above impairments. Patients rehabilitation potential is considered to be Guarded Factors which may influence rehabilitation potential include:  
[]         None noted 
[]         Mental ability/status [x]         Medical condition 
[]         Home/family situation and support systems 
[]         Safety awareness 
[]         Pain tolerance/management 
[x]         Other: possible transition to hospice PLAN : 
Recommendations and Planned Interventions: 
[x]           Bed Mobility Training             []    Neuromuscular Re-Education 
[x]           Transfer Training                   []    Orthotic/Prosthetic Training 
[]           Gait Training                         []    Modalities [x]           Therapeutic Exercises           []    Edema Management/Control 
[x]           Therapeutic Activities            [x]    Patient and Family Training/Education 
[]           Other (comment): Frequency/Duration: Patient will be followed by physical therapy  5 times a week to address goals. Discharge Recommendations: To Be Determined Further Equipment Recommendations for Discharge: SUBJECTIVE:  
Patient stated I'll try.  OBJECTIVE DATA SUMMARY:  
HISTORY:   
Past Medical History:  
Diagnosis Date  CAD (coronary artery disease) 2004  
 4 stents placed in heart  Chronic obstructive pulmonary disease ([facility] [address] EMPHYSEMA SEEN ON CXR  
 Colon polyp Three tubular adenomas excised colonoscopically on 3/17/2011.  COPD (chronic obstructive pulmonary disease) ([address] 2/4/2014  Diverticulosis of colon (without mention of hemorrhage)  DM (diabetes mellitus) ([facility] [address] 4/3/2012  GERD (gastroesophageal reflux disease) [address]  History of pneumothorax 2/2014  Hypercholesteremia  Pneumonia 2004  PUD (peptic ulcer disease) 1955  Pulmonary fibrosis ([facility] [address] Past Surgical History:  
Procedure Laterality Date  ENDOSCOPY, COLON, DIAGNOSTIC  3/2011 Polyps. Tics. Rep 3 yrs.  HX CORONARY STENT PLACEMENT  2004  
 4 cardiac stents  HX GI  circa 2000 Laparoscopic cholecystectomy. [address] [place] [address] left inguinal hernia repair  HX LUMBAR LAMINECTOMY  11/20/2013 L4 L5 Laminectomy  HX OTHER SURGICAL  2/7/2014 LVATS, bronch, talc pleurodesis, Resection of ruptured bulla  HX TONSILLECTOMY  age 29 Prior Level of Function/Home Situation: Non ambulatory, transferred with wife's stand by assist, required assist for ADLs. Personal factors and/or comorbidities impacting plan of care: Accepting of need for palliative care, but refusing hospice. Home Situation Home Environment: Private residence One/Two Story Residence: One story Living Alone: No 
Support Systems: Child(deno), Family member(s), Spouse/Significant Other/Partner, Friends \ neighbors Patient Expects to be Discharged to[de-identified] Private residence Current DME Used/Available at Home: CPAP EXAMINATION/PRESENTATION/DECISION MAKING:  
Critical Behavior: 
Neurologic State: Alert Orientation Level: Oriented X4 Cognition: Follows commands, Decreased command following, Decreased attention/concentration Hearing: Auditory Auditory Impairment: Hard of hearing, bilateral 
Skin:   
Edema: none noted Range Of Motion: 
AROM: Within functional limits Strength:   
Strength: Grossly decreased, non-functional 
  
  
  
  
  
  
Tone & Sensation:  
  
  
  
  
  
  
  
  
  
   
Coordination: 
  
Vision:  
  
Functional Mobility: 
Bed Mobility: 
Rolling: Minimum assistance;Assist x1 Supine to Sit: Minimum assistance;Assist x1 Sit to Supine: Minimum assistance;Assist x1 Transfers: 
  
  
     
  
     
  
  
Balance:  
Sitting: Intact; With support Ambulation/Gait Training: 
  
  
  
  
  
  
  
  
  
  
  
  
  
  
  
  
  
  
 Stairs: Therapeutic Exercises: Ankle pumps, heel slides and hip internal/external rotation x 3 reps each with assist. 
 
Functional Measure: 
Barthel Index: 
 
Bathin Bladder: 5 Bowels: 5 Groomin Dressin Feedin Mobility: 0 Stairs: 0 Toilet Use: 0 Transfer (Bed to Chair and Back): 0 Total: 15 Barthel and G-code impairment scale: 
Percentage of impairment CH 
0% CI 
1-19% CJ 
20-39% CK 
40-59% CL 
60-79% CM 
80-99% CN 
100% Barthel Score 0-100 100 99-80 79-60 59-40 20-39 1-19 
 0 Barthel Score 0-20 20 17-19 13-16 9-12 5-8 1-4 0 The Barthel ADL Index: Guidelines 1. The index should be used as a record of what a patient does, not as a record of what a patient could do. 2. The main aim is to establish degree of independence from any help, physical or verbal, however minor and for whatever reason. 3. The need for supervision renders the patient not independent. 4. A patient's performance should be established using the best available evidence. Asking the patient, friends/relatives and nurses are the usual sources, but direct observation and common sense are also important. However direct testing is not needed. 5. Usually the patient's performance over the preceding 24-48 hours is important, but occasionally longer periods will be relevant. 6. Middle categories imply that the patient supplies over 50 per cent of the effort. 7. Use of aids to be independent is allowed. Navi Walden., Barthel, D.W. (5585). Functional evaluation: the Barthel Index. [address] [facility] (14)2. CARTER Barraza, Gena Stallworth., [facility], [address] [place] [address] (). Measuring the change indisability after inpatient rehabilitation; comparison of the responsiveness of the Barthel Index and Functional [place] Measure. Journal of Neurology, Neurosurgery, and Psychiatry, 66(4), 094-147.  
LEO Ba, BOBBY Cabral, Devin Hector, M.A. (2004.) Assessment of post-stroke quality of life in cost-effectiveness studies: The usefulness of the Barthel Index and the EuroQoL-5D. [facility], 13, 194-16 G codes: In compliance with CMSs Claims Based Outcome Reporting, the following G-code set was chosen for this patient based on their primary functional limitation being treated: The outcome measure chosen to determine the severity of the functional limitation was the Barthel with a score of 15/100 which was correlated with the impairment scale. ? Mobility - Walking and Moving Around:  
  - CURRENT STATUS: CM - 80%-99% impaired, limited or restricted  - GOAL STATUS: CM - 80%-99% impaired, limited or restricted  - D/C STATUS:  ---------------To be determined--------------- Physical Therapy Evaluation Charge Determination History Examination Presentation Decision-Making HIGH Complexity :3+ comorbidities / personal factors will impact the outcome/ POC  MEDIUM Complexity : 3 Standardized tests and measures addressing body structure, function, activity limitation and / or participation in recreation  MEDIUM Complexity : Evolving with changing characteristics  MEDIUM Complexity : FOTO score of 26-74 Based on the above components, the patient evaluation is determined to be of the following complexity level: MEDIUM Pain: 
Pain Scale 1: Numeric (0 - 10) Pain Intensity 1: 0 Activity Tolerance:  
Poor,  SOB at rest 
Please refer to the flowsheet for vital signs taken during this treatment. After treatment:  
[]         Patient left in no apparent distress sitting up in chair 
[x]         Patient left in no apparent distress in bed 
[x]         Call bell left within reach 
[]         Nursing notified 
[x]         Caregiver present 
[]         Bed alarm activated COMMUNICATION/EDUCATION:  
The patients plan of care was discussed with: Registered Nurse. [x]         Fall prevention education was provided and the patient/caregiver indicated understanding. [x]         Patient/family have participated as able in goal setting and plan of care. [x]         Patient/family agree to work toward stated goals and plan of care. []         Patient understands intent and goals of therapy, but is neutral about his/her participation. []         Patient is unable to participate in goal setting and plan of care. Thank you for this referral. 
Josselyn Bhakta Time Calculation: 23 mins

## 2018-09-05 NOTE — PROGRESS NOTES
Bakari Mejia [address] [place] [address] 
[address] [facility], [address] [place] [address] 
[phone] Medical Progress Note NAME: Nasra Lainez :  1935 MRM:  [id number] Date/Time: 2018 Subjective: Chief Complaint:  Patient was seen and examined by me. Chart reviewed. Breathing better this AM.  Wife to meet with palliative today Objective:  
 
 
Vitals:  
 
 
Last 24hrs VS reviewed since prior progress note. Most recent are: 
 
Visit Vitals  /60 (BP 1 Location: Right arm, BP Patient Position: At rest)  Pulse 82  Temp 97.3 °F (36.3 °C)  Resp 22  
 Ht 5' 10\" (1.778 m)  Wt 76.4 kg (168 lb 8 oz)  SpO2 96%  BMI 24.18 kg/m2 SpO2 Readings from Last 6 Encounters:  
18 96% 17 96% 10/13/14 94% 14 95% 14 93% 14 96% O2 Flow Rate (L/min): 60 l/min Intake/Output Summary (Last 24 hours) at 18 0935 Last data filed at 18 7716 Gross per 24 hour Intake              200 ml Output             1000 ml Net             -800 ml Exam:  
 
Physical Exam: 
 
Gen:  Disheveled, frail, mild distress, on HF oxygen HEENT:  Pink conjunctivae, PERRL, hearing intact to voice, moist mucous membranes Neck:  Supple, without masses, thyroid non-tender Resp:  + accessory muscle use, bilateral fine rales Card:  No murmurs, normal S1, S2 without thrills, bruits or peripheral edema Abd:  Soft, non-tender, non-distended, normoactive bowel sounds are present Musc:  No cyanosis or clubbing Skin:  No rashes Neuro:  Cranial nerves 3-12 are grossly intact, follows commands appropriately Psych:  poor insight, oriented to person, place and time, alert Medications Reviewed: (see below) Lab Data Reviewed: (see below) 
 
______________________________________________________________________ Medications:  
 
Current Facility-Administered Medications Medication Dose Route Frequency  QUEtiapine (SEROquel) tablet 50 mg  50 mg Oral QHS  morphine injection 2 mg  2 mg IntraVENous Q4H PRN  
 LORazepam (ATIVAN) tablet 0.5 mg  0.5 mg Oral QHS  LORazepam (ATIVAN) tablet 0.5 mg  0.5 mg Oral Q6H PRN  
 guaiFENesin ER (MUCINEX) tablet 600 mg  600 mg Oral Q12H  
 arformoterol (BROVANA) neb solution 15 mcg  15 mcg Nebulization BID RT  
 sodium chloride (NS) flush 5-10 mL  5-10 mL IntraVENous Q8H  
 sodium chloride (NS) flush 5-10 mL  5-10 mL IntraVENous PRN  
 albuterol-ipratropium (DUO-NEB) 2.5 MG-0.5 MG/3 ML  3 mL Nebulization Q4H RT  
 cetirizine (ZYRTEC) tablet 10 mg  10 mg Oral DAILY  finasteride (PROSCAR) tablet 5 mg  5 mg Oral DAILY  budesonide (PULMICORT) 500 mcg/2 ml nebulizer suspension  500 mcg Nebulization BID RT  
 gabapentin (NEURONTIN) capsule 300 mg  300 mg Oral DAILY WITH LUNCH  
 gabapentin (NEURONTIN) capsule 600 mg  600 mg Oral QHS  pirfenidone tab 801 mg   (Patient Supplied)  801 mg Oral TID  tamsulosin (FLOMAX) capsule 0.4 mg  0.4 mg Oral DAILY  enoxaparin (LOVENOX) injection 40 mg  40 mg SubCUTAneous Q24H  
 aspirin chewable tablet 81 mg  81 mg Oral DAILY  metoprolol tartrate (LOPRESSOR) tablet 12.5 mg  12.5 mg Oral BID  pantoprazole (PROTONIX) tablet 40 mg  40 mg Oral ACB Lab Review: No results for input(s): WBC, HGB, HCT, PLT, HGBEXT, HCTEXT, PLTEXT, HGBEXT, HCTEXT, PLTEXT in the last 72 hours. Recent Labs  
   09/04/18 
 0404 NA  134* K  4.0  
CL  99  
CO2  26 GLU  160* BUN  16  
CREA  0.89 CA  8.5 MG  1.7 PHOS  3.9 Lab Results Component Value Date/Time Glucose (POC) 114 (H) 02/11/2014 11:49 AM  
 Glucose (POC) 173 (H) 02/11/2014 08:26 AM  
 Glucose (POC) 158 (H) 02/10/2014 08:38 PM  
 Glucose (POC) 144 (H) 02/10/2014 04:15 PM  
 Glucose (POC) 156 (H) 02/10/2014 11:08 AM  
 
 
  
Assessment / Plan: Active Problems: 
 
79 yo hx of COPD, CAD, end stage pulm fibrosis on 6L O2, presented w/ resp failure, NSTEMI 1) Acute on chronic resp failure/hypoxia/end stage pulm fibrosis: poor prognosis, not much improvement. Still on high flow O2. Patient is DNR, but does not want hospice yet. Wife to meet with palliative today. Pulm also following. Cont LABA/ICS, duo nebs 2) NSTEMI/CAD: likely type 2 MI from supply demand. Cont ASA, metoprolol. 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 No further management due to potential comfort care 4) Anxiety: due to hypoxia, pulm fibrosis. Cont seroquel. Use IV ativan, IV morphine prn Code: DNR Total time spent with patient: 25 min Care Plan discussed with: Patient, wife, nursing Discussed:  Care Plan Prophylaxis:  Lovenox Disposition:  hospice 
        
___________________________________________________ Attending Physician: Cathy Pretty MD

## 2018-09-05 NOTE — ROUTINE PROCESS
Bedside and Verbal shift change report given to Joo Low RN (oncoming nurse) by Kale Núñez RN (offgoing nurse). Report included the following information SBAR, Kardex, Intake/Output, MAR, Accordion, Recent Results, Cardiac Rhythm SR/ST and Alarm Parameters .

## 2018-09-05 NOTE — PROGRESS NOTES
I had a lengthy conversation with the patient and his wife. Pt stated, \"I am leaning towards hospice\". I spend a lot of time at the pt's bedside explaining the difference between palliative care vs hospice care. Patient and wife concerned about how they can meet their ultimate goal of getting home, \"if that is what is best\". I explained to them that if home is an option with hospice, his 02 needs/requirements ( that may include  bipap) and a medication regimen would all be figured out before discharge to make sure that his needs could be met at home under hospice care. I will follow up with the pt and his wife after they meet with palliative care. I'm not sure if pt would be a candidate for [address] [place] [address], I did not discuss that with family.  Thanks CHUCK Duran

## 2018-09-05 NOTE — PROGRESS NOTES
Palliative Medicine Consult David: [phone]) Patient Name: Carolyn Cha YOB: 1935 Date of Initial Consult: 8/27/18 Reason for Consult: Goals-of-care Requesting Provider: Polly Robles MD 
Primary Care Physician: Sammie Alfredo MD 
 
 SUMMARY:  
Carolyn Cha is a 80 y.o. with a end-stage COPD and pulmonary fibrosis, O2-dependent at 20L/minute at baseline; CAD who was admitted on 8/25/2018 from home with a diagnosis of acute-on-chronic respiratory failure and NSTEMI due to tachycardia and hypoxia. Current medical issues leading to Palliative Medicine involvement include: shortness of breath, anxiety/agitation, confusion in setting of steroids and hypoxia, goals of care. PALLIATIVE DIAGNOSES:  
1. Shortness of breath 2. Anxiety 3. Confusion/altered mental status 4. Debility 5. Goals of care PLAN:  
1. Dennise([facility]) and I met with patient and his spouse. Patient continues to require 60 liters of oxygen with FIO2 of 80%. Really not been able to be weaned but remains mentally clear on the HFNC 2. GOC-spouse and patient ideally want to return home but seem to understand that at this time, he is on too much oxygen to be done in the home setting. We discussed potential continuous BIPAP at home but both agree that does not seem very appealing. In addition, they want to know if he can return home, could home Palliative be done. Explained that he would not be a good candidate for home palliative because of his tremendous needs. He would be better served with home hospice care given his needs and the staffing required. Finally, we discussed transition to inpatient hospice care and how that would look in the hospital. Discussed being \"discharged\" and then readmitted to hospice care. We discussed the weaning process and would work closely with him on when/how that is done.  Explained the focus would be sure he was comfortable and not allowed to suffer if decision made to wean the HFNC. They want to wait for daughter and family friends to return this weekend before making any decisions. 3. AMD-spouse remains MPOA and AMD in the chart 4. Code status-DNR per prior discussions. If he survives this stay, will need DDNR at discharge 5. Symptom management-he continues to have prn ativan and morphine for anxiety and SOB. 6. Psychosocial-patient states just not \"mentally ready\" to make transition to hospice care. Will need ongoing support for patient and family in this difficult decision/process 7. Discussed with Dr. Kathy Angelo, Dr. Briseida Schmid and bedside nurse(Jessica) 8. Communicated plan of care with: Palliative IDT 
 
 
 GOALS OF CARE / TREATMENT PREFERENCES:  
 
GOALS OF CARE: continue current therapies with goal of returning home TREATMENT PREFERENCES:  
Code Status: DNR Advance Care Planning: 
Advance Care Planning 2018 Patient's Healthcare Decision Maker is: Named in scanned ACP document Primary Decision Maker Name Janett Aguirre Primary Decision Maker Phone Number [phone] Primary Decision Maker Relationship to Patient Spouse Secondary Decision Maker Name Andre Velazquez and/or dtr Abram Denton Secondary Decision Maker Phone Number [phone], [phone] Confirm Advance Directive Yes, on file Other Instructions: Other: As far as possible, the palliative care team has discussed with patient / health care proxy about goals of care / treatment preferences for patient. HISTORY:  
 
History obtained from: patient, wife, chart CHIEF COMPLAINT: anxiety HPI/SUBJECTIVE: The patient is:  
[x] Verbal and participatory -  
[] Non-participatory due to:  
 
Patient remains fatigued but interactive. Smiling at times->grandchild and great-grandchild in the room when we entered. SOB remains about the same. Still very fatigued Clinical Pain Assessment (nonverbal scale for severity on nonverbal patients): Duration: for how long has pt been experiencing pain (e.g., 2 days, 1 month, years) Frequency: how often pain is an issue (e.g., several times per day, once every few days, constant) FUNCTIONAL ASSESSMENT:  
 
Palliative Performance Scale (PPS): PPS: 50 PSYCHOSOCIAL/SPIRITUAL SCREENING:  
 
Palliative IDT has assessed this patient for cultural preferences / practices and a referral made as appropriate to needs (Cultural Services, Patient Advocacy, Ethics, etc.) Advance Care Planning: 
Advance Care Planning 2018 Patient's Healthcare Decision Maker is: Named in scanned ACP document Primary Decision Maker Name Gracie Simmons Primary Decision Maker Phone Number [phone] Primary Decision Maker Relationship to Patient Spouse Secondary Decision Maker Name Andre Casillas and/or dtr Tomas Morfin Secondary Decision Maker Phone Number [phone], [phone] Confirm Advance Directive Yes, on file Any spiritual / Gnosticist concerns: 
[] Yes /  [x] No 
 
Caregiver Burnout: 
[] Yes /  [x] No /  [] No Caregiver Present Anticipatory grief assessment:  
[] Normal  / [x] Maladaptive : component of denial?    
 
ESAS Anxiety: Anxiety: 4 ESAS Depression:    
 
 
 REVIEW OF SYSTEMS:  
 
Positive and pertinent negative findings in ROS are noted above in HPI. The following systems were [] reviewed / [] unable to be reviewed as noted in HPI Other findings are noted below. Systems: constitutional, ears/nose/mouth/throat, respiratory, gastrointestinal, genitourinary, musculoskeletal, integumentary, neurologic, psychiatric, endocrine. Positive findings noted below. Modified ESAS Completed by: provider Anxiety: 4 Nausea: 0 Anorexia: 0 Constipation: No  
  Stool Occurrence(s): 1 PHYSICAL EXAM:  
 
From RN flowsheet: Wt Readings from Last 3 Encounters:  
09/05/18 168 lb 8 oz (76.4 kg) 10/20/17 178 lb (80.7 kg) 06/22/17 184 lb (83.5 kg) Blood pressure 113/60, pulse 82, temperature 97.3 °F (36.3 °C), resp. rate 22, height 5' 10\" (1.778 m), weight 168 lb 8 oz (76.4 kg), SpO2 93 %. Pain Scale 1: Numeric (0 - 10) Pain Intensity 1: 0 Last bowel movement, if known: today Constitutional: sitting up in bed, HFNC in place, tired Eyes: pupils equal, anicteric ENMT: no nasal discharge, moist mucous membranes Cardiovascular: regular rhythm, distal pulses intact Respiratory: breathing moderately labored even with talking, symmetric Gastrointestinal: soft non-tender, +bowel sounds Musculoskeletal: no deformity, no tenderness to palpation Skin: warm, dry Neurologic: following commands, moving all extremities Psychiatric: restricted affect, no obvious hallucinations Other: 
 
 
 HISTORY:  
 
Active Problems: 
  CAD (coronary artery disease) () Overview: cardiac stentd (4) COPD (chronic obstructive pulmonary disease) ([address] (2/4/2014) DM II (diabetes mellitus, type II), controlled ([address] (2/4/2014) Pulmonary fibrosis (HCC) () Acute on chronic respiratory failure ([address] (8/26/2018) Past Medical History:  
Diagnosis Date  CAD (coronary artery disease) 2004  
 4 stents placed in heart  Chronic obstructive pulmonary disease ([address] EMPHYSEMA SEEN ON CXR  
 Colon polyp Three tubular adenomas excised colonoscopically on 3/17/2011.  COPD (chronic obstructive pulmonary disease) ([address] 2/4/2014  Diverticulosis of colon (without mention of hemorrhage)  DM (diabetes mellitus) ([address] 4/3/2012  GERD (gastroesophageal reflux disease) [address]  History of pneumothorax 2/2014  Hypercholesteremia  Pneumonia 2004  PUD (peptic ulcer disease) 1955  Pulmonary fibrosis ([address] Past Surgical History:  
Procedure Laterality Date  ENDOSCOPY, COLON, DIAGNOSTIC  3/2011 Polyps. Tics. Rep 3 yrs.  HX CORONARY STENT PLACEMENT  2004  
 4 cardiac stents  HX GI  circa 2000 Laparoscopic cholecystectomy. [place] left inguinal hernia repair  HX LUMBAR LAMINECTOMY  11/20/2013 L4 L5 Laminectomy  HX OTHER SURGICAL  2/7/2014 LVATS, bronch, talc pleurodesis, Resection of ruptured bulla  HX TONSILLECTOMY  age 29 Family History Problem Relation Age of Onset  Adopted: Yes  Other Other   
  patient was adopted History reviewed, no pertinent family history. Social History Substance Use Topics  Smoking status: Former Smoker Packs/day: 1.50 Years: 36.00 Types: Cigarettes Quit date: 1/1/1987  Smokeless tobacco: Never Used  Alcohol use No  
 
Allergies Allergen Reactions  Bactrim [Sulfamethoprim] Unknown (comments) FLU LIKE SYMPTOMS  
 Statins-Hmg-Coa Reductase Inhibitors Myalgia  Sulfa (Sulfonamide Antibiotics) Other (comments) Bactrim causes flu like symptoms Current Facility-Administered Medications Medication Dose Route Frequency  QUEtiapine (SEROquel) tablet 50 mg  50 mg Oral QHS  morphine injection 2 mg  2 mg IntraVENous Q4H PRN  
 LORazepam (ATIVAN) tablet 0.5 mg  0.5 mg Oral QHS  LORazepam (ATIVAN) tablet 0.5 mg  0.5 mg Oral Q6H PRN  
 guaiFENesin ER (MUCINEX) tablet 600 mg  600 mg Oral Q12H  
 arformoterol (BROVANA) neb solution 15 mcg  15 mcg Nebulization BID RT  
 sodium chloride (NS) flush 5-10 mL  5-10 mL IntraVENous Q8H  
 sodium chloride (NS) flush 5-10 mL  5-10 mL IntraVENous PRN  
 albuterol-ipratropium (DUO-NEB) 2.5 MG-0.5 MG/3 ML  3 mL Nebulization Q4H RT  
 cetirizine (ZYRTEC) tablet 10 mg  10 mg Oral DAILY  finasteride (PROSCAR) tablet 5 mg  5 mg Oral DAILY  budesonide (PULMICORT) 500 mcg/2 ml nebulizer suspension  500 mcg Nebulization BID RT  
  gabapentin (NEURONTIN) capsule 300 mg  300 mg Oral DAILY WITH LUNCH  
 gabapentin (NEURONTIN) capsule 600 mg  600 mg Oral QHS  pirfenidone tab 801 mg   (Patient Supplied)  801 mg Oral TID  tamsulosin (FLOMAX) capsule 0.4 mg  0.4 mg Oral DAILY  enoxaparin (LOVENOX) injection 40 mg  40 mg SubCUTAneous Q24H  
 aspirin chewable tablet 81 mg  81 mg Oral DAILY  metoprolol tartrate (LOPRESSOR) tablet 12.5 mg  12.5 mg Oral BID  pantoprazole (PROTONIX) tablet 40 mg  40 mg Oral ACB  
 
 
 
 LAB AND IMAGING FINDINGS:  
 
Lab Results Component Value Date/Time WBC 7.6 08/26/2018 01:06 PM  
 HGB 13.2 08/26/2018 01:06 PM  
 PLATELET 914 75/59/4350 01:06 PM  
 
Lab Results Component Value Date/Time Sodium 134 (L) 09/04/2018 04:04 AM  
 Potassium 4.0 09/04/2018 04:04 AM  
 Chloride 99 09/04/2018 04:04 AM  
 CO2 26 09/04/2018 04:04 AM  
 BUN 16 09/04/2018 04:04 AM  
 Creatinine 0.89 09/04/2018 04:04 AM  
 Calcium 8.5 09/04/2018 04:04 AM  
 Magnesium 1.7 09/04/2018 04:04 AM  
 Phosphorus 3.9 09/04/2018 04:04 AM  
  
Lab Results Component Value Date/Time AST (SGOT) 18 01/26/2018 02:19 PM  
 Alk. phosphatase 27 (L) 01/26/2018 02:19 PM  
 Protein, total 7.6 01/26/2018 02:19 PM  
 Albumin 4.3 01/26/2018 02:19 PM  
 Globulin 3.6 02/05/2014 04:36 AM  
 
Lab Results Component Value Date/Time INR 1.0 11/01/2013 04:13 PM  
 Prothrombin time 10.4 11/01/2013 04:13 PM  
  
No results found for: IRON, FE, TIBC, IBCT, PSAT, FERR Lab Results Component Value Date/Time pH 7.39 02/04/2014 02:00 AM  
 PCO2 31 (L) 02/04/2014 02:00 AM  
 PO2 90 02/04/2014 02:00 AM  
 
No components found for: [place] No results found for: CPK, CKMB Total time: 35 
Counseling / coordination time, spent as noted above:20  
> 50% counseling / coordination?: yes Prolonged service was provided for  []30 min   []75 min in face to face time in the presence of the patient, spent as noted above. Time Start: Time End:  
Note: this can only be billed with 94114 (initial) or 53960 (follow up). If multiple start / stop times, list each separately.

## 2018-09-05 NOTE — PROGRESS NOTES
Shift Summary 9/5/2018 
[id number] 
 
0700 Pt asleep at time of bedside shift report received from [facility].  
 
0900 AM vitals, assessment, complete w/o difficulty. AM rhythm strip shows NSR. Reviewed today's plan of care and goals with patient/family; plan of care today includes monitor respiratory status, comfort. Pt's wife asks to please speak with Palliative Care team again today; messaged Dr. Erica Melchor via [facility] Text. No significant changes today. Patient weaned down to 60L, 80% FIO2 hiflow NC. 
 
1900 Bedside and Verbal shift change report given to Tayo Walden (oncoming nurse) by Sergio Crawford (offgoing nurse). Report included the following information SBAR, Kardex, Intake/Output, MAR, Recent Results and Cardiac Rhythm NSR.

## 2018-09-05 NOTE — PROGRESS NOTES
Problem: Pressure Injury - Risk of 
Goal: *Prevention of pressure injury Document Vasile Scale and appropriate interventions in the flowsheet. Outcome: Progressing Towards Goal 
Pressure Injury Interventions: 
Sensory Interventions: Assess changes in LOC, Assess need for specialty bed, Avoid rigorous massage over bony prominences, Chair cushion, Check visual cues for pain, Discuss PT/OT consult with provider, Float heels, Keep linens dry and wrinkle-free, Minimize linen layers, Monitor skin under medical devices, Pad between skin to skin, Turn and reposition approx. every two hours (pillows and wedges if needed) Activity Interventions: Assess need for specialty bed, Chair cushion, Increase time out of bed, Pressure redistribution bed/mattress(bed type), PT/OT evaluation Mobility Interventions: Assess need for specialty bed, Chair cushion, Float heels, HOB 30 degrees or less, Pressure redistribution bed/mattress (bed type), PT/OT evaluation, Turn and reposition approx. every two hours(pillow and wedges) Nutrition Interventions: Document food/fluid/supplement intake, Discuss nutritional consult with provider, Offer support with meals,snacks and hydration Friction and Shear Interventions: Apply protective barrier, creams and emollients, Feet elevated on foot rest, Foam dressings/transparent film/skin sealants, HOB 30 degrees or less, Lift team/patient mobility team, Minimize layers, Transferring/repositioning devices Problem: Falls - Risk of 
Goal: *Absence of Falls Document Catherene Bunting Fall Risk and appropriate interventions in the flowsheet.   
Outcome: Progressing Towards Goal 
Fall Risk Interventions: 
Mobility Interventions: Assess mobility with egress test, Bed/chair exit alarm, Communicate number of staff needed for ambulation/transfer, OT consult for ADLs, Patient to call before getting OOB, PT Consult for mobility concerns, PT Consult for assist device competence, Utilize walker, cane, or other assistive device Mentation Interventions: Adequate sleep, hydration, pain control, Bed/chair exit alarm, Door open when patient unattended, Evaluate medications/consider consulting pharmacy, Eyeglasses and hearing aids, Family/sitter at bedside, HELP ([address]) if available, Increase mobility, More frequent rounding, Reorient patient, Room close to nurse's station, Toileting rounds, Update white board Medication Interventions: Assess postural VS orthostatic hypotension, Bed/chair exit alarm, Evaluate medications/consider consulting pharmacy, Patient to call before getting OOB, Teach patient to arise slowly Elimination Interventions: Bed/chair exit alarm, Call light in reach, Elevated toilet seat, Patient to call for help with toileting needs, Toilet paper/wipes in reach, Toileting schedule/hourly rounds, Urinal in reach History of Falls Interventions: Bed/chair exit alarm, Consult care management for discharge planning, Door open when patient unattended, Evaluate medications/consider consulting pharmacy, Investigate reason for fall, Room close to nurse's station

## 2018-09-05 NOTE — PROGRESS NOTES
Palliative Medicine Code Status: DNR Advance Care Planning: 
Advance Care Planning 2018 Patient's Healthcare Decision Maker is: Named in scanned ACP document Primary Decision Maker Name Steve Lanes Primary Decision Maker Phone Number [phone] Primary Decision Maker Relationship to Patient Spouse Secondary Decision Maker Name Andre Sebastian and/or dtr Gracia Larger Secondary Decision Maker Phone Number [phone], [phone] Confirm Advance Directive Yes, on file Patient / Family Encounter Documentation Participants (names): Pt, wife Isaac Makenna (Dr. Doris Vázquez, [facility]) Narrative:  Met with pt and wife at bedside, several other family members were in earlier. Pt remains on hi-flow, was alert and oriented, overall demeanor was softer than during prior visits. Options for care moving forward were discussed. Pt's current O2 requirements can not be sustained in home setting, pt/wife not interested in continuous bipap as a long term plan. Family remains reluctant to pursue hospice but verbalized understanding that hospice would provide the most comprehensive support if goal is to return home. It was explained to pt and wife, however, that home might not even be a feasible goal due to current O2 requirements. Hospice in inpt setting was also discussed, with understanding that plan would be to wean off hi-flow, that prognosis would likely be very limited but medications would be used to ensure pt's comfort. Wife stated they are expecting several family members and friends in over the weekend, stated they would not be prepared to make any decisions prior to this weekend (\"and maybe not even after that\"). Pt stated several times that he is \"not mentally ready\" to consider transitioning to comfort measures.  
 
Psychosocial Issues Identified/ Resilience Factors: Pt/family coping with pt's advanced illness and limited options for care, pt stated the medical community has \"failed\" him. Pt did appear calmer, more vulnerable than during prior visits, acknowledged need to get his affairs in order. Pt and wife have strong family support. Goals of Care / Plan: Continue weaning O2 with goal of being able to safely transition home. Should pt not tolerate wean, might consider changing focus of care to comfort though was clear he is not ready to pursue comfort/hospice at this time. Discussed with Pulmonary, Hospitalist, RN, Care Management. Will continue to follow for ongoing support and goals of care discussions. Thank you for including Palliative Medicine in the care of Mr. Mell Frances. [facility] MADI Cab[facility], [facility]- 
[address] ([id number])

## 2018-09-05 NOTE — PROGRESS NOTES
Physical Therapy 13:32 Reviewed chart and attempted visit, but patient has multiple family members present and declines visit for today.

## 2018-09-05 NOTE — PROGRESS NOTES
Name: [address] [place] [address] East: [facility]  
: 1935 Admit Date: 2018 Phone: [phone]  Room: 334/01 PCP: Arlin Decker MD  MRN: [id number] Date: 2018  Code: DNR Chart and notes reviewed. Data reviewed. I review the patient's current medications in the medical record at each encounter. I have evaluated and examined the patient. Overnight events Afebrile Sats 97% on HF 60L 100%: wean FiO2 to 80% with sats ranging from 85-94%. Currently tolerating. Labs reviewed ROS: Feeling the same. Still with SOB at rest, better with HF. Not tolerating BiPAP recently. ROB with worsening hypoxia mostly unchanged. Denies CP. Denies fever or chills. Reports anxiety and didn't sleep well last night. Past Medical History:  
Diagnosis Date  CAD (coronary artery disease)   
 4 stents placed in heart  Chronic obstructive pulmonary disease ([address] EMPHYSEMA SEEN ON CXR  
 Colon polyp Three tubular adenomas excised colonoscopically on 3/17/2011.  COPD (chronic obstructive pulmonary disease) ([address] 2014  Diverticulosis of colon (without mention of hemorrhage)  DM (diabetes mellitus) ([address] 4/3/2012  GERD (gastroesophageal reflux disease) [address] [facility] [address]  History of pneumothorax 2014  Hypercholesteremia  Pneumonia   PUD (peptic ulcer disease) 195  Pulmonary fibrosis [address] Past Surgical History:  
Procedure Laterality Date  ENDOSCOPY, COLON, DIAGNOSTIC  3/2011 Polyps. Tics. Rep 3 yrs.  HX CORONARY STENT PLACEMENT    
 4 cardiac stents  HX GI  circa  Laparoscopic cholecystectomy. [address] left inguinal hernia repair  HX LUMBAR LAMINECTOMY  2013 L4 L5 Laminectomy  HX OTHER SURGICAL  2014 LVATS, bronch, talc pleurodesis, Resection of ruptured bulla  HX TONSILLECTOMY  age 29 Family History Problem Relation Age of Onset  Adopted:  Yes  
  Other Other   
  patient was adopted Social History Substance Use Topics  Smoking status: Former Smoker Packs/day: 1.50 Years: 36.00 Types: Cigarettes Quit date: 1/1/1987  Smokeless tobacco: Never Used  Alcohol use No  
 
 
Allergies Allergen Reactions  Bactrim [Sulfamethoprim] Unknown (comments) FLU LIKE SYMPTOMS  
 Statins-Hmg-Coa Reductase Inhibitors Myalgia  Sulfa (Sulfonamide Antibiotics) Other (comments) Bactrim causes flu like symptoms Current Facility-Administered Medications Medication Dose Route Frequency  QUEtiapine (SEROquel) tablet 50 mg  50 mg Oral QHS  morphine injection 2 mg  2 mg IntraVENous Q4H PRN  
 LORazepam (ATIVAN) tablet 0.5 mg  0.5 mg Oral QHS  LORazepam (ATIVAN) tablet 0.5 mg  0.5 mg Oral Q6H PRN  
 guaiFENesin ER (MUCINEX) tablet 600 mg  600 mg Oral Q12H  
 arformoterol (BROVANA) neb solution 15 mcg  15 mcg Nebulization BID RT  
 sodium chloride (NS) flush 5-10 mL  5-10 mL IntraVENous Q8H  
 sodium chloride (NS) flush 5-10 mL  5-10 mL IntraVENous PRN  
 albuterol-ipratropium (DUO-NEB) 2.5 MG-0.5 MG/3 ML  3 mL Nebulization Q4H RT  
 cetirizine (ZYRTEC) tablet 10 mg  10 mg Oral DAILY  finasteride (PROSCAR) tablet 5 mg  5 mg Oral DAILY  budesonide (PULMICORT) 500 mcg/2 ml nebulizer suspension  500 mcg Nebulization BID RT  
 gabapentin (NEURONTIN) capsule 300 mg  300 mg Oral DAILY WITH LUNCH  
 gabapentin (NEURONTIN) capsule 600 mg  600 mg Oral QHS  pirfenidone tab 801 mg   (Patient Supplied)  801 mg Oral TID  tamsulosin (FLOMAX) capsule 0.4 mg  0.4 mg Oral DAILY  enoxaparin (LOVENOX) injection 40 mg  40 mg SubCUTAneous Q24H  
 aspirin chewable tablet 81 mg  81 mg Oral DAILY  metoprolol tartrate (LOPRESSOR) tablet 12.5 mg  12.5 mg Oral BID  pantoprazole (PROTONIX) tablet 40 mg  40 mg Oral ACB REVIEW OF SYSTEMS  
12 point ROS negative except as stated in the HPI. Physical Exam: Visit Vitals  /60 (BP 1 Location: Right arm, BP Patient Position: At rest)  Pulse 82  Temp 97.3 °F (36.3 °C)  Resp 22  
 Ht 5' 10\" (1.778 m)  Wt 76.4 kg (168 lb 8 oz)  SpO2 97%  BMI 24.18 kg/m2 General:  Alert, appears stated age, on hi flow, dyspneic with speaking Head:  Normocephalic, without obvious abnormality, atraumatic. Eyes:  Conjunctivae/corneas clear. Nose: Nares normal. Septum midline. Mucosa normal.   
Throat: Lips, mucosa, and tongue normal.   
Neck: Supple, symmetrical, trachea midline, no adenopathy. Lungs:   Diminished air movement with bibasilar crackles Chest wall:  No tenderness or deformity. Heart:  Regular rate and rhythm, S1, S2 normal, no murmur, click, rub or gallop. Abdomen:   Soft, non-tender. Bowel sounds normal.   
Extremities: Extremities normal, atraumatic, no cyanosis or edema. Pulses: 2+ and symmetric all extremities. Skin: Skin color, texture, turgor normal. No rashes or lesions Lymph nodes: Cervical, supraclavicular nodes normal.  
Neurologic: Moves all extremities, no gross focal deficits Lab Results Component Value Date/Time Sodium 134 (L) 09/04/2018 04:04 AM  
 Potassium 4.0 09/04/2018 04:04 AM  
 Chloride 99 09/04/2018 04:04 AM  
 CO2 26 09/04/2018 04:04 AM  
 BUN 16 09/04/2018 04:04 AM  
 Creatinine 0.89 09/04/2018 04:04 AM  
 Glucose 160 (H) 09/04/2018 04:04 AM  
 Calcium 8.5 09/04/2018 04:04 AM  
 Magnesium 1.7 09/04/2018 04:04 AM  
 Phosphorus 3.9 09/04/2018 04:04 AM  
 
 
Lab Results Component Value Date/Time WBC 7.6 08/26/2018 01:06 PM  
 HGB 13.2 08/26/2018 01:06 PM  
 PLATELET 575 00/52/6440 01:06 PM  
 MCV 96.2 08/26/2018 01:06 PM  
 
 
Lab Results Component Value Date/Time INR 1.0 11/01/2013 04:13 PM  
 AST (SGOT) 18 01/26/2018 02:19 PM  
 Alk.  phosphatase 27 (L) 01/26/2018 02:19 PM  
 Protein, total 7.6 01/26/2018 02:19 PM  
 Albumin 4.3 01/26/2018 02:19 PM  
 Globulin 3.6 02/05/2014 04:36 AM  
 
 
No results found for: IRON, FE, TIBC, IBCT, PSAT, FERR Lab Results Component Value Date/Time Sed rate (ESR) 9 07/22/2015 12:34 PM  
 TSH 3.230 03/10/2015 02:51 PM  
  
 
No results found for: PH, PHI, PCO2, PCO2I, PO2, PO2I, HCO3, HCO3I, FIO2, FIO2I Lab Results Component Value Date/Time Troponin-I, Qt. 4.46 (H) 08/27/2018 12:30 AM  
  
 
Lab Results Component Value Date/Time Culture result:  02/04/2014 05:20 AM  
  MRSA NOT PRESENT Screening of patient nares for MRSA is for surveillance purposes and, if positive, to facilitate isolation considerations in high risk settings. It is not intended for automatic decolonization interventions per se as regimens are not sufficiently effective to warrant routine use. Culture result:  11/01/2013 03:55 PM  
  MRSA NOT PRESENT Apparent Staphylococcus aureus (not MRSA) noted. Screening of patient nares for MRSA is for surveillance purposes and, if positive, to facilitate isolation considerations in high risk settings. It is not intended for automatic decolonization interventions per se as regimens are not sufficiently effective to warrant routine use. No results found for: TOXA1, RPR, HBCM, HBSAG, HAAB, HCAB1, HAAT, G6PD, CRYAC, HIVGT, HIVR, HIV1, HIV12, HIVPC, HIVRPI No results found for: VANCT, CPK Lab Results Component Value Date/Time  Color YELLOW/STRAW 02/04/2014 08:30 AM  
 Appearance CLEAR 02/04/2014 08:30 AM  
 pH (UA) 5.0 02/04/2014 08:30 AM  
 Protein TRACE (A) 02/04/2014 08:30 AM  
 Glucose 250 (A) 02/04/2014 08:30 AM  
 Ketone NEGATIVE  02/04/2014 08:30 AM  
 Bilirubin NEGATIVE  02/04/2014 08:30 AM  
 Blood NEGATIVE  02/04/2014 08:30 AM  
 Urobilinogen 0.2 02/04/2014 08:30 AM  
 Nitrites NEGATIVE  02/04/2014 08:30 AM  
 Leukocyte Esterase NEGATIVE  02/04/2014 08:30 AM  
 WBC 0-4 02/04/2014 08:30 AM  
 RBC 0-5 02/04/2014 08:30 AM  
 Bacteria NEGATIVE  02/04/2014 08:30 AM  
 
 
 Images: no new images this morning; personally visualized and reviewed report CXR (8/30/18): Slight increase in bibasilar interstitial/airspace opacities and probable small effusions. IMPRESSION 
· Acute on chronic hypoxic respiratory failure · Advanced end stage pulmonary fibrosis · COPD 
· NSTEMI/CAD · Severe pulmonary HTN on ECHO · DM 
 
PLAN 
· Continue BiPAP vs HF O2 and wean flow and FiO2 as able to keep sats > 85%. Have not been able to wean flow or FiO2 significantly. · IV steroids discontinued · Continue Duonebs scheduled and Brovana/Pulmicort nebs. · Continue home Esbriet · Metoprolol per Cardiology. No additional recs at this time · Appreciate eval by hospice and Palliative Care team.  Patient with significant increase in baseline O2 requirements since last year and now, unable to even keep outpatient appointments due to severe hypoxia. Continue Ativan and morphine prn. · Daily discussions with patient and wife, who are both aware of the severity of his decline. Patient seems today to be more receptive to possibly considering hospic. Again discussed that it is unlikely that we will be able decrease his O2 requirement to a level acceptable for discharge to home. Daughter and adult grandchildren came in from [place] last week. Son has also been by. · Patient's wife asked about treating his pulmonary HTN. Discussed that sildenafil is indicated for primary pulmonary HTN and there isn't evidence that it provides significant benefit in secondary pulmonary HTN. She also asked about adding Ofev to current Esbriet therapy. Discussed that neither of these medications will reverse his pulmonary fibrosis or improve his current lung function, but aim to slow the progression. · Palliative Care meeting planned for this afternoon with patient and wife · GI prophylaxis: Protonix · DVT prophylaxis: Lovenox Patient critically ill requiring continuous HF O2 due to severe hypoxia and respiratory distress and is high risk for continued decompensation. Discussed with nursing, CM, and Palliative Care team.  CC time EOP 30 min.  
 
Pb Dias

## 2018-09-06 NOTE — PROGRESS NOTES
NUTRITION  
RD Screen Pt seen for:   
 
[]  MST for     []   MD Consult   
[]        Supplements  []   PO intake check  
[]        Food Allergies  []   Food Preferences/tolerances   
[]        Rescreen  []   Education []        Diet order clarification []   Other  
[x]  LOS Nutrition Prescription:  
 
9/6: Follow up. Pt appetite continues fair/good. Please continue to document PO intakes. Pt still on HiFlow, unable to wean. Last BM 9/4. Stage 1 sacral p/i. Continue ONS to support PO intakes. Pt on regular diet. BG rarely being monitored- on 9/4 fasting , 2/2 respiratory status. No point of care checks, no daily labs. Pt down 7 lbs. No edema. Continue to follow per protocol. Monitor intakes, weight.  
 
8/31: 80 yr old male admitted for Acute on chronic respiratory failure. Hx pulmonary fibrosis, CAD, DM, COPD. Pt eating well. Appetite good, intakes >75%. Pt with HiFlow NC @ 60L/min. Recommend limiting fluids to 2 L a day or less. Weight stable. No n/v. Last BM 8/29. No food allergies. Monitor intakes, BG - 251, 78 mg/dL. No labs since 8/27. Monitor POC. Encourage adequate intake of meals and supplements Assessment:  
Information obtained from: Chart/patient Cultural, Advent and ethnic food preferences:  
[x]  None  
[]  Identified and addressed Diet: Regular PO Intake:   [x] Good           [] Fair         []  Poor Patient Vitals for the past 100 hrs: 
 % Diet Eaten 09/02/18 2000 100 % 09/02/18 1840 50 % 09/02/18 1258 40 % Wt Readings from Last 5 Encounters:  
09/05/18 76.4 kg (168 lb 8 oz) 10/20/17 80.7 kg (178 lb)  
06/22/17 83.5 kg (184 lb) 04/25/17 81.6 kg (180 lb)  
02/21/17 85.3 kg (188 lb) Body mass index is 24.18 kg/(m^2). Skin: sacrum stage 1 BM: 8/29 ABD: WNL Estimated Daily Nutrition Requirements: 
Kcals/day: 1953 Kcals/day (BMR(1502x1.3)) Protein: 80 g (-96g/day(1.0-1.2g/kg)) Fluid:  1950 mL Based On: [place] 1898 Weight Used:  Actual weight 79.6 kg Weight Changes:  
[x]   Loss 
[]   Gain 
[]   Stable Nutrition Problems Identified 
[]     None 
[]     Specified food preferences  
[]     Dislikes supplements             
[]     Allergies []     Difficulty chewing    
[]     Dentition  
[]     Nausea/Vomiting 
[]    Constipation []    Diarrhea Nutrition Diagnosis:  
  
Problem:  Increased energy expendeture Etiology: related to increased energy needs Signs/Symptoms: as evidenced by respiratory status - HiFlow 60, Pulm fibrosis Intervention:  
[]    Obtained/adjusted food preferences/tolerances and/or snacks options []    Dislikes supplements will try a substitution []    Modify diet for food allergies []    Adjust texture due to difficulty chewing  
[]    Encourage Fresh vegetables, whole grains, general healthful diet  
[]    Educated patient 
[]    Rescreen per screening protocol [x]    Add Supplements Goal: Pt will consume >50% of meals and ONS within 3-5 days Monitoring/Evaluation:  
Education & Discharge Needs 
[] Nutrition related discharge needs addressed:  
[] Supplements (on d/c instruction &/or coupons provided) [] Education [] No nutrition related discharge needs at this time Rescreen: [x]  At Nutrition Risk  
       []  Not at Nutrition Risk, rescreen per screening protocol Heidi Gurrola RD Pager: [phone]

## 2018-09-06 NOTE — PROGRESS NOTES
Name: [address] [place] [address] East: [address]  
: 1935 Admit Date: 2018 Phone: [phone]  Room: Pending sale to [facility]/01 PCP: Charles Brooks MD  MRN: [id number] Date: 2018  Code: DNR Chart and notes reviewed. Data reviewed. I review the patient's current medications in the medical record at each encounter. I have evaluated and examined the patient. Overnight events Afebrile Sats 97% on HF 60L 90%: was mid to upper 80% during my exam  
Labs reviewed ROS: Not able to significantly wean HF. Feeling the same. Still with SOB at rest, better with HF. Not tolerating BiPAP. ROB with worsening hypoxia mostly unchanged. Denies CP. Denies fever or chills. Reports anxiety but  did sleep a little better last night. Morphine is helping his WOB. Past Medical History:  
Diagnosis Date  CAD (coronary artery disease)   
 4 stents placed in heart  Chronic obstructive pulmonary disease ([address] EMPHYSEMA SEEN ON CXR  
 Colon polyp Three tubular adenomas excised colonoscopically on 3/17/2011.  COPD (chronic obstructive pulmonary disease) ([address] 2014  Diverticulosis of colon (without mention of hemorrhage)  DM (diabetes mellitus) ([address] 4/3/2012  GERD (gastroesophageal reflux disease) [address] [facility] [address]  History of pneumothorax 2014  Hypercholesteremia  Pneumonia   PUD (peptic ulcer disease) 195  Pulmonary fibrosis ([address] Past Surgical History:  
Procedure Laterality Date  ENDOSCOPY, COLON, DIAGNOSTIC  3/2011 Polyps. Tics. Rep 3 yrs.  HX CORONARY STENT PLACEMENT  2004  
 4 cardiac stents  HX GI  circa  Laparoscopic cholecystectomy. [address] [place] [address] left inguinal hernia repair  HX LUMBAR LAMINECTOMY  2013 L4 L5 Laminectomy  HX OTHER SURGICAL  2014 LVATS, bronch, talc pleurodesis, Resection of ruptured bulla  HX TONSILLECTOMY  age 29 Family History Problem Relation Age of Onset  Adopted: Yes  Other Other   
  patient was adopted Social History Substance Use Topics  Smoking status: Former Smoker Packs/day: 1.50 Years: 36.00 Types: Cigarettes Quit date: 1/1/1987  Smokeless tobacco: Never Used  Alcohol use No  
 
 
Allergies Allergen Reactions  Bactrim [Sulfamethoprim] Unknown (comments) FLU LIKE SYMPTOMS  
 Statins-Hmg-Coa Reductase Inhibitors Myalgia  Sulfa (Sulfonamide Antibiotics) Other (comments) Bactrim causes flu like symptoms Current Facility-Administered Medications Medication Dose Route Frequency  fluticasone (FLONASE) 50 mcg/actuation nasal spray 2 Spray  2 Spray Both Nostrils DAILY  QUEtiapine (SEROquel) tablet 50 mg  50 mg Oral QHS  morphine injection 2 mg  2 mg IntraVENous Q4H PRN  
 LORazepam (ATIVAN) tablet 0.5 mg  0.5 mg Oral QHS  LORazepam (ATIVAN) tablet 0.5 mg  0.5 mg Oral Q6H PRN  
 guaiFENesin ER (MUCINEX) tablet 600 mg  600 mg Oral Q12H  
 arformoterol (BROVANA) neb solution 15 mcg  15 mcg Nebulization BID RT  
 sodium chloride (NS) flush 5-10 mL  5-10 mL IntraVENous Q8H  
 sodium chloride (NS) flush 5-10 mL  5-10 mL IntraVENous PRN  
 albuterol-ipratropium (DUO-NEB) 2.5 MG-0.5 MG/3 ML  3 mL Nebulization Q4H RT  
 cetirizine (ZYRTEC) tablet 10 mg  10 mg Oral DAILY  finasteride (PROSCAR) tablet 5 mg  5 mg Oral DAILY  budesonide (PULMICORT) 500 mcg/2 ml nebulizer suspension  500 mcg Nebulization BID RT  
 gabapentin (NEURONTIN) capsule 300 mg  300 mg Oral DAILY WITH LUNCH  
 gabapentin (NEURONTIN) capsule 600 mg  600 mg Oral QHS  pirfenidone tab 801 mg   (Patient Supplied)  801 mg Oral TID  tamsulosin (FLOMAX) capsule 0.4 mg  0.4 mg Oral DAILY  enoxaparin (LOVENOX) injection 40 mg  40 mg SubCUTAneous Q24H  
 aspirin chewable tablet 81 mg  81 mg Oral DAILY  metoprolol tartrate (LOPRESSOR) tablet 12.5 mg  12.5 mg Oral BID  
  pantoprazole (PROTONIX) tablet 40 mg  40 mg Oral ACB REVIEW OF SYSTEMS  
12 point ROS negative except as stated in the HPI. Physical Exam:  
Visit Vitals  /66 (BP 1 Location: Left arm, BP Patient Position: At rest)  Pulse 90  Temp 97.8 °F (36.6 °C)  Resp 23  
 Ht 5' 10\" (1.778 m)  Wt 76.4 kg (168 lb 8 oz)  SpO2 97%  BMI 24.18 kg/m2 General:  Alert, ill appearing, appears stated age, on hi flow, dyspneic with speaking Head:  Normocephalic, without obvious abnormality, atraumatic. Eyes:  Conjunctivae/corneas clear. Nose: Nares normal. Septum midline. Mucosa normal.   
Throat: Lips, mucosa, and tongue normal.   
Neck: Supple, symmetrical, trachea midline, no adenopathy. Lungs:   Diminished air movement with bibasilar crackles. + increased WOB and accessory muscle use. Chest wall:  No tenderness or deformity. Heart:  Regular rate and rhythm, S1, S2 normal, no murmur, click, rub or gallop. Abdomen:   Soft, non-tender. Bowel sounds normal.   
Extremities: Extremities normal, atraumatic, no cyanosis or edema. Pulses: 2+ and symmetric all extremities. Skin: Skin color, texture, turgor normal. No rashes or lesions Lymph nodes: Cervical, supraclavicular nodes normal.  
Neurologic: Moves all extremities, no gross focal deficits. Weak. Lab Results Component Value Date/Time Sodium 134 (L) 09/04/2018 04:04 AM  
 Potassium 4.0 09/04/2018 04:04 AM  
 Chloride 99 09/04/2018 04:04 AM  
 CO2 26 09/04/2018 04:04 AM  
 BUN 16 09/04/2018 04:04 AM  
 Creatinine 0.89 09/04/2018 04:04 AM  
 Glucose 160 (H) 09/04/2018 04:04 AM  
 Calcium 8.5 09/04/2018 04:04 AM  
 Magnesium 1.7 09/04/2018 04:04 AM  
 Phosphorus 3.9 09/04/2018 04:04 AM  
 
 
Lab Results Component Value Date/Time WBC 7.6 08/26/2018 01:06 PM  
 HGB 13.2 08/26/2018 01:06 PM  
 PLATELET 997 72/42/0755 01:06 PM  
 MCV 96.2 08/26/2018 01:06 PM  
 
 
Lab Results Component Value Date/Time INR 1.0 11/01/2013 04:13 PM  
 AST (SGOT) 18 01/26/2018 02:19 PM  
 Alk. phosphatase 27 (L) 01/26/2018 02:19 PM  
 Protein, total 7.6 01/26/2018 02:19 PM  
 Albumin 4.3 01/26/2018 02:19 PM  
 Globulin 3.6 02/05/2014 04:36 AM  
 
 
No results found for: IRON, FE, TIBC, IBCT, PSAT, FERR Lab Results Component Value Date/Time Sed rate (ESR) 9 07/22/2015 12:34 PM  
 TSH 3.230 03/10/2015 02:51 PM  
  
 
No results found for: PH, PHI, PCO2, PCO2I, PO2, PO2I, HCO3, HCO3I, FIO2, FIO2I Lab Results Component Value Date/Time Troponin-I, Qt. 4.46 (H) 08/27/2018 12:30 AM  
  
 
Lab Results Component Value Date/Time Culture result:  02/04/2014 05:20 AM  
  MRSA NOT PRESENT Screening of patient nares for MRSA is for surveillance purposes and, if positive, to facilitate isolation considerations in high risk settings. It is not intended for automatic decolonization interventions per se as regimens are not sufficiently effective to warrant routine use. Culture result:  11/01/2013 03:55 PM  
  MRSA NOT PRESENT Apparent Staphylococcus aureus (not MRSA) noted. Screening of patient nares for MRSA is for surveillance purposes and, if positive, to facilitate isolation considerations in high risk settings. It is not intended for automatic decolonization interventions per se as regimens are not sufficiently effective to warrant routine use. No results found for: TOXA1, RPR, HBCM, HBSAG, HAAB, HCAB1, HAAT, G6PD, CRYAC, HIVGT, HIVR, HIV1, HIV12, HIVPC, HIVRPI No results found for: VANCT, CPK Lab Results Component Value Date/Time  Color YELLOW/STRAW 02/04/2014 08:30 AM  
 Appearance CLEAR 02/04/2014 08:30 AM  
 pH (UA) 5.0 02/04/2014 08:30 AM  
 Protein TRACE (A) 02/04/2014 08:30 AM  
 Glucose 250 (A) 02/04/2014 08:30 AM  
 Ketone NEGATIVE  02/04/2014 08:30 AM  
 Bilirubin NEGATIVE  02/04/2014 08:30 AM  
 Blood NEGATIVE  02/04/2014 08:30 AM  
 Urobilinogen 0.2 02/04/2014 08:30 AM  
 Nitrites NEGATIVE  02/04/2014 08:30 AM  
 Leukocyte Esterase NEGATIVE  02/04/2014 08:30 AM  
 WBC 0-4 02/04/2014 08:30 AM  
 RBC 0-5 02/04/2014 08:30 AM  
 Bacteria NEGATIVE  02/04/2014 08:30 AM  
 
 
Images: no new images this morning; personally visualized and reviewed report CXR (8/30/18): Slight increase in bibasilar interstitial/airspace opacities and probable small effusions. IMPRESSION 
· Acute on chronic hypoxic respiratory failure · Advanced end stage pulmonary fibrosis · COPD 
· NSTEMI/CAD · Severe pulmonary HTN on ECHO · DM 
 
PLAN 
· Continue BiPAP vs HF O2 and wean flow and FiO2 as able to keep sats > 85%. Have not been able to wean flow or FiO2 significantly and patient not tolerating BiPAP very well. · IV steroids discontinued · Continue Duonebs scheduled and Brovana/Pulmicort nebs. · Continue home Esbriet · Metoprolol per Cardiology. No additional recs at this time · Appreciate eval by hospice and Palliative Care team.  Patient with significant increase in baseline O2 requirements since last year and now, unable to even keep outpatient appointments due to severe hypoxia. Continue Ativan and morphine prn. · Daily discussions with patient and wife, who are both aware of the severity of his decline. Patient seems to be more receptive to possibly considering hospice, but not yet ready to make that decision. Additional family coming this weekend. Again discussed that it is unlikely that we will be able decrease his O2 requirement to a level acceptable for discharge to home. · Patient's wife asked about treating his pulmonary HTN. Discussed that sildenafil is indicated for primary pulmonary HTN and there isn't evidence that it provides significant benefit in secondary pulmonary HTN. She also asked about adding Ofev to current Esbriet therapy.   Discussed that neither of these medications will reverse his pulmonary fibrosis or improve his current lung function, but aim to slow the progression. · Palliative Care assistance and support greatly appreciated · GI prophylaxis: Protonix · DVT prophylaxis: Lovenox Patient critically ill requiring continuous HF O2 due to severe hypoxia and respiratory distress and is high risk for continued decompensation. Discussed with nursing, CM, and Palliative Care team.  CC time EOP 30 min.  
 
Pb Zapata

## 2018-09-06 NOTE — PROGRESS NOTES
I met with the patient and his wife. They were inquiring about a notary. I explained to them that we have those services available if they need assistance.  Thanks CHUCK Grullon

## 2018-09-06 NOTE — PROGRESS NOTES
Bakari Mejia [facility] 
[address] [facility], [address] [place] [address] 
[phone] Medical Progress Note NAME: Natanael Bryan :  1935 MRM:  [id number] Date/Time: 2018 Subjective: Chief Complaint:  Patient was seen and examined by me. Chart reviewed. Unable to wean high flow. Still on 90% Objective:  
 
 
Vitals:  
 
 
Last 24hrs VS reviewed since prior progress note. Most recent are: 
 
Visit Vitals  /66 (BP 1 Location: Left arm, BP Patient Position: At rest)  Pulse 90  Temp 97.8 °F (36.6 °C)  Resp 23  
 Ht 5' 10\" (1.778 m)  Wt 76.4 kg (168 lb 8 oz)  SpO2 97%  BMI 24.18 kg/m2 SpO2 Readings from Last 6 Encounters:  
18 97% 17 96% 10/13/14 94% 14 95% 14 93% 14 96% O2 Flow Rate (L/min): 60 l/min Intake/Output Summary (Last 24 hours) at 18 1018 Last data filed at 18 2668 Gross per 24 hour Intake              250 ml Output              750 ml Net             -500 ml Exam:  
 
Physical Exam: 
 
Gen:  Disheveled, frail, mild distress, on HF oxygen HEENT:  Pink conjunctivae, PERRL, hearing intact to voice, moist mucous membranes Neck:  Supple, without masses, thyroid non-tender Resp:  + accessory muscle use, bilateral fine rales Card:  No murmurs, normal S1, S2 without thrills, bruits or peripheral edema Abd:  Soft, non-tender, non-distended, normoactive bowel sounds are present Musc:  No cyanosis or clubbing Skin:  No rashes Neuro:  Cranial nerves 3-12 are grossly intact, follows commands appropriately Psych:  poor insight, oriented to person, place and time, alert Medications Reviewed: (see below) Lab Data Reviewed: (see below) 
 
______________________________________________________________________ Medications:  
 
Current Facility-Administered Medications Medication Dose Route Frequency  QUEtiapine (SEROquel) tablet 50 mg  50 mg Oral QHS  morphine injection 2 mg  2 mg IntraVENous Q4H PRN  
 LORazepam (ATIVAN) tablet 0.5 mg  0.5 mg Oral QHS  LORazepam (ATIVAN) tablet 0.5 mg  0.5 mg Oral Q6H PRN  
 guaiFENesin ER (MUCINEX) tablet 600 mg  600 mg Oral Q12H  
 arformoterol (BROVANA) neb solution 15 mcg  15 mcg Nebulization BID RT  
 sodium chloride (NS) flush 5-10 mL  5-10 mL IntraVENous Q8H  
 sodium chloride (NS) flush 5-10 mL  5-10 mL IntraVENous PRN  
 albuterol-ipratropium (DUO-NEB) 2.5 MG-0.5 MG/3 ML  3 mL Nebulization Q4H RT  
 cetirizine (ZYRTEC) tablet 10 mg  10 mg Oral DAILY  finasteride (PROSCAR) tablet 5 mg  5 mg Oral DAILY  budesonide (PULMICORT) 500 mcg/2 ml nebulizer suspension  500 mcg Nebulization BID RT  
 gabapentin (NEURONTIN) capsule 300 mg  300 mg Oral DAILY WITH LUNCH  
 gabapentin (NEURONTIN) capsule 600 mg  600 mg Oral QHS  pirfenidone tab 801 mg   (Patient Supplied)  801 mg Oral TID  tamsulosin (FLOMAX) capsule 0.4 mg  0.4 mg Oral DAILY  enoxaparin (LOVENOX) injection 40 mg  40 mg SubCUTAneous Q24H  
 aspirin chewable tablet 81 mg  81 mg Oral DAILY  metoprolol tartrate (LOPRESSOR) tablet 12.5 mg  12.5 mg Oral BID  pantoprazole (PROTONIX) tablet 40 mg  40 mg Oral ACB Lab Review: No results for input(s): WBC, HGB, HCT, PLT, HGBEXT, HCTEXT, PLTEXT, HGBEXT, HCTEXT, PLTEXT in the last 72 hours. Recent Labs  
   09/04/18 
 0404 NA  134* K  4.0  
CL  99  
CO2  26 GLU  160* BUN  16  
CREA  0.89 CA  8.5 MG  1.7 PHOS  3.9 Lab Results Component Value Date/Time Glucose (POC) 114 (H) 02/11/2014 11:49 AM  
 Glucose (POC) 173 (H) 02/11/2014 08:26 AM  
 Glucose (POC) 158 (H) 02/10/2014 08:38 PM  
 Glucose (POC) 144 (H) 02/10/2014 04:15 PM  
 Glucose (POC) 156 (H) 02/10/2014 11:08 AM  
 
 
  
Assessment / Plan: Active Problems: 
 
79 yo hx of COPD, CAD, end stage pulm fibrosis on 6L O2, presented w/ resp failure, NSTEMI 1) Acute on chronic resp failure/hypoxia/end stage pulm fibrosis: poor prognosis, no improvement. Still on high flow O2 at 90%. Patient is DNR, but does not want hospice yet. Palliative care following. Wife is waiting for several family members to arrive. Pulm also following. Cont LABA/ICS, duo nebs 2) NSTEMI/CAD: likely type 2 MI from supply demand. Cont ASA, metoprolol. 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 No further management due to potential comfort care 4) Anxiety: due to hypoxia, pulm fibrosis. Cont seroquel. Use IV ativan, IV morphine prn Code: DNR Total time spent with patient: 20 min Care Plan discussed with: Patient, wife, nursing Discussed:  Care Plan Prophylaxis:  Lovenox Disposition:  hospice 
        
___________________________________________________ Attending Physician: Mary Anne Wright MD

## 2018-09-06 NOTE — PROGRESS NOTES
Bedside and Verbal shift change report given to Camilo Collins RN (oncoming nurse) by Nigel Phlean RN (offgoing nurse). Report included the following information SBAR, Kardex, Intake/Output, MAR, Accordion, Recent Results, Cardiac Rhythm SR and Alarm Parameters .

## 2018-09-06 NOTE — PROGRESS NOTES
Problem: Pressure Injury - Risk of 
Goal: *Prevention of pressure injury Document Vasile Scale and appropriate interventions in the flowsheet. Outcome: Progressing Towards Goal 
Pressure Injury Interventions: 
Sensory Interventions: Assess changes in LOC, Assess need for specialty bed, Avoid rigorous massage over bony prominences, Chair cushion, Check visual cues for pain, Discuss PT/OT consult with provider, Float heels, Keep linens dry and wrinkle-free, Minimize linen layers, Monitor skin under medical devices, Pad between skin to skin, Turn and reposition approx. every two hours (pillows and wedges if needed) Activity Interventions: Assess need for specialty bed, Chair cushion, Increase time out of bed, Pressure redistribution bed/mattress(bed type), PT/OT evaluation Mobility Interventions: Assess need for specialty bed, Chair cushion, Float heels, HOB 30 degrees or less, Pressure redistribution bed/mattress (bed type), PT/OT evaluation, Turn and reposition approx. every two hours(pillow and wedges) Nutrition Interventions: Document food/fluid/supplement intake, Discuss nutritional consult with provider, Offer support with meals,snacks and hydration Friction and Shear Interventions: Apply protective barrier, creams and emollients, Feet elevated on foot rest, Foam dressings/transparent film/skin sealants, HOB 30 degrees or less, Lift team/patient mobility team, Minimize layers Problem: Falls - Risk of 
Goal: *Absence of Falls Document Ann Porter Fall Risk and appropriate interventions in the flowsheet.   
Outcome: Progressing Towards Goal 
Fall Risk Interventions: 
Mobility Interventions: Assess mobility with egress test, Bed/chair exit alarm, Communicate number of staff needed for ambulation/transfer, OT consult for ADLs, Patient to call before getting OOB, PT Consult for mobility concerns, PT Consult for assist device competence, Strengthening exercises (ROM-active/passive), Utilize walker, cane, or other assistive device, Utilize gait belt for transfers/ambulation Mentation Interventions: Adequate sleep, hydration, pain control, Bed/chair exit alarm, Door open when patient unattended, Evaluate medications/consider consulting pharmacy, Eyeglasses and hearing aids, Family/sitter at bedside, Increase mobility, More frequent rounding, Reorient patient, Room close to nurse's station, Toileting rounds, Update white board Medication Interventions: Assess postural VS orthostatic hypotension, Bed/chair exit alarm, Evaluate medications/consider consulting pharmacy, Patient to call before getting OOB, Teach patient to arise slowly Elimination Interventions: Bed/chair exit alarm, Call light in reach, Elevated toilet seat, Patient to call for help with toileting needs, Toilet paper/wipes in reach, Toileting schedule/hourly rounds, Urinal in reach History of Falls Interventions: Bed/chair exit alarm, Consult care management for discharge planning, Door open when patient unattended, Evaluate medications/consider consulting pharmacy, Investigate reason for fall, Room close to nurse's station

## 2018-09-06 NOTE — PROGRESS NOTES
Shift Summary 9/6/2018 
[id number] 
 
0700 Pt asleep at time of bedside shift report received from [employer]. 1000 AM vitals, assessment, and meds complete without difficulty, AM rhythm strip shows NSR. Reviewed today's plan of care and goals with patient/family; plan of care today includes monitor respiratory status. No significant changes today. Pt remains on hiflow O2 at 60L, 90% FIO2, unable to tolerate further weaning. 1900 Bedside and Verbal shift change report given to Landen Rivero (oncoming nurse) by [address] (offgoing nurse). Report included the following information SBAR, Intake/Output, MAR, Recent Results and Cardiac Rhythm NSR.

## 2018-09-06 NOTE — PROGRESS NOTES
Physical Therapy 15:50 Reviewed chart and made contact with patient. Patient continues with significant fatigue and requested deferring visit for today. Will attempt visit tomorrow.

## 2018-09-06 NOTE — PROGRESS NOTES
Palliative Medicine Consult David: [phone] Patient Name: Alexi Avendano YOB: 1935 Date of Initial Consult: 8/27/18 Reason for Consult: Goals-of-care Requesting Provider: Trupti Harper MD 
Primary Care Physician: Micah De La Rosa MD 
 
 SUMMARY:  
Alexi Avendano is a 80 y.o. with a end-stage COPD and pulmonary fibrosis, O2-dependent at 20L/minute at baseline; CAD who was admitted on 8/25/2018 from home with a diagnosis of acute-on-chronic respiratory failure and NSTEMI due to tachycardia and hypoxia. Current medical issues leading to Palliative Medicine involvement include: shortness of breath, anxiety/agitation, confusion in setting of steroids and hypoxia, goals of care. PALLIATIVE DIAGNOSES:  
1. Shortness of breath 2. Anxiety 3. Debility 4. Goals of care 5. Pulmonary fibrosis PLAN:  
1. Met with patient and spouse as a support visit. Patient remains on 60 liters/90% FIO2. Still remains very tired and sats drop with any kind of activity. 2. GOC-family and friends coming over the next several days to visit. Really not ready to make any decisions about any transition to hospice/comfort until the weekend is over. 3. AMD-spouse remains MPOA and AMD in the chart 4. Code status-DNR per prior discussions. If he survives this stay, will need DDNR at discharge 5. Symptom management-he continues to have prn ativan and morphine for anxiety and SOB. Used 2 doses of morphine 2 mg over the next 24 hours. 6. Psychosocial-patient states just not \"mentally ready\" to make transition to hospice care. Will need ongoing support for patient and family in this difficult decision/process 7. Discussed with and bedside nurse(Jessica) 8. Communicated plan of care with: Palliative IDT 
 
 
 GOALS OF CARE / TREATMENT PREFERENCES:  
 
GOALS OF CARE: continue current therapies with goal of returning home TREATMENT PREFERENCES:  
Code Status: DNR 
 
 Advance Care Planning: 
Advance Care Planning 2018 Patient's Healthcare Decision Maker is: Named in scanned ACP document Primary Decision Maker Name Paula Saenz Primary Decision Maker Phone Number [phone] Primary Decision Maker Relationship to Patient Spouse Secondary Decision Maker Name Andre Green Record and/or dtr Rosamaria Lovell Secondary Decision Maker Phone Number [phone], [phone] Confirm Advance Directive Yes, on file Other Instructions: Other: As far as possible, the palliative care team has discussed with patient / health care proxy about goals of care / treatment preferences for patient. HISTORY:  
 
History obtained from: patient, wife, chart CHIEF COMPLAINT: anxiety HPI/SUBJECTIVE: The patient is:  
[x] Verbal and participatory -  
[] Non-participatory due to:  
 
Patient really without changes in symptoms. Slept ok last night Clinical Pain Assessment (nonverbal scale for severity on nonverbal patients): Duration: for how long has pt been experiencing pain (e.g., 2 days, 1 month, years) Frequency: how often pain is an issue (e.g., several times per day, once every few days, constant) FUNCTIONAL ASSESSMENT:  
 
Palliative Performance Scale (PPS): PPS: 50 PSYCHOSOCIAL/SPIRITUAL SCREENING:  
 
Palliative IDT has assessed this patient for cultural preferences / practices and a referral made as appropriate to needs (Cultural Services, Patient Advocacy, Ethics, etc.) Advance Care Planning: 
Advance Care Planning 2018 Patient's Healthcare Decision Maker is: Named in scanned ACP document Primary Decision Maker Name Paula Saenz Primary Decision Maker Phone Number [phone] Primary Decision Maker Relationship to Patient Spouse Secondary Decision Maker Name Andre Green Record and/or dtr Rosamaria Lovell Secondary Decision Maker Phone Number [phone], [phone] Confirm Advance Directive Yes, on file Any spiritual / Hoahaoism concerns: 
[] Yes /  [x] No 
 
Caregiver Burnout: 
[] Yes /  [x] No /  [] No Caregiver Present Anticipatory grief assessment:  
[] Normal  / [x] Maladaptive : component of denial?    
 
ESAS Anxiety: Anxiety: 4 ESAS Depression:    
 
 
 REVIEW OF SYSTEMS:  
 
Positive and pertinent negative findings in ROS are noted above in HPI. The following systems were [] reviewed / [] unable to be reviewed as noted in HPI Other findings are noted below. Systems: constitutional, ears/nose/mouth/throat, respiratory, gastrointestinal, genitourinary, musculoskeletal, integumentary, neurologic, psychiatric, endocrine. Positive findings noted below. Modified ESAS Completed by: provider Anxiety: 4 Nausea: 0 Anorexia: 0 Constipation: No  
  Stool Occurrence(s): 1 PHYSICAL EXAM:  
 
From RN flowsheet: 
Wt Readings from Last 3 Encounters:  
09/05/18 168 lb 8 oz (76.4 kg) 10/20/17 178 lb (80.7 kg) 06/22/17 184 lb (83.5 kg) Blood pressure 119/61, pulse 87, temperature 97.6 °F (36.4 °C), resp. rate 22, height 5' 10\" (1.778 m), weight 168 lb 8 oz (76.4 kg), SpO2 93 %. Pain Scale 1: Numeric (0 - 10) Pain Intensity 1: 0 Last bowel movement, if known: today Constitutional: sitting up in bed, HFNC in place, tired Eyes: pupils equal, anicteric ENMT: no nasal discharge, moist mucous membranes Cardiovascular: regular rhythm, distal pulses intact Respiratory: breathing moderately labored even with talking, symmetric Gastrointestinal: soft non-tender, +bowel sounds Musculoskeletal: no deformity, no tenderness to palpation Skin: warm, dry Neurologic: following commands, moving all extremities Psychiatric: restricted affect, no obvious hallucinations Other: 
 
 
 HISTORY:  
 
Active Problems: 
  CAD (coronary artery disease) () Overview: cardiac stentd (4) COPD (chronic obstructive pulmonary disease) ([address] (2/4/2014) DM II (diabetes mellitus, type II), controlled ([address] (2/4/2014) Pulmonary fibrosis (HCC) () Acute on chronic respiratory failure ([address] (8/26/2018) Past Medical History:  
Diagnosis Date  CAD (coronary artery disease) 2004  
 4 stents placed in heart  Chronic obstructive pulmonary disease ([address] EMPHYSEMA SEEN ON CXR  
 Colon polyp Three tubular adenomas excised colonoscopically on 3/17/2011.  COPD (chronic obstructive pulmonary disease) ([address] 2/4/2014  Diverticulosis of colon (without mention of hemorrhage)  DM (diabetes mellitus) ([address] 4/3/2012  GERD (gastroesophageal reflux disease) [address] [facility] [address]  History of pneumothorax 2/2014  Hypercholesteremia  Pneumonia 2004  PUD (peptic ulcer disease) 1955  Pulmonary fibrosis ([address] Past Surgical History:  
Procedure Laterality Date  ENDOSCOPY, COLON, DIAGNOSTIC  3/2011 Polyps. Tics. Rep 3 yrs.  HX CORONARY STENT PLACEMENT  2004  
 4 cardiac stents  HX GI  circa 2000 Laparoscopic cholecystectomy. [address] left inguinal hernia repair  HX LUMBAR LAMINECTOMY  11/20/2013 L4 L5 Laminectomy  HX OTHER SURGICAL  2/7/2014 LVATS, bronch, talc pleurodesis, Resection of ruptured bulla  HX TONSILLECTOMY  age 29 Family History Problem Relation Age of Onset  Adopted: Yes  Other Other   
  patient was adopted History reviewed, no pertinent family history. Social History Substance Use Topics  Smoking status: Former Smoker Packs/day: 1.50 Years: 36.00 Types: Cigarettes Quit date: 1/1/1987  Smokeless tobacco: Never Used  Alcohol use No  
 
Allergies Allergen Reactions  Bactrim [Sulfamethoprim] Unknown (comments) FLU LIKE SYMPTOMS  
 Statins-Hmg-Coa Reductase Inhibitors Myalgia  Sulfa (Sulfonamide Antibiotics) Other (comments) Bactrim causes flu like symptoms Current Facility-Administered Medications Medication Dose Route Frequency  fluticasone (FLONASE) 50 mcg/actuation nasal spray 2 Spray  2 Spray Both Nostrils DAILY  QUEtiapine (SEROquel) tablet 50 mg  50 mg Oral QHS  morphine injection 2 mg  2 mg IntraVENous Q4H PRN  
 LORazepam (ATIVAN) tablet 0.5 mg  0.5 mg Oral QHS  LORazepam (ATIVAN) tablet 0.5 mg  0.5 mg Oral Q6H PRN  
 guaiFENesin ER (MUCINEX) tablet 600 mg  600 mg Oral Q12H  
 arformoterol (BROVANA) neb solution 15 mcg  15 mcg Nebulization BID RT  
 sodium chloride (NS) flush 5-10 mL  5-10 mL IntraVENous Q8H  
 sodium chloride (NS) flush 5-10 mL  5-10 mL IntraVENous PRN  
 albuterol-ipratropium (DUO-NEB) 2.5 MG-0.5 MG/3 ML  3 mL Nebulization Q4H RT  
 cetirizine (ZYRTEC) tablet 10 mg  10 mg Oral DAILY  finasteride (PROSCAR) tablet 5 mg  5 mg Oral DAILY  budesonide (PULMICORT) 500 mcg/2 ml nebulizer suspension  500 mcg Nebulization BID RT  
 gabapentin (NEURONTIN) capsule 300 mg  300 mg Oral DAILY WITH LUNCH  
 gabapentin (NEURONTIN) capsule 600 mg  600 mg Oral QHS  pirfenidone tab 801 mg   (Patient Supplied)  801 mg Oral TID  tamsulosin (FLOMAX) capsule 0.4 mg  0.4 mg Oral DAILY  enoxaparin (LOVENOX) injection 40 mg  40 mg SubCUTAneous Q24H  
 aspirin chewable tablet 81 mg  81 mg Oral DAILY  metoprolol tartrate (LOPRESSOR) tablet 12.5 mg  12.5 mg Oral BID  pantoprazole (PROTONIX) tablet 40 mg  40 mg Oral ACB  
 
 
 
 LAB AND IMAGING FINDINGS:  
 
Lab Results Component Value Date/Time WBC 7.6 08/26/2018 01:06 PM  
 HGB 13.2 08/26/2018 01:06 PM  
 PLATELET 819 04/74/1725 01:06 PM  
 
Lab Results Component Value Date/Time  Sodium 134 (L) 09/04/2018 04:04 AM  
 Potassium 4.0 09/04/2018 04:04 AM  
 Chloride 99 09/04/2018 04:04 AM  
 CO2 26 09/04/2018 04:04 AM  
 BUN 16 09/04/2018 04:04 AM  
 Creatinine 0.89 09/04/2018 04:04 AM  
 Calcium 8.5 09/04/2018 04:04 AM  
 Magnesium 1.7 09/04/2018 04:04 AM  
 Phosphorus 3.9 09/04/2018 04:04 AM  
  
Lab Results Component Value Date/Time AST (SGOT) 18 01/26/2018 02:19 PM  
 Alk. phosphatase 27 (L) 01/26/2018 02:19 PM  
 Protein, total 7.6 01/26/2018 02:19 PM  
 Albumin 4.3 01/26/2018 02:19 PM  
 Globulin 3.6 02/05/2014 04:36 AM  
 
Lab Results Component Value Date/Time INR 1.0 11/01/2013 04:13 PM  
 Prothrombin time 10.4 11/01/2013 04:13 PM  
  
No results found for: IRON, FE, TIBC, IBCT, PSAT, FERR Lab Results Component Value Date/Time pH 7.39 02/04/2014 02:00 AM  
 PCO2 31 (L) 02/04/2014 02:00 AM  
 PO2 90 02/04/2014 02:00 AM  
 
No components found for: [place] No results found for: CPK, CKMB Total time: 25 
Counseling / coordination time, spent as noted above:20  
> 50% counseling / coordination?: yes Prolonged service was provided for  []30 min   []75 min in face to face time in the presence of the patient, spent as noted above. Time Start:  
Time End:  
Note: this can only be billed with 79238 (initial) or 10014 (follow up). If multiple start / stop times, list each separately.

## 2018-09-07 NOTE — PROGRESS NOTES
Name: Yuniel [place]: [facility]  
: 1935 Admit Date: 2018 Phone: [phone]  Room: [facility]/01 PCP: Daniela Perez MD  MRN: [id number] Date: 2018  Code: DNR Chart and notes reviewed. Data reviewed. I review the patient's current medications in the medical record at each encounter. I have evaluated and examined the patient. Overnight events Afebrile Sats 94% on HF 50L 100%; still with dips to the 80s with activity No new labs Increased resp distress/WOB overnight ROS: Still not able to significantly wean HF. Had a rough night initially and required an extra dose of morphine and ativan. Still with SOB at rest, better with HF. Has not tolerated BiPAP well for the most part. ROB with worsening hypoxia mostly unchanged. Denies CP. Denies fever or chills. Continue with anxiety. Flonase helped the tinnitus for a short while. Past Medical History:  
Diagnosis Date  CAD (coronary artery disease)   
 4 stents placed in heart  Chronic obstructive pulmonary disease ([address] EMPHYSEMA SEEN ON CXR  
 Colon polyp Three tubular adenomas excised colonoscopically on 3/17/2011.  COPD (chronic obstructive pulmonary disease) ([address] 2014  Diverticulosis of colon (without mention of hemorrhage)  DM (diabetes mellitus) ([address] 4/3/2012  GERD (gastroesophageal reflux disease) [address]  History of pneumothorax 2014  Hypercholesteremia  Pneumonia   PUD (peptic ulcer disease) 195  Pulmonary fibrosis ([facility] [address] [facility] Past Surgical History:  
Procedure Laterality Date  ENDOSCOPY, COLON, DIAGNOSTIC  3/2011 Polyps. Tics. Rep 3 yrs.  HX CORONARY STENT PLACEMENT    
 4 cardiac stents  HX GI  circa  Laparoscopic cholecystectomy. [address] [place] [address] left inguinal hernia repair  HX LUMBAR LAMINECTOMY  2013 L4 L5 Laminectomy  HX OTHER SURGICAL  2014 LVATS, bronch, talc pleurodesis, Resection of ruptured bulla  HX TONSILLECTOMY  age 29 Family History Problem Relation Age of Onset  Adopted: Yes  Other Other   
  patient was adopted Social History Substance Use Topics  Smoking status: Former Smoker Packs/day: 1.50 Years: 36.00 Types: Cigarettes Quit date: 1/1/1987  Smokeless tobacco: Never Used  Alcohol use No  
 
 
Allergies Allergen Reactions  Bactrim [Sulfamethoprim] Unknown (comments) FLU LIKE SYMPTOMS  
 Statins-Hmg-Coa Reductase Inhibitors Myalgia  Sulfa (Sulfonamide Antibiotics) Other (comments) Bactrim causes flu like symptoms Current Facility-Administered Medications Medication Dose Route Frequency  fluticasone (FLONASE) 50 mcg/actuation nasal spray 2 Spray  2 Spray Both Nostrils DAILY  QUEtiapine (SEROquel) tablet 50 mg  50 mg Oral QHS  morphine injection 2 mg  2 mg IntraVENous Q4H PRN  
 LORazepam (ATIVAN) tablet 0.5 mg  0.5 mg Oral QHS  LORazepam (ATIVAN) tablet 0.5 mg  0.5 mg Oral Q6H PRN  
 guaiFENesin ER (MUCINEX) tablet 600 mg  600 mg Oral Q12H  
 arformoterol (BROVANA) neb solution 15 mcg  15 mcg Nebulization BID RT  
 sodium chloride (NS) flush 5-10 mL  5-10 mL IntraVENous Q8H  
 sodium chloride (NS) flush 5-10 mL  5-10 mL IntraVENous PRN  
 albuterol-ipratropium (DUO-NEB) 2.5 MG-0.5 MG/3 ML  3 mL Nebulization Q4H RT  
 cetirizine (ZYRTEC) tablet 10 mg  10 mg Oral DAILY  finasteride (PROSCAR) tablet 5 mg  5 mg Oral DAILY  budesonide (PULMICORT) 500 mcg/2 ml nebulizer suspension  500 mcg Nebulization BID RT  
 gabapentin (NEURONTIN) capsule 300 mg  300 mg Oral DAILY WITH LUNCH  
 gabapentin (NEURONTIN) capsule 600 mg  600 mg Oral QHS  pirfenidone tab 801 mg   (Patient Supplied)  801 mg Oral TID  tamsulosin (FLOMAX) capsule 0.4 mg  0.4 mg Oral DAILY  enoxaparin (LOVENOX) injection 40 mg  40 mg SubCUTAneous Q24H  aspirin chewable tablet 81 mg  81 mg Oral DAILY  metoprolol tartrate (LOPRESSOR) tablet 12.5 mg  12.5 mg Oral BID  pantoprazole (PROTONIX) tablet 40 mg  40 mg Oral ACB REVIEW OF SYSTEMS  
12 point ROS negative except as stated in the HPI. Physical Exam:  
Visit Vitals  /69  Pulse 88  Temp 97.6 °F (36.4 °C)  Resp 24  
 Ht 5' 10\" (1.778 m)  Wt 76.4 kg (168 lb 8 oz)  SpO2 99%  BMI 24.18 kg/m2 General:  Alert, ill appearing/frail, appears stated age, on hi flow, dyspneic with speaking Head:  Normocephalic, without obvious abnormality, atraumatic. Eyes:  Conjunctivae/corneas clear. Nose: Nares normal. Septum midline. Mucosa normal.   
Throat: Lips, mucosa, and tongue normal.   
Neck: Supple, symmetrical, trachea midline, no adenopathy. Lungs:   Diminished air movement with bibasilar crackles. + increased WOB and accessory muscle use. Chest wall:  No tenderness or deformity. Heart:  Regular rate and rhythm, S1, S2 normal, no murmur, click, rub or gallop. Abdomen:   Soft, non-tender. Bowel sounds normal.   
Extremities: Extremities normal, atraumatic, no cyanosis or edema. Pulses: 2+ and symmetric all extremities. Skin: Skin color, texture, turgor normal. No rashes or lesions Lymph nodes: Cervical, supraclavicular nodes normal.  
Neurologic: Moves all extremities, no gross focal deficits. Weak. Lab Results Component Value Date/Time Sodium 134 (L) 09/04/2018 04:04 AM  
 Potassium 4.0 09/04/2018 04:04 AM  
 Chloride 99 09/04/2018 04:04 AM  
 CO2 26 09/04/2018 04:04 AM  
 BUN 16 09/04/2018 04:04 AM  
 Creatinine 0.89 09/04/2018 04:04 AM  
 Glucose 160 (H) 09/04/2018 04:04 AM  
 Calcium 8.5 09/04/2018 04:04 AM  
 Magnesium 1.7 09/04/2018 04:04 AM  
 Phosphorus 3.9 09/04/2018 04:04 AM  
 
 
Lab Results Component Value Date/Time  WBC 7.6 08/26/2018 01:06 PM  
 HGB 13.2 08/26/2018 01:06 PM  
 PLATELET 588 57/71/0406 01:06 PM  
 MCV 96.2 08/26/2018 01:06 PM  
 
 
Lab Results Component Value Date/Time INR 1.0 11/01/2013 04:13 PM  
 AST (SGOT) 18 01/26/2018 02:19 PM  
 Alk. phosphatase 27 (L) 01/26/2018 02:19 PM  
 Protein, total 7.6 01/26/2018 02:19 PM  
 Albumin 4.3 01/26/2018 02:19 PM  
 Globulin 3.6 02/05/2014 04:36 AM  
 
 
No results found for: IRON, FE, TIBC, IBCT, PSAT, FERR Lab Results Component Value Date/Time Sed rate (ESR) 9 07/22/2015 12:34 PM  
 TSH 3.230 03/10/2015 02:51 PM  
  
 
No results found for: PH, PHI, PCO2, PCO2I, PO2, PO2I, HCO3, HCO3I, FIO2, FIO2I Lab Results Component Value Date/Time Troponin-I, Qt. 4.46 (H) 08/27/2018 12:30 AM  
  
 
Lab Results Component Value Date/Time Culture result:  02/04/2014 05:20 AM  
  MRSA NOT PRESENT Screening of patient nares for MRSA is for surveillance purposes and, if positive, to facilitate isolation considerations in high risk settings. It is not intended for automatic decolonization interventions per se as regimens are not sufficiently effective to warrant routine use. Culture result:  11/01/2013 03:55 PM  
  MRSA NOT PRESENT Apparent Staphylococcus aureus (not MRSA) noted. Screening of patient nares for MRSA is for surveillance purposes and, if positive, to facilitate isolation considerations in high risk settings. It is not intended for automatic decolonization interventions per se as regimens are not sufficiently effective to warrant routine use. No results found for: TOXA1, RPR, HBCM, HBSAG, HAAB, HCAB1, HAAT, G6PD, CRYAC, HIVGT, HIVR, HIV1, HIV12, HIVPC, HIVRPI No results found for: VANCT, CPK Lab Results Component Value Date/Time  Color YELLOW/STRAW 02/04/2014 08:30 AM  
 Appearance CLEAR 02/04/2014 08:30 AM  
 pH (UA) 5.0 02/04/2014 08:30 AM  
 Protein TRACE (A) 02/04/2014 08:30 AM  
 Glucose 250 (A) 02/04/2014 08:30 AM  
 Ketone NEGATIVE  02/04/2014 08:30 AM  
 Bilirubin NEGATIVE  02/04/2014 08:30 AM  
 Blood NEGATIVE  02/04/2014 08:30 AM  
 Urobilinogen 0.2 02/04/2014 08:30 AM  
 Nitrites NEGATIVE  02/04/2014 08:30 AM  
 Leukocyte Esterase NEGATIVE  02/04/2014 08:30 AM  
 WBC 0-4 02/04/2014 08:30 AM  
 RBC 0-5 02/04/2014 08:30 AM  
 Bacteria NEGATIVE  02/04/2014 08:30 AM  
 
 
Images: no new images this morning; personally visualized and reviewed report CXR (8/30/18): Slight increase in bibasilar interstitial/airspace opacities and probable small effusions. IMPRESSION 
· Acute on chronic hypoxic respiratory failure · Advanced end stage pulmonary fibrosis · COPD 
· NSTEMI/CAD · Severe pulmonary HTN on ECHO · DM 
 
PLAN 
· Continue HF O2 and wean flow and FiO2 as able to keep sats > 85%. Have not been able to wean flow or FiO2 significantly and patient has not tolerated BiPAP well. · IV steroids discontinued · Continue Duonebs scheduled and Brovana/Pulmicort nebs. · Continue home Esbriet · Metoprolol per Cardiology. No additional recs at this time · Appreciate eval by hospice and Palliative Care team.  Patient with significant increase in baseline O2 requirements since last year and now, unable to even keep outpatient appointments due to severe hypoxia. Continue Ativan and morphine prn. · Daily discussions with patient and wife, who are both aware of the severity of his decline. Patient not yet ready to make the decision to proceed with. Additional family and friends coming this weekend. Patient and wife aware that it is unlikely that we will be able decrease his O2 requirement to a level acceptable for discharge to home. · Patient's wife earlier this admission asked about treating his pulmonary HTN. Discussed that sildenafil is indicated for primary pulmonary HTN and there isn't evidence that it provides significant benefit in secondary pulmonary HTN. She also asked about adding Ofev to current Esbriet therapy.   Discussed that neither of these medications will reverse his pulmonary fibrosis or improve his current lung function, but aim to slow the progression. · Palliative Care assistance and support greatly appreciated · GI prophylaxis: Protonix · DVT prophylaxis: Lovenox Patient critically ill requiring continuous HF O2 due to severe hypoxia and respiratory distress and is high risk for continued decompensation.   Discussed with nursing, CM, and Palliative Care team.   
 
MAMIE Calvillo

## 2018-09-07 NOTE — PROGRESS NOTES
Problem: Pressure Injury - Risk of 
Goal: *Prevention of pressure injury Document Vasile Scale and appropriate interventions in the flowsheet. Outcome: Progressing Towards Goal 
Pressure Injury Interventions: 
Sensory Interventions: Monitor skin under medical devices Activity Interventions: Increase time out of bed Mobility Interventions: HOB 30 degrees or less Nutrition Interventions: Document food/fluid/supplement intake Friction and Shear Interventions: HOB 30 degrees or less

## 2018-09-07 NOTE — PROGRESS NOTES
physical Therapy TREATMENT Patient: Joey Hendrickson (37 y.o. male) Date: 9/7/2018 Diagnosis: Acute on chronic respiratory failure (HCC) Acute on chronic respiratory failure (HCC) <principal problem not specified> Precautions:   
Chart, physical therapy assessment, plan of care and goals were reviewed. ASSESSMENT: 
Physical therapy visit performed on an 79 yo male with end stage pulmonary fibrosis and acute on chronic respiratory failure. Patient received in bed and agreeable to therapy, family present in room. Patient educated on use of bed exercises to manage anxiety and improve comfort. Patient able to perform ankle pumps, heel slides, hip abd, hip int/ext rotation and glute sets with slow pace and assist as needed. Sats remained above 90% through session. Patient is still considering hospice and other family arriving this weekend. Progression toward goals: 
[]    Improving appropriately and progressing toward goals 
[]    Improving slowly and progressing toward goals [x]    Not making progress toward goals and plan of care will be adjusted PLAN: 
Patient continues to benefit from skilled intervention to address the above impairments. Continue treatment per established plan of care. Discharge Recommendations: To Be Determined Further Equipment Recommendations for Discharge: SUBJECTIVE:  
Patient stated I can move my legs.  OBJECTIVE DATA SUMMARY:  
Critical Behavior: 
Neurologic State: Drowsy Orientation Level: Oriented to person Cognition: Appropriate for age attention/concentration Functional Mobility Training: 
Bed Mobility: 
  
  
  
  
  
  
Transfers: 
  
  
     
  
     
  
  
  
  
  
  
  
  
  
  
  
  
  
Neuro Re-Education: 
Gentle vibration and ligh massage for comfort Therapeutic Exercises: Ankle pumps and circles, heel slides, hip abd/add, hip ext/int. glute set with assist as needed. Pain: 
Pain Scale 1: Numeric (0 - 10) Pain Intensity 1: 0 
  
  
 Activity Tolerance:  
Poor, unable to tolerate bed mobility or transfers Please refer to the flowsheet for vital signs taken during this treatment. After treatment:  
[]    Patient left in no apparent distress sitting up in chair 
[x]    Patient left in no apparent distress in bed 
[x]    Call bell left within reach [x]    Nursing notified 
[x]    Caregiver present 
[]    Bed alarm activated COMMUNICATION/COLLABORATION:  
The patients plan of care was discussed with: Registered Nurse Izaiah Rodríguez Time Calculation: 20 mins

## 2018-09-07 NOTE — PROGRESS NOTES
Bakari Mejia [facility] [place] [facility] 
[address] [facility], [address] [place] [address] 
[phone] Medical Progress Note NAME: Enriqueta Harrison :  1935 MRM:  [id number] Date/Time: 2018 Subjective: Chief Complaint:  Patient was seen and examined by me. Chart reviewed. C/o insomnia, trouble breathing at night. Now more stable. Still on high flow Objective:  
 
 
Vitals:  
 
 
Last 24hrs VS reviewed since prior progress note. Most recent are: 
 
Visit Vitals  /45 (BP 1 Location: Left arm, BP Patient Position: Sitting)  Pulse 79  Temp 97.4 °F (36.3 °C)  Resp 25  
 Ht 5' 10\" (1.778 m)  Wt 76.4 kg (168 lb 8 oz)  SpO2 97%  BMI 24.18 kg/m2 SpO2 Readings from Last 6 Encounters:  
18 97% 17 96% 10/13/14 94% 14 95% 14 93% 14 96% O2 Flow Rate (L/min): 50 l/min Intake/Output Summary (Last 24 hours) at 18 1313 Last data filed at 18 2868 Gross per 24 hour Intake              320 ml Output              550 ml Net             -230 ml Exam:  
 
Physical Exam: 
 
Gen:  Disheveled, frail, mild distress, on HF oxygen HEENT:  Pink conjunctivae, PERRL, hearing intact to voice, moist mucous membranes Neck:  Supple, without masses, thyroid non-tender Resp:  Some accessory muscle use, bilateral fine rales Card:  No murmurs, normal S1, S2 without thrills, bruits or peripheral edema Abd:  Soft, non-tender, non-distended, normoactive bowel sounds are present Musc:  No cyanosis or clubbing Skin:  No rashes Neuro:  Cranial nerves 3-12 are grossly intact, follows commands appropriately Psych:  poor insight, oriented to person, place and time, alert Medications Reviewed: (see below) Lab Data Reviewed: (see below) 
 
______________________________________________________________________ Medications:  
 
Current Facility-Administered Medications Medication Dose Route Frequency  fluticasone (FLONASE) 50 mcg/actuation nasal spray 2 Spray  2 Spray Both Nostrils DAILY  QUEtiapine (SEROquel) tablet 50 mg  50 mg Oral QHS  morphine injection 2 mg  2 mg IntraVENous Q4H PRN  
 LORazepam (ATIVAN) tablet 0.5 mg  0.5 mg Oral QHS  LORazepam (ATIVAN) tablet 0.5 mg  0.5 mg Oral Q6H PRN  
 guaiFENesin ER (MUCINEX) tablet 600 mg  600 mg Oral Q12H  
 arformoterol (BROVANA) neb solution 15 mcg  15 mcg Nebulization BID RT  
 sodium chloride (NS) flush 5-10 mL  5-10 mL IntraVENous Q8H  
 sodium chloride (NS) flush 5-10 mL  5-10 mL IntraVENous PRN  
 albuterol-ipratropium (DUO-NEB) 2.5 MG-0.5 MG/3 ML  3 mL Nebulization Q4H RT  
 cetirizine (ZYRTEC) tablet 10 mg  10 mg Oral DAILY  finasteride (PROSCAR) tablet 5 mg  5 mg Oral DAILY  budesonide (PULMICORT) 500 mcg/2 ml nebulizer suspension  500 mcg Nebulization BID RT  
 gabapentin (NEURONTIN) capsule 300 mg  300 mg Oral DAILY WITH LUNCH  
 gabapentin (NEURONTIN) capsule 600 mg  600 mg Oral QHS  pirfenidone tab 801 mg   (Patient Supplied)  801 mg Oral TID  tamsulosin (FLOMAX) capsule 0.4 mg  0.4 mg Oral DAILY  enoxaparin (LOVENOX) injection 40 mg  40 mg SubCUTAneous Q24H  
 aspirin chewable tablet 81 mg  81 mg Oral DAILY  metoprolol tartrate (LOPRESSOR) tablet 12.5 mg  12.5 mg Oral BID  pantoprazole (PROTONIX) tablet 40 mg  40 mg Oral ACB Lab Review: No results for input(s): WBC, HGB, HCT, PLT, HGBEXT, HCTEXT, PLTEXT, HGBEXT, HCTEXT, PLTEXT in the last 72 hours. No results for input(s): NA, K, CL, CO2, GLU, BUN, CREA, CA, MG, PHOS, ALB, TBIL, TBILI, SGOT, ALT, INR in the last 72 hours. No lab exists for component: INREXT, INREXT Lab Results Component Value Date/Time  Glucose (POC) 114 (H) 02/11/2014 11:49 AM  
 Glucose (POC) 173 (H) 02/11/2014 08:26 AM  
 Glucose (POC) 158 (H) 02/10/2014 08:38 PM  
 Glucose (POC) 144 (H) 02/10/2014 04:15 PM  
 Glucose (POC) 156 (H) 02/10/2014 11:08 AM  
 
 
  
Assessment / Plan: Active Problems: 
 
81 yo hx of COPD, CAD, end stage pulm fibrosis on 6L O2, presented w/ resp failure, NSTEMI 1) Acute on chronic resp failure/hypoxia/end stage pulm fibrosis: poor prognosis, minimal improvement. Still on high flow O2 at 90%. Patient is DNR, but \"not ready for hospice\". Palliative care following. Wife is waiting for several family members to arrive. Pulm also following. Cont LABA/ICS, duo nebs prn 
 
2) NSTEMI/CAD: likely type 2 MI from supply demand. Cont ASA, metoprolol. 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 No further management due to potential comfort care 4) Anxiety: due to hypoxia, pulm fibrosis. Cont seroquel. Use IV ativan, IV morphine prn Code: DNR Total time spent with patient: 20 min Care Plan discussed with: Patient, wife, nursing, pulm Discussed:  Care Plan Prophylaxis:  Lovenox Disposition:  hospice 
        
___________________________________________________ Attending Physician: Panda Dolan MD

## 2018-09-07 NOTE — PROGRESS NOTES
Bedside, Verbal and  shift change report given to SULEMA Judd RN (oncoming nurse) by Connie Adame (offgoing nurse). Report included the following information SBAR, Kardex, Intake/Output, MAR and Cardiac Rhythm Sinus Rhythm.

## 2018-09-07 NOTE — PROGRESS NOTES
Palliative Medicine Consult David: [phone]) Patient Name: Donya Biswas YOB: 1935 Date of Initial Consult: 8/27/18 Reason for Consult: Goals-of-care Requesting Provider: Antonia Perez MD 
Primary Care Physician: Daniela Perez MD 
 
 SUMMARY:  
Donya Biswas is a 80 y.o. with a end-stage COPD and pulmonary fibrosis, O2-dependent at 20L/minute at baseline; CAD who was admitted on 8/25/2018 from home with a diagnosis of acute-on-chronic respiratory failure and NSTEMI due to tachycardia and hypoxia. Current medical issues leading to Palliative Medicine involvement include: shortness of breath, anxiety/agitation, confusion in setting of steroids and hypoxia, goals of care. PALLIATIVE DIAGNOSES:  
1. Shortness of breath 2. Anxiety 3. Debility 4. Goals of care 5. Pulmonary fibrosis PLAN:  
1. Met with patient and spouse but patient is sleeping thru most of the visit. Had a difficult night because felt \"like he couldn't breath\". FIO2 on HFNC increased to 100% and remains on 60 liters. He received an extra dose of ativan and morphine and finally fell asleep. 2. Spent time talking with wife. She is norma and almost hopes he were to pass without him having to make the decision to stop the HFNC. She is now seeing his decline despite the HFNC at the maximum dosing. Certainly all providers that have seen him have explained over time, this will be less effective. She is not ready for him to be on scheduled medications but also explained to not be afraid to ask for the morphine or ativan for his anxiety/SOB if needed. Nobody wants to see him suffer with that feeling of SOB. She understands that sometimes we have to sedate people to make them comfortable in this situation. 3. GOC-family and friends coming over the next several days to visit. They remain hopeful that he can interact with them but spouse also aware that his symptom management should be priority. 4. AMD-spouse remains MPOA and AMD in the chart 5. Code status-DNR per prior discussions. If he survives this stay, will need DDNR at discharge 6. Symptom management-he continues to have prn ativan and morphine for anxiety and SOB. Used 2 doses of morphine 2 mg over the next 24 hours. Also with seroquel at night 7. Psychosocial-patient states just not \"mentally ready\" to make transition to hospice care. Will need ongoing support for patient and family in this difficult decision/process 8. Discussed with and bedside nurse, pulmonary team 
9. Communicated plan of care with: Palliative IDT 
 
 
 GOALS OF CARE / TREATMENT PREFERENCES:  
 
GOALS OF CARE: continue current therapies with goal of returning home TREATMENT PREFERENCES:  
Code Status: DNR Advance Care Planning: 
Advance Care Planning 2018 Patient's Healthcare Decision Maker is: Named in scanned ACP document Primary Decision Maker Name Niki Person Primary Decision Maker Phone Number [phone] Primary Decision Maker Relationship to Patient Spouse Secondary Decision Maker Name Son Davis Mishra and/or dtr Jazzy Pastrana Secondary Decision Maker Phone Number [phone], [phone] Confirm Advance Directive Yes, on file Other Instructions: Other: As far as possible, the palliative care team has discussed with patient / health care proxy about goals of care / treatment preferences for patient. HISTORY:  
 
History obtained from: patient, wife, chart CHIEF COMPLAINT: anxiety HPI/SUBJECTIVE: The patient is:  
[x] Verbal and participatory -  
[] Non-participatory due to:  
 
Patient sleeping this morning. Had a difficult nite. Clinical Pain Assessment (nonverbal scale for severity on nonverbal patients): Duration: for how long has pt been experiencing pain (e.g., 2 days, 1 month, years) Frequency: how often pain is an issue (e.g., several times per day, once every few days, constant) FUNCTIONAL ASSESSMENT:  
 
Palliative Performance Scale (PPS): PPS: 50 PSYCHOSOCIAL/SPIRITUAL SCREENING:  
 
Palliative IDT has assessed this patient for cultural preferences / practices and a referral made as appropriate to needs (Cultural Services, Patient Advocacy, Ethics, etc.) Advance Care Planning: 
Advance Care Planning 2018 Patient's Healthcare Decision Maker is: Named in scanned ACP document Primary Decision Maker Name Bethel Stallworth Primary Decision Maker Phone Number [phone] Primary Decision Maker Relationship to Patient Spouse Secondary Decision Maker Name Son Promise Hines and/or dtr Martina Mcclain Secondary Decision Maker Phone Number [phone], [phone] Confirm Advance Directive Yes, on file Any spiritual / Church concerns: 
[] Yes /  [x] No 
 
Caregiver Burnout: 
[] Yes /  [x] No /  [] No Caregiver Present Anticipatory grief assessment:  
[] Normal  / [x] Maladaptive : component of denial?    
 
ESAS Anxiety: Anxiety: 4 ESAS Depression:    
 
 
 REVIEW OF SYSTEMS:  
 
Positive and pertinent negative findings in ROS are noted above in HPI. The following systems were [] reviewed / [] unable to be reviewed as noted in HPI Other findings are noted below. Systems: constitutional, ears/nose/mouth/throat, respiratory, gastrointestinal, genitourinary, musculoskeletal, integumentary, neurologic, psychiatric, endocrine. Positive findings noted below. Modified ESAS Completed by: provider Anxiety: 4 Nausea: 0 Anorexia: 0 Constipation: No  
  Stool Occurrence(s): 1 PHYSICAL EXAM:  
 
From RN flowsheet: 
Wt Readings from Last 3 Encounters:  
18 168 lb 8 oz (76.4 kg) 10/20/17 178 lb (80.7 kg) 17 184 lb (83.5 kg) Blood pressure 123/69, pulse 88, temperature 97.6 °F (36.4 °C), resp. rate 24, height 5' 10\" (1.778 m), weight 168 lb 8 oz (76.4 kg), SpO2 99 %. Pain Scale 1: Numeric (0 - 10) Pain Intensity 1: 0 Last bowel movement, if known: today Constitutional: sleeping, HFNC in place Eyes: pupils equal, anicteric ENMT: no nasal discharge, moist mucous membranes Cardiovascular: regular rhythm, distal pulses intact Respiratory: breathing moderately labored even with talking, symmetric Gastrointestinal: soft non-tender, +bowel sounds Musculoskeletal: no deformity, no tenderness to palpation Skin: warm, dry Neurologic: following commands, moving all extremities Psychiatric: sleeping Other: 
 
 
 HISTORY:  
 
Active Problems: 
  CAD (coronary artery disease) () Overview: cardiac stentd (4) COPD (chronic obstructive pulmonary disease) ([address] (2/4/2014) DM II (diabetes mellitus, type II), controlled ([address] (2/4/2014) Pulmonary fibrosis (HCC) () Acute on chronic respiratory failure ([address] (8/26/2018) Past Medical History:  
Diagnosis Date  CAD (coronary artery disease) 2004  
 4 stents placed in heart  Chronic obstructive pulmonary disease ([address] EMPHYSEMA SEEN ON CXR  
 Colon polyp Three tubular adenomas excised colonoscopically on 3/17/2011.  COPD (chronic obstructive pulmonary disease) ([address] 2/4/2014  Diverticulosis of colon (without mention of hemorrhage)  DM (diabetes mellitus) ([address] 4/3/2012  GERD (gastroesophageal reflux disease) [address] [facility] [address]  History of pneumothorax 2/2014  Hypercholesteremia  Pneumonia 2004  PUD (peptic ulcer disease) 1955  Pulmonary fibrosis ([address] Past Surgical History:  
Procedure Laterality Date  ENDOSCOPY, COLON, DIAGNOSTIC  3/2011 Polyps. Tics. Rep 3 yrs.  HX CORONARY STENT PLACEMENT  2004  
 4 cardiac stents  HX GI  circa 2000 Laparoscopic cholecystectomy. [address] left inguinal hernia repair  HX LUMBAR LAMINECTOMY  11/20/2013 L4 L5 Laminectomy  HX OTHER SURGICAL  2/7/2014 LVATS, bronch, talc pleurodesis, Resection of ruptured bulla  HX TONSILLECTOMY  age 29 Family History Problem Relation Age of Onset  Adopted: Yes  Other Other   
  patient was adopted History reviewed, no pertinent family history. Social History Substance Use Topics  Smoking status: Former Smoker Packs/day: 1.50 Years: 36.00 Types: Cigarettes Quit date: 1/1/1987  Smokeless tobacco: Never Used  Alcohol use No  
 
Allergies Allergen Reactions  Bactrim [Sulfamethoprim] Unknown (comments) FLU LIKE SYMPTOMS  
 Statins-Hmg-Coa Reductase Inhibitors Myalgia  Sulfa (Sulfonamide Antibiotics) Other (comments) Bactrim causes flu like symptoms Current Facility-Administered Medications Medication Dose Route Frequency  fluticasone (FLONASE) 50 mcg/actuation nasal spray 2 Spray  2 Spray Both Nostrils DAILY  QUEtiapine (SEROquel) tablet 50 mg  50 mg Oral QHS  morphine injection 2 mg  2 mg IntraVENous Q4H PRN  
 LORazepam (ATIVAN) tablet 0.5 mg  0.5 mg Oral QHS  LORazepam (ATIVAN) tablet 0.5 mg  0.5 mg Oral Q6H PRN  
 guaiFENesin ER (MUCINEX) tablet 600 mg  600 mg Oral Q12H  
 arformoterol (BROVANA) neb solution 15 mcg  15 mcg Nebulization BID RT  
 sodium chloride (NS) flush 5-10 mL  5-10 mL IntraVENous Q8H  
 sodium chloride (NS) flush 5-10 mL  5-10 mL IntraVENous PRN  
 albuterol-ipratropium (DUO-NEB) 2.5 MG-0.5 MG/3 ML  3 mL Nebulization Q4H RT  
 cetirizine (ZYRTEC) tablet 10 mg  10 mg Oral DAILY  finasteride (PROSCAR) tablet 5 mg  5 mg Oral DAILY  budesonide (PULMICORT) 500 mcg/2 ml nebulizer suspension  500 mcg Nebulization BID RT  
 gabapentin (NEURONTIN) capsule 300 mg  300 mg Oral DAILY WITH LUNCH  
 gabapentin (NEURONTIN) capsule 600 mg  600 mg Oral QHS  pirfenidone tab 801 mg   (Patient Supplied)  801 mg Oral TID  tamsulosin (FLOMAX) capsule 0.4 mg  0.4 mg Oral DAILY  enoxaparin (LOVENOX) injection 40 mg  40 mg SubCUTAneous Q24H  
 aspirin chewable tablet 81 mg  81 mg Oral DAILY  metoprolol tartrate (LOPRESSOR) tablet 12.5 mg  12.5 mg Oral BID  pantoprazole (PROTONIX) tablet 40 mg  40 mg Oral ACB  
 
 
 
 LAB AND IMAGING FINDINGS:  
 
Lab Results Component Value Date/Time WBC 7.6 08/26/2018 01:06 PM  
 HGB 13.2 08/26/2018 01:06 PM  
 PLATELET 932 35/47/6879 01:06 PM  
 
Lab Results Component Value Date/Time Sodium 134 (L) 09/04/2018 04:04 AM  
 Potassium 4.0 09/04/2018 04:04 AM  
 Chloride 99 09/04/2018 04:04 AM  
 CO2 26 09/04/2018 04:04 AM  
 BUN 16 09/04/2018 04:04 AM  
 Creatinine 0.89 09/04/2018 04:04 AM  
 Calcium 8.5 09/04/2018 04:04 AM  
 Magnesium 1.7 09/04/2018 04:04 AM  
 Phosphorus 3.9 09/04/2018 04:04 AM  
  
Lab Results Component Value Date/Time AST (SGOT) 18 01/26/2018 02:19 PM  
 Alk. phosphatase 27 (L) 01/26/2018 02:19 PM  
 Protein, total 7.6 01/26/2018 02:19 PM  
 Albumin 4.3 01/26/2018 02:19 PM  
 Globulin 3.6 02/05/2014 04:36 AM  
 
Lab Results Component Value Date/Time INR 1.0 11/01/2013 04:13 PM  
 Prothrombin time 10.4 11/01/2013 04:13 PM  
  
No results found for: IRON, FE, TIBC, IBCT, PSAT, FERR Lab Results Component Value Date/Time pH 7.39 02/04/2014 02:00 AM  
 PCO2 31 (L) 02/04/2014 02:00 AM  
 PO2 90 02/04/2014 02:00 AM  
 
No components found for: [place] No results found for: CPK, CKMB Total time: 35 
Counseling / coordination time, spent as noted above:30 
> 50% counseling / coordination?: yes Prolonged service was provided for  []30 min   []75 min in face to face time in the presence of the patient, spent as noted above. Time Start:  
Time End:  
Note: this can only be billed with 92455 (initial) or 78857 (follow up). If multiple start / stop times, list each separately.

## 2018-09-07 NOTE — PROGRESS NOTES
Beginning of shift:  Bedside and Verbal shift change report given to Severa Pinks, RN (oncoming nurse) by Kd Alfaro RN (offgoing nurse). Report included the following information SBAR, Kardex, Intake/Output, MAR, Accordion, Recent Results, Med Rec Status and Cardiac Rhythm NSR.  
 
1918  Pt currently has a room full of visitors at his bedside. Introduced self as primary RN. White board updated with this RN's name. 2000  Initial assessment completed.  VSS. Pt currently on Hi Flow at  .  Per pt's wife, he takes his perfinedone with his meals. Perfinedone med given at time of assessment since he had his dinner. Pt denies additional complaints at this time. Plan for the evening discussed with pt and he has verbalized understanding. Bed locked and in low position with call bell within reach.  Instructed him to press call bell when needed. 2052  Pt given his PM medications except for his Seroquel. His wife stated that he would normally take it about 30 minutes after taking his HS Ativan dose. New IV placed and documented. Pt requested to use bedside commode. Prior to leaving the room pt was placed on bedside commode. Pt assisted w/ the help of his wife onto [facility]. Pt became very hypoxic (lower 80s), tachypneic (upper 30s), and tachycardic (120s) while transitioning to sitting, standing, then sitting. During his changes of positions his recovery lasted between 5-10 min w/ pt becoming anxious. Was able to calm pt down by rubbing his back, holding his hand, and verbally walking him through breathing techniques. Once pt was on the commode and comfortable, he and his wife both stated that he would call out when he was finished. 2119  Responded to call bell. Pt assisted off the bedside commode onto the side of the bed. Pt's sats decreased into the high 70s-low 80s, although this time his recovery was shorter than previous.   Adjusted pt back into bed once stable. Again, his sats decreased into the 80s but increased in less then 5 min. Seroquel given at his request.  
 
0012  Entered room to assess pt, he was found resting quietly although he verbalized being upset that he was awaked for a temp check. Wife asked pt if he wished to have a dose of morphine to assist him back to sleep. Pt responded stating \"I would like to give myself more time to try and fall asleep on my own. He stated he would call me if he needed. End of shift:  Bedside and Verbal shift change report given to Brandon Umanzor RN (oncoming nurse) by Ilene Santos RN (offgoing nurse). Report included the following information SBAR, Kardex, Intake/Output, MAR, Accordion, Recent Results, Med Rec Status and Cardiac Rhythm NSR - Sinus Tachy.

## 2018-09-07 NOTE — PROGRESS NOTES
Bedside and Verbal shift change report given to Keith Vidal (oncoming nurse) by Annalise Valerio (offgoing nurse). Report included the following information SBAR, Kardex, Procedure Summary, Intake/Output, MAR, Accordion, Recent Results, Med Rec Status and Cardiac Rhythm NSR.

## 2018-09-07 NOTE — PROGRESS NOTES
Problem: Falls - Risk of 
Goal: *Absence of Falls Document Cathy President Fall Risk and appropriate interventions in the flowsheet. Outcome: Progressing Towards Goal 
Fall Risk Interventions: 
Mobility Interventions: Bed/chair exit alarm Mentation Interventions: Door open when patient unattended Medication Interventions: Patient to call before getting OOB, Evaluate medications/consider consulting pharmacy Elimination Interventions: Call light in reach, Patient to call for help with toileting needs, Urinal in reach History of Falls Interventions: Bed/chair exit alarm, Investigate reason for fall, Utilize gait belt for transfer/ambulation

## 2018-09-07 NOTE — DISCHARGE SUMMARY
This is an interim summary from 09/04 to 09/07 Admission date: 08/25 Admission diagnosis: end stage pulm fibrosis, acute on chronic resp failure Consults: pulm, palliative, hospice 81 yo hx of COPD, CAD, end stage pulm fibrosis on 6L O2, presented w/ resp failure, NSTEMI. Patient remains on high flow O2, and unable to wean. He is now DNR, but \"not ready for hospice yet. \"  Wife is waiting for family members to arrive. Palliative care and Pulm following Dispo: likely inpatient hospice once patient and family is ready Current Meds: 
Current Facility-Administered Medications Medication Dose Route Frequency  fluticasone (FLONASE) 50 mcg/actuation nasal spray 2 Spray  2 Spray Both Nostrils DAILY  QUEtiapine (SEROquel) tablet 50 mg  50 mg Oral QHS  morphine injection 2 mg  2 mg IntraVENous Q4H PRN  
 LORazepam (ATIVAN) tablet 0.5 mg  0.5 mg Oral QHS  LORazepam (ATIVAN) tablet 0.5 mg  0.5 mg Oral Q6H PRN  
 guaiFENesin ER (MUCINEX) tablet 600 mg  600 mg Oral Q12H  
 arformoterol (BROVANA) neb solution 15 mcg  15 mcg Nebulization BID RT  
 sodium chloride (NS) flush 5-10 mL  5-10 mL IntraVENous Q8H  
 sodium chloride (NS) flush 5-10 mL  5-10 mL IntraVENous PRN  
 albuterol-ipratropium (DUO-NEB) 2.5 MG-0.5 MG/3 ML  3 mL Nebulization Q4H RT  
 cetirizine (ZYRTEC) tablet 10 mg  10 mg Oral DAILY  finasteride (PROSCAR) tablet 5 mg  5 mg Oral DAILY  budesonide (PULMICORT) 500 mcg/2 ml nebulizer suspension  500 mcg Nebulization BID RT  
 gabapentin (NEURONTIN) capsule 300 mg  300 mg Oral DAILY WITH LUNCH  
 gabapentin (NEURONTIN) capsule 600 mg  600 mg Oral QHS  pirfenidone tab 801 mg   (Patient Supplied)  801 mg Oral TID  tamsulosin (FLOMAX) capsule 0.4 mg  0.4 mg Oral DAILY  enoxaparin (LOVENOX) injection 40 mg  40 mg SubCUTAneous Q24H  
 aspirin chewable tablet 81 mg  81 mg Oral DAILY  metoprolol tartrate (LOPRESSOR) tablet 12.5 mg  12.5 mg Oral BID  
  pantoprazole (PROTONIX) tablet 40 mg  40 mg Oral ACB

## 2018-09-08 NOTE — PROGRESS NOTES
Beginning of shift:  Bedside and Verbal shift change report given to Wesley Killian RN (oncoming nurse) by Alyssa Davis RN (offgoing nurse). Report included the following information SBAR, Intake/Output, MAR, Med Rec Status and Cardiac Rhythm NSR/ST. 2000  Introduced self as primary RN.  Pt denies having any needs at this time. Currently he has multiple visitors present in the room. 2032  Initial assessment completed.  VSS.  Pt denies additional complaints at this time. Plan for the evening discussed with pt and he has verbalized understanding. He has requested that his PM med, Seroquel be given 30 min after the Ativan. He states that he will notify RN if he needs morphine at bedtime. Bed locked and in low position with call bell within reach.  Instructed him to press call chavez when needed. White board updated with this RN's name. 2105  Approached by pt's wife who states that pt had voided 450 ml of yellow, clear urine and 500 mL earlier during the day. 2218  PM meds given to pt. With assistance, pt boosted up in the bed, well tolerated. 2252  Seroquel 50 mg given to pt.  
 
2346  Morphine given IV at pt's request. 
 
2351  Per wife, pt has voided 150 mL of yellow, clear urine. 0255  Pt continues to sleep. End of shift:  Bedside and Verbal shift change report given to Alyssa Davis RN (oncoming nurse) by Wesley Killian RN (offgoing nurse). Report included the following information SBAR, Intake/Output, Med Rec Status and Cardiac Rhythm NSR.

## 2018-09-08 NOTE — PROGRESS NOTES
Bakari Mejia [facility] [address] 
[address], [place], [address] [facility] 
[phone] Medical Progress Note NAME: Molly Steen :  1935 MRM:  [id number] Date/Time: 2018  7:52 AM 
 
  
Assessment and Plan: 1. Acute on chronic resp failure/hypoxia/end stage pulm fibrosis: Still on high flow O2 at 90%. Patient is DNR, but \"not ready for hospice\". Pulmonary and palliative care following. Cont LABA/ICS, duo nebs prn poor prognosis.  
  
2. NSTEMI/CAD: likely type 2 MI from supply demand. Cont ASA, metoprolol. Evaluated by cardiology 
  
3. 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 No further management due to potential comfort care 
  
4. Anxiety: due to hypoxia, pulm fibrosis. Cont seroquel. Use IV ativan, IV morphine prn 
  
Code: DNR Subjective: Chief Complaint:  Follow up of pt who was admitted with severe respiratory failure. Still using high flow O2. Spoke with pt and wife. Both want to continue current treatment ROS: 
(bold if positive, if negative) Tolerating PT  Tolerating Diet Objective:  
 
Last 24hrs VS reviewed since prior progress note. Most recent are: 
 
Visit Vitals  /63 (BP 1 Location: Right arm, BP Patient Position: At rest)  Pulse 86  Temp 97.4 °F (36.3 °C)  Resp 25  
 Ht 5' 10\" (1.778 m)  Wt 76.4 kg (168 lb 8 oz)  SpO2 91%  BMI 24.18 kg/m2 SpO2 Readings from Last 6 Encounters:  
18 91% 17 96% 10/13/14 94% 14 95% 14 93% 14 96% O2 Flow Rate (L/min): 50 l/min Intake/Output Summary (Last 24 hours) at 18 2398 Last data filed at 18 2234 Gross per 24 hour Intake              720 ml Output              750 ml Net              -30 ml Physical Exam: 
 
Gen:  Well-developed, well-nourished, in no acute distress HEENT:  Pink conjunctivae, PERRL, hearing intact to voice, moist mucous membranes Neck:  Supple, without masses, thyroid non-tender Resp:  No accessory muscle use, rales BL Card:  No murmurs, normal S1, S2 without thrills, bruits or peripheral edema Abd:  Soft, non-tender, non-distended, normoactive bowel sounds are present, no palpable organomegaly and no detectable hernias Lymph:  No cervical or inguinal adenopathy Musc:  No cyanosis or clubbing Skin:  No rashes or ulcers, skin turgor is good Neuro:  Cranial nerves are grossly intact, no focal motor weakness, follows commands appropriately Psych:  Good insight, oriented to person, place and time, alert 
__________________________________________________________________ Medications Reviewed: (see below) Medications:  
 
Current Facility-Administered Medications Medication Dose Route Frequency  fluticasone (FLONASE) 50 mcg/actuation nasal spray 2 Spray  2 Spray Both Nostrils DAILY  QUEtiapine (SEROquel) tablet 50 mg  50 mg Oral QHS  morphine injection 2 mg  2 mg IntraVENous Q4H PRN  
 LORazepam (ATIVAN) tablet 0.5 mg  0.5 mg Oral QHS  LORazepam (ATIVAN) tablet 0.5 mg  0.5 mg Oral Q6H PRN  
 guaiFENesin ER (MUCINEX) tablet 600 mg  600 mg Oral Q12H  
 arformoterol (BROVANA) neb solution 15 mcg  15 mcg Nebulization BID RT  
 sodium chloride (NS) flush 5-10 mL  5-10 mL IntraVENous Q8H  
 sodium chloride (NS) flush 5-10 mL  5-10 mL IntraVENous PRN  
 albuterol-ipratropium (DUO-NEB) 2.5 MG-0.5 MG/3 ML  3 mL Nebulization Q4H RT  
 cetirizine (ZYRTEC) tablet 10 mg  10 mg Oral DAILY  finasteride (PROSCAR) tablet 5 mg  5 mg Oral DAILY  budesonide (PULMICORT) 500 mcg/2 ml nebulizer suspension  500 mcg Nebulization BID RT  
 gabapentin (NEURONTIN) capsule 300 mg  300 mg Oral DAILY WITH LUNCH  
 gabapentin (NEURONTIN) capsule 600 mg  600 mg Oral QHS  pirfenidone tab 801 mg   (Patient Supplied)  801 mg Oral TID  tamsulosin (FLOMAX) capsule 0.4 mg  0.4 mg Oral DAILY  enoxaparin (LOVENOX) injection 40 mg  40 mg SubCUTAneous Q24H  
 aspirin chewable tablet 81 mg  81 mg Oral DAILY  metoprolol tartrate (LOPRESSOR) tablet 12.5 mg  12.5 mg Oral BID  pantoprazole (PROTONIX) tablet 40 mg  40 mg Oral ACB Lab Data Reviewed: (see below) Lab Review: No results for input(s): WBC, HGB, HCT, PLT, HGBEXT, HCTEXT, PLTEXT in the last 72 hours. No results for input(s): NA, K, CL, CO2, GLU, BUN, CREA, CA, MG, PHOS, ALB, TBIL, TBILI, SGOT, ALT, INR in the last 72 hours. No lab exists for component: INREXT Lab Results Component Value Date/Time Glucose (POC) 114 (H) 02/11/2014 11:49 AM  
 Glucose (POC) 173 (H) 02/11/2014 08:26 AM  
 Glucose (POC) 158 (H) 02/10/2014 08:38 PM  
 Glucose (POC) 144 (H) 02/10/2014 04:15 PM  
 Glucose (POC) 156 (H) 02/10/2014 11:08 AM  
 
No results for input(s): PH, PCO2, PO2, HCO3, FIO2 in the last 72 hours. No results for input(s): INR in the last 72 hours. No lab exists for component: INREXT All Micro Results None I have reviewed notes of prior 24hr. Other pertinent lab: Total time spent with patient: 28 Care Plan discussed with: Patient, Family, Nursing Staff and >50% of time spent in counseling and coordination of care Discussed:  Care Plan Prophylaxis:  Lovenox Disposition:  poor prognosis 
        
___________________________________________________ Attending Physician: Sonja Goodman MD

## 2018-09-08 NOTE — PROGRESS NOTES
SHIFT CHANGE: 
8:13 AM Report received from Marylene Hoots, RN. SBAR, Kardex, Procedure Summary, Intake/Output, MAR, Recent Results, Med Rec Status and Cardiac Rhythm NSR/ST were discussed. SHIFT SUMMARY: 
9:02 AM  Patient up and eating breakfast this morning. Agreeable to take medications. Second breakfast ordered. 7:00 PM  Uneventful shift. Patient has tolerated hi-flow all day. END OF SHIFT REPORT: 
7:16 PM Bedside and Verbal shift change report given to Marylene Hoots, RN (oncoming nurse) by Tanvi Ying RN (offgoing nurse). Report included the following information SBAR, Kardex, Procedure Summary, Intake/Output, MAR, Recent Results, Med Rec Status and Cardiac Rhythm NSR.

## 2018-09-09 NOTE — PROGRESS NOTES
SHIFT CHANGE: 
7:22 AM Report received from Barrie Zaman RN. SBAR, Kardex, Procedure Summary, Intake/Output, MAR, Recent Results, Med Rec Status and Cardiac Rhythm NSR/ST were discussed. SHIFT SUMMARY: 
10:00 AM  Patient's Hi-flow settings changed by respiratory from 50L down to 20L. Patient now with O2 sats dropping into the 80's at rest.  Increased to 30L and will monitor. 3:30 PM  Patient states he feels like he isn't getting as much air through the hi-flow cannula. O2 sats in the low 80's. Increased to 40L and contacted respiratory to come check the hi-flow meter to make sure its functioning properly. END OF SHIFT REPORT: 
7:24 PM  Bedside and Verbal shift change report given to Barrie Zaman RN (oncoming nurse) by Aayush Rose RN (offgoing nurse). Report included the following information SBAR, Kardex, Procedure Summary, Intake/Output, MAR, Recent Results, Med Rec Status and Cardiac Rhythm NSR/ST.

## 2018-09-09 NOTE — PROGRESS NOTES
[facility]ak[facility] Mej[facility] 
[address] 
[phone] Medical Progress Note NAME: Alexi Avendano :  1935 MRM:  [id number] Date/Time: 2018  7:52 AM 
 
  
Assessment and Plan: 1. Acute on chronic resp failure/hypoxia/end stage pulm fibrosis: Still on high flow O2 at 90%. Patient is DNR, but \"not ready for hospice\". Pulmonary and palliative care following. Cont LABA/ICS, duo nebs prn poor prognosis.  
  
2. NSTEMI/CAD: likely type 2 MI from supply demand. Cont ASA, metoprolol. Evaluated by cardiology 
  
3. 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 No further management due to potential comfort care 
  
4. Anxiety: due to hypoxia, pulm fibrosis. Cont seroquel. Use IV ativan, IV morphine prn 
  
Code: DNR Subjective: Chief Complaint:  Follow up of pt who was admitted with severe respiratory failure. Still using high flow O2. Spoke with pt and wife. Both want to continue current treatment ROS: 
(bold if positive, if negative) Tolerating PT  Tolerating Diet Objective:  
 
Last 24hrs VS reviewed since prior progress note. Most recent are: 
 
Visit Vitals  /48 (BP 1 Location: Right arm, BP Patient Position: At rest;Supine; Head of bed elevated (Comment degrees))  Pulse 81  Temp 98 °F (36.7 °C)  Resp 24  
 Ht 5' 10\" (1.778 m)  Wt 76.4 kg (168 lb 8 oz)  SpO2 91%  BMI 24.18 kg/m2 SpO2 Readings from Last 6 Encounters:  
18 91% 17 96% 10/13/14 94% 14 95% 14 93% 14 96% O2 Flow Rate (L/min): 50 l/min Intake/Output Summary (Last 24 hours) at 18 2151 Last data filed at 18 2337 Gross per 24 hour Intake             1320 ml Output              650 ml Net              670 ml Physical Exam: 
 
Gen:  Well-developed, well-nourished, in no acute distress HEENT:  Pink conjunctivae, PERRL, hearing intact to voice, moist mucous membranes Neck:  Supple, without masses, thyroid non-tender Resp:  No accessory muscle use, rales BL Card:  No murmurs, normal S1, S2 without thrills, bruits or peripheral edema Abd:  Soft, non-tender, non-distended, normoactive bowel sounds are present, no palpable organomegaly and no detectable hernias Lymph:  No cervical or inguinal adenopathy Musc:  No cyanosis or clubbing Skin:  No rashes or ulcers, skin turgor is good Neuro:  Cranial nerves are grossly intact, no focal motor weakness, follows commands appropriately Psych:  Good insight, oriented to person, place and time, alert 
__________________________________________________________________ Medications Reviewed: (see below) Medications:  
 
Current Facility-Administered Medications Medication Dose Route Frequency  fluticasone (FLONASE) 50 mcg/actuation nasal spray 2 Spray  2 Spray Both Nostrils DAILY  QUEtiapine (SEROquel) tablet 50 mg  50 mg Oral QHS  morphine injection 2 mg  2 mg IntraVENous Q4H PRN  
 LORazepam (ATIVAN) tablet 0.5 mg  0.5 mg Oral QHS  LORazepam (ATIVAN) tablet 0.5 mg  0.5 mg Oral Q6H PRN  
 guaiFENesin ER (MUCINEX) tablet 600 mg  600 mg Oral Q12H  
 arformoterol (BROVANA) neb solution 15 mcg  15 mcg Nebulization BID RT  
 sodium chloride (NS) flush 5-10 mL  5-10 mL IntraVENous Q8H  
 sodium chloride (NS) flush 5-10 mL  5-10 mL IntraVENous PRN  
 albuterol-ipratropium (DUO-NEB) 2.5 MG-0.5 MG/3 ML  3 mL Nebulization Q4H RT  
 cetirizine (ZYRTEC) tablet 10 mg  10 mg Oral DAILY  finasteride (PROSCAR) tablet 5 mg  5 mg Oral DAILY  budesonide (PULMICORT) 500 mcg/2 ml nebulizer suspension  500 mcg Nebulization BID RT  
 gabapentin (NEURONTIN) capsule 300 mg  300 mg Oral DAILY WITH LUNCH  
 gabapentin (NEURONTIN) capsule 600 mg  600 mg Oral QHS  pirfenidone tab 801 mg   (Patient Supplied)  801 mg Oral TID  tamsulosin (FLOMAX) capsule 0.4 mg  0.4 mg Oral DAILY  enoxaparin (LOVENOX) injection 40 mg  40 mg SubCUTAneous Q24H  
 aspirin chewable tablet 81 mg  81 mg Oral DAILY  metoprolol tartrate (LOPRESSOR) tablet 12.5 mg  12.5 mg Oral BID  pantoprazole (PROTONIX) tablet 40 mg  40 mg Oral ACB Lab Data Reviewed: (see below) Lab Review: No results for input(s): WBC, HGB, HCT, PLT, HGBEXT, HCTEXT, PLTEXT, HGBEXT, HCTEXT, PLTEXT in the last 72 hours. No results for input(s): NA, K, CL, CO2, GLU, BUN, CREA, CA, MG, PHOS, ALB, TBIL, TBILI, SGOT, ALT, INR in the last 72 hours. No lab exists for component: INREXT, INREXT Lab Results Component Value Date/Time Glucose (POC) 114 (H) 02/11/2014 11:49 AM  
 Glucose (POC) 173 (H) 02/11/2014 08:26 AM  
 Glucose (POC) 158 (H) 02/10/2014 08:38 PM  
 Glucose (POC) 144 (H) 02/10/2014 04:15 PM  
 Glucose (POC) 156 (H) 02/10/2014 11:08 AM  
 
No results for input(s): PH, PCO2, PO2, HCO3, FIO2 in the last 72 hours. No results for input(s): INR in the last 72 hours. No lab exists for component: INREXT, INREXT All Micro Results None I have reviewed notes of prior 24hr. Other pertinent lab: Total time spent with patient: 28 Care Plan discussed with: Patient, Family, Nursing Staff and >50% of time spent in counseling and coordination of care Discussed:  Care Plan Prophylaxis:  Lovenox Disposition:  poor prognosis 
        
___________________________________________________ Attending Physician: Danae Menendez MD

## 2018-09-09 NOTE — PROGRESS NOTES
Beginning of shift:  Bedside and Verbal shift change report given to Bakari Feng RN (oncoming nurse) by Geneva Ventura RN (offgoing nurse). Report included the following information SBAR, Kardex and Cardiac Rhythm NSR.  
 
1950  Initial assessment completed. Introduced self as primary RN.  VSS.  Pt's wife has requested that once they are asleep, they would like for the lights not to be turned on. Pt denies additional complaints at this time. Plan for the evening discussed with pt and he has verbalized understanding. Bed locked and in low position with call bell within reach.  Instructed him to press call chavez when needed. White board updated with this RN's name. RT arrived to place pt on breathing treatment. 2112  PM meds given w/o complications. Pt and wife stated he feels a little more anxious tonight. He states he may need the Morphine around bedtime. 2231  Pt called out requesting Morphine. Morphine 2 mg IV given. Pt also requested urinal.  Wife to notify RN w/ output total. 
 
2128  Received call from  that pt's sats had dropped into the 70's. RT at the bedside to administer breathing treatment and was adjusting his hi flow in response to pt's low sats. RT increased pt's hi flow to 50L from 40L, but sats remained in the mid to low 80's. Eventually, hi flow increased to 60L and pt is now sating in the low to mid 90's. 0015  Pt asleep in bed. Wife also asleep in the recliner. 0110  Pt called out asking for assistance to be repositioned in the bed. 
 
0125  With assistance, pt repositioned and boosted in the bed. O2 low, remained in the low 80's and 70's prior to and during repositioning. Pt refuses to use the bipap, states he's not able to tolerate mask. Pt recovered into the 90's about 5-10 min after repositioning. 3201  Pt's wife requested assistance in boosting pt up in the bed. Pt sats had been low again. Pt much more anxious when approached.   Asking that he be given time before being moved. Took patient a little longer this time recover. He became hypoxic w/ sats remaining in the high 70's-80's. After about 10 min from repositioning pt was able to reach sats above 90's. 5310  Pt requested medication for pt's anxiety. End of shift:  Bedside and Verbal shift change report given to ROZINA Warren (oncoming nurse) by Ruth Geronimo RN (offgoing nurse). Report included the following information SBAR, Kardex, Procedure Summary, Intake/Output, MAR, Accordion, Recent Results, Med Rec Status and Cardiac Rhythm NSR.

## 2018-09-10 NOTE — PROGRESS NOTES
Palliative Medicine Code Status: DNR Advance Care Planning: 
Advance Care Planning 2018 Patient's Healthcare Decision Maker is: Named in scanned ACP document Primary Decision Maker Name Hunter Rosa Primary Decision Maker Phone Number [phone] Primary Decision Maker Relationship to Patient Spouse Secondary Decision Maker Name Andre Vences and/or dtr Sandralee Layer Secondary Decision Maker Phone Number [phone], [phone] Confirm Advance Directive Yes, on file Patient / Family Encounter Documentation Participants (names): Pt, wife Belle Lynn, dtr Ry Little, Palliative Medicine (Dr. Maged Brown, [address]) Narrative: Pt was awake in bed on hi-flow O2, wife and dtr at bedside. Pt remains alert and oriented, able to engage in conversation without apparent difficulty. Pt shared that family and close friends had been able to visit over the weekend, which was a source of considerable mak. Pt and wife requested that Dr. Maged Brown speak with a physician friend of pt's who will be coming to town tomorrow. Psychosocial Issues Identified/ Resilience Factors: Pt shared that in addition to desired visitors he is also receiving visits from neighbors and others, which can be tiresome. Pt is not interested in posting signs to limit visitation at this time. Goals of Care / Plan: Pt again stated he is not mentally ready to transition to comfort measures, stated he is still able to interact in meaningful way, can smile and joke, spend time with his loved ones. Pt and family are very appreciative of the care provided by staff. SW will continue to follow for support. Discussed with Pulmonary PA, RN, Care Manager. Thank you for including Palliative Medicine in the care of Mr. Nash Lazaro. Juliet  MADI Cabrera, [facility]- 
[phone])

## 2018-09-10 NOTE — PROGRESS NOTES
Chart reviewed and noted that RT worked with patient yesterday. HF decreased from 50L to 20L by RT yesterday. Patient did not tolerate well and became increasing hypoxic and had a difficult time recovering for most of the afternoon and evening. Palliative care team continues to follow. Current respiratory needs cannot be met in the home or in a SNF. I will continue to follow and offer support to patient/family.  CHUCK Donato

## 2018-09-10 NOTE — PROGRESS NOTES
[facility]ak[facility] Mejia [facility] 
[address] [facility], [place], [address] [facility] 
[phone] Medical Progress Note NAME: Andreia Adams :  1935 MRM:  [id number] Date/Time: 9/10/2018  7:52 AM 
 
  
Assessment and Plan: 1. Acute on chronic resp failure/hypoxia/end stage pulm fibrosis: Still on high flow O2 at 90%. Patient is DNR, but \"not ready for hospice\". Pulmonary and palliative care following. Cont LABA/ICS, duo nebs prn poor prognosis.  
  
2. NSTEMI/CAD: likely type 2 MI from supply demand. Cont ASA, metoprolol. Evaluated by cardiology 
  
3. 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 No further management due to potential comfort care 
  
4. Anxiety: due to hypoxia, pulm fibrosis. Cont seroquel. Use IV ativan, IV morphine prn 
  
Code: DNR Subjective: Chief Complaint:  Follow up of pt who was admitted with severe respiratory failure. Still using high flow O2. ROS: 
(bold if positive, if negative) Tolerating PT  Tolerating Diet Objective:  
 
Last 24hrs VS reviewed since prior progress note. Most recent are: 
 
Visit Vitals  /56 (BP 1 Location: Right arm, BP Patient Position: At rest;Supine; Head of bed elevated (Comment degrees))  Pulse 88  Temp 98.4 °F (36.9 °C)  Resp (!) 38  
 Ht 5' 10\" (1.778 m)  Wt 76.4 kg (168 lb 8 oz)  SpO2 93%  BMI 24.18 kg/m2 SpO2 Readings from Last 6 Encounters:  
09/10/18 93% 17 96% 10/13/14 94% 14 95% 14 93% 14 96% O2 Flow Rate (L/min): 60 l/min Intake/Output Summary (Last 24 hours) at 09/10/18 6396 Last data filed at 09/10/18 6496 Gross per 24 hour Intake             1080 ml Output              725 ml Net              355 ml Physical Exam: 
 
Gen:  Well-developed, well-nourished, in no acute distress HEENT:  Pink conjunctivae, PERRL, hearing intact to voice, moist mucous membranes Neck:  Supple, without masses, thyroid non-tender Resp:  No accessory muscle use, rales BL Card:  No murmurs, normal S1, S2 without thrills, bruits or peripheral edema Abd:  Soft, non-tender, non-distended, normoactive bowel sounds are present, no palpable organomegaly and no detectable hernias Lymph:  No cervical or inguinal adenopathy Musc:  No cyanosis or clubbing Skin:  No rashes or ulcers, skin turgor is good Neuro:  Cranial nerves are grossly intact, no focal motor weakness, follows commands appropriately Psych:  Good insight, oriented to person, place and time, alert 
__________________________________________________________________ Medications Reviewed: (see below) Medications:  
 
Current Facility-Administered Medications Medication Dose Route Frequency  fluticasone (FLONASE) 50 mcg/actuation nasal spray 2 Spray  2 Spray Both Nostrils DAILY  QUEtiapine (SEROquel) tablet 50 mg  50 mg Oral QHS  morphine injection 2 mg  2 mg IntraVENous Q4H PRN  
 LORazepam (ATIVAN) tablet 0.5 mg  0.5 mg Oral QHS  LORazepam (ATIVAN) tablet 0.5 mg  0.5 mg Oral Q6H PRN  
 guaiFENesin ER (MUCINEX) tablet 600 mg  600 mg Oral Q12H  
 arformoterol (BROVANA) neb solution 15 mcg  15 mcg Nebulization BID RT  
 sodium chloride (NS) flush 5-10 mL  5-10 mL IntraVENous Q8H  
 sodium chloride (NS) flush 5-10 mL  5-10 mL IntraVENous PRN  
 albuterol-ipratropium (DUO-NEB) 2.5 MG-0.5 MG/3 ML  3 mL Nebulization Q4H RT  
 cetirizine (ZYRTEC) tablet 10 mg  10 mg Oral DAILY  finasteride (PROSCAR) tablet 5 mg  5 mg Oral DAILY  budesonide (PULMICORT) 500 mcg/2 ml nebulizer suspension  500 mcg Nebulization BID RT  
 gabapentin (NEURONTIN) capsule 300 mg  300 mg Oral DAILY WITH LUNCH  
 gabapentin (NEURONTIN) capsule 600 mg  600 mg Oral QHS  pirfenidone tab 801 mg   (Patient Supplied)  801 mg Oral TID  tamsulosin (FLOMAX) capsule 0.4 mg  0.4 mg Oral DAILY  enoxaparin (LOVENOX) injection 40 mg  40 mg SubCUTAneous Q24H  aspirin chewable tablet 81 mg  81 mg Oral DAILY  metoprolol tartrate (LOPRESSOR) tablet 12.5 mg  12.5 mg Oral BID  pantoprazole (PROTONIX) tablet 40 mg  40 mg Oral ACB Lab Data Reviewed: (see below) Lab Review: No results for input(s): WBC, HGB, HCT, PLT, HGBEXT, HCTEXT, PLTEXT, HGBEXT, HCTEXT, PLTEXT in the last 72 hours. No results for input(s): NA, K, CL, CO2, GLU, BUN, CREA, CA, MG, PHOS, ALB, TBIL, TBILI, SGOT, ALT, INR in the last 72 hours. No lab exists for component: INREXT, INREXT Lab Results Component Value Date/Time Glucose (POC) 114 (H) 02/11/2014 11:49 AM  
 Glucose (POC) 173 (H) 02/11/2014 08:26 AM  
 Glucose (POC) 158 (H) 02/10/2014 08:38 PM  
 Glucose (POC) 144 (H) 02/10/2014 04:15 PM  
 Glucose (POC) 156 (H) 02/10/2014 11:08 AM  
 
No results for input(s): PH, PCO2, PO2, HCO3, FIO2 in the last 72 hours. No results for input(s): INR in the last 72 hours. No lab exists for component: INREXT, INREXT All Micro Results None I have reviewed notes of prior 24hr. Other pertinent lab: Total time spent with patient: 28 Care Plan discussed with: Patient, Family, Nursing Staff and >50% of time spent in counseling and coordination of care Discussed:  Care Plan Prophylaxis:  Lovenox Disposition:  poor prognosis 
        
___________________________________________________ Attending Physician: Pattie Ortega MD

## 2018-09-10 NOTE — PROGRESS NOTES
Patient complaining of being exhausted and would love to just get five straight hour of rest. Morning medications held at this time per patients request. Will administer medications as he feels able and willing.

## 2018-09-10 NOTE — PROGRESS NOTES
Beginning of shift:  Bedside and Verbal shift change report given to Kaden Feldman RN (oncoming nurse) by ROZINA Warren (offgoing nurse). Report included the following information SBAR and Kardex. 1939  Introduced self as primary RN.  He has 3 visitors in his room at this time. Pt denies additional complaints at this time. White board updated with this RN's name. 1955  Responded to call bell and pt's request for anxiety medication. Pt's wife is at the bedside. Initial assessment completed. VSS.  PM meds given along w/ Ativan. Plan for the evening discussed with pt and he has verbalized understanding. Bed locked and in low position with call bell within reach.  Instructed him to press call bell when needed. 2035  Per RT, pt placed on BiPap and is currently tolerating it well. 2052  Per RT, pt taken off BiPap and placed back on the Hi Flow NC @ 60L 100%. 2229  Per pt's wife, pt requested Morphine. 0010  Per RT, pt had requested \"something for sleep\". 0015  MAR reviewed, pt was given Ativan IV 0.5 mg at 2000, Seroquel 50 mg PO at 2100, and Morphine 2 mg IV at 2230. Went to pt's room to explain that it would not be safe to administer more sedating medication, but he and his wife were both asleep. Will continue to monitor. 0043  O2 sats not showing on monitor. Pt's pulse ox had fallen off his ear. New pulse ox monitor placed. O2 at 91%. Pt's wife had inquired if he was able to or not take something for sleep. Informed her that at this time, it would not be safe to give him something d/t the multiple sedating meds he's received recently. She verbalized understanding. End of shift:  Bedside and Verbal shift change report given to ROZINA Jimenez (oncoming nurse) by Kaden Feldman RN (offgoing nurse). Report included the following information SBAR, Kardex, Intake/Output, MAR, Recent Results, Med Rec Status and Cardiac Rhythm NSR.

## 2018-09-10 NOTE — PROGRESS NOTES
Name: [address] [place] [address] East: [address]  
: 1935 Admit Date: 2018 Phone: [phone]  Room: [facility]01 PCP: Kathy Kimble MD  MRN: [id number] Date: 9/10/2018  Code: DNR Chart and notes reviewed. Data reviewed. I review the patient's current medications in the medical record at each encounter. I have evaluated and examined the patient. Overnight events Afebrile Sats 92% on HF 50L FiO2 100% Increased resp distress/WOB overnight HF decreased from 50L to 20L by RT yesterday. Patient did not tolerate well and became increasing hypoxic and had a difficult time recovering for most of the afternoon and evening. ROS: Continues to report the feeling of not getting encough air. Still not able to significantly wean HF. Had a rough night, wife thinks due to issues with HF wean yesterday afternoon. Still with SOB at rest, better with HF. Has not tolerated BiPAP well for the most part. ROB with worsening hypoxia mostly unchanged. Desats with even minimal activity including being repositioned in the bed. Denies CP. Denies fever or chills. Continues with anxiety. Past Medical History:  
Diagnosis Date  CAD (coronary artery disease)   
 4 stents placed in heart  Chronic obstructive pulmonary disease ([facility] EMPHYSEMA SEEN ON CXR  
 Colon polyp Three tubular adenomas excised colonoscopically on 3/17/2011.  COPD (chronic obstructive pulmonary disease) ([facility] 2014  Diverticulosis of colon (without mention of hemorrhage)  DM (diabetes mellitus) ([facility] 4/3/2012  GERD (gastroesophageal reflux disease) [address] [facility] [facility] [address]  History of pneumothorax 2014  Hypercholesteremia  Pneumonia   PUD (peptic ulcer disease)   Pulmonary fibrosis ([facility] Past Surgical History:  
Procedure Laterality Date  ENDOSCOPY, COLON, DIAGNOSTIC  3/2011 Polyps. Tics. Rep 3 yrs.  HX CORONARY STENT PLACEMENT   4 cardiac stents  HX GI  circa 2000 Laparoscopic cholecystectomy. [address] [place] [address] left inguinal hernia repair  HX LUMBAR LAMINECTOMY  11/20/2013 L4 L5 Laminectomy  HX OTHER SURGICAL  2/7/2014 LVATS, bronch, talc pleurodesis, Resection of ruptured bulla  HX TONSILLECTOMY  age 29 Family History Problem Relation Age of Onset  Adopted: Yes  Other Other   
  patient was adopted Social History Substance Use Topics  Smoking status: Former Smoker Packs/day: 1.50 Years: 36.00 Types: Cigarettes Quit date: 1/1/1987  Smokeless tobacco: Never Used  Alcohol use No  
 
 
Allergies Allergen Reactions  Bactrim [Sulfamethoprim] Unknown (comments) FLU LIKE SYMPTOMS  
 Statins-Hmg-Coa Reductase Inhibitors Myalgia  Sulfa (Sulfonamide Antibiotics) Other (comments) Bactrim causes flu like symptoms Current Facility-Administered Medications Medication Dose Route Frequency  fluticasone (FLONASE) 50 mcg/actuation nasal spray 2 Spray  2 Spray Both Nostrils DAILY  QUEtiapine (SEROquel) tablet 50 mg  50 mg Oral QHS  morphine injection 2 mg  2 mg IntraVENous Q4H PRN  
 LORazepam (ATIVAN) tablet 0.5 mg  0.5 mg Oral QHS  LORazepam (ATIVAN) tablet 0.5 mg  0.5 mg Oral Q6H PRN  
 guaiFENesin ER (MUCINEX) tablet 600 mg  600 mg Oral Q12H  
 arformoterol (BROVANA) neb solution 15 mcg  15 mcg Nebulization BID RT  
 sodium chloride (NS) flush 5-10 mL  5-10 mL IntraVENous Q8H  
 sodium chloride (NS) flush 5-10 mL  5-10 mL IntraVENous PRN  
 albuterol-ipratropium (DUO-NEB) 2.5 MG-0.5 MG/3 ML  3 mL Nebulization Q4H RT  
 cetirizine (ZYRTEC) tablet 10 mg  10 mg Oral DAILY  finasteride (PROSCAR) tablet 5 mg  5 mg Oral DAILY  budesonide (PULMICORT) 500 mcg/2 ml nebulizer suspension  500 mcg Nebulization BID RT  
 gabapentin (NEURONTIN) capsule 300 mg  300 mg Oral DAILY WITH LUNCH  
  gabapentin (NEURONTIN) capsule 600 mg  600 mg Oral QHS  pirfenidone tab 801 mg   (Patient Supplied)  801 mg Oral TID  tamsulosin (FLOMAX) capsule 0.4 mg  0.4 mg Oral DAILY  enoxaparin (LOVENOX) injection 40 mg  40 mg SubCUTAneous Q24H  
 aspirin chewable tablet 81 mg  81 mg Oral DAILY  metoprolol tartrate (LOPRESSOR) tablet 12.5 mg  12.5 mg Oral BID  pantoprazole (PROTONIX) tablet 40 mg  40 mg Oral ACB REVIEW OF SYSTEMS  
12 point ROS negative except as stated in the HPI. Physical Exam:  
Visit Vitals  /66 (BP 1 Location: Right arm, BP Patient Position: At rest;Supine)  Pulse 84  Temp 97.4 °F (36.3 °C)  Resp 24  
 Ht 5' 10\" (1.778 m)  Wt 76.4 kg (168 lb 8 oz)  SpO2 92%  BMI 24.18 kg/m2 General:  Alert, ill appearing/frail, appears stated age, on hi flow, dyspneic with speaking Head:  Normocephalic, without obvious abnormality, atraumatic. Eyes:  Conjunctivae/corneas clear. Nose: Nares normal. Septum midline. Mucosa normal.   
Throat: Lips, mucosa, and tongue normal.   
Neck: Supple, symmetrical, trachea midline, no adenopathy. Lungs:   Diminished air movement with bibasilar crackles. + increased WOB and accessory muscle use. Chest wall:  No tenderness or deformity. Heart:  Regular rate and rhythm, S1, S2 normal, no murmur, click, rub or gallop. Abdomen:   Soft, non-tender. Bowel sounds normal.   
Extremities: Extremities normal, atraumatic, no cyanosis or edema. Pulses: 2+ and symmetric all extremities. Skin: Skin color, texture, turgor normal. No rashes or lesions Lymph nodes: Cervical, supraclavicular nodes normal.  
Neurologic: Moves all extremities, no gross focal deficits. Very weak/debilitated. Lab Results Component Value Date/Time  Sodium 134 (L) 09/04/2018 04:04 AM  
 Potassium 4.0 09/04/2018 04:04 AM  
 Chloride 99 09/04/2018 04:04 AM  
 CO2 26 09/04/2018 04:04 AM  
 BUN 16 09/04/2018 04:04 AM  
 Creatinine 0.89 09/04/2018 04:04 AM  
 Glucose 160 (H) 09/04/2018 04:04 AM  
 Calcium 8.5 09/04/2018 04:04 AM  
 Magnesium 1.7 09/04/2018 04:04 AM  
 Phosphorus 3.9 09/04/2018 04:04 AM  
 
 
Lab Results Component Value Date/Time WBC 7.6 08/26/2018 01:06 PM  
 HGB 13.2 08/26/2018 01:06 PM  
 PLATELET 484 81/90/1875 01:06 PM  
 MCV 96.2 08/26/2018 01:06 PM  
 
 
Lab Results Component Value Date/Time INR 1.0 11/01/2013 04:13 PM  
 AST (SGOT) 18 01/26/2018 02:19 PM  
 Alk. phosphatase 27 (L) 01/26/2018 02:19 PM  
 Protein, total 7.6 01/26/2018 02:19 PM  
 Albumin 4.3 01/26/2018 02:19 PM  
 Globulin 3.6 02/05/2014 04:36 AM  
 
 
No results found for: IRON, FE, TIBC, IBCT, PSAT, FERR Lab Results Component Value Date/Time Sed rate (ESR) 9 07/22/2015 12:34 PM  
 TSH 3.230 03/10/2015 02:51 PM  
  
 
No results found for: PH, PHI, PCO2, PCO2I, PO2, PO2I, HCO3, HCO3I, FIO2, FIO2I Lab Results Component Value Date/Time Troponin-I, Qt. 4.46 (H) 08/27/2018 12:30 AM  
  
 
Lab Results Component Value Date/Time Culture result:  02/04/2014 05:20 AM  
  MRSA NOT PRESENT Screening of patient nares for MRSA is for surveillance purposes and, if positive, to facilitate isolation considerations in high risk settings. It is not intended for automatic decolonization interventions per se as regimens are not sufficiently effective to warrant routine use. Culture result:  11/01/2013 03:55 PM  
  MRSA NOT PRESENT Apparent Staphylococcus aureus (not MRSA) noted. Screening of patient nares for MRSA is for surveillance purposes and, if positive, to facilitate isolation considerations in high risk settings. It is not intended for automatic decolonization interventions per se as regimens are not sufficiently effective to warrant routine use. No results found for: TOXA1, RPR, HBCM, HBSAG, HAAB, HCAB1, HAAT, G6PD, CRYAC, HIVGT, HIVR, HIV1, HIV12, HIVPC, HIVRPI No results found for: ABNER PEREZK 
 
 Lab Results Component Value Date/Time Color YELLOW/STRAW 02/04/2014 08:30 AM  
 Appearance CLEAR 02/04/2014 08:30 AM  
 pH (UA) 5.0 02/04/2014 08:30 AM  
 Protein TRACE (A) 02/04/2014 08:30 AM  
 Glucose 250 (A) 02/04/2014 08:30 AM  
 Ketone NEGATIVE  02/04/2014 08:30 AM  
 Bilirubin NEGATIVE  02/04/2014 08:30 AM  
 Blood NEGATIVE  02/04/2014 08:30 AM  
 Urobilinogen 0.2 02/04/2014 08:30 AM  
 Nitrites NEGATIVE  02/04/2014 08:30 AM  
 Leukocyte Esterase NEGATIVE  02/04/2014 08:30 AM  
 WBC 0-4 02/04/2014 08:30 AM  
 RBC 0-5 02/04/2014 08:30 AM  
 Bacteria NEGATIVE  02/04/2014 08:30 AM  
 
 
Images: no new images this morning; personally visualized and reviewed report CXR (8/30/18): Slight increase in bibasilar interstitial/airspace opacities and probable small effusions. IMPRESSION 
· Acute on chronic hypoxic respiratory failure · Advanced end stage pulmonary fibrosis · COPD 
· NSTEMI/CAD · Severe pulmonary HTN on ECHO · DM 
 
PLAN 
· Continue HF O2 and wean flow and FiO2 as able to keep sats > 85%. Have not been able to wean flow or FiO2 significantly and patient has not tolerated BiPAP well. · IV steroids discontinued · Continue Duonebs scheduled and Brovana/Pulmicort nebs. · Continue home Esbriet · Metoprolol per Cardiology. No additional recs at this time · Appreciate eval by hospice and Palliative Care team.  Patient with significant increase in baseline O2 requirements since last year and now, unable to even keep outpatient appointments due to severe hypoxia. Continue Ativan and morphine prn. · Daily discussions with patient and wife, who are both aware of the severity of his decline. Patient not yet ready to make the decision to proceed with. Additional family and friends coming this weekend. Patient and wife aware that it is unlikely that we will be able decrease his O2 requirement to a level acceptable for discharge to home. · Patient's wife earlier this admission asked about treating his pulmonary HTN. Discussed that sildenafil is indicated for primary pulmonary HTN and there isn't evidence that it provides significant benefit in secondary pulmonary HTN. She also asked about adding Ofev to current Esbriet therapy. Discussed that neither of these medications will reverse his pulmonary fibrosis or improve his current lung function, but aim to slow the progression. · Palliative Care assistance and support greatly appreciated · GI prophylaxis: Protonix · DVT prophylaxis: Lovenox Patient critically ill requiring continuous HF O2 due to severe hypoxia and respiratory distress and is high risk for continued decompensation.   Discussed with nursing, RT, and Palliative Care team.   
 
MAMIE Du

## 2018-09-10 NOTE — PROGRESS NOTES
Physical Therapy 1508 Chart reviewed, spoke with RN. Pt not tolerating attempts to decrease hi-flow O2 with difficultly recovering. Per RN pt O2 desats into 70's with minimal activity,i.e. repositioning in bed. Will defer PT today. PT will continue to follow Temo Rahman

## 2018-09-10 NOTE — PROGRESS NOTES
Palliative Medicine Consult David: [phone]) Patient Name: Jose Angel Perea YOB: 1935 Date of Initial Consult: 8/27/18 Reason for Consult: Goals-of-care Requesting Provider: Niharika Ortiz MD 
Primary Care Physician: Guicho Burns MD 
 
 SUMMARY:  
Jose Angel Perea is a 80 y.o. with a end-stage COPD and pulmonary fibrosis, O2-dependent at 20L/minute at baseline; CAD who was admitted on 8/25/2018 from home with a diagnosis of acute-on-chronic respiratory failure and NSTEMI due to tachycardia and hypoxia. Current medical issues leading to Palliative Medicine involvement include: shortness of breath, anxiety/agitation, confusion in setting of steroids and hypoxia, goals of care. PALLIATIVE DIAGNOSES:  
1. Shortness of breath 2. Anxiety 3. Debility 4. Goals of care 5. Pulmonary fibrosis PLAN:  
1. Sandor Funk and I met with patient and spouse. Patient was able to visit with lots of friends on Friday/Saturday. Yesterday, attempt made to wean HFNC but overall did not go well and took long time to recover. He is now back on HFNC at 60 liters with FIO2 of 100%. He a little frustrated with lots of visitors/being awakened but overall very complimentary of the care he is being given. 2. GOC-he is not ready to make transition to comfort care/hospice. He wants to continue attempts at weaning the HFNC to a number that is acceptable for home. We all agree that is not likely but he is jsut not ready to stop current therapy. He would like for us to talk with his friend who is a physician(Ozzie) who will be here tomorrow at 2 PM. We agreed to talk with him 3. AMD-spouse remains MPOA and AMD in the chart 4. Code status-DNR per prior discussions. If he survives this stay, will need DDNR at discharge 5. Symptom management-he continues to have prn ativan and morphine for anxiety and SOB. Used 1 dose of morphine 2 mg over the next 24 hours.  Also with seroquel at night. One dose of ativan uses 6. Psychosocial-patient states just not \"mentally ready\" to make transition to hospice care. Will need ongoing support for patient and family in this difficult decision/process 7. Discussed with bedside nurse(April), Pulmonary and CM. He knows a sign can be placed on the door to limit visitors. 8. Communicated plan of care with: Palliative IDT 
 
 
 GOALS OF CARE / TREATMENT PREFERENCES:  
 
GOALS OF CARE: continue current therapies with goal of returning home TREATMENT PREFERENCES:  
Code Status: DNR Advance Care Planning: 
Advance Care Planning 2018 Patient's Healthcare Decision Maker is: Named in scanned ACP document Primary Decision Maker Name Stephanie Moncada Primary Decision Maker Phone Number [phone] Primary Decision Maker Relationship to Patient Spouse Secondary Decision Maker Name Andre Albert and/or dtr Leeanne Salas Secondary Decision Maker Phone Number [phone], [phone] Confirm Advance Directive Yes, on file Other Instructions: Other: As far as possible, the palliative care team has discussed with patient / health care proxy about goals of care / treatment preferences for patient. HISTORY:  
 
History obtained from: patient, wife, chart CHIEF COMPLAINT: anxiety HPI/SUBJECTIVE: The patient is:  
[x] Verbal and participatory -  
[] Non-participatory due to:  
 
Patient with difficult day yesterday. Today feels a little better. Remains very SOB with any activity Clinical Pain Assessment (nonverbal scale for severity on nonverbal patients): Duration: for how long has pt been experiencing pain (e.g., 2 days, 1 month, years) Frequency: how often pain is an issue (e.g., several times per day, once every few days, constant) FUNCTIONAL ASSESSMENT:  
 
Palliative Performance Scale (PPS): PPS: 50  PSYCHOSOCIAL/SPIRITUAL SCREENING:  
 
 Palliative IDT has assessed this patient for cultural preferences / practices and a referral made as appropriate to needs (Cultural Services, Patient Advocacy, Ethics, etc.) Advance Care Planning: 
Advance Care Planning 2018 Patient's Healthcare Decision Maker is: Named in scanned ACP document Primary Decision Maker Name Sergio Santana Primary Decision Maker Phone Number [phone] Primary Decision Maker Relationship to Patient Spouse Secondary Decision Maker Name Son Helen Jon and/or dtr Nadya Almaguer Secondary Decision Maker Phone Number [phone], [phone] Confirm Advance Directive Yes, on file Any spiritual / Scientologist concerns: 
[] Yes /  [x] No 
 
Caregiver Burnout: 
[] Yes /  [x] No /  [] No Caregiver Present Anticipatory grief assessment:  
[x] Normal  / [] Maladaptive :  
 
ESAS Anxiety: Anxiety: 4 ESAS Depression:    
 
 
 REVIEW OF SYSTEMS:  
 
Positive and pertinent negative findings in ROS are noted above in HPI. The following systems were [] reviewed / [] unable to be reviewed as noted in HPI Other findings are noted below. Systems: constitutional, ears/nose/mouth/throat, respiratory, gastrointestinal, genitourinary, musculoskeletal, integumentary, neurologic, psychiatric, endocrine. Positive findings noted below. Modified ESAS Completed by: provider Anxiety: 4 Nausea: 0 Anorexia: 0 Constipation: No  
  Stool Occurrence(s): 1 PHYSICAL EXAM:  
 
From RN flowsheet: 
Wt Readings from Last 3 Encounters:  
18 168 lb 8 oz (76.4 kg) 10/20/17 178 lb (80.7 kg) 17 184 lb (83.5 kg) Blood pressure 116/61, pulse 87, temperature 98.1 °F (36.7 °C), resp. rate 25, height 5' 10\" (1.778 m), weight 168 lb 8 oz (76.4 kg), SpO2 97 %. Pain Scale 1: Numeric (0 - 10) Pain Intensity 1: 0 Last bowel movement, if known: today Constitutional: awake and alert, HFNC in place Eyes: pupils equal, anicteric ENMT: no nasal discharge, moist mucous membranes Cardiovascular: regular rhythm, distal pulses intact Respiratory: breathing mild-moderately labored even with talking, symmetric Gastrointestinal: soft non-tender, +bowel sounds Musculoskeletal: no deformity, no tenderness to palpation Skin: warm, dry Neurologic: following commands, moving all extremities Psychiatric:calm Other: 
 
 
 HISTORY:  
 
Active Problems: 
  CAD (coronary artery disease) () Overview: cardiac stentd (4) COPD (chronic obstructive pulmonary disease) ([address] (2/4/2014) DM II (diabetes mellitus, type II), controlled ([address] (2/4/2014) Pulmonary fibrosis (HCC) () Acute on chronic respiratory failure ([address] (8/26/2018) Past Medical History:  
Diagnosis Date  CAD (coronary artery disease) 2004  
 4 stents placed in heart  Chronic obstructive pulmonary disease ([address] EMPHYSEMA SEEN ON CXR  
 Colon polyp Three tubular adenomas excised colonoscopically on 3/17/2011.  COPD (chronic obstructive pulmonary disease) ([address] 2/4/2014  Diverticulosis of colon (without mention of hemorrhage)  DM (diabetes mellitus) ([address] 4/3/2012  GERD (gastroesophageal reflux disease) [address] [facility] [address]  History of pneumothorax 2/2014  Hypercholesteremia  Pneumonia 2004  PUD (peptic ulcer disease) 1955  Pulmonary fibrosis ([address] Past Surgical History:  
Procedure Laterality Date  ENDOSCOPY, COLON, DIAGNOSTIC  3/2011 Polyps. Tics. Rep 3 yrs.  HX CORONARY STENT PLACEMENT  2004  
 4 cardiac stents  HX GI  circa 2000 Laparoscopic cholecystectomy. [address] left inguinal hernia repair  HX LUMBAR LAMINECTOMY  11/20/2013 L4 L5 Laminectomy  HX OTHER SURGICAL  2/7/2014 LVATS, bronch, talc pleurodesis, Resection of ruptured bulla  HX TONSILLECTOMY  age 29 Family History Problem Relation Age of Onset  Adopted:  Yes  
  Other Other   
  patient was adopted History reviewed, no pertinent family history. Social History Substance Use Topics  Smoking status: Former Smoker Packs/day: 1.50 Years: 36.00 Types: Cigarettes Quit date: 1/1/1987  Smokeless tobacco: Never Used  Alcohol use No  
 
Allergies Allergen Reactions  Bactrim [Sulfamethoprim] Unknown (comments) FLU LIKE SYMPTOMS  
 Statins-Hmg-Coa Reductase Inhibitors Myalgia  Sulfa (Sulfonamide Antibiotics) Other (comments) Bactrim causes flu like symptoms Current Facility-Administered Medications Medication Dose Route Frequency  fluticasone (FLONASE) 50 mcg/actuation nasal spray 2 Spray  2 Spray Both Nostrils DAILY  QUEtiapine (SEROquel) tablet 50 mg  50 mg Oral QHS  morphine injection 2 mg  2 mg IntraVENous Q4H PRN  
 LORazepam (ATIVAN) tablet 0.5 mg  0.5 mg Oral QHS  LORazepam (ATIVAN) tablet 0.5 mg  0.5 mg Oral Q6H PRN  
 guaiFENesin ER (MUCINEX) tablet 600 mg  600 mg Oral Q12H  
 arformoterol (BROVANA) neb solution 15 mcg  15 mcg Nebulization BID RT  
 sodium chloride (NS) flush 5-10 mL  5-10 mL IntraVENous Q8H  
 sodium chloride (NS) flush 5-10 mL  5-10 mL IntraVENous PRN  
 albuterol-ipratropium (DUO-NEB) 2.5 MG-0.5 MG/3 ML  3 mL Nebulization Q4H RT  
 cetirizine (ZYRTEC) tablet 10 mg  10 mg Oral DAILY  finasteride (PROSCAR) tablet 5 mg  5 mg Oral DAILY  budesonide (PULMICORT) 500 mcg/2 ml nebulizer suspension  500 mcg Nebulization BID RT  
 gabapentin (NEURONTIN) capsule 300 mg  300 mg Oral DAILY WITH LUNCH  
 gabapentin (NEURONTIN) capsule 600 mg  600 mg Oral QHS  pirfenidone tab 801 mg   (Patient Supplied)  801 mg Oral TID  tamsulosin (FLOMAX) capsule 0.4 mg  0.4 mg Oral DAILY  enoxaparin (LOVENOX) injection 40 mg  40 mg SubCUTAneous Q24H  
 aspirin chewable tablet 81 mg  81 mg Oral DAILY  metoprolol tartrate (LOPRESSOR) tablet 12.5 mg  12.5 mg Oral BID  
  pantoprazole (PROTONIX) tablet 40 mg  40 mg Oral ACB  
 
 
 
 LAB AND IMAGING FINDINGS:  
 
Lab Results Component Value Date/Time WBC 7.6 08/26/2018 01:06 PM  
 HGB 13.2 08/26/2018 01:06 PM  
 PLATELET 949 17/42/1848 01:06 PM  
 
Lab Results Component Value Date/Time Sodium 134 (L) 09/04/2018 04:04 AM  
 Potassium 4.0 09/04/2018 04:04 AM  
 Chloride 99 09/04/2018 04:04 AM  
 CO2 26 09/04/2018 04:04 AM  
 BUN 16 09/04/2018 04:04 AM  
 Creatinine 0.89 09/04/2018 04:04 AM  
 Calcium 8.5 09/04/2018 04:04 AM  
 Magnesium 1.7 09/04/2018 04:04 AM  
 Phosphorus 3.9 09/04/2018 04:04 AM  
  
Lab Results Component Value Date/Time AST (SGOT) 18 01/26/2018 02:19 PM  
 Alk. phosphatase 27 (L) 01/26/2018 02:19 PM  
 Protein, total 7.6 01/26/2018 02:19 PM  
 Albumin 4.3 01/26/2018 02:19 PM  
 Globulin 3.6 02/05/2014 04:36 AM  
 
Lab Results Component Value Date/Time INR 1.0 11/01/2013 04:13 PM  
 Prothrombin time 10.4 11/01/2013 04:13 PM  
  
No results found for: IRON, FE, TIBC, IBCT, PSAT, FERR Lab Results Component Value Date/Time pH 7.39 02/04/2014 02:00 AM  
 PCO2 31 (L) 02/04/2014 02:00 AM  
 PO2 90 02/04/2014 02:00 AM  
 
No components found for: [place] No results found for: CPK, CKMB Total time: 35 
Counseling / coordination time, spent as noted above:30 
> 50% counseling / coordination?: yes Prolonged service was provided for  []30 min   []75 min in face to face time in the presence of the patient, spent as noted above. Time Start:  
Time End:  
Note: this can only be billed with 82038 (initial) or 96991 (follow up). If multiple start / stop times, list each separately.

## 2018-09-11 NOTE — PROGRESS NOTES
Name: Yuniel Matheson [place]: [facility]  
: 1935 Admit Date: 2018 Phone: [phone]  Room: 334/01 PCP: Daniela Perez MD  MRN: [id number] Date: 2018  Code: DNR Chart and notes reviewed. Data reviewed. I review the patient's current medications in the medical record at each encounter. I have evaluated and examined the patient. Overnight events Afebrile Sats low to mid 80s % on HF 60L FiO2 100% Increased resp distress/WOB overnight, likley exacerbated by anxiety ROS: Continues with anxiety, increased WOB, and periods of air hunger. We remain unable to significantly wean HF. Did not sleep well until about 1-2 am.   Has not tolerated BiPAP well for the most part, only able to keep on or shirt periods of time. ROB with worsening hypoxia mostly unchanged. Desats with even minimal activity including being repositioned in the bed. Denies CP. Denies fever or chills. Bowel have slowed, now going about every 2-3 days. Past Medical History:  
Diagnosis Date  CAD (coronary artery disease)   
 4 stents placed in heart  Chronic obstructive pulmonary disease ([address] EMPHYSEMA SEEN ON CXR  
 Colon polyp Three tubular adenomas excised colonoscopically on 3/17/2011.  COPD (chronic obstructive pulmonary disease) ([address] 2014  Diverticulosis of colon (without mention of hemorrhage)  DM (diabetes mellitus) ([address] 4/3/2012  GERD (gastroesophageal reflux disease) [address]  History of pneumothorax 2014  Hypercholesteremia  Pneumonia   PUD (peptic ulcer disease)   Pulmonary fibrosis ([address] Past Surgical History:  
Procedure Laterality Date  ENDOSCOPY, COLON, DIAGNOSTIC  3/2011 Polyps. Tics. Rep 3 yrs.  HX CORONARY STENT PLACEMENT    
 4 cardiac stents  HX GI  circa  Laparoscopic cholecystectomy. [address] [place] [address] left inguinal hernia repair  HX LUMBAR LAMINECTOMY  11/20/2013 L4 L5 Laminectomy  HX OTHER SURGICAL  2/7/2014 LVATS, bronch, talc pleurodesis, Resection of ruptured bulla  HX TONSILLECTOMY  age 29 Family History Problem Relation Age of Onset  Adopted: Yes  Other Other   
  patient was adopted Social History Substance Use Topics  Smoking status: Former Smoker Packs/day: 1.50 Years: 36.00 Types: Cigarettes Quit date: 1/1/1987  Smokeless tobacco: Never Used  Alcohol use No  
 
 
Allergies Allergen Reactions  Bactrim [Sulfamethoprim] Unknown (comments) FLU LIKE SYMPTOMS  
 Statins-Hmg-Coa Reductase Inhibitors Myalgia  Sulfa (Sulfonamide Antibiotics) Other (comments) Bactrim causes flu like symptoms Current Facility-Administered Medications Medication Dose Route Frequency  fluticasone (FLONASE) 50 mcg/actuation nasal spray 2 Spray  2 Spray Both Nostrils DAILY  QUEtiapine (SEROquel) tablet 50 mg  50 mg Oral QHS  morphine injection 2 mg  2 mg IntraVENous Q4H PRN  
 LORazepam (ATIVAN) tablet 0.5 mg  0.5 mg Oral QHS  LORazepam (ATIVAN) tablet 0.5 mg  0.5 mg Oral Q6H PRN  
 guaiFENesin ER (MUCINEX) tablet 600 mg  600 mg Oral Q12H  
 arformoterol (BROVANA) neb solution 15 mcg  15 mcg Nebulization BID RT  
 sodium chloride (NS) flush 5-10 mL  5-10 mL IntraVENous Q8H  
 sodium chloride (NS) flush 5-10 mL  5-10 mL IntraVENous PRN  
 albuterol-ipratropium (DUO-NEB) 2.5 MG-0.5 MG/3 ML  3 mL Nebulization Q4H RT  
 cetirizine (ZYRTEC) tablet 10 mg  10 mg Oral DAILY  finasteride (PROSCAR) tablet 5 mg  5 mg Oral DAILY  budesonide (PULMICORT) 500 mcg/2 ml nebulizer suspension  500 mcg Nebulization BID RT  
 gabapentin (NEURONTIN) capsule 300 mg  300 mg Oral DAILY WITH LUNCH  
 gabapentin (NEURONTIN) capsule 600 mg  600 mg Oral QHS  pirfenidone tab 801 mg   (Patient Supplied)  801 mg Oral TID  tamsulosin (FLOMAX) capsule 0.4 mg  0.4 mg Oral DAILY  enoxaparin (LOVENOX) injection 40 mg  40 mg SubCUTAneous Q24H  
 aspirin chewable tablet 81 mg  81 mg Oral DAILY  metoprolol tartrate (LOPRESSOR) tablet 12.5 mg  12.5 mg Oral BID  pantoprazole (PROTONIX) tablet 40 mg  40 mg Oral ACB REVIEW OF SYSTEMS  
12 point ROS negative except as stated in the HPI. Physical Exam:  
Visit Vitals  /70 (BP 1 Location: Right arm, BP Patient Position: At rest;Lying right side)  Pulse 96  Temp 97.6 °F (36.4 °C)  Resp (!) 31  
 Ht 5' 10\" (1.778 m)  Wt 76.4 kg (168 lb 8 oz)  SpO2 90%  BMI 24.18 kg/m2 General:  Alert, ill appearing/frail, appears stated age, on max hi flow, dyspneic with speaking Head:  Normocephalic, without obvious abnormality, atraumatic. Eyes:  Conjunctivae/corneas clear. Nose: Nares normal. Septum midline. Mucosa normal.   
Throat: Lips, mucosa, and tongue normal.   
Neck: Supple, symmetrical, trachea midline, no adenopathy. Lungs:   Diminished air movement with bibasilar crackles. + increased WOB, tachypnea, and accessory muscle use. Chest wall:  No tenderness or deformity. Heart:  Regular rate and rhythm, S1, S2 normal, no murmur, click, rub or gallop. Abdomen:   Soft, non-tender. Bowel sounds normal.   
Extremities: Extremities normal, atraumatic, no cyanosis or edema. Pulses: 2+ and symmetric all extremities. Skin: Skin color, texture, turgor normal. No rashes or lesions Lymph nodes: Cervical, supraclavicular nodes normal.  
Neurologic: Moves all extremities, no gross focal deficits. Very weak/debilitated. Lab Results Component Value Date/Time  Sodium 134 (L) 09/04/2018 04:04 AM  
 Potassium 4.0 09/04/2018 04:04 AM  
 Chloride 99 09/04/2018 04:04 AM  
 CO2 26 09/04/2018 04:04 AM  
 BUN 16 09/04/2018 04:04 AM  
 Creatinine 0.89 09/04/2018 04:04 AM  
 Glucose 160 (H) 09/04/2018 04:04 AM  
 Calcium 8.5 09/04/2018 04:04 AM  
 Magnesium 1.7 09/04/2018 04:04 AM  
 Phosphorus 3.9 09/04/2018 04:04 AM  
 
 
Lab Results Component Value Date/Time WBC 7.6 08/26/2018 01:06 PM  
 HGB 13.2 08/26/2018 01:06 PM  
 PLATELET 978 53/12/4539 01:06 PM  
 MCV 96.2 08/26/2018 01:06 PM  
 
 
Lab Results Component Value Date/Time INR 1.0 11/01/2013 04:13 PM  
 AST (SGOT) 18 01/26/2018 02:19 PM  
 Alk. phosphatase 27 (L) 01/26/2018 02:19 PM  
 Protein, total 7.6 01/26/2018 02:19 PM  
 Albumin 4.3 01/26/2018 02:19 PM  
 Globulin 3.6 02/05/2014 04:36 AM  
 
 
No results found for: IRON, FE, TIBC, IBCT, PSAT, FERR Lab Results Component Value Date/Time Sed rate (ESR) 9 07/22/2015 12:34 PM  
 TSH 3.230 03/10/2015 02:51 PM  
  
 
No results found for: PH, PHI, PCO2, PCO2I, PO2, PO2I, HCO3, HCO3I, FIO2, FIO2I Lab Results Component Value Date/Time Troponin-I, Qt. 4.46 (H) 08/27/2018 12:30 AM  
  
 
Lab Results Component Value Date/Time Culture result:  02/04/2014 05:20 AM  
  MRSA NOT PRESENT Screening of patient nares for MRSA is for surveillance purposes and, if positive, to facilitate isolation considerations in high risk settings. It is not intended for automatic decolonization interventions per se as regimens are not sufficiently effective to warrant routine use. Culture result:  11/01/2013 03:55 PM  
  MRSA NOT PRESENT Apparent Staphylococcus aureus (not MRSA) noted. Screening of patient nares for MRSA is for surveillance purposes and, if positive, to facilitate isolation considerations in high risk settings. It is not intended for automatic decolonization interventions per se as regimens are not sufficiently effective to warrant routine use. No results found for: TOXA1, RPR, HBCM, HBSAG, HAAB, HCAB1, HAAT, G6PD, CRYAC, HIVGT, HIVR, HIV1, HIV12, HIVPC, HIVRPI No results found for: VANCT, CPK Lab Results Component Value Date/Time  Color YELLOW/STRAW 02/04/2014 08:30 AM  
 Appearance CLEAR 02/04/2014 08:30 AM  
 pH (UA) 5.0 02/04/2014 08:30 AM  
 Protein TRACE (A) 02/04/2014 08:30 AM  
 Glucose 250 (A) 02/04/2014 08:30 AM  
 Ketone NEGATIVE  02/04/2014 08:30 AM  
 Bilirubin NEGATIVE  02/04/2014 08:30 AM  
 Blood NEGATIVE  02/04/2014 08:30 AM  
 Urobilinogen 0.2 02/04/2014 08:30 AM  
 Nitrites NEGATIVE  02/04/2014 08:30 AM  
 Leukocyte Esterase NEGATIVE  02/04/2014 08:30 AM  
 WBC 0-4 02/04/2014 08:30 AM  
 RBC 0-5 02/04/2014 08:30 AM  
 Bacteria NEGATIVE  02/04/2014 08:30 AM  
 
 
Images: no new images this morning; personally visualized and reviewed report CXR (8/30/18): Slight increase in bibasilar interstitial/airspace opacities and probable small effusions. IMPRESSION 
· Acute on chronic hypoxic respiratory failure · Advanced end stage pulmonary fibrosis · COPD 
· NSTEMI/CAD · Severe pulmonary HTN on ECHO · DM 
 
PLAN 
· Continue HF O2 and wean flow and FiO2 as able to keep sats > 85%. Have not been able to wean flow or FiO2 significantly and patient has not tolerated BiPAP well. · IV steroids discontinued · Continue Duonebs scheduled and Brovana/Pulmicort nebs. · Continue home Esbriet · Metoprolol per Cardiology. No additional recs at this time · Appreciate eval by hospice and Palliative Care team.  Patient with significant increase in baseline O2 requirements since last year and now, unable to even keep outpatient appointments due to severe hypoxia. Continue Ativan and morphine prn. Seroquel also added at night to help with sleep. · Daily discussions with patient and wife, who are both aware of the severity of his decline. Patient not yet ready to make the decision to proceed with hospice. Family and friends have been by to vitis, especially over the weekend. Patient and wife aware that it is unlikely that we will be able decrease his O2 requirement to a level acceptable for discharge to home.    
· Patient's wife earlier this admission asked about treating his pulmonary HTN.  Discussed that sildenafil is indicated for primary pulmonary HTN and there isn't evidence that it provides significant benefit in secondary pulmonary HTN. She also asked about adding Ofev to current Esbriet therapy. Discussed that neither of these medications will reverse his pulmonary fibrosis or improve his current lung function, but aim to slow the progression. · Palliative Care assistance and support greatly appreciated · GI prophylaxis: Protonix · DVT prophylaxis: Lovenox Patient critically ill requiring continuous HF O2 due to severe hypoxia and respiratory distress and is high risk for continued decompensation.   Discussed with nursing, RT, and Palliative Care team.   
 
MAMIE Khan

## 2018-09-11 NOTE — PROGRESS NOTES
Bedside and Verbal shift change report given to Tony (oncoming nurse) by Mansoor Ayon (offgoing nurse). Report included the following information SBAR, Kardex, Intake/Output, MAR and Recent Results. 2470: Patient is found to be very drowsy and wife asks if we can hold PO meds until he is more awake. 1020: notified of low O2 sat by tele monitor tech,  Trinity Folk on the floor and notified of same. O2 sensor changed with no improvement in number. Pt is eating at this time. 1120: Called to bedside multiple times as wife requests patient be repositioned to help with O2 saturations. Trinity Folk on the floor and is asked what is best to do. She states to provide morphine as ordered. Patient repositioned and medication administered. 1900: Bedside and Verbal shift change report given to Verónica Mary (oncoming nurse) by Tony (offgoing nurse). Report included the following information SBAR, Kardex, ED Summary, Intake/Output, MAR, Recent Results and Med Rec Status.

## 2018-09-11 NOTE — PROGRESS NOTES
Problem: Pressure Injury - Risk of 
Goal: *Prevention of pressure injury Document Vasile Scale and appropriate interventions in the flowsheet. Outcome: Progressing Towards Goal 
Pressure Injury Interventions: 
Sensory Interventions: Turn and reposition approx. every two hours (pillows and wedges if needed), Pad between skin to skin, Monitor skin under medical devices, Keep linens dry and wrinkle-free Activity Interventions: Increase time out of bed, PT/OT evaluation Mobility Interventions: PT/OT evaluation Compliant with mobility team schedule Nutrition Interventions: Document food/fluid/supplement intake Friction and Shear Interventions: Lift sheet

## 2018-09-11 NOTE — ROUTINE PROCESS
Bedside and Verbal shift change report given to Bob Rivera (oncoming nurse) by Kinjal Paige RN (offgoing nurse). Report included the following information SBAR, Kardex, Intake/Output, MAR, Accordion and Recent Results.

## 2018-09-11 NOTE — PROGRESS NOTES
Palliative Medicine Consult David: [phone]) Patient Name: Nasra Lainez YOB: 1935 Date of Initial Consult: 8/27/18 Reason for Consult: Goals-of-care Requesting Provider: Rhiannon Aguilera MD 
Primary Care Physician: Keith Lopez MD 
 
 SUMMARY:  
Nasra Lainez is a 80 y.o. with a end-stage COPD and pulmonary fibrosis, O2-dependent at 20L/minute at baseline; CAD who was admitted on 8/25/2018 from home with a diagnosis of acute-on-chronic respiratory failure and NSTEMI due to tachycardia and hypoxia. Current medical issues leading to Palliative Medicine involvement include: shortness of breath, anxiety/agitation, confusion in setting of steroids and hypoxia, goals of care. PALLIATIVE DIAGNOSES:  
1. Shortness of breath 2. Anxiety 3. Debility 4. Goals of care 5. Pulmonary fibrosis PLAN:  
1. Dennise and I met with patient, spouse and friend( \"Ozzie\"). Patient wanted us to talk with Dr. Brayden Randall outside the room. Brayden Randall is retired OB/GYN provider/Hospital . He has been friends with patient for 60 years. Brayden Randall just wanted to advocate for patient. Patient told Brayden Randall last week that he felt like he was being pressured into transition to hospice care/weaning his HFNC/committing suicide. Reassured Brayden Randall that our team(as well as all inpatient providers) have had multiple discussions with patient about his goals and certainly do not want him to feel pressure about transition to hospice. Reviewed the clinical scenario with Brayden Randall who understands the reality is that patient unlikely will be able to be weaned from [facility] and will likely pass in the hospital at some point. The bottom line is that patient wants be in control of decisions about his health care for as long as possible.   
2. Dennise and I then returned to the room and reassured the patient that as long as he continues to have capacity to make medical decisions, he will be his own decision make on medical issues. 3. GOC-he remains not ready to make transition to comfort care/hospice. He wants to continue attempts at weaning the HFNC to a number that is acceptable for home but also realizes that likely not possible. He is not ready to die but also understands current treatment will likely become less effective. 4. AMD-spouse remains MPOA and AMD in the chart 5. Code status-DNR per prior discussions. If he survives this stay, will need DDNR at discharge 6. Symptom management-he continues to have prn ativan and morphine for anxiety and SOB. His anxiety seems to be worse in the evening. He was using ativan at home late afternoon and bedtime. Will add scheduled dose of ativan 0.5 mg at 4 PM daily, and continue scheduled dose at bedtime. Will continue to monitor effect and make adjustments. Did adjust dose of morphine to 2 mg every 1 hour prn so that dosing scheduled not limited if he needs more than every 4 hours 7. Psychosocial-patient states just not \"mentally ready\" to make transition to hospice care. Will need ongoing support for patient and family in this difficult decision/process 8. Discussed with bedside nurse, Dr. Skip Asher, Pulmonary and CM. 9. Communicated plan of care with: Palliative IDT 
 
 
 GOALS OF CARE / TREATMENT PREFERENCES:  
 
GOALS OF CARE: continue current therapies with goal of returning home TREATMENT PREFERENCES:  
Code Status: DNR Advance Care Planning: 
Advance Care Planning 2018 Patient's Healthcare Decision Maker is: Named in scanned ACP document Primary Decision Maker Name Bethel Stallworth Primary Decision Maker Phone Number [phone] Primary Decision Maker Relationship to Patient Spouse Secondary Decision Maker Name Son Promise Hines and/or dtr Martina Mcclain Secondary Decision Maker Phone Number [phone], [phone] Confirm Advance Directive Yes, on file Other Instructions: Other: As far as possible, the palliative care team has discussed with patient / health care proxy about goals of care / treatment preferences for patient. HISTORY:  
 
History obtained from: patient, wife, chart CHIEF COMPLAINT: anxiety HPI/SUBJECTIVE: The patient is:  
[x] Verbal and participatory -  
[] Non-participatory due to:  
 
Patient continues to feel fatigued and anxiety worse at nite Clinical Pain Assessment (nonverbal scale for severity on nonverbal patients): Duration: for how long has pt been experiencing pain (e.g., 2 days, 1 month, years) Frequency: how often pain is an issue (e.g., several times per day, once every few days, constant) FUNCTIONAL ASSESSMENT:  
 
Palliative Performance Scale (PPS): PPS: 50 PSYCHOSOCIAL/SPIRITUAL SCREENING:  
 
Palliative IDT has assessed this patient for cultural preferences / practices and a referral made as appropriate to needs (Cultural Services, Patient Advocacy, Ethics, etc.) Advance Care Planning: 
Advance Care Planning 2018 Patient's Healthcare Decision Maker is: Named in scanned ACP document Primary Decision Maker Name Naif Wakefield Primary Decision Maker Phone Number [phone] Primary Decision Maker Relationship to Patient Spouse Secondary Decision Maker Name Andre Morales and/or dtr Marla Vickers Secondary Decision Maker Phone Number [phone], [phone] Confirm Advance Directive Yes, on file Any spiritual / Mosque concerns: 
[] Yes /  [x] No 
 
Caregiver Burnout: 
[] Yes /  [x] No /  [] No Caregiver Present Anticipatory grief assessment:  
[x] Normal  / [] Maladaptive :  
 
ESAS Anxiety: Anxiety: 4 ESAS Depression:    
 
 
 REVIEW OF SYSTEMS:  
 
Positive and pertinent negative findings in ROS are noted above in HPI. The following systems were [] reviewed / [] unable to be reviewed as noted in HPI Other findings are noted below. Systems: constitutional, ears/nose/mouth/throat, respiratory, gastrointestinal, genitourinary, musculoskeletal, integumentary, neurologic, psychiatric, endocrine. Positive findings noted below. Modified ESAS Completed by: provider Anxiety: 4 Nausea: 0 Anorexia: 0 Constipation: No  
  Stool Occurrence(s): 1 PHYSICAL EXAM:  
 
From RN flowsheet: 
Wt Readings from Last 3 Encounters:  
09/05/18 168 lb 8 oz (76.4 kg) 10/20/17 178 lb (80.7 kg) 06/22/17 184 lb (83.5 kg) Blood pressure 116/52, pulse 88, temperature 97.5 °F (36.4 °C), resp. rate 23, height 5' 10\" (1.778 m), weight 168 lb 8 oz (76.4 kg), SpO2 95 %. Pain Scale 1: Visual 
Pain Intensity 1: 0 Last bowel movement, if known: today Constitutional: awake and alert, HFNC in place, appears fatigued Eyes: pupils equal, anicteric ENMT: no nasal discharge, moist mucous membranes Cardiovascular: regular rhythm, distal pulses intact Respiratory: breathing mild-moderately labored even with talking, symmetric Gastrointestinal: soft non-tender, +bowel sounds Musculoskeletal: no deformity, no tenderness to palpation Skin: warm, dry Neurologic: following commands, moving all extremities Psychiatric:calm Other: 
 
 
 HISTORY:  
 
Active Problems: 
  CAD (coronary artery disease) () Overview: cardiac stentd (4) COPD (chronic obstructive pulmonary disease) ([address] (2/4/2014) DM II (diabetes mellitus, type II), controlled ([address] (2/4/2014) Pulmonary fibrosis (HCC) () Acute on chronic respiratory failure ([address] (8/26/2018) Past Medical History:  
Diagnosis Date  CAD (coronary artery disease) 2004  
 4 stents placed in heart  Chronic obstructive pulmonary disease ([address] EMPHYSEMA SEEN ON CXR  
 Colon polyp Three tubular adenomas excised colonoscopically on 3/17/2011.  COPD (chronic obstructive pulmonary disease) ([address] 2/4/2014  Diverticulosis of colon (without mention of hemorrhage)  DM (diabetes mellitus) [address] 4/3/2012  GERD (gastroesophageal reflux disease) [address] [facility] [address]  History of pneumothorax 2/2014  Hypercholesteremia  Pneumonia 2004  PUD (peptic ulcer disease) 1955  Pulmonary fibrosis ([facility] Past Surgical History:  
Procedure Laterality Date  ENDOSCOPY, COLON, DIAGNOSTIC  3/2011 Polyps. Tics. Rep 3 yrs.  HX CORONARY STENT PLACEMENT  2004  
 4 cardiac stents  HX GI  circa 2000 Laparoscopic cholecystectomy. [address] left inguinal hernia repair  HX LUMBAR LAMINECTOMY  11/20/2013 L4 L5 Laminectomy  HX OTHER SURGICAL  2/7/2014 LVATS, bronch, talc pleurodesis, Resection of ruptured bulla  HX TONSILLECTOMY  age 29 Family History Problem Relation Age of Onset  Adopted: Yes  Other Other   
  patient was adopted History reviewed, no pertinent family history. Social History Substance Use Topics  Smoking status: Former Smoker Packs/day: 1.50 Years: 36.00 Types: Cigarettes Quit date: 1/1/1987  Smokeless tobacco: Never Used  Alcohol use No  
 
Allergies Allergen Reactions  Bactrim [Sulfamethoprim] Unknown (comments) FLU LIKE SYMPTOMS  
 Statins-Hmg-Coa Reductase Inhibitors Myalgia  Sulfa (Sulfonamide Antibiotics) Other (comments) Bactrim causes flu like symptoms Current Facility-Administered Medications Medication Dose Route Frequency  LORazepam (ATIVAN) tablet 0.5 mg  0.5 mg Oral Q24H  
 morphine injection 2 mg  2 mg IntraVENous Q1H PRN  
 fluticasone (FLONASE) 50 mcg/actuation nasal spray 2 Spray  2 Spray Both Nostrils DAILY  QUEtiapine (SEROquel) tablet 50 mg  50 mg Oral QHS  LORazepam (ATIVAN) tablet 0.5 mg  0.5 mg Oral QHS  LORazepam (ATIVAN) tablet 0.5 mg  0.5 mg Oral Q6H PRN  
 guaiFENesin ER (MUCINEX) tablet 600 mg  600 mg Oral Q12H  arformoterol (BROVANA) neb solution 15 mcg  15 mcg Nebulization BID RT  
 sodium chloride (NS) flush 5-10 mL  5-10 mL IntraVENous Q8H  
 sodium chloride (NS) flush 5-10 mL  5-10 mL IntraVENous PRN  
 albuterol-ipratropium (DUO-NEB) 2.5 MG-0.5 MG/3 ML  3 mL Nebulization Q4H RT  
 cetirizine (ZYRTEC) tablet 10 mg  10 mg Oral DAILY  finasteride (PROSCAR) tablet 5 mg  5 mg Oral DAILY  budesonide (PULMICORT) 500 mcg/2 ml nebulizer suspension  500 mcg Nebulization BID RT  
 gabapentin (NEURONTIN) capsule 300 mg  300 mg Oral DAILY WITH LUNCH  
 gabapentin (NEURONTIN) capsule 600 mg  600 mg Oral QHS  pirfenidone tab 801 mg   (Patient Supplied)  801 mg Oral TID  tamsulosin (FLOMAX) capsule 0.4 mg  0.4 mg Oral DAILY  enoxaparin (LOVENOX) injection 40 mg  40 mg SubCUTAneous Q24H  
 aspirin chewable tablet 81 mg  81 mg Oral DAILY  metoprolol tartrate (LOPRESSOR) tablet 12.5 mg  12.5 mg Oral BID  pantoprazole (PROTONIX) tablet 40 mg  40 mg Oral ACB  
 
 
 
 LAB AND IMAGING FINDINGS:  
 
Lab Results Component Value Date/Time WBC 7.6 08/26/2018 01:06 PM  
 HGB 13.2 08/26/2018 01:06 PM  
 PLATELET 725 36/05/7498 01:06 PM  
 
Lab Results Component Value Date/Time Sodium 134 (L) 09/04/2018 04:04 AM  
 Potassium 4.0 09/04/2018 04:04 AM  
 Chloride 99 09/04/2018 04:04 AM  
 CO2 26 09/04/2018 04:04 AM  
 BUN 16 09/04/2018 04:04 AM  
 Creatinine 0.89 09/04/2018 04:04 AM  
 Calcium 8.5 09/04/2018 04:04 AM  
 Magnesium 1.7 09/04/2018 04:04 AM  
 Phosphorus 3.9 09/04/2018 04:04 AM  
  
Lab Results Component Value Date/Time AST (SGOT) 18 01/26/2018 02:19 PM  
 Alk. phosphatase 27 (L) 01/26/2018 02:19 PM  
 Protein, total 7.6 01/26/2018 02:19 PM  
 Albumin 4.3 01/26/2018 02:19 PM  
 Globulin 3.6 02/05/2014 04:36 AM  
 
Lab Results Component Value Date/Time  INR 1.0 11/01/2013 04:13 PM  
 Prothrombin time 10.4 11/01/2013 04:13 PM  
  
No results found for: IRON, FE, TIBC, IBCT, PSAT, FERR  
 Lab Results Component Value Date/Time pH 7.39 02/04/2014 02:00 AM  
 PCO2 31 (L) 02/04/2014 02:00 AM  
 PO2 90 02/04/2014 02:00 AM  
 
No components found for: [place] No results found for: CPK, CKMB Total time: 35 
Counseling / coordination time, spent as noted above:30 
> 50% counseling / coordination?: yes Prolonged service was provided for  [x]30 min   []75 min in face to face time in the presence of the patient, spent as noted above. Time Start: 2 PM 
Time End: 3:10 PM 
Note: this can only be billed with 17092 (initial) or 49971 (follow up). If multiple start / stop times, list each separately.

## 2018-09-11 NOTE — PROGRESS NOTES
Palliative Medicine Code Status: DNR Advance Care Planning: 
Advance Care Planning 2018 Patient's Healthcare Decision Maker is: Named in scanned ACP document Primary Decision Maker Name Halie Phan Primary Decision Maker Phone Number [phone] Primary Decision Maker Relationship to Patient Spouse Secondary Decision Maker Name Andre Catalan and/or dtr Matt Martin Secondary Decision Maker Phone Number [phone], [phone] Confirm Advance Directive Yes, on file Patient / Family Encounter Documentation Participants (names): Pt, wife Master Greer, close friend Karen Shepard, Palliative Medicine (Dr. Jono Alvarez, [address]) Narrative: Palliative team met first with Karen Shepard outside of room per pt request.  Karen Shepard is a physician who has been a close friend of pt's for 61 yrs, is a former hospital . Karen Shepard traveled to [facility] from the [facility] in order to serve as advocate in pt's care, shared that pt had expressed privately to him last week that he was being forced to wean himself off hi-flow and essentially \"commit suicide. \"  Karen Shepard stated pt's wish is that he can remain in control of his care, does not want his life or death decisions dictated by the hospital.  Reassured Karen Shepard that pt was not being forced to \"decide his death date,\" that pt remains in control of his medical decisions. Karen Shepard verbalized understanding of the severity of pt's condition, expressed belief that pt will never be able to return home, acknowledged that pt might get to a point where death becomes preferable to living in debilitated state but pt is clearly not at that place. Palliative team returned to room along with Karen Shepard to update pt and wife on above and to further discuss plan of care. Wife spoke of pt's tendency to become more anxious in the evenings/at night. Ativan to be scheduled at 1600 and bedtime, with prn morphine available as well.   Nonpharmacological strategies such as music/meditation were explored. Psychosocial Issues Identified/ Resilience Factors: Anxiety, great awareness of impending mortality. Pt expressed words of thanks to staff and to Tramaine Bishop, appears to be getting his emotional affairs in order at his own pace. Goals of Care / Plan: Meds adjusted to address anxiety, other strategies explored as well. Pt wishing to continue current measures, with the understanding that condition could decline despite efforts. Pt's hope is to remain in control of his own care, not have his end of life journey dictated by others. Pt and wife are very appreciative of the care provided by staff throughout this difficult process. SW will continue to follow for support. Discussed with Pulmonary PA, RN, Care Manager. Thank you for including Palliative Medicine in the care of Mr. Skyler Mensah. Juliet Cabrera LCSW, [facility]- 
[phone]

## 2018-09-11 NOTE — PROGRESS NOTES
Bakari Mejia [facility] 
5[address] [facility], [address] [place] [address] 
[phone] Medical Progress Note NAME: Andreia Adams :  1935 MRM:  [id number] Date/Time: 2018  7:52 AM 
 
  
Assessment and Plan: 1. Acute on chronic resp failure/hypoxia/end stage pulm fibrosis: Still on high flow O2 at 90%. Patient is DNR, but he wants to continue attempts at weaning the high flow O2 that is acceptable for home. That might not be possible, still pt is not ready for hospice. Pulmonary and palliative care following. Cont LABA/ICS, duo nebs prn poor prognosis.  
  
2. NSTEMI/CAD: likely type 2 MI from supply demand. Cont ASA, metoprolol. Evaluated by cardiology 
  
3. 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 No further management due to potential comfort care 
  
4. Anxiety: due to hypoxia, pulm fibrosis. Cont seroquel. Use IV ativan, IV morphine prn 
  
Code: DNR Subjective: Chief Complaint:  Follow up of pt who was admitted with severe respiratory failure. Still using high flow O2. ROS: 
(bold if positive, if negative) Tolerating PT  Tolerating Diet Objective:  
 
Last 24hrs VS reviewed since prior progress note. Most recent are: 
 
Visit Vitals  /70 (BP 1 Location: Right arm, BP Patient Position: At rest;Lying right side)  Pulse 86  Temp 97.6 °F (36.4 °C)  Resp 27  
 Ht 5' 10\" (1.778 m)  Wt 76.4 kg (168 lb 8 oz)  SpO2 90%  BMI 24.18 kg/m2 SpO2 Readings from Last 6 Encounters:  
18 90% 17 96% 10/13/14 94% 14 95% 14 93% 14 96% O2 Flow Rate (L/min): 60 l/min Intake/Output Summary (Last 24 hours) at 18 0831 Last data filed at 18 0400 Gross per 24 hour Intake              760 ml Output             1350 ml Net             -590 ml Physical Exam: 
 
Gen:  Well-developed, well-nourished, in no acute distress HEENT:  Pink conjunctivae, PERRL, hearing intact to voice, moist mucous membranes Neck:  Supple, without masses, thyroid non-tender Resp:  No accessory muscle use, rales BL Card:  No murmurs, normal S1, S2 without thrills, bruits or peripheral edema Abd:  Soft, non-tender, non-distended, normoactive bowel sounds are present, no palpable organomegaly and no detectable hernias Lymph:  No cervical or inguinal adenopathy Musc:  No cyanosis or clubbing Skin:  No rashes or ulcers, skin turgor is good Neuro:  Cranial nerves are grossly intact, no focal motor weakness, follows commands appropriately Psych:  Good insight, oriented to person, place and time, alert 
__________________________________________________________________ Medications Reviewed: (see below) Medications:  
 
Current Facility-Administered Medications Medication Dose Route Frequency  fluticasone (FLONASE) 50 mcg/actuation nasal spray 2 Spray  2 Spray Both Nostrils DAILY  QUEtiapine (SEROquel) tablet 50 mg  50 mg Oral QHS  morphine injection 2 mg  2 mg IntraVENous Q4H PRN  
 LORazepam (ATIVAN) tablet 0.5 mg  0.5 mg Oral QHS  LORazepam (ATIVAN) tablet 0.5 mg  0.5 mg Oral Q6H PRN  
 guaiFENesin ER (MUCINEX) tablet 600 mg  600 mg Oral Q12H  
 arformoterol (BROVANA) neb solution 15 mcg  15 mcg Nebulization BID RT  
 sodium chloride (NS) flush 5-10 mL  5-10 mL IntraVENous Q8H  
 sodium chloride (NS) flush 5-10 mL  5-10 mL IntraVENous PRN  
 albuterol-ipratropium (DUO-NEB) 2.5 MG-0.5 MG/3 ML  3 mL Nebulization Q4H RT  
 cetirizine (ZYRTEC) tablet 10 mg  10 mg Oral DAILY  finasteride (PROSCAR) tablet 5 mg  5 mg Oral DAILY  budesonide (PULMICORT) 500 mcg/2 ml nebulizer suspension  500 mcg Nebulization BID RT  
 gabapentin (NEURONTIN) capsule 300 mg  300 mg Oral DAILY WITH LUNCH  
 gabapentin (NEURONTIN) capsule 600 mg  600 mg Oral QHS  pirfenidone tab 801 mg   (Patient Supplied)  801 mg Oral TID  tamsulosin (FLOMAX) capsule 0.4 mg  0.4 mg Oral DAILY  enoxaparin (LOVENOX) injection 40 mg  40 mg SubCUTAneous Q24H  
 aspirin chewable tablet 81 mg  81 mg Oral DAILY  metoprolol tartrate (LOPRESSOR) tablet 12.5 mg  12.5 mg Oral BID  pantoprazole (PROTONIX) tablet 40 mg  40 mg Oral ACB Lab Data Reviewed: (see below) Lab Review: No results for input(s): WBC, HGB, HCT, PLT, HGBEXT, HCTEXT, PLTEXT, HGBEXT, HCTEXT, PLTEXT in the last 72 hours. No results for input(s): NA, K, CL, CO2, GLU, BUN, CREA, CA, MG, PHOS, ALB, TBIL, TBILI, SGOT, ALT, INR in the last 72 hours. No lab exists for component: INREXT, INREXT Lab Results Component Value Date/Time Glucose (POC) 114 (H) 02/11/2014 11:49 AM  
 Glucose (POC) 173 (H) 02/11/2014 08:26 AM  
 Glucose (POC) 158 (H) 02/10/2014 08:38 PM  
 Glucose (POC) 144 (H) 02/10/2014 04:15 PM  
 Glucose (POC) 156 (H) 02/10/2014 11:08 AM  
 
No results for input(s): PH, PCO2, PO2, HCO3, FIO2 in the last 72 hours. No results for input(s): INR in the last 72 hours. No lab exists for component: INREXT, INREXT All Micro Results None I have reviewed notes of prior 24hr. Other pertinent lab: Total time spent with patient: 28 Care Plan discussed with: Patient, Family, Nursing Staff and >50% of time spent in counseling and coordination of care Discussed:  Care Plan Prophylaxis:  Lovenox Disposition:  poor prognosis 
        
___________________________________________________ Attending Physician: Joseph Krabbe, MD

## 2018-09-11 NOTE — PROGRESS NOTES
Physical Therapy Note: 
 
Chart reviewed, discussed with RN, PT treatment with weekly re-assessment attempted and deferred. Pt meeting with palliative care staff at time of attempt and unavailable to participate with PT. Per chart pt continues to require high flow O2 and demonstrates O2 desaturations with repositioning. Participation with PT over past week has been limited with pt frequently reporting fatigue and inability to participate. Will continue to follow and complete PT treatment/weekly re-assessment when pt available, agreeable, and appropriate.

## 2018-09-12 NOTE — PROGRESS NOTES
Shift Summary 9/12/2018 
[id number] 
 
0700 Pt asleep at time of bedside shift report received from [facility]. 0810 Oxygen decreased to 40L 100% by RT. 
 
0941 Oxygen increased to 50L 90% by Felicitas Lucia NP (pulm) as pt's SpO2 in mid-70's while she was rounding at bedside. 0945 AM vitals, assessment, and meds complete without difficulty, AM rhythm strip shows NSR. Reviewed today's plan of care and goals with patient/family; plan of care today includes monitor oxygen needs. 705 [facility] Ne pt's SpO2 in low 70's, patient in no acute distress, talking with visitors. Increased oxygen to 50L 100%. 1040 Per pt's wife's request, increased oxygen 60L 100% as SpO2 remains mid-70's.  
 
1200 Pt very anxious, fixated on SpO2 number (70-80's), wife suggests that perhaps the SpO2 monitor probe is defective and would like to try a new one; probe replaced but relatively little change in SpO2 readings. Pt also using home SpO2 fingertip monitor. Pt's wife asks if Morphine will help; I explained that Morphine would help the sensation of air hunger allowing pt to calm and rest which would perhaps allow for deeper breathing, but that the morphine itself would not help the SpO2 number return to normal. I explained that it will most likely be increasingly difficult for the pt to recover from exertion. Morphine given. 1400 Pt remains anxious and is agreeable to put BiPAP on to help respiratory status. Will continue to monitor. 1900 Bedside and Verbal shift change report given to Jason Hernandez (oncoming nurse) by Kassandra Hampton (offgoing nurse). Report included the following information SBAR, Intake/Output, MAR, Recent Results and Cardiac Rhythm NSR.

## 2018-09-12 NOTE — PROGRESS NOTES
Bakari Mejia [facility] [place] [facility] 
5[address] [facility], [address] [place] [address] 
[phone] Medical Progress Note NAME: Karrie Christopher :  1935 MRM:  [id number] Date/Time: 2018  7:52 AM 
 
  
Assessment and Plan: 1. Acute on chronic resp failure/hypoxia/end stage pulm fibrosis: Still on high flow O2. Patient is DNR, but he wants to continue attempts at weaning the high flow O2 that is acceptable for home. That might not be possible, still pt is not ready for hospice( despite multiple meeting with pt and family and care providers, pt doesn't want to be under hospice care). Pulmonary and palliative care following. Cont LABA/ICS, duo nebs prn poor prognosis.  
  
2. NSTEMI/CAD: likely type 2 MI from supply demand. Cont ASA, metoprolol. Evaluated by cardiology 
  
3. 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 No further management due to potential comfort care 
  
4. Anxiety: due to hypoxia, pulm fibrosis. Cont seroquel. Use IV ativan, IV morphine prn 
  
Code: DNR Subjective: Chief Complaint:  Follow up of pt who was admitted with severe respiratory failure. Still using high flow O2. ROS: 
(bold if positive, if negative) Tolerating PT  Tolerating Diet Objective:  
 
Last 24hrs VS reviewed since prior progress note. Most recent are: 
 
Visit Vitals  /45  Pulse 87  Temp 97.6 °F (36.4 °C)  Resp 25  
 Ht 5' 10\" (1.778 m)  Wt 76.4 kg (168 lb 8 oz)  SpO2 (!) 87%  BMI 24.18 kg/m2 SpO2 Readings from Last 6 Encounters:  
18 (!) 87% 17 96% 10/13/14 94% 14 95% 14 93% 14 96% O2 Flow Rate (L/min): 60 l/min Intake/Output Summary (Last 24 hours) at 18 1655 Last data filed at 18 1826 Gross per 24 hour Intake              240 ml Output              250 ml Net              -10 ml Physical Exam: 
 
Gen:  Well-developed, well-nourished, in no acute distress HEENT:  Pink conjunctivae, PERRL, hearing intact to voice, moist mucous membranes Neck:  Supple, without masses, thyroid non-tender Resp:  No accessory muscle use, rales BL Card:  No murmurs, normal S1, S2 without thrills, bruits or peripheral edema Abd:  Soft, non-tender, non-distended, normoactive bowel sounds are present, no palpable organomegaly and no detectable hernias Lymph:  No cervical or inguinal adenopathy Musc:  No cyanosis or clubbing Skin:  No rashes or ulcers, skin turgor is good Neuro:  Cranial nerves are grossly intact, no focal motor weakness, follows commands appropriately Psych:  Good insight, oriented to person, place and time, alert 
__________________________________________________________________ Medications Reviewed: (see below) Medications:  
 
Current Facility-Administered Medications Medication Dose Route Frequency  LORazepam (ATIVAN) tablet 0.5 mg  0.5 mg Oral Q24H  
 morphine injection 2 mg  2 mg IntraVENous Q1H PRN  
 fluticasone (FLONASE) 50 mcg/actuation nasal spray 2 Spray  2 Spray Both Nostrils DAILY  QUEtiapine (SEROquel) tablet 50 mg  50 mg Oral QHS  LORazepam (ATIVAN) tablet 0.5 mg  0.5 mg Oral QHS  LORazepam (ATIVAN) tablet 0.5 mg  0.5 mg Oral Q6H PRN  
 guaiFENesin ER (MUCINEX) tablet 600 mg  600 mg Oral Q12H  
 arformoterol (BROVANA) neb solution 15 mcg  15 mcg Nebulization BID RT  
 sodium chloride (NS) flush 5-10 mL  5-10 mL IntraVENous Q8H  
 sodium chloride (NS) flush 5-10 mL  5-10 mL IntraVENous PRN  
 albuterol-ipratropium (DUO-NEB) 2.5 MG-0.5 MG/3 ML  3 mL Nebulization Q4H RT  
 cetirizine (ZYRTEC) tablet 10 mg  10 mg Oral DAILY  finasteride (PROSCAR) tablet 5 mg  5 mg Oral DAILY  budesonide (PULMICORT) 500 mcg/2 ml nebulizer suspension  500 mcg Nebulization BID RT  
 gabapentin (NEURONTIN) capsule 300 mg  300 mg Oral DAILY WITH LUNCH  
 gabapentin (NEURONTIN) capsule 600 mg  600 mg Oral QHS  pirfenidone tab 801 mg   (Patient Supplied)  801 mg Oral TID  tamsulosin (FLOMAX) capsule 0.4 mg  0.4 mg Oral DAILY  enoxaparin (LOVENOX) injection 40 mg  40 mg SubCUTAneous Q24H  
 aspirin chewable tablet 81 mg  81 mg Oral DAILY  metoprolol tartrate (LOPRESSOR) tablet 12.5 mg  12.5 mg Oral BID  pantoprazole (PROTONIX) tablet 40 mg  40 mg Oral ACB Lab Data Reviewed: (see below) Lab Review: No results for input(s): WBC, HGB, HCT, PLT, HGBEXT, HCTEXT, PLTEXT, HGBEXT, HCTEXT, PLTEXT in the last 72 hours. No results for input(s): NA, K, CL, CO2, GLU, BUN, CREA, CA, MG, PHOS, ALB, TBIL, TBILI, SGOT, ALT, INR in the last 72 hours. No lab exists for component: INREXT, INREXT Lab Results Component Value Date/Time Glucose (POC) 114 (H) 02/11/2014 11:49 AM  
 Glucose (POC) 173 (H) 02/11/2014 08:26 AM  
 Glucose (POC) 158 (H) 02/10/2014 08:38 PM  
 Glucose (POC) 144 (H) 02/10/2014 04:15 PM  
 Glucose (POC) 156 (H) 02/10/2014 11:08 AM  
 
No results for input(s): PH, PCO2, PO2, HCO3, FIO2 in the last 72 hours. No results for input(s): INR in the last 72 hours. No lab exists for component: INREXT, INREXT All Micro Results None I have reviewed notes of prior 24hr. Other pertinent lab: Total time spent with patient: 28 Care Plan discussed with: Patient, Family, Nursing Staff and >50% of time spent in counseling and coordination of care Discussed:  Care Plan Prophylaxis:  Lovenox Disposition:  poor prognosis 
        
___________________________________________________ Attending Physician: Shemar Orlando MD

## 2018-09-12 NOTE — PROGRESS NOTES
Name: [address] [place] [address] East: [address]  
: 1935 Admit Date: 2018 Phone: [phone]  Room: [facility]/ PCP: Julieta Brooke MD  MRN: [id number] Date: 2018  Code: DNR Chart and notes reviewed. Data reviewed. I review the patient's current medications in the medical record at each encounter. I have evaluated and examined the patient. Overnight events: 
Afebrile Sats 89-91% HF 50L FiO2 90% BP and HR stable No new labs or imaging this morning ROS: Complains of some dry eyes this morning. Artificial tears ordered. Had a good night last night. Slept well. Awake and alert and resting comfortably this morning. Less WOB. Not struggling to breath this morning. Does not appear anxious. Minimal cough with sputum. No wheezing or chest pain. Still very difficult to wean HF. Was on 40L and 100% this morning and O2 sats in low 80's. Adjusted to 50L and 90%. Maintaining sats. Past Medical History:  
Diagnosis Date  CAD (coronary artery disease)   
 4 stents placed in heart  Chronic obstructive pulmonary disease ([address] EMPHYSEMA SEEN ON CXR  
 Colon polyp Three tubular adenomas excised colonoscopically on 3/17/2011.  COPD (chronic obstructive pulmonary disease) ([address] 2014  Diverticulosis of colon (without mention of hemorrhage)  DM (diabetes mellitus) ([address] 4/3/2012  GERD (gastroesophageal reflux disease) [address] [facility] [address]  History of pneumothorax 2014  Hypercholesteremia  Pneumonia   PUD (peptic ulcer disease) 195  Pulmonary fibrosis ([address] Past Surgical History:  
Procedure Laterality Date  ENDOSCOPY, COLON, DIAGNOSTIC  3/2011 Polyps. Tics. Rep 3 yrs.  HX CORONARY STENT PLACEMENT  2004  
 4 cardiac stents  HX GI  circa  Laparoscopic cholecystectomy. [address] [place] [address] left inguinal hernia repair  HX LUMBAR LAMINECTOMY  2013 L4 L5 Laminectomy  HX OTHER SURGICAL  2/7/2014 LVATS, bronch, talc pleurodesis, Resection of ruptured bulla  HX TONSILLECTOMY  age 29 Family History Problem Relation Age of Onset  Adopted: Yes  Other Other   
  patient was adopted Social History Substance Use Topics  Smoking status: Former Smoker Packs/day: 1.50 Years: 36.00 Types: Cigarettes Quit date: 1/1/1987  Smokeless tobacco: Never Used  Alcohol use No  
 
 
Allergies Allergen Reactions  Bactrim [Sulfamethoprim] Unknown (comments) FLU LIKE SYMPTOMS  
 Statins-Hmg-Coa Reductase Inhibitors Myalgia  Sulfa (Sulfonamide Antibiotics) Other (comments) Bactrim causes flu like symptoms Current Facility-Administered Medications Medication Dose Route Frequency  artificial tears (dextran 70-hypromellose) (NATURAL BALANCE) 0.1-0.3 % ophthalmic solution 2 Drop  2 Drop Both Eyes PRN  
 LORazepam (ATIVAN) tablet 0.5 mg  0.5 mg Oral Q24H  
 morphine injection 2 mg  2 mg IntraVENous Q1H PRN  
 fluticasone (FLONASE) 50 mcg/actuation nasal spray 2 Spray  2 Spray Both Nostrils DAILY  QUEtiapine (SEROquel) tablet 50 mg  50 mg Oral QHS  LORazepam (ATIVAN) tablet 0.5 mg  0.5 mg Oral QHS  LORazepam (ATIVAN) tablet 0.5 mg  0.5 mg Oral Q6H PRN  
 guaiFENesin ER (MUCINEX) tablet 600 mg  600 mg Oral Q12H  
 arformoterol (BROVANA) neb solution 15 mcg  15 mcg Nebulization BID RT  
 sodium chloride (NS) flush 5-10 mL  5-10 mL IntraVENous Q8H  
 sodium chloride (NS) flush 5-10 mL  5-10 mL IntraVENous PRN  
 albuterol-ipratropium (DUO-NEB) 2.5 MG-0.5 MG/3 ML  3 mL Nebulization Q4H RT  
 cetirizine (ZYRTEC) tablet 10 mg  10 mg Oral DAILY  finasteride (PROSCAR) tablet 5 mg  5 mg Oral DAILY  budesonide (PULMICORT) 500 mcg/2 ml nebulizer suspension  500 mcg Nebulization BID RT  
 gabapentin (NEURONTIN) capsule 300 mg  300 mg Oral DAILY WITH LUNCH  
 gabapentin (NEURONTIN) capsule 600 mg  600 mg Oral QHS  pirfenidone tab 801 mg   (Patient Supplied)  801 mg Oral TID  tamsulosin (FLOMAX) capsule 0.4 mg  0.4 mg Oral DAILY  enoxaparin (LOVENOX) injection 40 mg  40 mg SubCUTAneous Q24H  
 aspirin chewable tablet 81 mg  81 mg Oral DAILY  metoprolol tartrate (LOPRESSOR) tablet 12.5 mg  12.5 mg Oral BID  pantoprazole (PROTONIX) tablet 40 mg  40 mg Oral ACB REVIEW OF SYSTEMS  
12 point ROS negative except as stated in the HPI. Physical Exam:  
Visit Vitals  BP 99/55 (BP 1 Location: Left arm, BP Patient Position: At rest)  Pulse (!) 101  Temp 98.5 °F (36.9 °C)  Resp 28  Ht 5' 10\" (1.778 m)  Wt 76.4 kg (168 lb 8 oz)  SpO2 90%  BMI 24.18 kg/m2 General:  Alert, ill appearing/frail, appears stated age, on max hi flow, dyspneic with speaking Head:  Normocephalic, without obvious abnormality, atraumatic. Eyes:  Conjunctivae/corneas clear. Nose: Nares normal. Septum midline. Mucosa normal.   
Throat: Lips, mucosa, and tongue normal.   
Neck: Supple, symmetrical, trachea midline, no adenopathy. Lungs:   Diminished air movement with bibasilar crackles; tachypnea, and accessory muscle use. Chest wall:  No tenderness or deformity. Heart:  Regular rate and rhythm, S1, S2 normal, no murmur, click, rub or gallop. Abdomen:   Soft, non-tender. Bowel sounds normal.   
Extremities: Extremities normal, atraumatic, no cyanosis or edema. Pulses: 2+ and symmetric all extremities. Skin: Skin color, texture, turgor normal. No rashes or lesions Lymph nodes: Cervical, supraclavicular nodes normal.  
Neurologic: Moves all extremities, no gross focal deficits. Very weak/debilitated. Lab Results Component Value Date/Time  Sodium 134 (L) 09/04/2018 04:04 AM  
 Potassium 4.0 09/04/2018 04:04 AM  
 Chloride 99 09/04/2018 04:04 AM  
 CO2 26 09/04/2018 04:04 AM  
 BUN 16 09/04/2018 04:04 AM  
 Creatinine 0.89 09/04/2018 04:04 AM  
 Glucose 160 (H) 09/04/2018 04:04 AM  
 Calcium 8.5 09/04/2018 04:04 AM  
 Magnesium 1.7 09/04/2018 04:04 AM  
 Phosphorus 3.9 09/04/2018 04:04 AM  
 
 
Lab Results Component Value Date/Time WBC 7.6 08/26/2018 01:06 PM  
 HGB 13.2 08/26/2018 01:06 PM  
 PLATELET 190 23/51/4009 01:06 PM  
 MCV 96.2 08/26/2018 01:06 PM  
 
 
Lab Results Component Value Date/Time INR 1.0 11/01/2013 04:13 PM  
 AST (SGOT) 18 01/26/2018 02:19 PM  
 Alk. phosphatase 27 (L) 01/26/2018 02:19 PM  
 Protein, total 7.6 01/26/2018 02:19 PM  
 Albumin 4.3 01/26/2018 02:19 PM  
 Globulin 3.6 02/05/2014 04:36 AM  
 
 
No results found for: IRON, FE, TIBC, IBCT, PSAT, FERR Lab Results Component Value Date/Time Sed rate (ESR) 9 07/22/2015 12:34 PM  
 TSH 3.230 03/10/2015 02:51 PM  
  
 
No results found for: PH, PHI, PCO2, PCO2I, PO2, PO2I, HCO3, HCO3I, FIO2, FIO2I Lab Results Component Value Date/Time Troponin-I, Qt. 4.46 (H) 08/27/2018 12:30 AM  
  
 
Lab Results Component Value Date/Time Culture result:  02/04/2014 05:20 AM  
  MRSA NOT PRESENT Screening of patient nares for MRSA is for surveillance purposes and, if positive, to facilitate isolation considerations in high risk settings. It is not intended for automatic decolonization interventions per se as regimens are not sufficiently effective to warrant routine use. Culture result:  11/01/2013 03:55 PM  
  MRSA NOT PRESENT Apparent Staphylococcus aureus (not MRSA) noted. Screening of patient nares for MRSA is for surveillance purposes and, if positive, to facilitate isolation considerations in high risk settings. It is not intended for automatic decolonization interventions per se as regimens are not sufficiently effective to warrant routine use. No results found for: TOXA1, RPR, HBCM, HBSAG, HAAB, HCAB1, HAAT, G6PD, CRYAC, HIVGT, HIVR, HIV1, HIV12, HIVPC, HIVRPI No results found for: VANCT, CPK Lab Results Component Value Date/Time Color YELLOW/STRAW 02/04/2014 08:30 AM  
 Appearance CLEAR 02/04/2014 08:30 AM  
 pH (UA) 5.0 02/04/2014 08:30 AM  
 Protein TRACE (A) 02/04/2014 08:30 AM  
 Glucose 250 (A) 02/04/2014 08:30 AM  
 Ketone NEGATIVE  02/04/2014 08:30 AM  
 Bilirubin NEGATIVE  02/04/2014 08:30 AM  
 Blood NEGATIVE  02/04/2014 08:30 AM  
 Urobilinogen 0.2 02/04/2014 08:30 AM  
 Nitrites NEGATIVE  02/04/2014 08:30 AM  
 Leukocyte Esterase NEGATIVE  02/04/2014 08:30 AM  
 WBC 0-4 02/04/2014 08:30 AM  
 RBC 0-5 02/04/2014 08:30 AM  
 Bacteria NEGATIVE  02/04/2014 08:30 AM  
 
 
Images: no new images this morning; personally visualized and reviewed report CXR (8/30/18): Slight increase in bibasilar interstitial/airspace opacities and probable small effusions. IMPRESSION 
· Acute on chronic hypoxic respiratory failure · Advanced end stage pulmonary fibrosis · COPD 
· NSTEMI/CAD · Severe pulmonary HTN on ECHO · DM 
 
PLAN 
· Continue HF O2 and wean flow and FiO2 as able to keep sats > 85%. Have not been able to wean flow or FiO2 significantly and patient has not tolerated BiPAP well; will continue to try · Artificial tears added · Continue Duonebs scheduled and Brovana/Pulmicort nebs · Continue home Esbriet · Metoprolol per Cardiology; no additional recs at this time · Palliative following and saw yesterday afternoon; not ready for Hospice · Continue Ativan, Seroquel, and morphine prn · Patient not yet ready to make the decision to proceed with hospice; patient and wife aware that it is unlikely that we will be able decrease his O2 requirement to a level acceptable for discharge to home · Sildenafil is indicated for primary pulmonary HTN and there isn't evidence that it provides significant benefit in secondary pulmonary HTN; neither Esbriet or Ofev will reverse his pulmonary fibrosis or improve his current lung function, but aim to slow the progression · GI prophylaxis: Protonix · DVT prophylaxis: Lovenox Patient critically ill requiring continuous HF O2 due to severe hypoxia and respiratory distress and is high risk for continued decompensation. Discussed with nursing.  
 
Kirk Bernard NP

## 2018-09-12 NOTE — PROGRESS NOTES
NUTRITION  
RD Screen Pt seen for:   
 
[]  MST for     []   MD Consult [x]        Supplements  [x]   PO intake check  
[]        Food Allergies  []   Food Preferences/tolerances [x]        Rescreen  []   Education []        Diet order clarification []   Other  
[]  LOS Nutrition Prescription:  
9/12: F/U. Pt continues with 50-60L hiflow. Pt intakes vary between 10-75% of meals. Pt on regular diet to encourage PO intakes. Pt likes salt and didn't like when diet was lower sodium. Encourage pt to order meals to select what he likes. Continue ONS for added kcal/pro. No updated labs. Update skin integrity in WOCN tab. Last weight taken 1 week ago. Continue to monitor POC.   
 
9/6: Follow up. Pt appetite continues fair/good. Please continue to document PO intakes. Pt still on HiFlow, unable to wean. Last BM 9/4. Stage 1 sacral p/i. Continue ONS to support PO intakes. Pt on regular diet. BG rarely being monitored- on 9/4 fasting , 2/2 respiratory status. No point of care checks, no daily labs. Pt down 7 lbs. No edema. Continue to follow per protocol. Monitor intakes, weight.  
 
8/31: 80 yr old male admitted for Acute on chronic respiratory failure. Hx pulmonary fibrosis, CAD, DM, COPD. Pt eating well. Appetite good, intakes >75%. Pt with HiFlow NC @ 60L/min. Recommend limiting fluids to 2 L a day or less. Weight stable. No n/v. Last BM 8/29. No food allergies. Monitor intakes, BG - 251, 78 mg/dL. No labs since 8/27. Monitor POC. Encourage adequate intake of meals and supplements Assessment:  
Information obtained from: Chart/patient Cultural, Islam and ethnic food preferences:  
[x]  None  
[]  Identified and addressed Diet: Regular PO Intake:   [x] Good           [] Fair         []  Poor Patient Vitals for the past 100 hrs: 
 % Diet Eaten 09/12/18 1040 75 % 09/11/18 1010 25 % 09/10/18 0904 10 % 09/09/18 1830 100 % 09/09/18 0954 100 % 09/08/18 1815 5 % 09/08/18 1405 10 % Wt Readings from Last 5 Encounters:  
09/05/18 76.4 kg (168 lb 8 oz) 10/20/17 80.7 kg (178 lb)  
06/22/17 83.5 kg (184 lb) 04/25/17 81.6 kg (180 lb)  
02/21/17 85.3 kg (188 lb) Body mass index is 24.18 kg/(m^2). Skin: sacrum stage 1 BM: 9/8 ABD: WNL Estimated Daily Nutrition Requirements: 
Kcals/day: 1953 Kcals/day (BMR(1502x1.3)) Protein: 80 g (-96g/day(1.0-1.2g/kg)) Fluid:  1950 mL Based On: [place] 1898 Weight Used:  Actual weight 79.6 kg Weight Changes:  
[x]   Loss 
[]   Gain 
[]   Stable Nutrition Problems Identified 
[]     None 
[]     Specified food preferences  
[]     Dislikes supplements             
[]     Allergies []     Difficulty chewing    
[]     Dentition  
[]     Nausea/Vomiting 
[]    Constipation []    Diarrhea Nutrition Diagnosis:  
  
Problem:  Increased energy expendeture Etiology: related to increased energy needs Signs/Symptoms: as evidenced by respiratory status - HiFlow 60, Pulm fibrosis Intervention:  
[]    Obtained/adjusted food preferences/tolerances and/or snacks options []    Dislikes supplements will try a substitution []    Modify diet for food allergies []    Adjust texture due to difficulty chewing  
[]    Encourage Fresh vegetables, whole grains, general healthful diet  
[]    Educated patient 
[]    Rescreen per screening protocol [x]    Add Supplements Goal: Pt will consume >50% of meals and ONS within 3-5 days Monitoring/Evaluation:  
Education & Discharge Needs 
[] Nutrition related discharge needs addressed:  
[x] Supplements (on d/c instruction &/or coupons provided) [] Education [] No nutrition related discharge needs at this time Rescreen: [x]  At Nutrition Risk  
       []  Not at Nutrition Risk, rescreen per screening protocol Landy Pena RD Pager: [phone]

## 2018-09-12 NOTE — PROGRESS NOTES
Palliative Medicine Consult David: [phone]) Patient Name: Beltran Alvarez YOB: 1935 Date of Initial Consult: 8/27/18 Reason for Consult: Goals-of-care Requesting Provider: Axel Cardozo MD 
Primary Care Physician: Shadia Honeycutt MD 
 
 SUMMARY:  
Beltran Alvarez is a 80 y.o. with a end-stage COPD and pulmonary fibrosis, O2-dependent at 20L/minute at baseline; CAD who was admitted on 8/25/2018 from home with a diagnosis of acute-on-chronic respiratory failure and NSTEMI due to tachycardia and hypoxia. Current medical issues leading to Palliative Medicine involvement include: shortness of breath, anxiety/agitation, confusion in setting of steroids and hypoxia, goals of care. PALLIATIVE DIAGNOSES:  
1. Shortness of breath 2. Anxiety 3. Debility 4. Goals of care 5. Pulmonary fibrosis PLAN:  
1. Met with patient and spouse. Patient feels about the same but sats have remained in the upper 70s, low 80s despite 60 liters/FIO2 100%. Attempted to wean earlier today without success. He slept much better last night with the addition of ativan at 4 PM.  
2. They are worried the ear monitor not detecting his oxygen sats properly(although the pleth appears normal). Will ask Jessica(bedside nurse) to change to reassure patient. Did explain that his body may have adjusted to the lower oxygen sats since being in the hospital but also the HFNC may become less effective over time. We will continue to support him as he goes thru this very difficult situation 3. GOC-he remains not ready to make transition to comfort care/hospice. Once again, he wants to remain in control of his medical decisions and not ready to transition to hospice. 4. AMD-spouse remains MPOA and AMD in the chart 5. Code status-DNR per prior discussions. If he survives this stay, will need DDNR at discharge 6.  Symptom management-he continues to have prn ativan and morphine for anxiety and SOB. His anxiety seems to be worse in the evening. The ativan at 4 PM and then again at bedtime seemed to allow him to sleep better overnight. Has required 2 doses of morphine 7. Psychosocial-patient states just not \"mentally ready\" to make transition to hospice care. We all suspect he will pass in the hospital and not be able to be weaned from the HFNC. Patient and his wife understand this scenario as well. 8. Discussed with bedside nurse, Dr. Skip Asher, Pulmonary and CM. 9. Communicated plan of care with: Palliative IDT 
 
 
 GOALS OF CARE / TREATMENT PREFERENCES:  
 
GOALS OF CARE: continue current therapies with goal of returning home TREATMENT PREFERENCES:  
Code Status: DNR Advance Care Planning: 
Advance Care Planning 2018 Patient's Healthcare Decision Maker is: Named in scanned ACP document Primary Decision Maker Name Bethel Stallworth Primary Decision Maker Phone Number [phone] Primary Decision Maker Relationship to Patient Spouse Secondary Decision Maker Name Son Promise Hines and/or dtr Martina  Secondary Decision Maker Phone Number [phone], [phone] Confirm Advance Directive Yes, on file Other Instructions: Other: As far as possible, the palliative care team has discussed with patient / health care proxy about goals of care / treatment preferences for patient. HISTORY:  
 
History obtained from: patient, wife, chart CHIEF COMPLAINT: anxiety HPI/SUBJECTIVE: The patient is:  
[x] Verbal and participatory -  
[] Non-participatory due to:  
 
Patient is awake, alert and denies significant SOB despite his low oxygen sats Clinical Pain Assessment (nonverbal scale for severity on nonverbal patients): Duration: for how long has pt been experiencing pain (e.g., 2 days, 1 month, years) Frequency: how often pain is an issue (e.g., several times per day, once every few days, constant) FUNCTIONAL ASSESSMENT:  
 
Palliative Performance Scale (PPS): PPS: 30 
 
 
 PSYCHOSOCIAL/SPIRITUAL SCREENING:  
 
Palliative IDT has assessed this patient for cultural preferences / practices and a referral made as appropriate to needs (Cultural Services, Patient Advocacy, Ethics, etc.) Advance Care Planning: 
Advance Care Planning 2018 Patient's Healthcare Decision Maker is: Named in scanned ACP document Primary Decision Maker Name Gracie Simmons Primary Decision Maker Phone Number [phone] Primary Decision Maker Relationship to Patient Spouse Secondary Decision Maker Name Andre Casillas and/or dtr Tomas Morfin Secondary Decision Maker Phone Number [phone], [phone] Confirm Advance Directive Yes, on file Any spiritual / Muslim concerns: 
[] Yes /  [x] No 
 
Caregiver Burnout: 
[] Yes /  [x] No /  [] No Caregiver Present Anticipatory grief assessment:  
[x] Normal  / [] Maladaptive :  
 
ESAS Anxiety: Anxiety: 4 ESAS Depression:    
 
 
 REVIEW OF SYSTEMS:  
 
Positive and pertinent negative findings in ROS are noted above in HPI. The following systems were [] reviewed / [] unable to be reviewed as noted in HPI Other findings are noted below. Systems: constitutional, ears/nose/mouth/throat, respiratory, gastrointestinal, genitourinary, musculoskeletal, integumentary, neurologic, psychiatric, endocrine. Positive findings noted below. Modified ESAS Completed by: provider Anxiety: 4 Nausea: 0 Anorexia: 0 Constipation: No  
  Stool Occurrence(s): 1 PHYSICAL EXAM:  
 
From RN flowsheet: 
Wt Readings from Last 3 Encounters:  
18 168 lb 8 oz (76.4 kg) 10/20/17 178 lb (80.7 kg) 17 184 lb (83.5 kg) Blood pressure 135/56, pulse 89, temperature 97.6 °F (36.4 °C), resp. rate 26, height 5' 10\" (1.778 m), weight 168 lb 8 oz (76.4 kg), SpO2 (!) 79 %. Pain Scale 1: Numeric (0 - 10) Pain Intensity 1: 0 
  
 Last bowel movement, if known: today Constitutional: awake and alert, HFNC in place, sats in the low 80s but appears comfortable. Struggled with SOB when used bedside commode Eyes: pupils equal, anicteric ENMT: no nasal discharge, moist mucous membranes Cardiovascular: regular rhythm, distal pulses intact Respiratory: breathing mild-moderately labored with talking, symmetric Gastrointestinal: soft non-tender, +bowel sounds Musculoskeletal: no deformity, no tenderness to palpation Skin: warm, dry Neurologic: following commands, moving all extremities Psychiatric:calm Other: 
 
 
 HISTORY:  
 
Active Problems: 
  CAD (coronary artery disease) () Overview: cardiac stentd (4) COPD (chronic obstructive pulmonary disease) ([address] (2/4/2014) DM II (diabetes mellitus, type II), controlled ([address] (2/4/2014) Pulmonary fibrosis (HCC) () Acute on chronic respiratory failure ([address] (8/26/2018) Past Medical History:  
Diagnosis Date  CAD (coronary artery disease) 2004  
 4 stents placed in heart  Chronic obstructive pulmonary disease ([address] EMPHYSEMA SEEN ON CXR  
 Colon polyp Three tubular adenomas excised colonoscopically on 3/17/2011.  COPD (chronic obstructive pulmonary disease) ([address] 2/4/2014  Diverticulosis of colon (without mention of hemorrhage)  DM (diabetes mellitus) ([address] 4/3/2012  GERD (gastroesophageal reflux disease) [address]  History of pneumothorax 2/2014  Hypercholesteremia  Pneumonia 2004  PUD (peptic ulcer disease) 1955  Pulmonary fibrosis ([address] Past Surgical History:  
Procedure Laterality Date  ENDOSCOPY, COLON, DIAGNOSTIC  3/2011 Polyps. Tics. Rep 3 yrs.  HX CORONARY STENT PLACEMENT  2004  
 4 cardiac stents  HX GI  circa 2000 Laparoscopic cholecystectomy. [address] [place] [address] left inguinal hernia repair  HX LUMBAR LAMINECTOMY  11/20/2013 L4 L5 Laminectomy  HX OTHER SURGICAL  2/7/2014 LVATS, bronch, talc pleurodesis, Resection of ruptured bulla  HX TONSILLECTOMY  age 29 Family History Problem Relation Age of Onset  Adopted: Yes  Other Other   
  patient was adopted History reviewed, no pertinent family history. Social History Substance Use Topics  Smoking status: Former Smoker Packs/day: 1.50 Years: 36.00 Types: Cigarettes Quit date: 1/1/1987  Smokeless tobacco: Never Used  Alcohol use No  
 
Allergies Allergen Reactions  Bactrim [Sulfamethoprim] Unknown (comments) FLU LIKE SYMPTOMS  
 Statins-Hmg-Coa Reductase Inhibitors Myalgia  Sulfa (Sulfonamide Antibiotics) Other (comments) Bactrim causes flu like symptoms Current Facility-Administered Medications Medication Dose Route Frequency  artificial tears (dextran 70-hypromellose) (NATURAL BALANCE) 0.1-0.3 % ophthalmic solution 2 Drop  2 Drop Both Eyes PRN  
 LORazepam (ATIVAN) tablet 0.5 mg  0.5 mg Oral Q24H  
 morphine injection 2 mg  2 mg IntraVENous Q1H PRN  
 fluticasone (FLONASE) 50 mcg/actuation nasal spray 2 Spray  2 Spray Both Nostrils DAILY  QUEtiapine (SEROquel) tablet 50 mg  50 mg Oral QHS  LORazepam (ATIVAN) tablet 0.5 mg  0.5 mg Oral QHS  LORazepam (ATIVAN) tablet 0.5 mg  0.5 mg Oral Q6H PRN  
 guaiFENesin ER (MUCINEX) tablet 600 mg  600 mg Oral Q12H  
 arformoterol (BROVANA) neb solution 15 mcg  15 mcg Nebulization BID RT  
 sodium chloride (NS) flush 5-10 mL  5-10 mL IntraVENous Q8H  
 sodium chloride (NS) flush 5-10 mL  5-10 mL IntraVENous PRN  
 albuterol-ipratropium (DUO-NEB) 2.5 MG-0.5 MG/3 ML  3 mL Nebulization Q4H RT  
 cetirizine (ZYRTEC) tablet 10 mg  10 mg Oral DAILY  finasteride (PROSCAR) tablet 5 mg  5 mg Oral DAILY  budesonide (PULMICORT) 500 mcg/2 ml nebulizer suspension  500 mcg Nebulization BID RT  
 gabapentin (NEURONTIN) capsule 300 mg  300 mg Oral DAILY WITH LUNCH  
  gabapentin (NEURONTIN) capsule 600 mg  600 mg Oral QHS  pirfenidone tab 801 mg   (Patient Supplied)  801 mg Oral TID  tamsulosin (FLOMAX) capsule 0.4 mg  0.4 mg Oral DAILY  enoxaparin (LOVENOX) injection 40 mg  40 mg SubCUTAneous Q24H  
 aspirin chewable tablet 81 mg  81 mg Oral DAILY  metoprolol tartrate (LOPRESSOR) tablet 12.5 mg  12.5 mg Oral BID  pantoprazole (PROTONIX) tablet 40 mg  40 mg Oral ACB  
 
 
 
 LAB AND IMAGING FINDINGS:  
 
Lab Results Component Value Date/Time WBC 7.6 08/26/2018 01:06 PM  
 HGB 13.2 08/26/2018 01:06 PM  
 PLATELET 059 98/62/3844 01:06 PM  
 
Lab Results Component Value Date/Time Sodium 134 (L) 09/04/2018 04:04 AM  
 Potassium 4.0 09/04/2018 04:04 AM  
 Chloride 99 09/04/2018 04:04 AM  
 CO2 26 09/04/2018 04:04 AM  
 BUN 16 09/04/2018 04:04 AM  
 Creatinine 0.89 09/04/2018 04:04 AM  
 Calcium 8.5 09/04/2018 04:04 AM  
 Magnesium 1.7 09/04/2018 04:04 AM  
 Phosphorus 3.9 09/04/2018 04:04 AM  
  
Lab Results Component Value Date/Time AST (SGOT) 18 01/26/2018 02:19 PM  
 Alk. phosphatase 27 (L) 01/26/2018 02:19 PM  
 Protein, total 7.6 01/26/2018 02:19 PM  
 Albumin 4.3 01/26/2018 02:19 PM  
 Globulin 3.6 02/05/2014 04:36 AM  
 
Lab Results Component Value Date/Time INR 1.0 11/01/2013 04:13 PM  
 Prothrombin time 10.4 11/01/2013 04:13 PM  
  
No results found for: IRON, FE, TIBC, IBCT, PSAT, FERR Lab Results Component Value Date/Time pH 7.39 02/04/2014 02:00 AM  
 PCO2 31 (L) 02/04/2014 02:00 AM  
 PO2 90 02/04/2014 02:00 AM  
 
No components found for: [place] No results found for: CPK, CKMB Total time: 35 
Counseling / coordination time, spent as noted above:30 
> 50% counseling / coordination?: yes Prolonged service was provided for  []30 min   []75 min in face to face time in the presence of the patient, spent as noted above. Time Start:  
Time End: Note: this can only be billed with 76501 (initial) or 08687 (follow up). If multiple start / stop times, list each separately.

## 2018-09-12 NOTE — PROGRESS NOTES
Attempted to work with the patient for Physical Therapy, chart reviewed and discussed with nurse patient not doing well still critically ill asked to defer therapy today. We will continue to follow up with the patient for therapy. Thank you.

## 2018-09-12 NOTE — DISCHARGE SUMMARY
Physician Interim Summary Patient ID: Joanna Carlson [id number] 
80 y.o. 
1935 PCP: Kathy Kimble MD  
 
Consults: Cardiology, Pulmonary/Intensive care and Palliative Care Covering dates: 8/25/2018 through 9/12/2018 Admission Diagnoses: Acute on chronic respiratory failure ([facility] Acute on chronic respiratory failure ([facility] Hospital Course:  
 Acute on chronic resp failure/hypoxia/end stage pulm fibrosis: Still on high flow O2 and difficult to wean him to his home O2 level. Still pt is not ready for hospice( despite multiple meeting with pt and family and care providers). On LABA/ICS, duo nebs prn poor prognosis. Can not tolerate BiPAP.    
Please see daily progress note for detail. Additional hospital course and discharge summary will be done by discharging physician.

## 2018-09-13 NOTE — PROGRESS NOTES
Name: [address] [place] [address] East: [address]  
: 1935 Admit Date: 2018 Phone: [phone]  Room: [facility]/ PCP: Karishma Avery MD  MRN: [id number] Date: 2018  Code: DNR Chart and notes reviewed. Data reviewed. I review the patient's current medications in the medical record at each encounter. I have evaluated and examined the patient. Overnight events: 
Afebrile Sats 89-91% HF 60L FiO2 90% BP and HR stable No new labs or imaging this morning ROS: Resting comfortably this morning. No acute distress. Breathing comfortably. Spoke with wife. He had a good night last night and used BiPAP all night. On HF again now 60L and 100%. He did not need morphine last night, but received 2 doses of Ativan. Artificial tears have helped his dry eyes. Has some nasal dryness from HF. Past Medical History:  
Diagnosis Date  CAD (coronary artery disease)   
 4 stents placed in heart  Chronic obstructive pulmonary disease ([address] EMPHYSEMA SEEN ON CXR  
 Colon polyp Three tubular adenomas excised colonoscopically on 3/17/2011.  COPD (chronic obstructive pulmonary disease) ([address] 2014  Diverticulosis of colon (without mention of hemorrhage)  DM (diabetes mellitus) ([address] 4/3/2012  GERD (gastroesophageal reflux disease) [address] [facility] [facility] [address]  History of pneumothorax 2014  Hypercholesteremia  Pneumonia   PUD (peptic ulcer disease) 195  Pulmonary fibrosis ([address] Past Surgical History:  
Procedure Laterality Date  ENDOSCOPY, COLON, DIAGNOSTIC  3/2011 Polyps. Tics. Rep 3 yrs.  HX CORONARY STENT PLACEMENT    
 4 cardiac stents  HX GI  circa  Laparoscopic cholecystectomy. [address] [place] [address] left inguinal hernia repair  HX LUMBAR LAMINECTOMY  2013 L4 L5 Laminectomy  HX OTHER SURGICAL  2014 LVATS, bronch, talc pleurodesis, Resection of ruptured bulla  HX TONSILLECTOMY  age 29 Family History Problem Relation Age of Onset  Adopted: Yes  Other Other   
  patient was adopted Social History Substance Use Topics  Smoking status: Former Smoker Packs/day: 1.50 Years: 36.00 Types: Cigarettes Quit date: 1/1/1987  Smokeless tobacco: Never Used  Alcohol use No  
 
 
Allergies Allergen Reactions  Bactrim [Sulfamethoprim] Unknown (comments) FLU LIKE SYMPTOMS  
 Statins-Hmg-Coa Reductase Inhibitors Myalgia  Sulfa (Sulfonamide Antibiotics) Other (comments) Bactrim causes flu like symptoms Current Facility-Administered Medications Medication Dose Route Frequency  artificial tears (dextran 70-hypromellose) (NATURAL BALANCE) 0.1-0.3 % ophthalmic solution 2 Drop  2 Drop Both Eyes PRN  
 LORazepam (ATIVAN) tablet 0.5 mg  0.5 mg Oral Q24H  
 morphine injection 2 mg  2 mg IntraVENous Q1H PRN  
 fluticasone (FLONASE) 50 mcg/actuation nasal spray 2 Spray  2 Spray Both Nostrils DAILY  QUEtiapine (SEROquel) tablet 50 mg  50 mg Oral QHS  LORazepam (ATIVAN) tablet 0.5 mg  0.5 mg Oral QHS  LORazepam (ATIVAN) tablet 0.5 mg  0.5 mg Oral Q6H PRN  
 guaiFENesin ER (MUCINEX) tablet 600 mg  600 mg Oral Q12H  
 arformoterol (BROVANA) neb solution 15 mcg  15 mcg Nebulization BID RT  
 sodium chloride (NS) flush 5-10 mL  5-10 mL IntraVENous Q8H  
 sodium chloride (NS) flush 5-10 mL  5-10 mL IntraVENous PRN  
 albuterol-ipratropium (DUO-NEB) 2.5 MG-0.5 MG/3 ML  3 mL Nebulization Q4H RT  
 cetirizine (ZYRTEC) tablet 10 mg  10 mg Oral DAILY  finasteride (PROSCAR) tablet 5 mg  5 mg Oral DAILY  budesonide (PULMICORT) 500 mcg/2 ml nebulizer suspension  500 mcg Nebulization BID RT  
 gabapentin (NEURONTIN) capsule 300 mg  300 mg Oral DAILY WITH LUNCH  
 gabapentin (NEURONTIN) capsule 600 mg  600 mg Oral QHS  pirfenidone tab 801 mg   (Patient Supplied)  801 mg Oral TID  tamsulosin (FLOMAX) capsule 0.4 mg  0.4 mg Oral DAILY  enoxaparin (LOVENOX) injection 40 mg  40 mg SubCUTAneous Q24H  
 aspirin chewable tablet 81 mg  81 mg Oral DAILY  metoprolol tartrate (LOPRESSOR) tablet 12.5 mg  12.5 mg Oral BID  pantoprazole (PROTONIX) tablet 40 mg  40 mg Oral ACB REVIEW OF SYSTEMS  
12 point ROS negative except as stated in the HPI. Physical Exam:  
Visit Vitals  /62 (BP 1 Location: Left arm, BP Patient Position: At rest)  Pulse 85  Temp 97.3 °F (36.3 °C)  Resp 22  
 Ht 5' 10\" (1.778 m)  Wt 75.6 kg (166 lb 11.2 oz)  SpO2 91%  BMI 23.92 kg/m2 General:  Alert, ill appearing/frail, appears stated age, on max hi flow, dyspneic with speaking Head:  Normocephalic, without obvious abnormality, atraumatic. Eyes:  Conjunctivae/corneas clear. Nose: Nares normal. Septum midline. Mucosa normal.   
Throat: Lips, mucosa, and tongue normal.   
Neck: Supple, symmetrical, trachea midline, no adenopathy. Lungs:   Diminished air movement with bibasilar crackles; tachypnea, and accessory muscle use. Chest wall:  No tenderness or deformity. Heart:  Regular rate and rhythm, S1, S2 normal, no murmur, click, rub or gallop. Abdomen:   Soft, non-tender. Bowel sounds normal.   
Extremities: Extremities normal, atraumatic, no cyanosis or edema. Pulses: 2+ and symmetric all extremities. Skin: Skin color, texture, turgor normal. No rashes or lesions Lymph nodes: Cervical, supraclavicular nodes normal.  
Neurologic: Moves all extremities, no gross focal deficits. Very weak/debilitated. Lab Results Component Value Date/Time  Sodium 134 (L) 09/04/2018 04:04 AM  
 Potassium 4.0 09/04/2018 04:04 AM  
 Chloride 99 09/04/2018 04:04 AM  
 CO2 26 09/04/2018 04:04 AM  
 BUN 16 09/04/2018 04:04 AM  
 Creatinine 0.89 09/04/2018 04:04 AM  
 Glucose 160 (H) 09/04/2018 04:04 AM  
 Calcium 8.5 09/04/2018 04:04 AM  
 Magnesium 1.7 09/04/2018 04:04 AM  
 Phosphorus 3.9 09/04/2018 04:04 AM  
 
 
Lab Results Component Value Date/Time WBC 7.6 08/26/2018 01:06 PM  
 HGB 13.2 08/26/2018 01:06 PM  
 PLATELET 134 05/39/7117 01:06 PM  
 MCV 96.2 08/26/2018 01:06 PM  
 
 
Lab Results Component Value Date/Time INR 1.0 11/01/2013 04:13 PM  
 AST (SGOT) 18 01/26/2018 02:19 PM  
 Alk. phosphatase 27 (L) 01/26/2018 02:19 PM  
 Protein, total 7.6 01/26/2018 02:19 PM  
 Albumin 4.3 01/26/2018 02:19 PM  
 Globulin 3.6 02/05/2014 04:36 AM  
 
 
No results found for: IRON, FE, TIBC, IBCT, PSAT, FERR Lab Results Component Value Date/Time Sed rate (ESR) 9 07/22/2015 12:34 PM  
 TSH 3.230 03/10/2015 02:51 PM  
  
 
No results found for: PH, PHI, PCO2, PCO2I, PO2, PO2I, HCO3, HCO3I, FIO2, FIO2I Lab Results Component Value Date/Time Troponin-I, Qt. 4.46 (H) 08/27/2018 12:30 AM  
  
 
Lab Results Component Value Date/Time Culture result:  02/04/2014 05:20 AM  
  MRSA NOT PRESENT Screening of patient nares for MRSA is for surveillance purposes and, if positive, to facilitate isolation considerations in high risk settings. It is not intended for automatic decolonization interventions per se as regimens are not sufficiently effective to warrant routine use. Culture result:  11/01/2013 03:55 PM  
  MRSA NOT PRESENT Apparent Staphylococcus aureus (not MRSA) noted. Screening of patient nares for MRSA is for surveillance purposes and, if positive, to facilitate isolation considerations in high risk settings. It is not intended for automatic decolonization interventions per se as regimens are not sufficiently effective to warrant routine use. No results found for: TOXA1, RPR, HBCM, HBSAG, HAAB, HCAB1, HAAT, G6PD, CRYAC, HIVGT, HIVR, HIV1, HIV12, HIVPC, HIVRPI No results found for: VANCT, CPK Lab Results Component Value Date/Time  Color YELLOW/STRAW 02/04/2014 08:30 AM  
 Appearance CLEAR 02/04/2014 08:30 AM  
 pH (UA) 5.0 02/04/2014 08:30 AM  
 Protein TRACE (A) 02/04/2014 08:30 AM  
 Glucose 250 (A) 02/04/2014 08:30 AM  
 Ketone NEGATIVE  02/04/2014 08:30 AM  
 Bilirubin NEGATIVE  02/04/2014 08:30 AM  
 Blood NEGATIVE  02/04/2014 08:30 AM  
 Urobilinogen 0.2 02/04/2014 08:30 AM  
 Nitrites NEGATIVE  02/04/2014 08:30 AM  
 Leukocyte Esterase NEGATIVE  02/04/2014 08:30 AM  
 WBC 0-4 02/04/2014 08:30 AM  
 RBC 0-5 02/04/2014 08:30 AM  
 Bacteria NEGATIVE  02/04/2014 08:30 AM  
 
 
Images: no new images this morning; personally visualized and reviewed report CXR (8/30/18): Slight increase in bibasilar interstitial/airspace opacities and probable small effusions. IMPRESSION 
· Acute on chronic hypoxic respiratory failure · Advanced end stage pulmonary fibrosis · COPD 
· NSTEMI/CAD · Severe pulmonary HTN on ECHO · DM 
 
PLAN 
· Continue HF O2 and wean flow and FiO2 as able to keep sats > 85%. Have not been able to wean flow or FiO2 significantly; tolerated BiPAP all night last night · Artificial tears and saline nasal gel · Continue Duonebs scheduled and Brovana/Pulmicort nebs · Continue home Esbriet · Metoprolol per Cardiology; no additional recs at this time · Palliative following and saw yesterday afternoon; not ready for Hospice · Continue Ativan, Seroquel, and morphine prn · Patient not yet ready to make the decision to proceed with hospice; patient and wife aware that it is unlikely that we will be able decrease his O2 requirement to a level acceptable for discharge to home · Sildenafil is indicated for primary pulmonary HTN and there isn't evidence that it provides significant benefit in secondary pulmonary HTN; neither Esbriet or Ofev will reverse his pulmonary fibrosis or improve his current lung function, but aim to slow the progression · GI prophylaxis: Protonix · DVT prophylaxis: Lovenox Patient critically ill requiring continuous HF O2 due to severe hypoxia and respiratory distress and is high risk for continued decompensation.    
 
Juanito Siddiqui, NP

## 2018-09-13 NOTE — ROUTINE PROCESS
Bedside and Verbal shift change report given to Ralf Palmer RN (oncoming nurse) by Dinora Mendoza RN (offgoing nurse). Report included the following information SBAR, Kardex, Intake/Output, MAR, Accordion, Recent Results, Cardiac Rhythm SR and Alarm Parameters .

## 2018-09-13 NOTE — PROGRESS NOTES
Problem: Mobility Impaired (Adult and Pediatric) Goal: *Acute Goals and Plan of Care (Insert Text) Physical Therapy Goals Initiated 9/4/2018 1. Patient will move from supine to sit and sit to supine  in bed with supervision/set-up within 7 day(s). 2.  Patient will transfer from bed to chair and chair to bed with moderate assistance  using the least restrictive device within 7 day(s). 3.  Patient will perform sit to stand with moderate assistance  within 7 day(s). physical Therapy TREATMENT: WEEKLY REASSESSMENT Patient: Daniel Burton (76 y.o. male) Date: 9/13/2018 Diagnosis: Acute on chronic respiratory failure (HCC) Acute on chronic respiratory failure (HCC) <principal problem not specified> Precautions:   
Chart, physical therapy assessment, plan of care and goals were reviewed. ASSESSMENT: 
Patient with end-stage COPD and pulmonary fibrosis, O2-dependent at 20L/minute. Nurse reports patient is having a better day today. His wife reports that he was able to sit up on the side of the bed last night. He does not tolerate flat supine. Noted that he had slid down in the bed. Reviewed with patient and wife best positioning in bed and discussed options for re-postioning using sheet to assist. 
 
Performed rolling with moderate assist each direction to place sheet, scoot up in bed with total assist of two. Instructed wife in bed controls to place bed in and out of chair position. Also reviewed foot position with foot board and pillow and rolled blanket to decrease heel cord tightness. Recommend seated position for meals and for change of position. Pulse ox 88% with initial sitting, improves to 90% after three minutes. Discussed goals with patient and with his wife, will instruct patient in a bed exercise program with the goal that he is able to perform the exercises independently or with his wife's assistance.  Recommend next treatment patient be given written home exercise program and review program with wife. Exercises to include (ankle pumps, heel slides, IR/ER at hips, Ab/add, Short arc quads, glut sets and quad sets) Patient's progression toward goals since last assessment: Reviewed chart and noted that the patient has had difficulty participating in PT due to poor medical status. Physical Therapy has been deferred on eight separate days (Sept 1, 2, 3, 5, 6, 10 ,11, and 12), able to perform PT eval on 9/4 and participate in bed ex on 9/7. PLAN: 
Goals have been updated based on progression since last assessment. Patient continues to benefit from skilled intervention to address the above impairments. Continue to follow the patient 3 times a week to address goals. Planned Interventions: 
[]              Bed Mobility Training             []       Neuromuscular Re-Education []              Transfer Training                   []       Orthotic/Prosthetic Training 
[]              Gait Training                         []       Modalities [x]              Therapeutic Exercises           []       Edema Management/Control []              Therapeutic Activities            []       Patient and Family Training/Education []              Other (comment): 
Discharge Recommendations: To Be Determined Further Equipment Recommendations for Discharge: none SUBJECTIVE:  
Patient stated I don't have any.  initial answer to what are is goals for PT OBJECTIVE DATA SUMMARY:  
Critical Behavior: 
Neurologic State: Alert Orientation Level: Oriented X4 Cognition: Appropriate decision making, Appropriate safety awareness, Follows commands Strength:  
Strength: Generally decreased, functional 
  
  
  
  
  
  
 
Functional Mobility Training: 
Bed Mobility: 
Rolling: Moderate assistance;Assist x1;Additional time Transfers: 
  
  
     
  
     
  
  
  
  
Balance: 
  
Ambulation/Gait Training: Stairs: 
  
  
  
 
Pain: 
Pain Scale 1: Numeric (0 - 10) Pain Intensity 1: 0 Activity Tolerance:  
Limited by weakness, desatting and shortness of breath Please refer to the flowsheet for vital signs taken during this treatment. After treatment:  
[x]  Patient left in no apparent distress sitting up in chair position in bed 
[]  Patient left in no apparent distress in bed 
[x]  Call bell left within reach [x]  Nursing notified 
[x]  Caregiver present 
[]  Bed alarm activated COMMUNICATION/COLLABORATION:  
The patients plan of care was discussed with: Registered Nurse Rocco Baca, PT Time Calculation: 868 mins

## 2018-09-13 NOTE — PROGRESS NOTES
Palliative Medicine Social Work Note SW spoke with wife outside of room. Wife reports pt had a good night last night, slept well (as did wife) but reports pt is groggy today. Wife expressed wish to speak with Palliative Medicine physician re: possibly adjusting meds to control pt's anxiety while still allowing pt to be alert. SW relayed above to Dr. Enio Yoder. Will continue to follow for support. Thank you for including Palliative Medicine in the care of Mr. Farrah Sifuentes. Juliet  MADI Cabrera, JUAQUIN-MARYANNE Palliative Medicine:  [phone])

## 2018-09-13 NOTE — PROGRESS NOTES
Problem: Pressure Injury - Risk of 
Goal: *Prevention of pressure injury Document Vasile Scale and appropriate interventions in the flowsheet. Outcome: Progressing Towards Goal 
Pressure Injury Interventions: 
Sensory Interventions: Assess changes in LOC, Assess need for specialty bed, Avoid rigorous massage over bony prominences, Chair cushion, Check visual cues for pain, Discuss PT/OT consult with provider, Keep linens dry and wrinkle-free, Maintain/enhance activity level, Minimize linen layers, Monitor skin under medical devices, Pad between skin to skin, Turn and reposition approx. every two hours (pillows and wedges if needed) Activity Interventions: Assess need for specialty bed, Chair cushion, Increase time out of bed, Pressure redistribution bed/mattress(bed type), PT/OT evaluation Mobility Interventions: Assess need for specialty bed, Chair cushion, Float heels, HOB 30 degrees or less, Pressure redistribution bed/mattress (bed type), PT/OT evaluation, Turn and reposition approx. every two hours(pillow and wedges) Nutrition Interventions: Document food/fluid/supplement intake, Discuss nutritional consult with provider, Offer support with meals,snacks and hydration Friction and Shear Interventions: Apply protective barrier, creams and emollients, Feet elevated on foot rest, Foam dressings/transparent film/skin sealants, HOB 30 degrees or less, Lift team/patient mobility team, Minimize layers, Transferring/repositioning devices Problem: Falls - Risk of 
Goal: *Absence of Falls Document Anette Rodrigues Fall Risk and appropriate interventions in the flowsheet.   
Outcome: Progressing Towards Goal 
Fall Risk Interventions: 
Mobility Interventions: Assess mobility with egress test, Bed/chair exit alarm, Communicate number of staff needed for ambulation/transfer, OT consult for ADLs, Patient to call before getting OOB, PT Consult for mobility concerns, PT Consult for assist device competence, Strengthening exercises (ROM-active/passive) Mentation Interventions: Adequate sleep, hydration, pain control, Bed/chair exit alarm, Door open when patient unattended, Evaluate medications/consider consulting pharmacy, Eyeglasses and hearing aids, Family/sitter at bedside, HELP ([address]) if available, Increase mobility, More frequent rounding, Reorient patient, Room close to nurse's station, Toileting rounds, Update white board Medication Interventions: Assess postural VS orthostatic hypotension, Bed/chair exit alarm, Evaluate medications/consider consulting pharmacy, Patient to call before getting OOB, Teach patient to arise slowly Elimination Interventions: Bed/chair exit alarm, Call light in reach, Elevated toilet seat, Patient to call for help with toileting needs, Toilet paper/wipes in reach, Toileting schedule/hourly rounds, Urinal in reach History of Falls Interventions: Bed/chair exit alarm, Consult care management for discharge planning, Door open when patient unattended, Evaluate medications/consider consulting pharmacy, Room close to nurse's station

## 2018-09-13 NOTE — PROGRESS NOTES
Bakari Backelsen [facility] [address] 
[address] [facility], [address] [place] [address] 
[phone] Medical Progress Note NAME: Kaylee Saenz :  1935 MRM:  [id number] Date/Time: 2018 Assessment and Plan:  
 Acute on chronic resp failure/hypoxia/end stage pulm fibrosis: Still on high flow O2. Patient is DNR, but he wants to continue attempts at weaning the high flow O2 that is acceptable for home. That might not be possible, still pt is not ready for hospice. Pulmonary and palliative care following. Cont LABA/ICS, duo nebs prn poor prognosis.  
  
 NSTEMI/CAD: likely type 2 MI from supply demand. Cont ASA, metoprolol. Evaluated by cardiology 
  
 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 No further management due to potential comfort care 
  
 Anxiety: due to hypoxia, pulm fibrosis. Cont seroquel. Use IV ativan, IV morphine prn Subjective: Chief Complaint:  F/u respiratory failure No acute events. Pt used bipap last night with some improvement in sob, fatigue. Objective:  
 
Last 24hrs VS reviewed since prior progress note. Most recent are: 
 
Visit Vitals  /64  Pulse 88  Temp 97.3 °F (36.3 °C)  Resp 22  
 Ht 5' 10\" (1.778 m)  Wt 75.6 kg (166 lb 11.2 oz)  SpO2 (!) 80%  BMI 23.92 kg/m2 SpO2 Readings from Last 6 Encounters:  
18 (!) 80% 17 96% 10/13/14 94% 14 95% 14 93% 14 96% O2 Flow Rate (L/min): 60 l/min Intake/Output Summary (Last 24 hours) at 18 1246 Last data filed at 18 4631 Gross per 24 hour Intake              300 ml Output              300 ml Net                0 ml Physical Exam: 
 
Gen:  elderly, in no acute distress HEENT:  Pink conjunctivae, PERRL, hearing intact to voice, moist mucous membranes Neck:  Supple, without masses, thyroid non-tender Resp:  No accessory muscle use, rales BL 
 Card:  No murmurs, normal S1, S2 without thrills, bruits or peripheral edema Abd:  Soft, non-tender, non-distended, normoactive bowel sounds are present, no palpable organomegaly and no detectable hernias Musc:  No cyanosis or clubbing Skin:  No rashes or ulcers, skin turgor is good Neuro:  No focal deficits, follows commands appropriately Psych: Moderate insight, oriented to person, place and time, alert 
__________________________________________________________________ Medications Reviewed: (see below) Medications:  
 
Current Facility-Administered Medications Medication Dose Route Frequency  sodium chloride-aloe vera spry   Nasal PRN  
 artificial tears (dextran 70-hypromellose) (NATURAL BALANCE) 0.1-0.3 % ophthalmic solution 2 Drop  2 Drop Both Eyes PRN  
 LORazepam (ATIVAN) tablet 0.5 mg  0.5 mg Oral Q24H  
 morphine injection 2 mg  2 mg IntraVENous Q1H PRN  
 fluticasone (FLONASE) 50 mcg/actuation nasal spray 2 Spray  2 Spray Both Nostrils DAILY  QUEtiapine (SEROquel) tablet 50 mg  50 mg Oral QHS  LORazepam (ATIVAN) tablet 0.5 mg  0.5 mg Oral QHS  LORazepam (ATIVAN) tablet 0.5 mg  0.5 mg Oral Q6H PRN  
 guaiFENesin ER (MUCINEX) tablet 600 mg  600 mg Oral Q12H  
 arformoterol (BROVANA) neb solution 15 mcg  15 mcg Nebulization BID RT  
 sodium chloride (NS) flush 5-10 mL  5-10 mL IntraVENous Q8H  
 sodium chloride (NS) flush 5-10 mL  5-10 mL IntraVENous PRN  
 albuterol-ipratropium (DUO-NEB) 2.5 MG-0.5 MG/3 ML  3 mL Nebulization Q4H RT  
 cetirizine (ZYRTEC) tablet 10 mg  10 mg Oral DAILY  finasteride (PROSCAR) tablet 5 mg  5 mg Oral DAILY  budesonide (PULMICORT) 500 mcg/2 ml nebulizer suspension  500 mcg Nebulization BID RT  
 gabapentin (NEURONTIN) capsule 300 mg  300 mg Oral DAILY WITH LUNCH  
 gabapentin (NEURONTIN) capsule 600 mg  600 mg Oral QHS  pirfenidone tab 801 mg   (Patient Supplied)  801 mg Oral TID  tamsulosin (FLOMAX) capsule 0.4 mg  0.4 mg Oral DAILY  enoxaparin (LOVENOX) injection 40 mg  40 mg SubCUTAneous Q24H  
 aspirin chewable tablet 81 mg  81 mg Oral DAILY  metoprolol tartrate (LOPRESSOR) tablet 12.5 mg  12.5 mg Oral BID  pantoprazole (PROTONIX) tablet 40 mg  40 mg Oral ACB Lab Data Reviewed: (see below) Lab Review: No results for input(s): WBC, HGB, HCT, PLT, HGBEXT, HCTEXT, PLTEXT, HGBEXT, HCTEXT, PLTEXT in the last 72 hours. No results for input(s): NA, K, CL, CO2, GLU, BUN, CREA, CA, MG, PHOS, ALB, TBIL, TBILI, SGOT, ALT, INR in the last 72 hours. No lab exists for component: INREXT, INREXT Lab Results Component Value Date/Time Glucose (POC) 114 (H) 02/11/2014 11:49 AM  
 Glucose (POC) 173 (H) 02/11/2014 08:26 AM  
 Glucose (POC) 158 (H) 02/10/2014 08:38 PM  
 Glucose (POC) 144 (H) 02/10/2014 04:15 PM  
 Glucose (POC) 156 (H) 02/10/2014 11:08 AM  
 
No results for input(s): PH, PCO2, PO2, HCO3, FIO2 in the last 72 hours. No results for input(s): INR in the last 72 hours. No lab exists for component: INREXT, INREXT All Micro Results None I have reviewed notes of prior 24hr. Other pertinent lab: Total time spent with patient: 30 Care Plan discussed with: Patient, Family, Nursing Staff and >50% of time spent in counseling and coordination of care Discussed:  Care Plan Prophylaxis:  Lovenox Disposition:  poor prognosis 
        
___________________________________________________ Attending Physician: Jerson Williamson MD

## 2018-09-13 NOTE — PROGRESS NOTES
I met with the patient and his wife. I will continue to follow and offer support.  Thanks CHUCK Duran

## 2018-09-13 NOTE — PROGRESS NOTES
Shift Summary 9/13/2018 
[id number] 
 
0700 Pt asleep at time of bedside shift report received from [facility]. 1000 AM vitals, assessment complete, family wishes to not wake patient for scheduled AM meds at this time; pt's wife will call for RN when patient is ready for meds. AM rhythm strip shows NSR. Reviewed today's plan of care and goals with patient/family; plan of care today includes monitor respiratory status. [address] w/WOCN regarding potential for pt to have specialty bed, as he is long term patient with very limited mobility and Mepilex to reddened but blanchable area on the buttocks. Wound care consult ordered for WOCN to follow up. 200 Pt assisted to [facility] by PCT; pt sats dropped as low as 66%; pt and wife became increasingly anxious. Pt placed back on BiPAP and sats returned to 88-90% within 5 minutes. 36 Pt attempted to eat on hiflow 60L 100% FIO2. SpO2 dropped to 75%, pt became anxious, placed back on BiPAP and SpO2 returned to >85% within 5 minutes. 1900 Bedside and Verbal shift change report given to Efren Solis (oncoming nurse) by Emigdio Ribeiro (offgoing nurse). Report included the following information SBAR, Intake/Output, MAR and Cardiac Rhythm NSR.

## 2018-09-13 NOTE — PROGRESS NOTES
Palliative Medicine Consult David: [phone]) Patient Name: Joey Hendrickson YOB: 1935 Date of Initial Consult: 8/27/18 Reason for Consult: Goals-of-care Requesting Provider: Lucia Arriaga MD 
Primary Care Physician: Yuko Pizarro MD 
 
 SUMMARY:  
Joey Hendrickson is a 80 y.o. with a end-stage COPD and pulmonary fibrosis, O2-dependent at 20L/minute at baseline; CAD who was admitted on 8/25/2018 from home with a diagnosis of acute-on-chronic respiratory failure and NSTEMI due to tachycardia and hypoxia. Current medical issues leading to Palliative Medicine involvement include: shortness of breath, anxiety/agitation, confusion in setting of steroids and hypoxia, goals of care. PALLIATIVE DIAGNOSES:  
1. Shortness of breath 2. Anxiety 3. Debility 4. Goals of care 5. Pulmonary fibrosis PLAN:  
1. Met with patient,son and spouse. Patient with significant increase in symptoms with any type of activity->sats plummeted with using the bedside commode. Required BIPAP afterwards and used BIPAP all night. Did rest well but a little \"groggy this morning\" per his spouse. 2. Discussed attempting to use BIPAP before and after planned activity. They understand given the advanced nature of his lung issues, it is normal for his sats to decline with activity. 3. GOC-he remains not ready to make transition to comfort care/hospice. Once again, he wants to remain in control of his medical decisions and not ready to transition to hospice. 4. AMD-spouse remains MPOA and AMD in the chart 5. Code status-DNR per prior discussions. If he survives this stay, will need DDNR at discharge 6. Symptom management-he continues to have prn ativan and morphine for anxiety and SOB. His anxiety seems to be worse in the evening. The ativan at 4 PM and then again at bedtime seemed to allow him to sleep better overnight.  Will adjust the seroquel dose to 25 mg qhs scheduled and then 25 mg prn(additional dose) if not able to fall asleep. 7. Psychosocial-patient states just not \"mentally ready\" to make transition to hospice care. We all suspect he will pass in the hospital and not be able to be weaned from the HFNC. Patient and his wife understand this scenario as well. 8. Discussed with bedside nurse, Dr. Joss Johnson, Pulmonary and CM. 9. Communicated plan of care with: Palliative IDT 
 
 
 GOALS OF CARE / TREATMENT PREFERENCES:  
 
GOALS OF CARE: continue current therapies with goal of returning home TREATMENT PREFERENCES:  
Code Status: DNR Advance Care Planning: 
Advance Care Planning 2018 Patient's Healthcare Decision Maker is: Named in scanned ACP document Primary Decision Maker Name Niki Person Primary Decision Maker Phone Number [phone] Primary Decision Maker Relationship to Patient Spouse Secondary Decision Maker Name Andre Mishra and/or dtr Jazzy Pastrana Secondary Decision Maker Phone Number [phone], [phone] Confirm Advance Directive Yes, on file Other Instructions: Other: As far as possible, the palliative care team has discussed with patient / health care proxy about goals of care / treatment preferences for patient. HISTORY:  
 
History obtained from: patient, wife, chart CHIEF COMPLAINT: anxiety HPI/SUBJECTIVE: The patient is:  
[x] Verbal and participatory -  
[] Non-participatory due to:  
 
Patient is awake, alert and feeling better. Now back on BIPAP Clinical Pain Assessment (nonverbal scale for severity on nonverbal patients): Duration: for how long has pt been experiencing pain (e.g., 2 days, 1 month, years) Frequency: how often pain is an issue (e.g., several times per day, once every few days, constant) FUNCTIONAL ASSESSMENT:  
 
Palliative Performance Scale (PPS): PPS: 30 
 
 
 PSYCHOSOCIAL/SPIRITUAL SCREENING:  
 
 Palliative IDT has assessed this patient for cultural preferences / practices and a referral made as appropriate to needs (Cultural Services, Patient Advocacy, Ethics, etc.) Advance Care Planning: 
Advance Care Planning 2018 Patient's Healthcare Decision Maker is: Named in scanned ACP document Primary Decision Maker Name Rosanne Stern Primary Decision Maker Phone Number [phone] Primary Decision Maker Relationship to Patient Spouse Secondary Decision Maker Name Son Sandee Avalos and/or dtr Noel Wilson Secondary Decision Maker Phone Number [phone], [phone] Confirm Advance Directive Yes, on file Any spiritual / Jewish concerns: 
[] Yes /  [x] No 
 
Caregiver Burnout: 
[] Yes /  [x] No /  [] No Caregiver Present Anticipatory grief assessment:  
[x] Normal  / [] Maladaptive :  
 
ESAS Anxiety: Anxiety: 4 ESAS Depression:    
 
 
 REVIEW OF SYSTEMS:  
 
Positive and pertinent negative findings in ROS are noted above in HPI. The following systems were [] reviewed / [] unable to be reviewed as noted in HPI Other findings are noted below. Systems: constitutional, ears/nose/mouth/throat, respiratory, gastrointestinal, genitourinary, musculoskeletal, integumentary, neurologic, psychiatric, endocrine. Positive findings noted below. Modified ESAS Completed by: provider Anxiety: 4 Nausea: 0 Anorexia: 0 Constipation: No  
  Stool Occurrence(s): 1 PHYSICAL EXAM:  
 
From RN flowsheet: 
Wt Readings from Last 3 Encounters:  
18 166 lb 11.2 oz (75.6 kg) 10/20/17 178 lb (80.7 kg) 17 184 lb (83.5 kg) Blood pressure 119/64, pulse 88, temperature 97.3 °F (36.3 °C), resp. rate 22, height 5' 10\" (1.778 m), weight 166 lb 11.2 oz (75.6 kg), SpO2 92 %. Pain Scale 1: Numeric (0 - 10) Pain Intensity 1: 0 Last bowel movement, if known: today Constitutional: awake and alert, HFNC in place, sats in the mid-90s but appears comfortable. Struggled with SOB when used bedside commode Eyes: pupils equal, anicteric ENMT: no nasal discharge, moist mucous membranes Cardiovascular: regular rhythm, distal pulses intact Respiratory: breathing mild-moderately labored with talking, symmetric Gastrointestinal: soft non-tender, +bowel sounds Musculoskeletal: no deformity, no tenderness to palpation Skin: warm, dry Neurologic: following commands, moving all extremities Psychiatric:calm Other: 
 
 
 HISTORY:  
 
Active Problems: 
  CAD (coronary artery disease) () Overview: cardiac stentd (4) COPD (chronic obstructive pulmonary disease) ([address] (2/4/2014) DM II (diabetes mellitus, type II), controlled ([address] (2/4/2014) Pulmonary fibrosis (HCC) () Acute on chronic respiratory failure ([address] (8/26/2018) Past Medical History:  
Diagnosis Date  CAD (coronary artery disease) 2004  
 4 stents placed in heart  Chronic obstructive pulmonary disease ([address] EMPHYSEMA SEEN ON CXR  
 Colon polyp Three tubular adenomas excised colonoscopically on 3/17/2011.  COPD (chronic obstructive pulmonary disease) ([address] 2/4/2014  Diverticulosis of colon (without mention of hemorrhage)  DM (diabetes mellitus) ([address] 4/3/2012  GERD (gastroesophageal reflux disease) [address]  History of pneumothorax 2/2014  Hypercholesteremia  Pneumonia 2004  PUD (peptic ulcer disease) 1955  Pulmonary fibrosis ([address] Past Surgical History:  
Procedure Laterality Date  ENDOSCOPY, COLON, DIAGNOSTIC  3/2011 Polyps. Tics. Rep 3 yrs.  HX CORONARY STENT PLACEMENT  2004  
 4 cardiac stents  HX GI  circa 2000 Laparoscopic cholecystectomy. [address] left inguinal hernia repair  HX LUMBAR LAMINECTOMY  11/20/2013 L4 L5 Laminectomy  HX OTHER SURGICAL  2/7/2014 LVATS, bronch, talc pleurodesis, Resection of ruptured bulla  HX TONSILLECTOMY  age 29 Family History Problem Relation Age of Onset  Adopted: Yes  Other Other   
  patient was adopted History reviewed, no pertinent family history. Social History Substance Use Topics  Smoking status: Former Smoker Packs/day: 1.50 Years: 36.00 Types: Cigarettes Quit date: 1/1/1987  Smokeless tobacco: Never Used  Alcohol use No  
 
Allergies Allergen Reactions  Bactrim [Sulfamethoprim] Unknown (comments) FLU LIKE SYMPTOMS  
 Statins-Hmg-Coa Reductase Inhibitors Myalgia  Sulfa (Sulfonamide Antibiotics) Other (comments) Bactrim causes flu like symptoms Current Facility-Administered Medications Medication Dose Route Frequency  sodium chloride-aloe vera spry   Nasal PRN  
 QUEtiapine (SEROquel) tablet 25 mg  25 mg Oral QHS  QUEtiapine (SEROquel) tablet 25 mg  25 mg Oral QHS PRN  
 artificial tears (dextran 70-hypromellose) (NATURAL BALANCE) 0.1-0.3 % ophthalmic solution 2 Drop  2 Drop Both Eyes PRN  
 LORazepam (ATIVAN) tablet 0.5 mg  0.5 mg Oral Q24H  
 morphine injection 2 mg  2 mg IntraVENous Q1H PRN  
 fluticasone (FLONASE) 50 mcg/actuation nasal spray 2 Spray  2 Spray Both Nostrils DAILY  LORazepam (ATIVAN) tablet 0.5 mg  0.5 mg Oral QHS  LORazepam (ATIVAN) tablet 0.5 mg  0.5 mg Oral Q6H PRN  
 guaiFENesin ER (MUCINEX) tablet 600 mg  600 mg Oral Q12H  
 arformoterol (BROVANA) neb solution 15 mcg  15 mcg Nebulization BID RT  
 sodium chloride (NS) flush 5-10 mL  5-10 mL IntraVENous Q8H  
 sodium chloride (NS) flush 5-10 mL  5-10 mL IntraVENous PRN  
 albuterol-ipratropium (DUO-NEB) 2.5 MG-0.5 MG/3 ML  3 mL Nebulization Q4H RT  
 cetirizine (ZYRTEC) tablet 10 mg  10 mg Oral DAILY  finasteride (PROSCAR) tablet 5 mg  5 mg Oral DAILY  budesonide (PULMICORT) 500 mcg/2 ml nebulizer suspension  500 mcg Nebulization BID RT  
  gabapentin (NEURONTIN) capsule 300 mg  300 mg Oral DAILY WITH LUNCH  
 gabapentin (NEURONTIN) capsule 600 mg  600 mg Oral QHS  pirfenidone tab 801 mg   (Patient Supplied)  801 mg Oral TID  tamsulosin (FLOMAX) capsule 0.4 mg  0.4 mg Oral DAILY  enoxaparin (LOVENOX) injection 40 mg  40 mg SubCUTAneous Q24H  
 aspirin chewable tablet 81 mg  81 mg Oral DAILY  metoprolol tartrate (LOPRESSOR) tablet 12.5 mg  12.5 mg Oral BID  pantoprazole (PROTONIX) tablet 40 mg  40 mg Oral ACB  
 
 
 
 LAB AND IMAGING FINDINGS:  
 
Lab Results Component Value Date/Time WBC 7.6 08/26/2018 01:06 PM  
 HGB 13.2 08/26/2018 01:06 PM  
 PLATELET 464 64/84/8733 01:06 PM  
 
Lab Results Component Value Date/Time Sodium 134 (L) 09/04/2018 04:04 AM  
 Potassium 4.0 09/04/2018 04:04 AM  
 Chloride 99 09/04/2018 04:04 AM  
 CO2 26 09/04/2018 04:04 AM  
 BUN 16 09/04/2018 04:04 AM  
 Creatinine 0.89 09/04/2018 04:04 AM  
 Calcium 8.5 09/04/2018 04:04 AM  
 Magnesium 1.7 09/04/2018 04:04 AM  
 Phosphorus 3.9 09/04/2018 04:04 AM  
  
Lab Results Component Value Date/Time AST (SGOT) 18 01/26/2018 02:19 PM  
 Alk. phosphatase 27 (L) 01/26/2018 02:19 PM  
 Protein, total 7.6 01/26/2018 02:19 PM  
 Albumin 4.3 01/26/2018 02:19 PM  
 Globulin 3.6 02/05/2014 04:36 AM  
 
Lab Results Component Value Date/Time INR 1.0 11/01/2013 04:13 PM  
 Prothrombin time 10.4 11/01/2013 04:13 PM  
  
No results found for: IRON, FE, TIBC, IBCT, PSAT, FERR Lab Results Component Value Date/Time pH 7.39 02/04/2014 02:00 AM  
 PCO2 31 (L) 02/04/2014 02:00 AM  
 PO2 90 02/04/2014 02:00 AM  
 
No components found for: [place] No results found for: CPK, CKMB Total time: 35 
Counseling / coordination time, spent as noted above:30 
> 50% counseling / coordination?: yes Prolonged service was provided for  []30 min   []75 min in face to face time in the presence of the patient, spent as noted above. Time Start:  
Time End: Note: this can only be billed with 54795 (initial) or 78713 (follow up). If multiple start / stop times, list each separately.

## 2018-09-13 NOTE — WOUND CARE
Wound care consult to obtain low air loss surface for prevention. Low air loss surface obtained for patient, staff advised. Per Matt Briggs - staff nurse assessment - skin intact, thin and fragile. Will follow Derrick Smyth

## 2018-09-14 NOTE — PROGRESS NOTES
Problem: Pressure Injury - Risk of 
Goal: *Prevention of pressure injury Document Vasile Scale and appropriate interventions in the flowsheet. Outcome: Progressing Towards Goal 
Pressure Injury Interventions: 
Sensory Interventions: Assess changes in LOC, Assess need for specialty bed, Avoid rigorous massage over bony prominences, Chair cushion, Check visual cues for pain, Discuss PT/OT consult with provider, Keep linens dry and wrinkle-free, Maintain/enhance activity level, Minimize linen layers, Monitor skin under medical devices, Pad between skin to skin, Turn and reposition approx. every two hours (pillows and wedges if needed) Activity Interventions: Assess need for specialty bed, Chair cushion, Increase time out of bed, Pressure redistribution bed/mattress(bed type), PT/OT evaluation Mobility Interventions: Assess need for specialty bed, Chair cushion, Float heels, HOB 30 degrees or less, Pressure redistribution bed/mattress (bed type), PT/OT evaluation, Turn and reposition approx. every two hours(pillow and wedges) Nutrition Interventions: Document food/fluid/supplement intake, Discuss nutritional consult with provider, Offer support with meals,snacks and hydration Friction and Shear Interventions: Apply protective barrier, creams and emollients, Feet elevated on foot rest, Foam dressings/transparent film/skin sealants, HOB 30 degrees or less, Lift team/patient mobility team, Minimize layers Problem: Falls - Risk of 
Goal: *Absence of Falls Document Anette Rodrigues Fall Risk and appropriate interventions in the flowsheet. Outcome: Progressing Towards Goal 
Fall Risk Interventions: 
Mobility Interventions: Assess mobility with egress test, Bed/chair exit alarm, Communicate number of staff needed for ambulation/transfer, OT consult for ADLs, Patient to call before getting OOB, PT Consult for mobility concerns, PT Consult for assist device competence Mentation Interventions: Adequate sleep, hydration, pain control, Bed/chair exit alarm, Door open when patient unattended, Evaluate medications/consider consulting pharmacy, Eyeglasses and hearing aids, Familiar objects from home, Increase mobility, More frequent rounding, Reorient patient, Room close to nurse's station, Toileting rounds, Update white board Medication Interventions: Assess postural VS orthostatic hypotension, Bed/chair exit alarm, Evaluate medications/consider consulting pharmacy, Patient to call before getting OOB, Teach patient to arise slowly Elimination Interventions: Bed/chair exit alarm, Call light in reach, Elevated toilet seat, Patient to call for help with toileting needs, Toilet paper/wipes in reach, Toileting schedule/hourly rounds, Urinal in reach History of Falls Interventions: Bed/chair exit alarm, Consult care management for discharge planning, Door open when patient unattended, Evaluate medications/consider consulting pharmacy, Investigate reason for fall, Room close to nurse's station

## 2018-09-14 NOTE — PROGRESS NOTES
Bakari Backelsen [facility] 
[address] [facility], [address] [place] [address] 
[phone] Medical Progress Note NAME: Carol Medeiros :  1935 MRM:  [id number] Date/Time: 2018 Assessment and Plan:  
 Acute on chronic resp failure/hypoxia/end stage pulm fibrosis: Still on high flow O2. Patient is DNR, but he wants to continue attempts at weaning the high flow O2 that is acceptable for home. That might not be possible, still pt is not ready for hospice. Pulmonary and palliative care following. Cont LABA/ICS, duo nebs prn poor prognosis.  
  
 NSTEMI/CAD: likely type 2 MI from supply demand. Cont ASA, metoprolol. Evaluated by cardiology 
  
 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 No further management due to potential comfort care 
  
 Anxiety: due to hypoxia, pulm fibrosis. Cont seroquel. Use IV ativan, IV morphine prn Subjective: Chief Complaint:  F/u respiratory failure No acute events. Sob continues with desaturations only with minimal activity Objective:  
 
Last 24hrs VS reviewed since prior progress note. Most recent are: 
 
Visit Vitals  /64 (BP 1 Location: Left arm, BP Patient Position: At rest)  Pulse 91  Temp 97.9 °F (36.6 °C)  Resp (!) 33  
 Ht 5' 10\" (1.778 m)  Wt 76.6 kg (168 lb 14 oz)  SpO2 (!) 28%  BMI 24.23 kg/m2 SpO2 Readings from Last 6 Encounters:  
18 (!) 28% 17 96% 10/13/14 94% 14 95% 14 93% 14 96% O2 Flow Rate (L/min): 60 l/min Intake/Output Summary (Last 24 hours) at 18 1400 Last data filed at 18 9371 Gross per 24 hour Intake              500 ml Output              775 ml Net             -275 ml Physical Exam: 
 
Gen:  elderly, in no acute distress HEENT:  Pink conjunctivae, PERRL, hearing intact to voice, moist mucous membranes Neck:  Supple, without masses, thyroid non-tender Resp:  No accessory muscle use, rales BL 
 Card:  No murmurs, normal S1, S2 without thrills, bruits or peripheral edema Abd:  Soft, non-tender, non-distended, normoactive bowel sounds are present, no palpable organomegaly and no detectable hernias Musc:  No cyanosis or clubbing Skin:  No rashes or ulcers, skin turgor is good Neuro:  No focal deficits, follows commands appropriately Psych: Moderate insight, oriented to person, place and time, alert 
__________________________________________________________________ Medications Reviewed: (see below) Medications:  
 
Current Facility-Administered Medications Medication Dose Route Frequency  sodium chloride-aloe vera spry   Nasal PRN  
 QUEtiapine (SEROquel) tablet 25 mg  25 mg Oral QHS  QUEtiapine (SEROquel) tablet 25 mg  25 mg Oral QHS PRN  
 artificial tears (dextran 70-hypromellose) (NATURAL BALANCE) 0.1-0.3 % ophthalmic solution 2 Drop  2 Drop Both Eyes PRN  
 LORazepam (ATIVAN) tablet 0.5 mg  0.5 mg Oral Q24H  
 morphine injection 2 mg  2 mg IntraVENous Q1H PRN  
 fluticasone (FLONASE) 50 mcg/actuation nasal spray 2 Spray  2 Spray Both Nostrils DAILY  LORazepam (ATIVAN) tablet 0.5 mg  0.5 mg Oral QHS  LORazepam (ATIVAN) tablet 0.5 mg  0.5 mg Oral Q6H PRN  
 guaiFENesin ER (MUCINEX) tablet 600 mg  600 mg Oral Q12H  
 arformoterol (BROVANA) neb solution 15 mcg  15 mcg Nebulization BID RT  
 sodium chloride (NS) flush 5-10 mL  5-10 mL IntraVENous Q8H  
 sodium chloride (NS) flush 5-10 mL  5-10 mL IntraVENous PRN  
 albuterol-ipratropium (DUO-NEB) 2.5 MG-0.5 MG/3 ML  3 mL Nebulization Q4H RT  
 cetirizine (ZYRTEC) tablet 10 mg  10 mg Oral DAILY  finasteride (PROSCAR) tablet 5 mg  5 mg Oral DAILY  budesonide (PULMICORT) 500 mcg/2 ml nebulizer suspension  500 mcg Nebulization BID RT  
 gabapentin (NEURONTIN) capsule 300 mg  300 mg Oral DAILY WITH LUNCH  
 gabapentin (NEURONTIN) capsule 600 mg  600 mg Oral QHS  pirfenidone tab 801 mg   (Patient Supplied)  801 mg Oral TID  tamsulosin (FLOMAX) capsule 0.4 mg  0.4 mg Oral DAILY  enoxaparin (LOVENOX) injection 40 mg  40 mg SubCUTAneous Q24H  
 aspirin chewable tablet 81 mg  81 mg Oral DAILY  metoprolol tartrate (LOPRESSOR) tablet 12.5 mg  12.5 mg Oral BID  pantoprazole (PROTONIX) tablet 40 mg  40 mg Oral ACB Lab Data Reviewed: (see below) Lab Review:  
 
Recent Labs  
   09/14/18 
 3768 WBC  8.3 HGB  13.1 HCT  39.4 PLT  297 Recent Labs  
   09/14/18 
 5020 NA  137  
K  3.6 CL  101 CO2  28 GLU  154* BUN  15  
CREA  0.70 CA  9.3 Lab Results Component Value Date/Time Glucose (POC) 114 (H) 02/11/2014 11:49 AM  
 Glucose (POC) 173 (H) 02/11/2014 08:26 AM  
 Glucose (POC) 158 (H) 02/10/2014 08:38 PM  
 Glucose (POC) 144 (H) 02/10/2014 04:15 PM  
 Glucose (POC) 156 (H) 02/10/2014 11:08 AM  
 
No results for input(s): PH, PCO2, PO2, HCO3, FIO2 in the last 72 hours. No results for input(s): INR in the last 72 hours. No lab exists for component: INREXT, INREXT All Micro Results None I have reviewed notes of prior 24hr. Other pertinent lab: Total time spent with patient: 30 Care Plan discussed with: Patient, Family, Nursing Staff and >50% of time spent in counseling and coordination of care Discussed:  Care Plan Prophylaxis:  Lovenox Disposition:  poor prognosis 
        
___________________________________________________ Attending Physician: Marcelino Landon MD

## 2018-09-14 NOTE — ROUTINE PROCESS
Bedside and Verbal shift change report given to Emeka Chavez RN (oncoming nurse) by Amada Nair RN (offgoing nurse). Report included the following information SBAR, Kardex, Intake/Output, MAR, Accordion, Recent Results, Cardiac Rhythm SR and Alarm Parameters .

## 2018-09-14 NOTE — PROGRESS NOTES
Name: [address] [place] [address] East: [address]  
: 1935 Admit Date: 2018 Phone: [phone]  Room: [facility]/01 PCP: Remington Hsieh MD  MRN: [id number] Date: 2018  Code: DNR Chart and notes reviewed. Data reviewed. I review the patient's current medications in the medical record at each encounter. I have evaluated and examined the patient. Overnight events: 
Afebrile Sats 90% HF 60L FiO2 100% BP and HR stable BMP and CBC stable this morning ROS: Did not have a great night last night. Still drowsy this morning, but does not appear to be distressed. Artifical tears and saline nasal gel helping slightly to give him some relief. Still minimally interactive as any kind of very small exertion or even talking exhausts him. Using BiPAP and night and during the day with increased WOB. Still unable to wean HF O2. Past Medical History:  
Diagnosis Date  CAD (coronary artery disease)   
 4 stents placed in heart  Chronic obstructive pulmonary disease ([address] EMPHYSEMA SEEN ON CXR  
 Colon polyp Three tubular adenomas excised colonoscopically on 3/17/2011.  COPD (chronic obstructive pulmonary disease) ([address] 2014  Diverticulosis of colon (without mention of hemorrhage)  DM (diabetes mellitus) ([address] 4/3/2012  GERD (gastroesophageal reflux disease) [address] [facility] [address]  History of pneumothorax 2014  Hypercholesteremia  Pneumonia   PUD (peptic ulcer disease) 195  Pulmonary fibrosis ([address] Past Surgical History:  
Procedure Laterality Date  ENDOSCOPY, COLON, DIAGNOSTIC  3/2011 Polyps. Tics. Rep 3 yrs.  HX CORONARY STENT PLACEMENT    
 4 cardiac stents  HX GI  circa  Laparoscopic cholecystectomy. [address] [place] [address] left inguinal hernia repair  HX LUMBAR LAMINECTOMY  2013 L4 L5 Laminectomy  HX OTHER SURGICAL  2014 LVATS, bronch, talc pleurodesis, Resection of ruptured bulla  HX TONSILLECTOMY  age 29 Family History Problem Relation Age of Onset  Adopted: Yes  Other Other   
  patient was adopted Social History Substance Use Topics  Smoking status: Former Smoker Packs/day: 1.50 Years: 36.00 Types: Cigarettes Quit date: 1/1/1987  Smokeless tobacco: Never Used  Alcohol use No  
 
 
Allergies Allergen Reactions  Bactrim [Sulfamethoprim] Unknown (comments) FLU LIKE SYMPTOMS  
 Statins-Hmg-Coa Reductase Inhibitors Myalgia  Sulfa (Sulfonamide Antibiotics) Other (comments) Bactrim causes flu like symptoms Current Facility-Administered Medications Medication Dose Route Frequency  sodium chloride-aloe vera spry   Nasal PRN  
 QUEtiapine (SEROquel) tablet 25 mg  25 mg Oral QHS  QUEtiapine (SEROquel) tablet 25 mg  25 mg Oral QHS PRN  
 artificial tears (dextran 70-hypromellose) (NATURAL BALANCE) 0.1-0.3 % ophthalmic solution 2 Drop  2 Drop Both Eyes PRN  
 LORazepam (ATIVAN) tablet 0.5 mg  0.5 mg Oral Q24H  
 morphine injection 2 mg  2 mg IntraVENous Q1H PRN  
 fluticasone (FLONASE) 50 mcg/actuation nasal spray 2 Spray  2 Spray Both Nostrils DAILY  LORazepam (ATIVAN) tablet 0.5 mg  0.5 mg Oral QHS  LORazepam (ATIVAN) tablet 0.5 mg  0.5 mg Oral Q6H PRN  
 guaiFENesin ER (MUCINEX) tablet 600 mg  600 mg Oral Q12H  
 arformoterol (BROVANA) neb solution 15 mcg  15 mcg Nebulization BID RT  
 sodium chloride (NS) flush 5-10 mL  5-10 mL IntraVENous Q8H  
 sodium chloride (NS) flush 5-10 mL  5-10 mL IntraVENous PRN  
 albuterol-ipratropium (DUO-NEB) 2.5 MG-0.5 MG/3 ML  3 mL Nebulization Q4H RT  
 cetirizine (ZYRTEC) tablet 10 mg  10 mg Oral DAILY  finasteride (PROSCAR) tablet 5 mg  5 mg Oral DAILY  budesonide (PULMICORT) 500 mcg/2 ml nebulizer suspension  500 mcg Nebulization BID RT  
  gabapentin (NEURONTIN) capsule 300 mg  300 mg Oral DAILY WITH LUNCH  
 gabapentin (NEURONTIN) capsule 600 mg  600 mg Oral QHS  pirfenidone tab 801 mg   (Patient Supplied)  801 mg Oral TID  tamsulosin (FLOMAX) capsule 0.4 mg  0.4 mg Oral DAILY  enoxaparin (LOVENOX) injection 40 mg  40 mg SubCUTAneous Q24H  
 aspirin chewable tablet 81 mg  81 mg Oral DAILY  metoprolol tartrate (LOPRESSOR) tablet 12.5 mg  12.5 mg Oral BID  pantoprazole (PROTONIX) tablet 40 mg  40 mg Oral ACB REVIEW OF SYSTEMS  
12 point ROS negative except as stated in the HPI. Physical Exam:  
Visit Vitals  /70 (BP 1 Location: Left arm, BP Patient Position: At rest)  Pulse 99  Temp 97.2 °F (36.2 °C)  Resp (!) 33  
 Ht 5' 10\" (1.778 m)  Wt 76.6 kg (168 lb 14 oz)  SpO2 (!) 24%  BMI 24.23 kg/m2 General:  Alert, ill appearing/frail, appears stated age, on max hi flow, dyspneic with speaking Head:  Normocephalic, without obvious abnormality, atraumatic. Eyes:  Conjunctivae/corneas clear. Nose: Nares normal. Septum midline. Mucosa normal.   
Throat: Lips, mucosa, and tongue normal.   
Neck: Supple, symmetrical, trachea midline, no adenopathy. Lungs:   Diminished air movement with bibasilar crackles; tachypnea, and accessory muscle use. Chest wall:  No tenderness or deformity. Heart:  Regular rate and rhythm, S1, S2 normal, no murmur, click, rub or gallop. Abdomen:   Soft, non-tender. Bowel sounds normal.   
Extremities: Extremities normal, atraumatic, no cyanosis or edema. Pulses: 2+ and symmetric all extremities. Skin: Skin color, texture, turgor normal. No rashes or lesions Lymph nodes: Cervical, supraclavicular nodes normal.  
Neurologic: Moves all extremities, no gross focal deficits. Very weak/debilitated. Lab Results Component Value Date/Time  Sodium 137 09/14/2018 04:52 AM  
 Potassium 3.6 09/14/2018 04:52 AM  
 Chloride 101 09/14/2018 04:52 AM  
 CO2 28 09/14/2018 04:52 AM  
 BUN 15 09/14/2018 04:52 AM  
 Creatinine 0.70 09/14/2018 04:52 AM  
 Glucose 154 (H) 09/14/2018 04:52 AM  
 Calcium 9.3 09/14/2018 04:52 AM  
 Magnesium 1.7 09/04/2018 04:04 AM  
 Phosphorus 3.9 09/04/2018 04:04 AM  
 
 
Lab Results Component Value Date/Time WBC 8.3 09/14/2018 04:52 AM  
 HGB 13.1 09/14/2018 04:52 AM  
 PLATELET 519 15/40/5346 04:52 AM  
 MCV 91.8 09/14/2018 04:52 AM  
 
 
Lab Results Component Value Date/Time INR 1.0 11/01/2013 04:13 PM  
 AST (SGOT) 18 01/26/2018 02:19 PM  
 Alk. phosphatase 27 (L) 01/26/2018 02:19 PM  
 Protein, total 7.6 01/26/2018 02:19 PM  
 Albumin 4.3 01/26/2018 02:19 PM  
 Globulin 3.6 02/05/2014 04:36 AM  
 
 
No results found for: IRON, FE, TIBC, IBCT, PSAT, FERR Lab Results Component Value Date/Time Sed rate (ESR) 9 07/22/2015 12:34 PM  
 TSH 3.230 03/10/2015 02:51 PM  
  
 
No results found for: PH, PHI, PCO2, PCO2I, PO2, PO2I, HCO3, HCO3I, FIO2, FIO2I Lab Results Component Value Date/Time Troponin-I, Qt. 4.46 (H) 08/27/2018 12:30 AM  
  
 
Lab Results Component Value Date/Time Culture result:  02/04/2014 05:20 AM  
  MRSA NOT PRESENT Screening of patient nares for MRSA is for surveillance purposes and, if positive, to facilitate isolation considerations in high risk settings. It is not intended for automatic decolonization interventions per se as regimens are not sufficiently effective to warrant routine use. Culture result:  11/01/2013 03:55 PM  
  MRSA NOT PRESENT Apparent Staphylococcus aureus (not MRSA) noted. Screening of patient nares for MRSA is for surveillance purposes and, if positive, to facilitate isolation considerations in high risk settings. It is not intended for automatic decolonization interventions per se as regimens are not sufficiently effective to warrant routine use.   
 
 
No results found for: TOXA1, RPR, HBCM, HBSAG, HAAB, HCAB1, HAAT, G6PD, CRYAC, HIVGT, HIVR, HIV1, HIV12, HIVPC, HIVRPI No results found for: VANCT, CPK Lab Results Component Value Date/Time Color YELLOW/STRAW 02/04/2014 08:30 AM  
 Appearance CLEAR 02/04/2014 08:30 AM  
 pH (UA) 5.0 02/04/2014 08:30 AM  
 Protein TRACE (A) 02/04/2014 08:30 AM  
 Glucose 250 (A) 02/04/2014 08:30 AM  
 Ketone NEGATIVE  02/04/2014 08:30 AM  
 Bilirubin NEGATIVE  02/04/2014 08:30 AM  
 Blood NEGATIVE  02/04/2014 08:30 AM  
 Urobilinogen 0.2 02/04/2014 08:30 AM  
 Nitrites NEGATIVE  02/04/2014 08:30 AM  
 Leukocyte Esterase NEGATIVE  02/04/2014 08:30 AM  
 WBC 0-4 02/04/2014 08:30 AM  
 RBC 0-5 02/04/2014 08:30 AM  
 Bacteria NEGATIVE  02/04/2014 08:30 AM  
 
 
Images: no new images this morning; personally visualized and reviewed report CXR (8/30/18): Slight increase in bibasilar interstitial/airspace opacities and probable small effusions. IMPRESSION 
· Acute on chronic hypoxic respiratory failure · Advanced end stage pulmonary fibrosis · COPD 
· NSTEMI/CAD · Severe pulmonary HTN on ECHO · DM 
 
PLAN 
· Continue HF O2 and wean flow and FiO2 as able to keep sats > 85%. Have not been able to wean flow or FiO2 significantly; tolerated BiPAP all night last night · Artificial tears and saline nasal gel · Continue Duonebs scheduled and Brovana/Pulmicort nebs · Continue home Esbriet · Metoprolol per Cardiology; no additional recs at this time · Palliative following and saw yesterday afternoon; not ready for Hospice · Continue Ativan, Seroquel, and morphine prn · Patient not yet ready to make the decision to proceed with hospice; patient and wife aware that it is unlikely that we will be able decrease his O2 requirement to a level acceptable for discharge to home · Sildenafil is indicated for primary pulmonary HTN and there isn't evidence that it provides significant benefit in secondary pulmonary HTN; neither Esbriet or Ofev will reverse his pulmonary fibrosis or improve his current lung function, but aim to slow the progression · GI prophylaxis: Protonix · DVT prophylaxis: Lovenox Patient critically ill requiring continuous HF O2 due to severe hypoxia and respiratory distress and is high risk for continued decompensation. Will see PRN over the weekend.   
 
Sarthak Alvarez NP

## 2018-09-14 NOTE — PROGRESS NOTES
Palliative Medicine Consult David: [phone] Patient Name: Geoff Gillis YOB: 1935 Date of Initial Consult: 8/27/18 Reason for Consult: Goals-of-care Requesting Provider: Josiah Mariee MD 
Primary Care Physician: Lola Hammer MD 
 
 SUMMARY:  
Geoff Gillis is a 80 y.o. with a end-stage COPD and pulmonary fibrosis, O2-dependent at 20L/minute at baseline; CAD who was admitted on 8/25/2018 from home with a diagnosis of acute-on-chronic respiratory failure and NSTEMI due to tachycardia and hypoxia. Current medical issues leading to Palliative Medicine involvement include: shortness of breath, anxiety/agitation, confusion in setting of steroids and hypoxia, goals of care. PALLIATIVE DIAGNOSES:  
1. Shortness of breath 2. Anxiety 3. Debility 4. Goals of care 5. Pulmonary fibrosis PLAN:  
1. Met with patient,son and spouse. Patient slept ok last nite but felt lethargic/\"hung over\" this morning. Feeling about the same now but took a while to return to baseline. Continue to require either HFNC(60l/%) or BIPAP. He does admit today that he knows that he does not have much longer. 2. GOC-he remains not ready to make transition to comfort care/hospice. Once again, he wants to remain in control of his medical decisions and not ready to transition to hospice. He knows his time is limited but not ready to die yet. He knows that as soon as BIPAP or HFNC removed, he will die rather quickly. 3. AMD-spouse remains MPOA and AMD in the chart 4. Code status-DNR per prior discussions. If he survives this stay, will need DDNR at discharge 5. Symptom management-he continues to have prn ativan and morphine for anxiety and SOB. His anxiety seems to be worse in the evening. The ativan at 4 PM and then again at bedtime seemed to allow him to sleep better overnight.  I suspect the seroquel is causing some of the hang over feeling. Will stop the seroquel and increase ativan to 1 mg qhs. He can than use the morphine if needed prn in the middle of the nite if needed for SOB/anxiety 6. Psychosocial-patient states just not \"mentally ready\" to make transition to hospice care. We all suspect he will pass in the hospital and not be able to be weaned from the HFNC. Patient and his wife understand this scenario as well. 7. Discussed with bedside nurse, Dr. Shelley Giraldo, Pulmonary and CM. 8. Communicated plan of care with: Palliative IDT 
 
 
 GOALS OF CARE / TREATMENT PREFERENCES:  
 
GOALS OF CARE: continue current therapies with goal of returning home TREATMENT PREFERENCES:  
Code Status: DNR Advance Care Planning: 
Advance Care Planning 2018 Patient's Healthcare Decision Maker is: Named in scanned ACP document Primary Decision Maker Name Claritza Galo Primary Decision Maker Phone Number [phone] Primary Decision Maker Relationship to Patient Spouse Secondary Decision Maker Name Andre Elliott and/or dtr Gm Gutierrez Secondary Decision Maker Phone Number [phone], [phone] Confirm Advance Directive Yes, on file Other Instructions: Other: As far as possible, the palliative care team has discussed with patient / health care proxy about goals of care / treatment preferences for patient. HISTORY:  
 
History obtained from: patient, wife, chart CHIEF COMPLAINT: anxiety HPI/SUBJECTIVE: The patient is:  
[x] Verbal and participatory -  
[] Non-participatory due to:  
 
Patient is awake, alert. Still feeling SOB with minimal activity Clinical Pain Assessment (nonverbal scale for severity on nonverbal patients): Duration: for how long has pt been experiencing pain (e.g., 2 days, 1 month, years) Frequency: how often pain is an issue (e.g., several times per day, once every few days, constant)  FUNCTIONAL ASSESSMENT:  
 
 Palliative Performance Scale (PPS): PPS: 30 
 
 
 PSYCHOSOCIAL/SPIRITUAL SCREENING:  
 
Palliative IDT has assessed this patient for cultural preferences / practices and a referral made as appropriate to needs (Cultural Services, Patient Advocacy, Ethics, etc.) Advance Care Planning: 
Advance Care Planning 2018 Patient's Healthcare Decision Maker is: Named in scanned ACP document Primary Decision Maker Name Isabela Bowen Primary Decision Maker Phone Number [phone] Primary Decision Maker Relationship to Patient Spouse Secondary Decision Maker Name Andre Arce Serum and/or dtr Farmer Solid Secondary Decision Maker Phone Number [phone], [phone] Confirm Advance Directive Yes, on file Any spiritual / Gnosticist concerns: 
[] Yes /  [x] No 
 
Caregiver Burnout: 
[] Yes /  [x] No /  [] No Caregiver Present Anticipatory grief assessment:  
[x] Normal  / [] Maladaptive :  
 
ESAS Anxiety: Anxiety: 4 ESAS Depression:    
 
 
 REVIEW OF SYSTEMS:  
 
Positive and pertinent negative findings in ROS are noted above in HPI. The following systems were [] reviewed / [] unable to be reviewed as noted in HPI Other findings are noted below. Systems: constitutional, ears/nose/mouth/throat, respiratory, gastrointestinal, genitourinary, musculoskeletal, integumentary, neurologic, psychiatric, endocrine. Positive findings noted below. Modified ESAS Completed by: provider Anxiety: 4 Nausea: 0 Anorexia: 0 Constipation: No  
  Stool Occurrence(s): 1 PHYSICAL EXAM:  
 
From RN flowsheet: 
Wt Readings from Last 3 Encounters:  
18 168 lb 14 oz (76.6 kg) 10/20/17 178 lb (80.7 kg) 17 184 lb (83.5 kg) Blood pressure 127/64, pulse 91, temperature 97.9 °F (36.6 °C), resp. rate (!) 33, height 5' 10\" (1.778 m), weight 168 lb 14 oz (76.6 kg), SpO2 (!) 28 %. Pain Scale 1: Numeric (0 - 10) Pain Intensity 1: 1 Last bowel movement, if known: today Constitutional: awake and alert, HFNC in place Eyes: pupils equal, anicteric ENMT: no nasal discharge, moist mucous membranes Cardiovascular: regular rhythm, distal pulses intact Respiratory: breathing mild-moderately labored with talking, symmetric Gastrointestinal: soft non-tender, +bowel sounds Musculoskeletal: no deformity, no tenderness to palpation Skin: warm, dry Neurologic: following commands, moving all extremities Psychiatric:calm Other: 
 
 
 HISTORY:  
 
Active Problems: 
  CAD (coronary artery disease) () Overview: cardiac stentd (4) COPD (chronic obstructive pulmonary disease) ([address] (2/4/2014) DM II (diabetes mellitus, type II), controlled ([address] (2/4/2014) Pulmonary fibrosis (HCC) () Acute on chronic respiratory failure ([address] (8/26/2018) Past Medical History:  
Diagnosis Date  CAD (coronary artery disease) 2004  
 4 stents placed in heart  Chronic obstructive pulmonary disease ([address] EMPHYSEMA SEEN ON CXR  
 Colon polyp Three tubular adenomas excised colonoscopically on 3/17/2011.  COPD (chronic obstructive pulmonary disease) ([address] 2/4/2014  Diverticulosis of colon (without mention of hemorrhage)  DM (diabetes mellitus) ([address] 4/3/2012  GERD (gastroesophageal reflux disease) [address] [facility] [address]  History of pneumothorax 2/2014  Hypercholesteremia  Pneumonia 2004  PUD (peptic ulcer disease) 1955  Pulmonary fibrosis ([address] Past Surgical History:  
Procedure Laterality Date  ENDOSCOPY, COLON, DIAGNOSTIC  3/2011 Polyps. Tics. Rep 3 yrs.  HX CORONARY STENT PLACEMENT  2004  
 4 cardiac stents  HX GI  circa 2000 Laparoscopic cholecystectomy. [address] left inguinal hernia repair  HX LUMBAR LAMINECTOMY  11/20/2013 L4 L5 Laminectomy  HX OTHER SURGICAL  2/7/2014 LVATS, bronch, talc pleurodesis, Resection of ruptured bulla  HX TONSILLECTOMY  age 29 Family History Problem Relation Age of Onset  Adopted: Yes  Other Other   
  patient was adopted History reviewed, no pertinent family history. Social History Substance Use Topics  Smoking status: Former Smoker Packs/day: 1.50 Years: 36.00 Types: Cigarettes Quit date: 1/1/1987  Smokeless tobacco: Never Used  Alcohol use No  
 
Allergies Allergen Reactions  Bactrim [Sulfamethoprim] Unknown (comments) FLU LIKE SYMPTOMS  
 Statins-Hmg-Coa Reductase Inhibitors Myalgia  Sulfa (Sulfonamide Antibiotics) Other (comments) Bactrim causes flu like symptoms Current Facility-Administered Medications Medication Dose Route Frequency  LORazepam (ATIVAN) tablet 1 mg  1 mg Oral QHS  LORazepam (ATIVAN) tablet 0.5 mg  0.5 mg Oral Q4H PRN  
 sodium chloride-aloe vera spry   Nasal PRN  
 artificial tears (dextran 70-hypromellose) (NATURAL BALANCE) 0.1-0.3 % ophthalmic solution 2 Drop  2 Drop Both Eyes PRN  
 LORazepam (ATIVAN) tablet 0.5 mg  0.5 mg Oral Q24H  
 morphine injection 2 mg  2 mg IntraVENous Q1H PRN  
 fluticasone (FLONASE) 50 mcg/actuation nasal spray 2 Spray  2 Spray Both Nostrils DAILY  guaiFENesin ER (MUCINEX) tablet 600 mg  600 mg Oral Q12H  
 arformoterol (BROVANA) neb solution 15 mcg  15 mcg Nebulization BID RT  
 sodium chloride (NS) flush 5-10 mL  5-10 mL IntraVENous Q8H  
 sodium chloride (NS) flush 5-10 mL  5-10 mL IntraVENous PRN  
 albuterol-ipratropium (DUO-NEB) 2.5 MG-0.5 MG/3 ML  3 mL Nebulization Q4H RT  
 cetirizine (ZYRTEC) tablet 10 mg  10 mg Oral DAILY  finasteride (PROSCAR) tablet 5 mg  5 mg Oral DAILY  budesonide (PULMICORT) 500 mcg/2 ml nebulizer suspension  500 mcg Nebulization BID RT  
 gabapentin (NEURONTIN) capsule 300 mg  300 mg Oral DAILY WITH LUNCH  
 gabapentin (NEURONTIN) capsule 600 mg  600 mg Oral QHS  pirfenidone tab 801 mg   (Patient Supplied)  801 mg Oral TID  tamsulosin (FLOMAX) capsule 0.4 mg  0.4 mg Oral DAILY  enoxaparin (LOVENOX) injection 40 mg  40 mg SubCUTAneous Q24H  
 aspirin chewable tablet 81 mg  81 mg Oral DAILY  metoprolol tartrate (LOPRESSOR) tablet 12.5 mg  12.5 mg Oral BID  pantoprazole (PROTONIX) tablet 40 mg  40 mg Oral ACB  
 
 
 
 LAB AND IMAGING FINDINGS:  
 
Lab Results Component Value Date/Time WBC 8.3 09/14/2018 04:52 AM  
 HGB 13.1 09/14/2018 04:52 AM  
 PLATELET 727 73/01/6219 04:52 AM  
 
Lab Results Component Value Date/Time Sodium 137 09/14/2018 04:52 AM  
 Potassium 3.6 09/14/2018 04:52 AM  
 Chloride 101 09/14/2018 04:52 AM  
 CO2 28 09/14/2018 04:52 AM  
 BUN 15 09/14/2018 04:52 AM  
 Creatinine 0.70 09/14/2018 04:52 AM  
 Calcium 9.3 09/14/2018 04:52 AM  
 Magnesium 1.7 09/04/2018 04:04 AM  
 Phosphorus 3.9 09/04/2018 04:04 AM  
  
Lab Results Component Value Date/Time AST (SGOT) 18 01/26/2018 02:19 PM  
 Alk. phosphatase 27 (L) 01/26/2018 02:19 PM  
 Protein, total 7.6 01/26/2018 02:19 PM  
 Albumin 4.3 01/26/2018 02:19 PM  
 Globulin 3.6 02/05/2014 04:36 AM  
 
Lab Results Component Value Date/Time INR 1.0 11/01/2013 04:13 PM  
 Prothrombin time 10.4 11/01/2013 04:13 PM  
  
No results found for: IRON, FE, TIBC, IBCT, PSAT, FERR Lab Results Component Value Date/Time pH 7.39 02/04/2014 02:00 AM  
 PCO2 31 (L) 02/04/2014 02:00 AM  
 PO2 90 02/04/2014 02:00 AM  
 
No components found for: [place] No results found for: CPK, CKMB Total time: 35 
Counseling / coordination time, spent as noted above:30 
> 50% counseling / coordination?: yes Prolonged service was provided for  []30 min   []75 min in face to face time in the presence of the patient, spent as noted above. Time Start:  
Time End:  
Note: this can only be billed with 66117 (initial) or 57659 (follow up). If multiple start / stop times, list each separately.

## 2018-09-14 NOTE — PROGRESS NOTES
Problem: Mobility Impaired (Adult and Pediatric) Goal: *Acute Goals and Plan of Care (Insert Text) Physical Therapy Goals Initiated 9/4/2018 1. Patient will move from supine to sit and sit to supine  in bed with supervision/set-up within 7 day(s). 2.  Patient will transfer from bed to chair and chair to bed with moderate assistance  using the least restrictive device within 7 day(s). 3.  Patient will perform sit to stand with moderate assistance  within 7 day(s). physical Therapy TREATMENT Patient: Eboni Shahid (1 y.o. male) Date: 9/14/2018 Diagnosis: Acute on chronic respiratory failure (HCC) Acute on chronic respiratory failure (HCC) <principal problem not specified> Precautions:   
Chart, physical therapy assessment, plan of care and goals were reviewed. ASSESSMENT:   Chart reviewed and noted new goal of issuing HEP and review with Pt and his wife. Spoke to RN prior to treatment and cleared to complete supine exercises. . Pt was able to tolerate a few exercises, reps as indicated below. Session limited by Pt's O2 dropping slowly from 90% to 84% and then returned to 86%. Pt was pleasant and agreeable to complete exercises to his tolerance. A HEP issued and all were reviewed with pt's son. HEP without indicated reps only AS TOLERATED. Will continue to follow. Progression toward goals: 
[]    Improving appropriately and progressing toward goals [x]    Improving slowly and progressing toward goals 
[]    Not making progress toward goals and plan of care will be adjusted PLAN: 
Patient continues to benefit from skilled intervention to address the above impairments. Continue treatment per established plan of care. Discharge Recommendations:  TBD Further Equipment Recommendations for Discharge: TBD SUBJECTIVE:  
Patient stated Let's give this a try.  OBJECTIVE DATA SUMMARY:  
Critical Behavior: 
Neurologic State: Alert, Appropriate for age Orientation Level: Oriented X4 Cognition: Appropriate decision making, Appropriate for age attention/concentration, Appropriate safety awareness, Follows commands Functional Mobility Training: 
Bed Mobility: 
  
  
  
  
  
  
Transfers: 
  
  
     
  
     
  
  
  
  
Balance: 
  
Ambulation/Gait Training: 
  
  
  
  
  
  
  
  
  
  
  
  
  
  
  
  
  
  
Therapeutic Exercises: Ankle pumps x 10, quad sets x 5 on RLE, heel slides x 2 bilateral, glut squeezes x 5 reps. Pain: 
Pain Scale 1: Numeric (0 - 10) Pain Intensity 1: 1 Activity Tolerance:  
Poor. Please refer to the flowsheet for vital signs taken during this treatment. After treatment:  
[]    Patient left in no apparent distress sitting up in chair 
[x]    Patient left in no apparent distress in bed 
[x]    Call bell left within reach [x]    Nursing notified 
[x]    Caregiver present 
[]    Bed alarm activated COMMUNICATION/COLLABORATION:  
The patients plan of care was discussed with: Registered Nurse Chiara Bacon PTA Time Calculation: 10 mins

## 2018-09-15 NOTE — ROUTINE PROCESS
Bedside and Verbal shift change report given to Aristeo Muller RN (oncoming nurse) by Presley Swann RN (offgoing nurse). Report included the following information SBAR, Kardex, Intake/Output, MAR, Accordion, Recent Results, Cardiac Rhythm SR and Alarm Parameters .

## 2018-09-15 NOTE — ROUTINE PROCESS
Bedside and Verbal shift change report given to Kris Rockwell RN (oncoming nurse) by Merritt Zapata RN (offgoing nurse). Report included the following information SBAR, Kardex, Intake/Output, MAR, Accordion, Recent Results, Cardiac Rhythm SR and Alarm Parameters .

## 2018-09-15 NOTE — PROGRESS NOTES
Advance Care Planning (ACP) Provider Note - Comprehensive Date of ACP Conversation: 09/15/18 Persons included in Conversation:  patient and family Length of ACP Conversation in minutes:  16 minutes Authorized Decision Maker (if patient is incapable of making informed decisions): This person is: 
Healthcare Agent/Medical Power of  under Advance Directive General ACP for ALL Patients with Decision Making Capacity: 
 Importance of advance care planning, including choosing a healthcare agent to communicate patient's healthcare decisions if patient lost the ability to make decisions, such as after a sudden illness or accident For Serious or Chronic Illness: 
Understanding of medical condition Interventions Provided: 
Pt has failed to improve and continues to have significant hypoxia and desaturations with high flow NC and bipap. Discussed pt wishes and hospice. He is asking what hospice has to offer that he is not getting now. Suggested repeating the hospice information session, initially pt seemed interested in this but later he declined.

## 2018-09-15 NOTE — PROGRESS NOTES
Problem: Pressure Injury - Risk of 
Goal: *Prevention of pressure injury Document Vasile Scale and appropriate interventions in the flowsheet. Outcome: Progressing Towards Goal 
Pressure Injury Interventions: 
Sensory Interventions: Assess changes in LOC, Assess need for specialty bed, Avoid rigorous massage over bony prominences, Chair cushion, Check visual cues for pain, Discuss PT/OT consult with provider, Keep linens dry and wrinkle-free, Maintain/enhance activity level, Minimize linen layers, Monitor skin under medical devices, Pad between skin to skin, Turn and reposition approx. every two hours (pillows and wedges if needed) Activity Interventions: Assess need for specialty bed, Chair cushion, Increase time out of bed, Pressure redistribution bed/mattress(bed type), PT/OT evaluation Mobility Interventions: Assess need for specialty bed, Chair cushion, Float heels, HOB 30 degrees or less, Pressure redistribution bed/mattress (bed type), PT/OT evaluation, Turn and reposition approx. every two hours(pillow and wedges) Nutrition Interventions: Document food/fluid/supplement intake, Discuss nutritional consult with provider, Offer support with meals,snacks and hydration Friction and Shear Interventions: Apply protective barrier, creams and emollients, Feet elevated on foot rest, Foam dressings/transparent film/skin sealants, HOB 30 degrees or less, Lift sheet, Lift team/patient mobility team, Minimize layers, Transferring/repositioning devices Problem: Falls - Risk of 
Goal: *Absence of Falls Document Valentina Zaman Fall Risk and appropriate interventions in the flowsheet.   
Outcome: Progressing Towards Goal 
Fall Risk Interventions: 
Mobility Interventions: Assess mobility with egress test, Bed/chair exit alarm, Communicate number of staff needed for ambulation/transfer, OT consult for ADLs, Patient to call before getting OOB, PT Consult for mobility concerns, PT Consult for assist device competence, Strengthening exercises (ROM-active/passive), Utilize walker, cane, or other assistive device Mentation Interventions: Adequate sleep, hydration, pain control, Bed/chair exit alarm, Door open when patient unattended, Evaluate medications/consider consulting pharmacy, Eyeglasses and hearing aids, Family/sitter at bedside, Increase mobility, More frequent rounding, Reorient patient, Room close to nurse's station, Toileting rounds, Update white board Medication Interventions: Assess postural VS orthostatic hypotension, Bed/chair exit alarm, Evaluate medications/consider consulting pharmacy, Patient to call before getting OOB, Teach patient to arise slowly Elimination Interventions: Bed/chair exit alarm, Call light in reach, Elevated toilet seat, Patient to call for help with toileting needs, Toilet paper/wipes in reach, Toileting schedule/hourly rounds, Urinal in reach History of Falls Interventions: Bed/chair exit alarm, Consult care management for discharge planning, Door open when patient unattended, Evaluate medications/consider consulting pharmacy, Investigate reason for fall, Room close to nurse's station

## 2018-09-15 NOTE — PROGRESS NOTES
[facility] [address] 
[address], [place], [address] [facility] 
[phone] Medical Progress Note NAME: Carol Medeiros :  1935 MRM:  [id number] Date/Time: 9/15/2018 Assessment and Plan:  
 Acute on chronic resp failure/hypoxia/end stage pulm fibrosis: Still on high flow O2. Patient is DNR, but he wants to continue attempts at weaning the high flow O2 that is acceptable for home. That might not be possible, still pt is not ready for hospice. Pulmonary and palliative care following. Cont LABA/ICS, duo nebs prn poor prognosis.  
  
 NSTEMI/CAD: likely type 2 MI from supply demand. Cont ASA, metoprolol. Evaluated by cardiology 
  
 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 No further management due to potential comfort care 
  
 Anxiety: due to hypoxia, pulm fibrosis. Cont seroquel. Use IV ativan, IV morphine prn Increased secretions today, pt has declined scopalomine Subjective: Chief Complaint:  F/u respiratory failure No acute events. Sob continues with desaturations only with minimal activity Objective:  
 
Last 24hrs VS reviewed since prior progress note. Most recent are: 
 
Visit Vitals  /90 (BP 1 Location: Left arm, BP Patient Position: At rest)  Pulse 100  Temp 97.4 °F (36.3 °C)  Resp 30  
 Ht 5' 10\" (1.778 m)  Wt 75.3 kg (165 lb 14.4 oz)  SpO2 92%  BMI 23.8 kg/m2 SpO2 Readings from Last 6 Encounters:  
09/15/18 92% 17 96% 10/13/14 94% 14 95% 14 93% 14 96% O2 Flow Rate (L/min): 60 l/min Intake/Output Summary (Last 24 hours) at 09/15/18 1204 Last data filed at 09/15/18 0215 Gross per 24 hour Intake              700 ml Output             1150 ml Net             -450 ml Physical Exam: 
 
Gen:  elderly, in no acute distress HEENT:  Pink conjunctivae, PERRL, hearing intact to voice, moist mucous membranes Neck:  Supple, without masses, thyroid non-tender Resp:  No accessory muscle use, rales BL Card:  No murmurs, normal S1, S2 without thrills, bruits or peripheral edema Abd:  Soft, non-tender, non-distended, normoactive bowel sounds are present, no palpable organomegaly and no detectable hernias Musc:  No cyanosis or clubbing Skin:  No rashes or ulcers, skin turgor is good Neuro:  No focal deficits, follows commands appropriately Psych: Moderate insight, oriented to person, place and time, alert 
__________________________________________________________________ Medications Reviewed: (see below) Medications:  
 
Current Facility-Administered Medications Medication Dose Route Frequency  LORazepam (ATIVAN) tablet 1 mg  1 mg Oral QHS  LORazepam (ATIVAN) tablet 0.5 mg  0.5 mg Oral Q4H PRN  
 sodium chloride-aloe vera spry   Nasal PRN  
 artificial tears (dextran 70-hypromellose) (NATURAL BALANCE) 0.1-0.3 % ophthalmic solution 2 Drop  2 Drop Both Eyes PRN  
 LORazepam (ATIVAN) tablet 0.5 mg  0.5 mg Oral Q24H  
 morphine injection 2 mg  2 mg IntraVENous Q1H PRN  
 fluticasone (FLONASE) 50 mcg/actuation nasal spray 2 Spray  2 Spray Both Nostrils DAILY  guaiFENesin ER (MUCINEX) tablet 600 mg  600 mg Oral Q12H  
 arformoterol (BROVANA) neb solution 15 mcg  15 mcg Nebulization BID RT  
 sodium chloride (NS) flush 5-10 mL  5-10 mL IntraVENous Q8H  
 sodium chloride (NS) flush 5-10 mL  5-10 mL IntraVENous PRN  
 albuterol-ipratropium (DUO-NEB) 2.5 MG-0.5 MG/3 ML  3 mL Nebulization Q4H RT  
 cetirizine (ZYRTEC) tablet 10 mg  10 mg Oral DAILY  finasteride (PROSCAR) tablet 5 mg  5 mg Oral DAILY  budesonide (PULMICORT) 500 mcg/2 ml nebulizer suspension  500 mcg Nebulization BID RT  
 gabapentin (NEURONTIN) capsule 300 mg  300 mg Oral DAILY WITH LUNCH  
 gabapentin (NEURONTIN) capsule 600 mg  600 mg Oral QHS  pirfenidone tab 801 mg   (Patient Supplied)  801 mg Oral TID  tamsulosin (FLOMAX) capsule 0.4 mg  0.4 mg Oral DAILY  enoxaparin (LOVENOX) injection 40 mg  40 mg SubCUTAneous Q24H  
 aspirin chewable tablet 81 mg  81 mg Oral DAILY  metoprolol tartrate (LOPRESSOR) tablet 12.5 mg  12.5 mg Oral BID  pantoprazole (PROTONIX) tablet 40 mg  40 mg Oral ACB Lab Data Reviewed: (see below) Lab Review:  
 
Recent Labs  
   09/14/18 
 8442 WBC  8.3 HGB  13.1 HCT  39.4 PLT  297 Recent Labs  
   09/14/18 
 7962 NA  137  
K  3.6 CL  101 CO2  28 GLU  154* BUN  15  
CREA  0.70 CA  9.3 Lab Results Component Value Date/Time Glucose (POC) 114 (H) 02/11/2014 11:49 AM  
 Glucose (POC) 173 (H) 02/11/2014 08:26 AM  
 Glucose (POC) 158 (H) 02/10/2014 08:38 PM  
 Glucose (POC) 144 (H) 02/10/2014 04:15 PM  
 Glucose (POC) 156 (H) 02/10/2014 11:08 AM  
 
No results for input(s): PH, PCO2, PO2, HCO3, FIO2 in the last 72 hours. No results for input(s): INR in the last 72 hours. No lab exists for component: INREXT, INREXT All Micro Results None I have reviewed notes of prior 24hr. Other pertinent lab: Total time spent with patient: 30 Care Plan discussed with: Patient, Family, Nursing Staff and >50% of time spent in counseling and coordination of care Discussed:  Care Plan Prophylaxis:  Lovenox Disposition:  poor prognosis 
        
___________________________________________________ Attending Physician: Mac Sanchez MD

## 2018-09-16 NOTE — PROGRESS NOTES
[facility] 
[address] [facility], [place], [address] [place] [address] 
[phone] Medical Progress Note NAME: Brayan Chun :  1935 MRM:  [id number] Date/Time: 2018 Assessment and Plan:  
 Acute on chronic resp failure/hypoxia/end stage pulm fibrosis: Still on high flow O2. Patient is DNR, but he wants to continue attempts at weaning the high flow O2 that is acceptable for home. That might not be possible, still pt is not ready for hospice. Pulmonary and palliative care following. Cont LABA/ICS, duo nebs prn poor prognosis.  
  
 NSTEMI/CAD: likely type 2 MI from supply demand. Cont ASA, metoprolol. Evaluated by cardiology 
  
 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 No further management due to potential comfort care 
  
 Anxiety: due to hypoxia, pulm fibrosis. Cont seroquel. Use IV ativan, IV morphine prn Increased secretions today, pt has declined scopalomine Subjective: Chief Complaint:  F/u respiratory failure No acute events. Sob continues with desaturations only with minimal activity Objective:  
 
Last 24hrs VS reviewed since prior progress note. Most recent are: 
 
Visit Vitals  /60 (BP 1 Location: Left arm, BP Patient Position: At rest)  Pulse 90  Temp 97.4 °F (36.3 °C)  Resp 28  Ht 5' 10\" (1.778 m)  Wt 75.3 kg (165 lb 14.4 oz)  SpO2 92%  BMI 23.8 kg/m2 SpO2 Readings from Last 6 Encounters:  
18 92% 17 96% 10/13/14 94% 14 95% 14 93% 14 96% O2 Flow Rate (L/min): 60 l/min Intake/Output Summary (Last 24 hours) at 18 1247 Last data filed at 09/15/18 1605 Gross per 24 hour Intake                0 ml Output              435 ml Net             -435 ml Physical Exam: 
 
Gen:  elderly, in no acute distress HEENT:  Pink conjunctivae, PERRL, hearing intact to voice, moist mucous membranes Neck:  Supple, without masses, thyroid non-tender Resp:  No accessory muscle use, rales BL Card:  No murmurs, normal S1, S2 without thrills, bruits or peripheral edema Abd:  Soft, non-tender, non-distended, normoactive bowel sounds are present, no palpable organomegaly and no detectable hernias Musc:  No cyanosis or clubbing Skin:  No rashes or ulcers, skin turgor is good Neuro:  No focal deficits, follows commands appropriately Psych: Moderate insight, oriented to person, place and time, alert 
__________________________________________________________________ Medications Reviewed: (see below) Medications:  
 
Current Facility-Administered Medications Medication Dose Route Frequency  LORazepam (ATIVAN) tablet 1 mg  1 mg Oral QHS  LORazepam (ATIVAN) tablet 0.5 mg  0.5 mg Oral Q4H PRN  
 sodium chloride-aloe vera spry   Nasal PRN  
 artificial tears (dextran 70-hypromellose) (NATURAL BALANCE) 0.1-0.3 % ophthalmic solution 2 Drop  2 Drop Both Eyes PRN  
 LORazepam (ATIVAN) tablet 0.5 mg  0.5 mg Oral Q24H  
 morphine injection 2 mg  2 mg IntraVENous Q1H PRN  
 fluticasone (FLONASE) 50 mcg/actuation nasal spray 2 Spray  2 Spray Both Nostrils DAILY  guaiFENesin ER (MUCINEX) tablet 600 mg  600 mg Oral Q12H  
 arformoterol (BROVANA) neb solution 15 mcg  15 mcg Nebulization BID RT  
 sodium chloride (NS) flush 5-10 mL  5-10 mL IntraVENous Q8H  
 sodium chloride (NS) flush 5-10 mL  5-10 mL IntraVENous PRN  
 albuterol-ipratropium (DUO-NEB) 2.5 MG-0.5 MG/3 ML  3 mL Nebulization Q4H RT  
 cetirizine (ZYRTEC) tablet 10 mg  10 mg Oral DAILY  finasteride (PROSCAR) tablet 5 mg  5 mg Oral DAILY  budesonide (PULMICORT) 500 mcg/2 ml nebulizer suspension  500 mcg Nebulization BID RT  
 gabapentin (NEURONTIN) capsule 300 mg  300 mg Oral DAILY WITH LUNCH  
 gabapentin (NEURONTIN) capsule 600 mg  600 mg Oral QHS  pirfenidone tab 801 mg   (Patient Supplied)  801 mg Oral TID  tamsulosin (FLOMAX) capsule 0.4 mg  0.4 mg Oral DAILY  enoxaparin (LOVENOX) injection 40 mg  40 mg SubCUTAneous Q24H  
 aspirin chewable tablet 81 mg  81 mg Oral DAILY  metoprolol tartrate (LOPRESSOR) tablet 12.5 mg  12.5 mg Oral BID  pantoprazole (PROTONIX) tablet 40 mg  40 mg Oral ACB Lab Data Reviewed: (see below) Lab Review:  
 
Recent Labs  
   09/14/18 
 0222 WBC  8.3 HGB  13.1 HCT  39.4 PLT  297 Recent Labs  
   09/14/18 
 5401 NA  137  
K  3.6 CL  101 CO2  28 GLU  154* BUN  15  
CREA  0.70 CA  9.3 Lab Results Component Value Date/Time Glucose (POC) 114 (H) 02/11/2014 11:49 AM  
 Glucose (POC) 173 (H) 02/11/2014 08:26 AM  
 Glucose (POC) 158 (H) 02/10/2014 08:38 PM  
 Glucose (POC) 144 (H) 02/10/2014 04:15 PM  
 Glucose (POC) 156 (H) 02/10/2014 11:08 AM  
 
No results for input(s): PH, PCO2, PO2, HCO3, FIO2 in the last 72 hours. No results for input(s): INR in the last 72 hours. No lab exists for component: INREXT, INREXT All Micro Results None I have reviewed notes of prior 24hr. Other pertinent lab: Total time spent with patient: 15 Care Plan discussed with: Patient, Family, Nursing Staff and >50% of time spent in counseling and coordination of care Discussed:  Care Plan Prophylaxis:  Lovenox Disposition:  poor prognosis 
        
___________________________________________________ Attending Physician: Kate Sharma MD

## 2018-09-16 NOTE — PROGRESS NOTES
Beginning of shift:  Bedside and Verbal shift change report given to Ilene Santos, RN (oncoming nurse) by Taina Ayala RN (offgoing nurse). Report included the following information SBAR, Kardex, Intake/Output, MAR, Med Rec Status and Cardiac Rhythm NSR. 
 
1951  Initial assessment completed. Introduced self as primary RN.  VSS.  Pt denies additional complaints at this time. Pt's wife has asked that his pirfenidone be given soon since he is now ready for it. Plan for the evening discussed with pt and verbalized understanding. Bed locked and in low position with call bell within reach.  Instructed him to press call chavez when needed. White board updated with this RN's name. 2022  Pirfenidone given PO. 
 
2142  PM medications given. 0  Pt boosted up in the bed w/ assistance from PCT and RN. 
 
Jus Settle  Per wife, pt had attempted to get out of bed and was pulling his Bipap mask off. Once in the room, pt was a little more oriented. Was able to get back up in the bed. Had PCT assist me with boosting him up in the bed. End of shift:  Bedside and Verbal shift change report given to Taina Ayala, RN (oncoming nurse) by Ilene Santos RN (offgoing nurse). Report included the following information SBAR, Kardex and Cardiac Rhythm NSR - ST.

## 2018-09-17 NOTE — PROGRESS NOTES
Medical Progress Note NAME: Natanael Bryan :  1935 MRM:  [id number] Date/Time: 2018  10:40 AM 
 
  
Assessment / Plan:  
 
Acute on chronic resp failure / Pulm fibrosis:  Using high flow O2 and BiPAP prn. Using BiPAP nightly and prn during the day. Sats in the low to mid 70's with just sitting up in bed. -- cont LABA/ICS and duoneb 
 -- high flow O2 and BiPAP prn 
   
NSTEMI/CAD:  Type 2 MI from hypoxia. -- cont ASA, metoprolol 
   
DM type 2:   
 -- check POC glucose 
 -- add SSI 
   
Anxiety:  Due to current medical issues. -- continue ativan 
  
 
 
  
Subjective: Chief Complaint:  About the same. Sob with minimal activity. No chest pain. ROS: 
(bold if positive, if negative) SOB/ROB Objective:  
 
 
Vitals:  
 
 
  
Last 24hrs VS reviewed since prior progress note. Most recent are: 
 
Visit Vitals  /61 (BP 1 Location: Left arm, BP Patient Position: At rest;Supine)  Pulse 93  Temp 98.1 °F (36.7 °C)  Resp 30  
 Ht 5' 10\" (1.778 m)  Wt 75.3 kg (165 lb 14.4 oz)  SpO2 92%  BMI 23.8 kg/m2 SpO2 Readings from Last 6 Encounters:  
18 92% 17 96% 10/13/14 94% 14 95% 14 93% 14 96% O2 Flow Rate (L/min): 60 l/min Intake/Output Summary (Last 24 hours) at 18 1040 Last data filed at 18 0430 Gross per 24 hour Intake              200 ml Output              950 ml Net             -750 ml Exam:  
 
Physical Exam: 
 
Gen:  Well-developed, well-nourished, in no acute distress HEENT:  Pink conjunctivae, hearing intact to voice, moist mucous membranes Resp:  No accessory muscle use, bibasilar crackles, decreased BS overall Card:  No murmurs, normal S1, S2 without thrills or peripheral edema Abd:  Soft, non-tender, non-distended, normoactive bowel sounds are present Skin:  No rashes Neuro:   Face symmetric, tongue midline, speech fluent,  strength is 5/5 bilaterally and dorsi / plantarflexion is 5/5 bilaterally, follows commands appropriately Psych:  oriented to person, place and time, alert Telemetry reviewed:   normal sinus rhythm Medications Reviewed: (see below) Lab Data Reviewed: (see below) 
 
______________________________________________________________________ Medications:  
 
Current Facility-Administered Medications Medication Dose Route Frequency  LORazepam (ATIVAN) tablet 1 mg  1 mg Oral QHS  LORazepam (ATIVAN) tablet 0.5 mg  0.5 mg Oral Q4H PRN  
 sodium chloride-aloe vera spry   Nasal PRN  
 artificial tears (dextran 70-hypromellose) (NATURAL BALANCE) 0.1-0.3 % ophthalmic solution 2 Drop  2 Drop Both Eyes PRN  
 LORazepam (ATIVAN) tablet 0.5 mg  0.5 mg Oral Q24H  
 morphine injection 2 mg  2 mg IntraVENous Q1H PRN  
 fluticasone (FLONASE) 50 mcg/actuation nasal spray 2 Spray  2 Spray Both Nostrils DAILY  guaiFENesin ER (MUCINEX) tablet 600 mg  600 mg Oral Q12H  
 arformoterol (BROVANA) neb solution 15 mcg  15 mcg Nebulization BID RT  
 sodium chloride (NS) flush 5-10 mL  5-10 mL IntraVENous Q8H  
 sodium chloride (NS) flush 5-10 mL  5-10 mL IntraVENous PRN  
 albuterol-ipratropium (DUO-NEB) 2.5 MG-0.5 MG/3 ML  3 mL Nebulization Q4H RT  
 cetirizine (ZYRTEC) tablet 10 mg  10 mg Oral DAILY  finasteride (PROSCAR) tablet 5 mg  5 mg Oral DAILY  budesonide (PULMICORT) 500 mcg/2 ml nebulizer suspension  500 mcg Nebulization BID RT  
 gabapentin (NEURONTIN) capsule 300 mg  300 mg Oral DAILY WITH LUNCH  
 gabapentin (NEURONTIN) capsule 600 mg  600 mg Oral QHS  pirfenidone tab 801 mg   (Patient Supplied)  801 mg Oral TID  tamsulosin (FLOMAX) capsule 0.4 mg  0.4 mg Oral DAILY  enoxaparin (LOVENOX) injection 40 mg  40 mg SubCUTAneous Q24H  
 aspirin chewable tablet 81 mg  81 mg Oral DAILY  metoprolol tartrate (LOPRESSOR) tablet 12.5 mg  12.5 mg Oral BID  
  pantoprazole (PROTONIX) tablet 40 mg  40 mg Oral ACB Lab Review: No results for input(s): WBC, HGB, HCT, PLT, HGBEXT, HCTEXT, PLTEXT in the last 72 hours. No results for input(s): NA, K, CL, CO2, GLU, BUN, CREA, CA, MG, PHOS, ALB, TBIL, TBILI, SGOT, ALT, INR in the last 72 hours. No lab exists for component: INREXT Lab Results Component Value Date/Time Glucose (POC) 114 (H) 02/11/2014 11:49 AM  
 Glucose (POC) 173 (H) 02/11/2014 08:26 AM  
 Glucose (POC) 158 (H) 02/10/2014 08:38 PM  
 Glucose (POC) 144 (H) 02/10/2014 04:15 PM  
 Glucose (POC) 156 (H) 02/10/2014 11:08 AM  
 
 
 
Total time spent with patient: 25 Minutes Care Plan discussed with: Patient and Family Discussed:  Care Plan Prophylaxis:  Lovenox Disposition:  TBD 
        
___________________________________________________ Attending Physician: Susanne Gates MD

## 2018-09-17 NOTE — PROGRESS NOTES
Bedside and Verbal shift change report given to Dorota Weaver RN (oncoming nurse) by Hilaria Kawasaki, RN (offgoing nurse). Report included the following information SBAR, Kardex, ED Summary, Intake/Output, MAR, Recent Results, Med Rec Status and Cardiac Rhythm NSR. 
 
0229: Pt anxious, Spo2 at 79-81% with Bi-Pap. Wife at pt bedside; trying to calm pt. Administered morphine 2 mg IV per pt wife request and pt consent. Patient Vitals for the past 4 hrs: 
 Temp Pulse Resp BP SpO2  
09/17/18 0400 98.1 °F (36.7 °C) 90 24 125/54 92 % 09/17/18 0300 - 92 - - 91 % 09/17/18 0223 - 88 - - 94 % 09/17/18 0200 - 94 - 131/90 90 % 09/17/18 0100 - 93 - - 93 % Bedside and Verbal shift change report given to Hilaria Kawasaki, RN (oncoming nurse) by Dorota Weaver RN (offgoing nurse). Report included the following information SBAR, Kardex, ED Summary, Intake/Output, MAR, Recent Results, Med Rec Status and Cardiac Rhythm NSR.

## 2018-09-17 NOTE — PROGRESS NOTES
Name: [address] [place] [address] East: [address]  
: 1935 Admit Date: 2018 Phone: [phone]  Room: [facility]/ PCP: Aminata Velásquez MD  MRN: [id number] Date: 2018  Code: DNR Chart and notes reviewed. Data reviewed. I review the patient's current medications in the medical record at each encounter. I have evaluated and examined the patient. Overnight events: 
Afebrile Sats 92% on BiPAP; 88% on HF 60L FiO2 100% BP and HR stable Recent lab work reviewed ROS: Had worsening anxiety over the weekend, especially when transition to hospice is discussed. Wife states he feels like he's being asked to commit suicide. Requiring BiPAP much more due to increased WOB and hypoxia with max HF. Denies pain. Denies fever, chills, or cough. Denies LE swelling. Past Medical History:  
Diagnosis Date  CAD (coronary artery disease)   
 4 stents placed in heart  Chronic obstructive pulmonary disease ([address] EMPHYSEMA SEEN ON CXR  
 Colon polyp Three tubular adenomas excised colonoscopically on 3/17/2011.  COPD (chronic obstructive pulmonary disease) ([address] 2014  Diverticulosis of colon (without mention of hemorrhage)  DM (diabetes mellitus) ([address] 4/3/2012  GERD (gastroesophageal reflux disease) [address] [facility]  History of pneumothorax 2014  Hypercholesteremia  Pneumonia   PUD (peptic ulcer disease) 195  Pulmonary fibrosis ([facility] Past Surgical History:  
Procedure Laterality Date  ENDOSCOPY, COLON, DIAGNOSTIC  3/2011 Polyps. Tics. Rep 3 yrs.  HX CORONARY STENT PLACEMENT  2004  
 4 cardiac stents  HX GI  circa  Laparoscopic cholecystectomy. [address] [place] [address] left inguinal hernia repair  HX LUMBAR LAMINECTOMY  2013 L4 L5 Laminectomy  HX OTHER SURGICAL  2014 LVATS, bronch, talc pleurodesis, Resection of ruptured bulla  HX TONSILLECTOMY  age 29 Family History Problem Relation Age of Onset  Adopted: Yes  Other Other   
  patient was adopted Social History Substance Use Topics  Smoking status: Former Smoker Packs/day: 1.50 Years: 36.00 Types: Cigarettes Quit date: 1/1/1987  Smokeless tobacco: Never Used  Alcohol use No  
 
 
Allergies Allergen Reactions  Bactrim [Sulfamethoprim] Unknown (comments) FLU LIKE SYMPTOMS  
 Statins-Hmg-Coa Reductase Inhibitors Myalgia  Sulfa (Sulfonamide Antibiotics) Other (comments) Bactrim causes flu like symptoms Current Facility-Administered Medications Medication Dose Route Frequency  LORazepam (ATIVAN) tablet 1 mg  1 mg Oral QHS  LORazepam (ATIVAN) tablet 0.5 mg  0.5 mg Oral Q4H PRN  
 sodium chloride-aloe vera spry   Nasal PRN  
 artificial tears (dextran 70-hypromellose) (NATURAL BALANCE) 0.1-0.3 % ophthalmic solution 2 Drop  2 Drop Both Eyes PRN  
 LORazepam (ATIVAN) tablet 0.5 mg  0.5 mg Oral Q24H  
 morphine injection 2 mg  2 mg IntraVENous Q1H PRN  
 fluticasone (FLONASE) 50 mcg/actuation nasal spray 2 Spray  2 Spray Both Nostrils DAILY  guaiFENesin ER (MUCINEX) tablet 600 mg  600 mg Oral Q12H  
 arformoterol (BROVANA) neb solution 15 mcg  15 mcg Nebulization BID RT  
 sodium chloride (NS) flush 5-10 mL  5-10 mL IntraVENous Q8H  
 sodium chloride (NS) flush 5-10 mL  5-10 mL IntraVENous PRN  
 albuterol-ipratropium (DUO-NEB) 2.5 MG-0.5 MG/3 ML  3 mL Nebulization Q4H RT  
 cetirizine (ZYRTEC) tablet 10 mg  10 mg Oral DAILY  finasteride (PROSCAR) tablet 5 mg  5 mg Oral DAILY  budesonide (PULMICORT) 500 mcg/2 ml nebulizer suspension  500 mcg Nebulization BID RT  
 gabapentin (NEURONTIN) capsule 300 mg  300 mg Oral DAILY WITH LUNCH  
 gabapentin (NEURONTIN) capsule 600 mg  600 mg Oral QHS  pirfenidone tab 801 mg   (Patient Supplied)  801 mg Oral TID  tamsulosin (FLOMAX) capsule 0.4 mg  0.4 mg Oral DAILY  enoxaparin (LOVENOX) injection 40 mg  40 mg SubCUTAneous Q24H  
 aspirin chewable tablet 81 mg  81 mg Oral DAILY  metoprolol tartrate (LOPRESSOR) tablet 12.5 mg  12.5 mg Oral BID  pantoprazole (PROTONIX) tablet 40 mg  40 mg Oral ACB REVIEW OF SYSTEMS  
12 point ROS negative except as stated in the HPI. Physical Exam:  
Visit Vitals  /61 (BP 1 Location: Left arm, BP Patient Position: At rest;Supine)  Pulse 93  Temp 98.1 °F (36.7 °C)  Resp 30  
 Ht 5' 10\" (1.778 m)  Wt 75.3 kg (165 lb 14.4 oz)  SpO2 92%  BMI 23.8 kg/m2 General:  Alert, ill appearing/frail, appears stated age, dyspneic with speaking and any activity Head:  Normocephalic, without obvious abnormality, atraumatic. Eyes:  Conjunctivae/corneas clear. Nose: Nares normal. Septum midline. Mucosa normal.   
Throat: Lips, mucosa, and tongue normal.   
Neck: Supple, symmetrical, trachea midline, no adenopathy. Lungs:   Diminished air movement with bibasilar crackles; tachypnea, and accessory muscle use. Chest wall:  No tenderness or deformity. Heart:  Regular rate and rhythm, S1, S2 normal, no murmur, click, rub or gallop. Abdomen:   Soft, non-tender. Bowel sounds normal.   
Extremities: Extremities normal, atraumatic, no cyanosis or edema. Pulses: 2+ and symmetric all extremities. Skin: Skin color, texture, turgor normal. No rashes or lesions Lymph nodes: Cervical, supraclavicular nodes normal.  
Neurologic: Moves all extremities, no gross focal deficits. Very weak/debilitated. Lab Results Component Value Date/Time  Sodium 137 09/14/2018 04:52 AM  
 Potassium 3.6 09/14/2018 04:52 AM  
 Chloride 101 09/14/2018 04:52 AM  
 CO2 28 09/14/2018 04:52 AM  
 BUN 15 09/14/2018 04:52 AM  
 Creatinine 0.70 09/14/2018 04:52 AM  
 Glucose 154 (H) 09/14/2018 04:52 AM  
 Calcium 9.3 09/14/2018 04:52 AM  
 Magnesium 1.7 09/04/2018 04:04 AM  
 Phosphorus 3.9 09/04/2018 04:04 AM  
 
 
 Lab Results Component Value Date/Time WBC 8.3 09/14/2018 04:52 AM  
 HGB 13.1 09/14/2018 04:52 AM  
 PLATELET 127 77/71/9398 04:52 AM  
 MCV 91.8 09/14/2018 04:52 AM  
 
 
Lab Results Component Value Date/Time INR 1.0 11/01/2013 04:13 PM  
 AST (SGOT) 18 01/26/2018 02:19 PM  
 Alk. phosphatase 27 (L) 01/26/2018 02:19 PM  
 Protein, total 7.6 01/26/2018 02:19 PM  
 Albumin 4.3 01/26/2018 02:19 PM  
 Globulin 3.6 02/05/2014 04:36 AM  
 
 
No results found for: IRON, FE, TIBC, IBCT, PSAT, FERR Lab Results Component Value Date/Time Sed rate (ESR) 9 07/22/2015 12:34 PM  
 TSH 3.230 03/10/2015 02:51 PM  
  
 
No results found for: PH, PHI, PCO2, PCO2I, PO2, PO2I, HCO3, HCO3I, FIO2, FIO2I Lab Results Component Value Date/Time Troponin-I, Qt. 4.46 (H) 08/27/2018 12:30 AM  
  
 
Lab Results Component Value Date/Time Culture result:  02/04/2014 05:20 AM  
  MRSA NOT PRESENT Screening of patient nares for MRSA is for surveillance purposes and, if positive, to facilitate isolation considerations in high risk settings. It is not intended for automatic decolonization interventions per se as regimens are not sufficiently effective to warrant routine use. Culture result:  11/01/2013 03:55 PM  
  MRSA NOT PRESENT Apparent Staphylococcus aureus (not MRSA) noted. Screening of patient nares for MRSA is for surveillance purposes and, if positive, to facilitate isolation considerations in high risk settings. It is not intended for automatic decolonization interventions per se as regimens are not sufficiently effective to warrant routine use. No results found for: TOXA1, RPR, HBCM, HBSAG, HAAB, HCAB1, HAAT, G6PD, CRYAC, HIVGT, HIVR, HIV1, HIV12, HIVPC, HIVRPI No results found for: VANCT, CPK Lab Results Component Value Date/Time  Color YELLOW/STRAW 02/04/2014 08:30 AM  
 Appearance CLEAR 02/04/2014 08:30 AM  
 pH (UA) 5.0 02/04/2014 08:30 AM  
 Protein TRACE (A) 02/04/2014 08:30 AM  
 Glucose 250 (A) 02/04/2014 08:30 AM  
 Ketone NEGATIVE  02/04/2014 08:30 AM  
 Bilirubin NEGATIVE  02/04/2014 08:30 AM  
 Blood NEGATIVE  02/04/2014 08:30 AM  
 Urobilinogen 0.2 02/04/2014 08:30 AM  
 Nitrites NEGATIVE  02/04/2014 08:30 AM  
 Leukocyte Esterase NEGATIVE  02/04/2014 08:30 AM  
 WBC 0-4 02/04/2014 08:30 AM  
 RBC 0-5 02/04/2014 08:30 AM  
 Bacteria NEGATIVE  02/04/2014 08:30 AM  
 
 
Images: no new images this morning CXR (8/30/18): Slight increase in bibasilar interstitial/airspace opacities and probable small effusions. IMPRESSION 
· Acute on chronic hypoxic respiratory failure · Advanced end stage pulmonary fibrosis · COPD 
· NSTEMI/CAD · Severe pulmonary HTN on ECHO · DM 
 
PLAN 
· Continue BiPAP vs HF O2 and wean flow and FiO2 as able to keep sats > 85%. Have not been able to wean flow or FiO2 significantly; requiring more BiPAP · Artificial tears and saline nasal gel · Continue Duonebs scheduled and Brovana/Pulmicort nebs · Continue home Esbriet · Metoprolol per Cardiology; no additional recs at this time · Palliative following following; assistance and support greatly appreciated. Continue Ativan and morphine prn. Seroquel d/c'd. · Patient not yet ready to make the decision to proceed with hospice; patient and wife aware that it is unlikely that we will be able decrease his O2 requirement to a level acceptable for discharge to home · Discussed with patient and wife earlier this admission that Sildenafil is indicated for primary pulmonary HTN and there isn't evidence that it provides significant benefit in secondary pulmonary HTN. Also discussed that neither Esbriet or Ofev will reverse his pulmonary fibrosis or improve his current lung function, but aim to slow the progression. · GI prophylaxis: Protonix · DVT prophylaxis: Lovenox Patient critically ill requiring continuous HF O2 due to severe hypoxia and respiratory distress and is high risk for continued decompensation.   Discussed with nursing, RT, and Palliative Care team.   
 
MAMIE Dias

## 2018-09-17 NOTE — PROGRESS NOTES
Palliative Medicine Consult David: [phone] ([phone]) Patient Name: Sandee Olivares YOB: 1935 Date of Initial Consult: 8/27/18 Reason for Consult: Goals-of-care Requesting Provider: Pattie Mcnulty MD 
Primary Care Physician: Aminata Velásquez MD 
 
 SUMMARY:  
Sandee Olivares is a 80 y.o. with a end-stage COPD and pulmonary fibrosis, O2-dependent at 20L/minute at baseline; CAD who was admitted on 8/25/2018 from home with a diagnosis of acute-on-chronic respiratory failure and NSTEMI due to tachycardia and hypoxia. Current medical issues leading to Palliative Medicine involvement include: shortness of breath, anxiety/agitation, confusion in setting of steroids and hypoxia, goals of care. PALLIATIVE DIAGNOSES:  
1. Shortness of breath 2. Anxiety 3. Debility 4. Goals of care 5. Pulmonary fibrosis PLAN:  
1. Met with patient, son, daughter and spouse. Difficult weekend because of discussion by inpatient team about hospice on Saturday. Patient has been clear about not wanting to transition to hospice and this has been relayed to all providers, CM, and administration. He is not ready to wean HFNC and/or BIPAP because quite frankly, he is not ready to die. 2. GOC-he remains not ready to make transition to comfort care/hospice. Think best for us not to pursue further discussions about hospice. He has been clear with his wishes and this only adds anxiety to his situation. 3. AMD-spouse remains MPOA and AMD in the chart 4. Code status-DNR per prior discussions. If he survives this stay, will need DDNR at discharge 5. Symptom management-he thinks stopping the seroquel has helped with being more awake in the mornings. The scheduled ativan 1 mg qhs and 0.5 mg at 4 PM working well for anxiety/sleep. Continues to wear BIPAP at night. Has used 2 doses of morphine.   
6. Psychosocial-patient states just not \"mentally ready\" to make transition to hospice care. We all suspect he will pass in the hospital and not be able to be weaned from the HFNC. Patient and his wife understand this scenario as well. 9. Son did talk with me outside the room and said, \"I don't think he has much more time\" and this family clearly understands the gravity of the situation but once again, we are trying to honor what the patient wants and allow him to make his medical decisions. 8. Discussed with bedside nurse, Dr. Remington Gutierrez, Pulmonary and CM. 9. Communicated plan of care with: Palliative IDT 
 
 
 GOALS OF CARE / TREATMENT PREFERENCES:  
 
GOALS OF CARE: continue current therapies with goal of returning home TREATMENT PREFERENCES:  
Code Status: DNR Advance Care Planning: 
Advance Care Planning 2018 Patient's Healthcare Decision Maker is: Named in scanned ACP document Primary Decision Maker Name Janett Aguirre Primary Decision Maker Phone Number [phone] Primary Decision Maker Relationship to Patient Spouse Secondary Decision Maker Name Andre Velazquez and/or dtr Abram Bertin Secondary Decision Maker Phone Number [phone], [phone] Confirm Advance Directive Yes, on file Other Instructions: Other: As far as possible, the palliative care team has discussed with patient / health care proxy about goals of care / treatment preferences for patient. HISTORY:  
 
History obtained from: patient, wife, chart CHIEF COMPLAINT: anxiety HPI/SUBJECTIVE: The patient is:  
[x] Verbal and participatory -  
[] Non-participatory due to:  
 
Patient definitely more fatigued today. Difficult for him to have an kind of discussion because of SOB Clinical Pain Assessment (nonverbal scale for severity on nonverbal patients): Duration: for how long has pt been experiencing pain (e.g., 2 days, 1 month, years) Frequency: how often pain is an issue (e.g., several times per day, once every few days, constant) FUNCTIONAL ASSESSMENT:  
 
Palliative Performance Scale (PPS): PPS: 30 
 
 
 PSYCHOSOCIAL/SPIRITUAL SCREENING:  
 
Palliative IDT has assessed this patient for cultural preferences / practices and a referral made as appropriate to needs (Cultural Services, Patient Advocacy, Ethics, etc.) Advance Care Planning: 
Advance Care Planning 2018 Patient's Healthcare Decision Maker is: Named in scanned ACP document Primary Decision Maker Name Stephanie Moncada Primary Decision Maker Phone Number [phone] Primary Decision Maker Relationship to Patient Spouse Secondary Decision Maker Name Andre Albert and/or dtr Leeanne Salas Secondary Decision Maker Phone Number [phone], [phone] Confirm Advance Directive Yes, on file Any spiritual / Hindu concerns: 
[] Yes /  [x] No 
 
Caregiver Burnout: 
[] Yes /  [x] No /  [] No Caregiver Present Anticipatory grief assessment:  
[x] Normal  / [] Maladaptive :  
 
ESAS Anxiety: Anxiety: 4 ESAS Depression:    
 
 
 REVIEW OF SYSTEMS:  
 
Positive and pertinent negative findings in ROS are noted above in HPI. The following systems were [] reviewed / [] unable to be reviewed as noted in HPI Other findings are noted below. Systems: constitutional, ears/nose/mouth/throat, respiratory, gastrointestinal, genitourinary, musculoskeletal, integumentary, neurologic, psychiatric, endocrine. Positive findings noted below. Modified ESAS Completed by: provider Anxiety: 4 Nausea: 0 Anorexia: 0 Constipation: No  
  Stool Occurrence(s): 1 PHYSICAL EXAM:  
 
From RN flowsheet: 
Wt Readings from Last 3 Encounters:  
09/15/18 165 lb 14.4 oz (75.3 kg) 10/20/17 178 lb (80.7 kg) 17 184 lb (83.5 kg) Blood pressure 125/62, pulse 93, temperature 98.5 °F (36.9 °C), resp. rate (!) 39, height 5' 10\" (1.778 m), weight 165 lb 14.4 oz (75.3 kg), SpO2 90 %. Pain Scale 1: Numeric (0 - 10) Pain Intensity 1: 0 Last bowel movement, if known: today Constitutional: awake and alert, appears fatigued. Had both HFNC and BIPAP in place at times during my visit. Eyes: pupils equal, anicteric ENMT: no nasal discharge, moist mucous membranes Cardiovascular: regular rhythm, distal pulses intact Respiratory: breathing mild-moderately labored with talking, symmetric Gastrointestinal: soft non-tender, +bowel sounds Musculoskeletal: no deformity, no tenderness to palpation Skin: warm, dry Neurologic: following commands, moving all extremities Psychiatric:calm Other: 
 
 
 HISTORY:  
 
Active Problems: 
  CAD (coronary artery disease) () Overview: cardiac stentd (4) COPD (chronic obstructive pulmonary disease) ([address] (2/4/2014) DM II (diabetes mellitus, type II), controlled ([address] (2/4/2014) Pulmonary fibrosis (HCC) () Acute on chronic respiratory failure ([address] (8/26/2018) Past Medical History:  
Diagnosis Date  CAD (coronary artery disease) 2004  
 4 stents placed in heart  Chronic obstructive pulmonary disease ([address] EMPHYSEMA SEEN ON CXR  
 Colon polyp Three tubular adenomas excised colonoscopically on 3/17/2011.  COPD (chronic obstructive pulmonary disease) ([address] 2/4/2014  Diverticulosis of colon (without mention of hemorrhage)  DM (diabetes mellitus) ([address] 4/3/2012  GERD (gastroesophageal reflux disease) [address] [facility] [address]  History of pneumothorax 2/2014  Hypercholesteremia  Pneumonia 2004  PUD (peptic ulcer disease) 1955  Pulmonary fibrosis ([address] Past Surgical History:  
Procedure Laterality Date  ENDOSCOPY, COLON, DIAGNOSTIC  3/2011 Polyps. Tics. Rep 3 yrs.  HX CORONARY STENT PLACEMENT  2004  
 4 cardiac stents  HX GI  circa 2000 Laparoscopic cholecystectomy. [address] left inguinal hernia repair  HX LUMBAR LAMINECTOMY  11/20/2013 L4 L5 Laminectomy  HX OTHER SURGICAL  2/7/2014 LVATS, bronch, talc pleurodesis, Resection of ruptured bulla  HX TONSILLECTOMY  age 29 Family History Problem Relation Age of Onset  Adopted: Yes  Other Other   
  patient was adopted History reviewed, no pertinent family history. Social History Substance Use Topics  Smoking status: Former Smoker Packs/day: 1.50 Years: 36.00 Types: Cigarettes Quit date: 1/1/1987  Smokeless tobacco: Never Used  Alcohol use No  
 
Allergies Allergen Reactions  Bactrim [Sulfamethoprim] Unknown (comments) FLU LIKE SYMPTOMS  
 Statins-Hmg-Coa Reductase Inhibitors Myalgia  Sulfa (Sulfonamide Antibiotics) Other (comments) Bactrim causes flu like symptoms Current Facility-Administered Medications Medication Dose Route Frequency  LORazepam (ATIVAN) tablet 1 mg  1 mg Oral QHS  LORazepam (ATIVAN) tablet 0.5 mg  0.5 mg Oral Q4H PRN  
 sodium chloride-aloe vera spry   Nasal PRN  
 artificial tears (dextran 70-hypromellose) (NATURAL BALANCE) 0.1-0.3 % ophthalmic solution 2 Drop  2 Drop Both Eyes PRN  
 LORazepam (ATIVAN) tablet 0.5 mg  0.5 mg Oral Q24H  
 morphine injection 2 mg  2 mg IntraVENous Q1H PRN  
 fluticasone (FLONASE) 50 mcg/actuation nasal spray 2 Spray  2 Spray Both Nostrils DAILY  guaiFENesin ER (MUCINEX) tablet 600 mg  600 mg Oral Q12H  
 arformoterol (BROVANA) neb solution 15 mcg  15 mcg Nebulization BID RT  
 sodium chloride (NS) flush 5-10 mL  5-10 mL IntraVENous Q8H  
 sodium chloride (NS) flush 5-10 mL  5-10 mL IntraVENous PRN  
 albuterol-ipratropium (DUO-NEB) 2.5 MG-0.5 MG/3 ML  3 mL Nebulization Q4H RT  
 cetirizine (ZYRTEC) tablet 10 mg  10 mg Oral DAILY  finasteride (PROSCAR) tablet 5 mg  5 mg Oral DAILY  budesonide (PULMICORT) 500 mcg/2 ml nebulizer suspension  500 mcg Nebulization BID RT  
 gabapentin (NEURONTIN) capsule 300 mg  300 mg Oral DAILY WITH LUNCH  
  gabapentin (NEURONTIN) capsule 600 mg  600 mg Oral QHS  pirfenidone tab 801 mg   (Patient Supplied)  801 mg Oral TID  tamsulosin (FLOMAX) capsule 0.4 mg  0.4 mg Oral DAILY  enoxaparin (LOVENOX) injection 40 mg  40 mg SubCUTAneous Q24H  
 aspirin chewable tablet 81 mg  81 mg Oral DAILY  metoprolol tartrate (LOPRESSOR) tablet 12.5 mg  12.5 mg Oral BID  pantoprazole (PROTONIX) tablet 40 mg  40 mg Oral ACB  
 
 
 
 LAB AND IMAGING FINDINGS:  
 
Lab Results Component Value Date/Time WBC 8.3 09/14/2018 04:52 AM  
 HGB 13.1 09/14/2018 04:52 AM  
 PLATELET 870 90/44/6434 04:52 AM  
 
Lab Results Component Value Date/Time Sodium 137 09/14/2018 04:52 AM  
 Potassium 3.6 09/14/2018 04:52 AM  
 Chloride 101 09/14/2018 04:52 AM  
 CO2 28 09/14/2018 04:52 AM  
 BUN 15 09/14/2018 04:52 AM  
 Creatinine 0.70 09/14/2018 04:52 AM  
 Calcium 9.3 09/14/2018 04:52 AM  
 Magnesium 1.7 09/04/2018 04:04 AM  
 Phosphorus 3.9 09/04/2018 04:04 AM  
  
Lab Results Component Value Date/Time AST (SGOT) 18 01/26/2018 02:19 PM  
 Alk. phosphatase 27 (L) 01/26/2018 02:19 PM  
 Protein, total 7.6 01/26/2018 02:19 PM  
 Albumin 4.3 01/26/2018 02:19 PM  
 Globulin 3.6 02/05/2014 04:36 AM  
 
Lab Results Component Value Date/Time INR 1.0 11/01/2013 04:13 PM  
 Prothrombin time 10.4 11/01/2013 04:13 PM  
  
No results found for: IRON, FE, TIBC, IBCT, PSAT, FERR Lab Results Component Value Date/Time pH 7.39 02/04/2014 02:00 AM  
 PCO2 31 (L) 02/04/2014 02:00 AM  
 PO2 90 02/04/2014 02:00 AM  
 
No components found for: [place] No results found for: CPK, CKMB Total time: 35 
Counseling / coordination time, spent as noted above:30 
> 50% counseling / coordination?: yes Prolonged service was provided for  []30 min   []75 min in face to face time in the presence of the patient, spent as noted above. Time Start:  
Time End:  
Note: this can only be billed with 76514 (initial) or 84183 (follow up). If multiple start / stop times, list each separately.

## 2018-09-18 NOTE — PROGRESS NOTES
Name: [address] [place] [address]: [address] [place] [address] Dr PIZANOB: 1935 Admit Date: 2018 Phone: [phone]  Room: [facility]/01 PCP: Aminata Velásquez MD  MRN: [id number] Date: 2018  Code: DNR Chart and notes reviewed. Data reviewed. I review the patient's current medications in the medical record at each encounter. I have evaluated and examined the patient. Overnight events: 
Afebrile Sats 90-94% on BiPAP Wife reports sats ranging from 82% to low 90s on max HF 60L FiO2 100% BP and HR stable RR 30s-40s overnight Lab work reviewed ROS: Had increased WOB and air hunger overnight, even with BiPAP in place. Symptoms improved after receiving Morphine. Requiring near continuous BiPAP due to increased WOB and hypoxia with max HF. Denies pain. Denies fever, chills, or cough. Denies LE swelling. Patient not ready to die and does not want wean respiratory support or transition to comfort measures at this time. Does not want to discuss hospice. Past Medical History:  
Diagnosis Date  CAD (coronary artery disease)   
 4 stents placed in heart  Chronic obstructive pulmonary disease ([address] EMPHYSEMA SEEN ON CXR  
 Colon polyp Three tubular adenomas excised colonoscopically on 3/17/2011.  COPD (chronic obstructive pulmonary disease) ([address] 2014  Diverticulosis of colon (without mention of hemorrhage)  DM (diabetes mellitus) ([address] 4/3/2012  GERD (gastroesophageal reflux disease) [address] [facility] [facility] [address]  History of pneumothorax 2014  Hypercholesteremia  Pneumonia   PUD (peptic ulcer disease)   Pulmonary fibrosis ([address] Past Surgical History:  
Procedure Laterality Date  ENDOSCOPY, COLON, DIAGNOSTIC  3/2011 Polyps. Tics. Rep 3 yrs.  HX CORONARY STENT PLACEMENT    
 4 cardiac stents  HX GI  circa  Laparoscopic cholecystectomy. [address] [place] [address] left inguinal hernia repair  HX LUMBAR LAMINECTOMY  11/20/2013 L4 L5 Laminectomy  HX OTHER SURGICAL  2/7/2014 LVATS, bronch, talc pleurodesis, Resection of ruptured bulla  HX TONSILLECTOMY  age 29 Family History Problem Relation Age of Onset  Adopted: Yes  Other Other   
  patient was adopted Social History Substance Use Topics  Smoking status: Former Smoker Packs/day: 1.50 Years: 36.00 Types: Cigarettes Quit date: 1/1/1987  Smokeless tobacco: Never Used  Alcohol use No  
 
 
Allergies Allergen Reactions  Bactrim [Sulfamethoprim] Unknown (comments) FLU LIKE SYMPTOMS  
 Statins-Hmg-Coa Reductase Inhibitors Myalgia  Sulfa (Sulfonamide Antibiotics) Other (comments) Bactrim causes flu like symptoms Current Facility-Administered Medications Medication Dose Route Frequency  LORazepam (ATIVAN) tablet 1 mg  1 mg Oral QHS  LORazepam (ATIVAN) tablet 0.5 mg  0.5 mg Oral Q4H PRN  
 sodium chloride-aloe vera spry   Nasal PRN  
 artificial tears (dextran 70-hypromellose) (NATURAL BALANCE) 0.1-0.3 % ophthalmic solution 2 Drop  2 Drop Both Eyes PRN  
 LORazepam (ATIVAN) tablet 0.5 mg  0.5 mg Oral Q24H  
 morphine injection 2 mg  2 mg IntraVENous Q1H PRN  
 fluticasone (FLONASE) 50 mcg/actuation nasal spray 2 Spray  2 Spray Both Nostrils DAILY  guaiFENesin ER (MUCINEX) tablet 600 mg  600 mg Oral Q12H  
 arformoterol (BROVANA) neb solution 15 mcg  15 mcg Nebulization BID RT  
 sodium chloride (NS) flush 5-10 mL  5-10 mL IntraVENous Q8H  
 sodium chloride (NS) flush 5-10 mL  5-10 mL IntraVENous PRN  
 albuterol-ipratropium (DUO-NEB) 2.5 MG-0.5 MG/3 ML  3 mL Nebulization Q4H RT  
 cetirizine (ZYRTEC) tablet 10 mg  10 mg Oral DAILY  finasteride (PROSCAR) tablet 5 mg  5 mg Oral DAILY  budesonide (PULMICORT) 500 mcg/2 ml nebulizer suspension  500 mcg Nebulization BID RT  
 gabapentin (NEURONTIN) capsule 300 mg  300 mg Oral DAILY WITH LUNCH  
  gabapentin (NEURONTIN) capsule 600 mg  600 mg Oral QHS  pirfenidone tab 801 mg   (Patient Supplied)  801 mg Oral TID  tamsulosin (FLOMAX) capsule 0.4 mg  0.4 mg Oral DAILY  enoxaparin (LOVENOX) injection 40 mg  40 mg SubCUTAneous Q24H  
 aspirin chewable tablet 81 mg  81 mg Oral DAILY  metoprolol tartrate (LOPRESSOR) tablet 12.5 mg  12.5 mg Oral BID  pantoprazole (PROTONIX) tablet 40 mg  40 mg Oral ACB REVIEW OF SYSTEMS  
12 point ROS negative except as stated in the HPI. Physical Exam:  
Visit Vitals  /66  Pulse 90  Temp 97.8 °F (36.6 °C)  Resp 26  
 Ht 5' 10\" (1.778 m)  Wt 75.3 kg (165 lb 14.4 oz)  SpO2 94%  BMI 23.8 kg/m2 General:  Alert, ill appearing/frail, appears stated age, dyspneic with speaking and any activity Head:  Normocephalic, without obvious abnormality, atraumatic. Eyes:  Conjunctivae/corneas clear. Nose: Nares normal. Septum midline. Mucosa normal.   
Throat: Lips, mucosa, and tongue normal.   
Neck: Supple, symmetrical, trachea midline, no adenopathy. Lungs:   Diminished air movement with bibasilar crackles; tachypnea, and accessory muscle use. Chest wall:  No tenderness or deformity. Heart:  Regular rate and rhythm, S1, S2 normal, no murmur, click, rub or gallop. Abdomen:   Soft, non-tender. Bowel sounds normal.   
Extremities: Extremities normal, atraumatic, no cyanosis or edema. Pulses: 2+ and symmetric all extremities. Skin: Skin color, texture, turgor normal. No rashes or lesions Lymph nodes: Cervical, supraclavicular nodes normal.  
Neurologic: Moves all extremities, no gross focal deficits. Very weak/debilitated. Lab Results Component Value Date/Time  Sodium 134 (L) 09/18/2018 01:53 AM  
 Potassium 4.3 09/18/2018 01:53 AM  
 Chloride 98 09/18/2018 01:53 AM  
 CO2 29 09/18/2018 01:53 AM  
 BUN 20 09/18/2018 01:53 AM  
 Creatinine 0.86 09/18/2018 01:53 AM  
 Glucose 164 (H) 09/18/2018 01:53 AM  
 Calcium 9.1 09/18/2018 01:53 AM  
 Magnesium 1.7 09/18/2018 01:53 AM  
 Phosphorus 3.9 09/04/2018 04:04 AM  
 
 
Lab Results Component Value Date/Time WBC 11.7 (H) 09/18/2018 01:53 AM  
 HGB 13.9 09/18/2018 01:53 AM  
 PLATELET 369 79/48/5648 01:53 AM  
 MCV 91.9 09/18/2018 01:53 AM  
 
 
Lab Results Component Value Date/Time INR 1.0 11/01/2013 04:13 PM  
 AST (SGOT) 18 01/26/2018 02:19 PM  
 Alk. phosphatase 27 (L) 01/26/2018 02:19 PM  
 Protein, total 7.6 01/26/2018 02:19 PM  
 Albumin 4.3 01/26/2018 02:19 PM  
 Globulin 3.6 02/05/2014 04:36 AM  
 
 
No results found for: IRON, FE, TIBC, IBCT, PSAT, FERR Lab Results Component Value Date/Time Sed rate (ESR) 9 07/22/2015 12:34 PM  
 TSH 3.230 03/10/2015 02:51 PM  
  
 
No results found for: PH, PHI, PCO2, PCO2I, PO2, PO2I, HCO3, HCO3I, FIO2, FIO2I Lab Results Component Value Date/Time Troponin-I, Qt. 4.46 (H) 08/27/2018 12:30 AM  
  
 
Lab Results Component Value Date/Time Culture result:  02/04/2014 05:20 AM  
  MRSA NOT PRESENT Screening of patient nares for MRSA is for surveillance purposes and, if positive, to facilitate isolation considerations in high risk settings. It is not intended for automatic decolonization interventions per se as regimens are not sufficiently effective to warrant routine use. Culture result:  11/01/2013 03:55 PM  
  MRSA NOT PRESENT Apparent Staphylococcus aureus (not MRSA) noted. Screening of patient nares for MRSA is for surveillance purposes and, if positive, to facilitate isolation considerations in high risk settings. It is not intended for automatic decolonization interventions per se as regimens are not sufficiently effective to warrant routine use. No results found for: TOXA1, RPR, HBCM, HBSAG, HAAB, HCAB1, HAAT, G6PD, CRYAC, HIVGT, HIVR, HIV1, HIV12, HIVPC, HIVRPI No results found for: VANCT, CPK Lab Results Component Value Date/Time Color YELLOW/STRAW 02/04/2014 08:30 AM  
 Appearance CLEAR 02/04/2014 08:30 AM  
 pH (UA) 5.0 02/04/2014 08:30 AM  
 Protein TRACE (A) 02/04/2014 08:30 AM  
 Glucose 250 (A) 02/04/2014 08:30 AM  
 Ketone NEGATIVE  02/04/2014 08:30 AM  
 Bilirubin NEGATIVE  02/04/2014 08:30 AM  
 Blood NEGATIVE  02/04/2014 08:30 AM  
 Urobilinogen 0.2 02/04/2014 08:30 AM  
 Nitrites NEGATIVE  02/04/2014 08:30 AM  
 Leukocyte Esterase NEGATIVE  02/04/2014 08:30 AM  
 WBC 0-4 02/04/2014 08:30 AM  
 RBC 0-5 02/04/2014 08:30 AM  
 Bacteria NEGATIVE  02/04/2014 08:30 AM  
 
 
Images: no new images this morning CXR (8/30/18): Slight increase in bibasilar interstitial/airspace opacities and probable small effusions. IMPRESSION 
· Acute on chronic hypoxic respiratory failure · Advanced end stage pulmonary fibrosis · COPD 
· NSTEMI/CAD · Severe pulmonary HTN on ECHO · DM 
 
PLAN 
· Continue BiPAP vs HF O2 and wean flow and FiO2 as able to keep sats > 85%. Have not been able to wean flow or FiO2 significantly; requiring more BiPAP · Artificial tears and saline nasal gel · Continue Duonebs scheduled and Brovana/Pulmicort nebs · Continue home Esbriet · Metoprolol per Cardiology; no additional recs at this time · Palliative following following; assistance and support greatly appreciated. Continue Ativan and morphine prn. Seroquel d/c'd. · Patient not yet ready to make the decision to proceed with hospice. Patient and wife aware that it is unlikely that we will be able decrease his O2 requirement to a level acceptable for discharge to home and he will likely die in the hospital.  Staff and family honoring patient's wishes to continue with max BiPAP/HF support and not engage in further hospice discussions at this time.  
· Discussed with patient and wife earlier this admission that Sildenafil is indicated for primary pulmonary HTN and there isn't evidence that it provides significant benefit in secondary pulmonary HTN. Also discussed that neither Esbriet or Ofev will reverse his pulmonary fibrosis or improve his current lung function, but aim to slow the progression. · GI prophylaxis: Protonix · DVT prophylaxis: Lovenox Patient requiring continuous BiPAP vs HF O2 due to severe hypoxia and respiratory distress and is high risk for continued decompensation. There is unfortunately nothing else to offer or add from a pulmonary standpoint. We will sign off and be available as needed. Discussed with Dr. Dayday Hunter and Dr. Sue Mckeon. Please call with any additional questions or concerns.  
 
Pb Torre

## 2018-09-18 NOTE — PROGRESS NOTES
Palliative Medicine Consult David: [phone] Patient Name: Andreia Adams YOB: 1935 Date of Initial Consult: 8/27/18 Reason for Consult: Goals-of-care Requesting Provider: Burgess Fredrick GUTIERREZ 
Primary Care Physician: Jos Flores MD 
 
 SUMMARY:  
Andreia Adams is a 80 y.o. with a end-stage COPD and pulmonary fibrosis, O2-dependent at 20L/minute at baseline; CAD who was admitted on 8/25/2018 from home with a diagnosis of acute-on-chronic respiratory failure and NSTEMI due to tachycardia and hypoxia. Current medical issues leading to Palliative Medicine involvement include: shortness of breath, anxiety/agitation, confusion in setting of steroids and hypoxia, goals of care. PALLIATIVE DIAGNOSES:  
1. Shortness of breath 2. Anxiety 3. Debility 4. Goals of care 5. Pulmonary fibrosis PLAN:  
1. Met with patient and spouse. Had a difficult nite and now essentially on continuous BIPAP. Having a tough time taking oral medications and anxiety has been high. Clearly showing decline as we anticipated that eventually the HFNC and/or BIPAP not being as effective. 2. GOC-he remains not ready to make transition to comfort care/hospice but he is willing to allow me to adjust medications to IV and stop all nonessential medications. Did talk with wife outside the room and she understands that we may be seeing a transition and he may need to be sedated to help with comfort/respiratory distress. She understands but still will allow him to make decisions until he no longer able. 3. AMD-spouse remains MPOA and AMD in the chart 4. Code status-DNR per prior discussions. If he survives this stay, will need DDNR at discharge 5. Symptom management-change the ativan to 1 mg IV qhs and 0.5 mg at 1600. In addition, changed the ativan to 0.5 mg IV every 2 hours prn anxiety. In addition, continue morphine 2 mg IV every 1 hour prn sob/pain 6. Psychosocial-patient still with great support from family. Children have been at bedside lots of this hospitalization. 7. Discussed with bedside nurse, Dr. Heber Mcnair, Pulmonary and CM. 8. Communicated plan of care with: Palliative IDT 
 
 
 GOALS OF CARE / TREATMENT PREFERENCES:  
 
GOALS OF CARE: continue current therapies with goal of returning home TREATMENT PREFERENCES:  
Code Status: DNR Advance Care Planning: 
Advance Care Planning 2018 Patient's Healthcare Decision Maker is: Named in scanned ACP document Primary Decision Maker Name Arlet Conn Primary Decision Maker Phone Number [phone] Primary Decision Maker Relationship to Patient Spouse Secondary Decision Maker Name Andre Goodrich and/or dtr Tre Chandler Secondary Decision Maker Phone Number [phone], [phone] Confirm Advance Directive Yes, on file Other Instructions: Other: As far as possible, the palliative care team has discussed with patient / health care proxy about goals of care / treatment preferences for patient. HISTORY:  
 
History obtained from: patient, wife, chart CHIEF COMPLAINT: anxiety HPI/SUBJECTIVE: The patient is:  
[x] Verbal and participatory -  
[] Non-participatory due to:  
 
Patient with BIPAP in place. Fatigued and difficult time speaking with us. Clinical Pain Assessment (nonverbal scale for severity on nonverbal patients): Duration: for how long has pt been experiencing pain (e.g., 2 days, 1 month, years) Frequency: how often pain is an issue (e.g., several times per day, once every few days, constant) FUNCTIONAL ASSESSMENT:  
 
Palliative Performance Scale (PPS): PPS: 30 
 
 
 PSYCHOSOCIAL/SPIRITUAL SCREENING:  
 
Palliative IDT has assessed this patient for cultural preferences / practices and a referral made as appropriate to needs (Cultural Services, Patient Advocacy, Ethics, etc.) Advance Care Planning: 
Advance Care Planning 2018 Patient's Healthcare Decision Maker is: Named in scanned ACP document Primary Decision Maker Name Claritza Galo Primary Decision Maker Phone Number [phone] Primary Decision Maker Relationship to Patient Spouse Secondary Decision Maker Name Andre Elliott and/or dtr Gm Gutierrez Secondary Decision Maker Phone Number [phone], [phone] Confirm Advance Directive Yes, on file Any spiritual / Hindu concerns: 
[] Yes /  [x] No 
 
Caregiver Burnout: 
[] Yes /  [x] No /  [] No Caregiver Present Anticipatory grief assessment:  
[x] Normal  / [] Maladaptive :  
 
ESAS Anxiety: Anxiety: 4 ESAS Depression:    
 
 
 REVIEW OF SYSTEMS:  
 
Positive and pertinent negative findings in ROS are noted above in HPI. The following systems were [] reviewed / [] unable to be reviewed as noted in HPI Other findings are noted below. Systems: constitutional, ears/nose/mouth/throat, respiratory, gastrointestinal, genitourinary, musculoskeletal, integumentary, neurologic, psychiatric, endocrine. Positive findings noted below. Modified ESAS Completed by: provider Anxiety: 4 Nausea: 0 Anorexia: 0 Constipation: No  
  Stool Occurrence(s): 1 PHYSICAL EXAM:  
 
From RN flowsheet: 
Wt Readings from Last 3 Encounters:  
09/15/18 165 lb 14.4 oz (75.3 kg) 10/20/17 178 lb (80.7 kg) 17 184 lb (83.5 kg) Blood pressure 113/57, pulse 97, temperature 97.8 °F (36.6 °C), resp. rate 30, height 5' 10\" (1.778 m), weight 165 lb 14.4 oz (75.3 kg), SpO2 96 %. Pain Scale 1: Numeric (0 - 10) Pain Intensity 1: 0 Last bowel movement, if known: today Constitutional: awake and alert, fatigued, BIPAP in place Eyes: pupils equal, anicteric ENMT: no nasal discharge, moist mucous membranes Cardiovascular: regular rhythm, distal pulses intact Respiratory: breathing mild-moderately labored with talking, symmetric Gastrointestinal: soft non-tender, +bowel sounds Musculoskeletal: no deformity, no tenderness to palpation Skin: warm, dry Neurologic: following commands, moving all extremities Psychiatric:anxious Other: 
 
 
 HISTORY:  
 
Active Problems: 
  CAD (coronary artery disease) () Overview: cardiac stentd (4) COPD (chronic obstructive pulmonary disease) ([address] (2/4/2014) DM II (diabetes mellitus, type II), controlled ([address] (2/4/2014) Pulmonary fibrosis (HCC) () Acute on chronic respiratory failure ([address] (8/26/2018) Past Medical History:  
Diagnosis Date  CAD (coronary artery disease) 2004  
 4 stents placed in heart  Chronic obstructive pulmonary disease ([address] EMPHYSEMA SEEN ON CXR  
 Colon polyp Three tubular adenomas excised colonoscopically on 3/17/2011.  COPD (chronic obstructive pulmonary disease) ([address] 2/4/2014  Diverticulosis of colon (without mention of hemorrhage)  DM (diabetes mellitus) ([address] 4/3/2012  GERD (gastroesophageal reflux disease) [address] [facility] [address]  History of pneumothorax 2/2014  Hypercholesteremia  Pneumonia 2004  PUD (peptic ulcer disease) 1955  Pulmonary fibrosis ([address] Past Surgical History:  
Procedure Laterality Date  ENDOSCOPY, COLON, DIAGNOSTIC  3/2011 Polyps. Tics. Rep 3 yrs.  HX CORONARY STENT PLACEMENT  2004  
 4 cardiac stents  HX GI  circa 2000 Laparoscopic cholecystectomy. [address] [place] [address] left inguinal hernia repair  HX LUMBAR LAMINECTOMY  11/20/2013 L4 L5 Laminectomy  HX OTHER SURGICAL  2/7/2014 LVATS, bronch, talc pleurodesis, Resection of ruptured bulla  HX TONSILLECTOMY  age 29 Family History Problem Relation Age of Onset  Adopted: Yes  Other Other   
  patient was adopted History reviewed, no pertinent family history. Social History Substance Use Topics  Smoking status: Former Smoker Packs/day: 1.50 Years: 36.00 Types: Cigarettes Quit date: 1/1/1987  Smokeless tobacco: Never Used  Alcohol use No  
 
Allergies Allergen Reactions  Bactrim [Sulfamethoprim] Unknown (comments) FLU LIKE SYMPTOMS  
 Statins-Hmg-Coa Reductase Inhibitors Myalgia  Sulfa (Sulfonamide Antibiotics) Other (comments) Bactrim causes flu like symptoms Current Facility-Administered Medications Medication Dose Route Frequency  LORazepam (ATIVAN) injection 0.5 mg  0.5 mg IntraVENous PCL  LORazepam (ATIVAN) injection 1 mg  1 mg IntraVENous QHS  LORazepam (ATIVAN) injection 0.5 mg  0.5 mg IntraVENous Q2H PRN  
 sodium chloride-aloe vera spry   Nasal PRN  
 artificial tears (dextran 70-hypromellose) (NATURAL BALANCE) 0.1-0.3 % ophthalmic solution 2 Drop  2 Drop Both Eyes PRN  
 morphine injection 2 mg  2 mg IntraVENous Q1H PRN  
 fluticasone (FLONASE) 50 mcg/actuation nasal spray 2 Spray  2 Spray Both Nostrils DAILY  guaiFENesin ER (MUCINEX) tablet 600 mg  600 mg Oral Q12H  
 arformoterol (BROVANA) neb solution 15 mcg  15 mcg Nebulization BID RT  
 sodium chloride (NS) flush 5-10 mL  5-10 mL IntraVENous Q8H  
 sodium chloride (NS) flush 5-10 mL  5-10 mL IntraVENous PRN  
 albuterol-ipratropium (DUO-NEB) 2.5 MG-0.5 MG/3 ML  3 mL Nebulization Q4H RT  
 budesonide (PULMICORT) 500 mcg/2 ml nebulizer suspension  500 mcg Nebulization BID RT  
 gabapentin (NEURONTIN) capsule 300 mg  300 mg Oral DAILY WITH LUNCH  
 gabapentin (NEURONTIN) capsule 600 mg  600 mg Oral QHS  pirfenidone tab 801 mg   (Patient Supplied)  801 mg Oral TID  tamsulosin (FLOMAX) capsule 0.4 mg  0.4 mg Oral DAILY  enoxaparin (LOVENOX) injection 40 mg  40 mg SubCUTAneous Q24H  
 metoprolol tartrate (LOPRESSOR) tablet 12.5 mg  12.5 mg Oral BID  pantoprazole (PROTONIX) tablet 40 mg  40 mg Oral ACB  
 
 
 
 LAB AND IMAGING FINDINGS:  
 
Lab Results Component Value Date/Time WBC 11.7 (H) 09/18/2018 01:53 AM  
 HGB 13.9 09/18/2018 01:53 AM  
 PLATELET 549 37/79/4820 01:53 AM  
 
Lab Results Component Value Date/Time Sodium 134 (L) 09/18/2018 01:53 AM  
 Potassium 4.3 09/18/2018 01:53 AM  
 Chloride 98 09/18/2018 01:53 AM  
 CO2 29 09/18/2018 01:53 AM  
 BUN 20 09/18/2018 01:53 AM  
 Creatinine 0.86 09/18/2018 01:53 AM  
 Calcium 9.1 09/18/2018 01:53 AM  
 Magnesium 1.7 09/18/2018 01:53 AM  
 Phosphorus 3.9 09/04/2018 04:04 AM  
  
Lab Results Component Value Date/Time AST (SGOT) 18 01/26/2018 02:19 PM  
 Alk. phosphatase 27 (L) 01/26/2018 02:19 PM  
 Protein, total 7.6 01/26/2018 02:19 PM  
 Albumin 4.3 01/26/2018 02:19 PM  
 Globulin 3.6 02/05/2014 04:36 AM  
 
Lab Results Component Value Date/Time INR 1.0 11/01/2013 04:13 PM  
 Prothrombin time 10.4 11/01/2013 04:13 PM  
  
No results found for: IRON, FE, TIBC, IBCT, PSAT, FERR Lab Results Component Value Date/Time pH 7.39 02/04/2014 02:00 AM  
 PCO2 31 (L) 02/04/2014 02:00 AM  
 PO2 90 02/04/2014 02:00 AM  
 
No components found for: [place] No results found for: CPK, CKMB Total time: 35 
Counseling / coordination time, spent as noted above:30 
> 50% counseling / coordination?: yes Prolonged service was provided for  []30 min   []75 min in face to face time in the presence of the patient, spent as noted above. Time Start:  
Time End:  
Note: this can only be billed with 41481 (initial) or 46234 (follow up). If multiple start / stop times, list each separately.

## 2018-09-18 NOTE — PROGRESS NOTES
Medical Progress Note NAME: Carolyn Cha :  1935 MRM:  [id number] Date/Time: 2018  11:06 AM 
 
  
Assessment / Plan:  
 
Acute on chronic resp failure / Pulm fibrosis:  has been on BiPAP since overnight. Only tolerated 5 min on high flow this AM. -- cont LABA/ICS and duoneb 
 -- high flow O2 and BiPAP prn 
 -- repeat CXR to compare to  
   
NSTEMI/CAD:  Type 2 MI from hypoxia. -- cont ASA, metoprolol 
   
DM type 2:  A1c shows good control. 
   
Anxiety:  Due to current medical issues. -- continue ativan 
  
 
 
  
Subjective: Chief Complaint:  Comfortable. Wearing bipap. Cough. No pain. ROS: 
(bold if positive, if negative) SOB/ROB Objective:  
 
 
Vitals:  
 
 
  
Last 24hrs VS reviewed since prior progress note. Most recent are: 
 
Visit Vitals  /66  Pulse 90  Temp 97.8 °F (36.6 °C)  Resp 26  
 Ht 5' 10\" (1.778 m)  Wt 75.3 kg (165 lb 14.4 oz)  SpO2 94%  BMI 23.8 kg/m2 SpO2 Readings from Last 6 Encounters:  
18 94% 17 96% 10/13/14 94% 14 95% 14 93% 14 96% O2 Flow Rate (L/min): 60 l/min Intake/Output Summary (Last 24 hours) at 18 1105 Last data filed at 18 3447 Gross per 24 hour Intake             1465 ml Output             1100 ml Net              365 ml Exam:  
 
Physical Exam: 
 
Gen:  Well-developed, well-nourished, in no acute distress HEENT:  Pink conjunctivae, hearing intact to voice, moist mucous membranes Resp:  No accessory muscle use, fine crackles Card:  No murmurs, normal S1, S2 without thrills or peripheral edema Abd:  Soft, non-tender, non-distended, normoactive bowel sounds are present Neuro: Face symmetric, tongue midline, speech fluent,  strength is 5/5 bilaterally, follows commands appropriately Psych:  oriented to person, place and time, alert Telemetry reviewed:   normal sinus rhythm Medications Reviewed: (see below) Lab Data Reviewed: (see below) 
 
______________________________________________________________________ Medications:  
 
Current Facility-Administered Medications Medication Dose Route Frequency  LORazepam (ATIVAN) tablet 1 mg  1 mg Oral QHS  LORazepam (ATIVAN) tablet 0.5 mg  0.5 mg Oral Q4H PRN  
 sodium chloride-aloe vera spry   Nasal PRN  
 artificial tears (dextran 70-hypromellose) (NATURAL BALANCE) 0.1-0.3 % ophthalmic solution 2 Drop  2 Drop Both Eyes PRN  
 LORazepam (ATIVAN) tablet 0.5 mg  0.5 mg Oral Q24H  
 morphine injection 2 mg  2 mg IntraVENous Q1H PRN  
 fluticasone (FLONASE) 50 mcg/actuation nasal spray 2 Spray  2 Spray Both Nostrils DAILY  guaiFENesin ER (MUCINEX) tablet 600 mg  600 mg Oral Q12H  
 arformoterol (BROVANA) neb solution 15 mcg  15 mcg Nebulization BID RT  
 sodium chloride (NS) flush 5-10 mL  5-10 mL IntraVENous Q8H  
 sodium chloride (NS) flush 5-10 mL  5-10 mL IntraVENous PRN  
 albuterol-ipratropium (DUO-NEB) 2.5 MG-0.5 MG/3 ML  3 mL Nebulization Q4H RT  
 cetirizine (ZYRTEC) tablet 10 mg  10 mg Oral DAILY  finasteride (PROSCAR) tablet 5 mg  5 mg Oral DAILY  budesonide (PULMICORT) 500 mcg/2 ml nebulizer suspension  500 mcg Nebulization BID RT  
 gabapentin (NEURONTIN) capsule 300 mg  300 mg Oral DAILY WITH LUNCH  
 gabapentin (NEURONTIN) capsule 600 mg  600 mg Oral QHS  pirfenidone tab 801 mg   (Patient Supplied)  801 mg Oral TID  tamsulosin (FLOMAX) capsule 0.4 mg  0.4 mg Oral DAILY  enoxaparin (LOVENOX) injection 40 mg  40 mg SubCUTAneous Q24H  
 aspirin chewable tablet 81 mg  81 mg Oral DAILY  metoprolol tartrate (LOPRESSOR) tablet 12.5 mg  12.5 mg Oral BID  pantoprazole (PROTONIX) tablet 40 mg  40 mg Oral ACB Lab Review:  
 
Recent Labs  
   09/18/18 0153 WBC  11.7* HGB  13.9 HCT  42.0 PLT  274 Recent Labs  
   09/18/18 0153 NA  134* K  4.3 CL  98  
CO2  29  
 GLU  164* BUN  20  
CREA  0.86 CA  9.1 MG  1.7 Lab Results Component Value Date/Time Glucose (POC) 148 (H) 09/18/2018 06:32 AM  
 Glucose (POC) 157 (H) 09/17/2018 09:00 PM  
 Glucose (POC) 199 (H) 09/17/2018 04:11 PM  
 Glucose (POC) 158 (H) 09/17/2018 12:05 PM  
 Glucose (POC) 114 (H) 02/11/2014 11:49 AM  
 
 
 
Total time spent with patient: 25 Minutes Care Plan discussed with: Patient and Family Discussed:  Care Plan Prophylaxis:  Lovenox Disposition:  TBD 
        
___________________________________________________ Attending Physician: Ulices Willis MD

## 2018-09-18 NOTE — PROGRESS NOTES
Chart Reviewed - Requiring near continuous BiPAP due to increased WOB and hypoxia with max HF. Patient not ready to die and does not want wean respiratory support or transition to comfort measures at this time. Does not want to discuss hospice. I will continue to follow and offer support to  the patient and is family.  CHUCK Cuevas

## 2018-09-18 NOTE — PROGRESS NOTES
Bedside and Verbal shift change report given to Edinson Hatfield RN (oncoming nurse) by Jose Angel Sweet RN (offgoing nurse). Report included the following information SBAR, Kardex, ED Summary, Intake/Output, Recent Results, Med Rec Status and Cardiac Rhythm NSR. 
 
2030: Pt alert, anxious; pulling off Bi-Pap mask; pt reports he feels like he cannot breath. Sat with pt at bedside; encouraging pt to practice deep long breaths. Pt continued to struggle with feeling of air hunger. 2047: Administered morphine 2 mg per IV as requested by wife and pt. Pt began to calm down shortly following morphine administration. 2200: Pt resting comfortably with Bi-Pap. RR 32, Spo2 @92, HR at 95, /58. 
 
2240: Pt alert, anxious, trying to remove Bi-Pap; pt wife at bedside. Assisted pt to sitting position to change pajama pants; pt refused to stand; Peewee Sparks assisted pt to standing position to pull on pants. Pt tolerated fairly well. Bedside and Verbal shift change report given to Romulo Barrera RN (oncoming nurse) by Edinson Hatfield RN (offgoing nurse). Report included the following information SBAR, Kardex, ED Summary, Intake/Output, Recent Results, Med Rec Status and Cardiac Rhythm NSR.

## 2018-09-18 NOTE — PROGRESS NOTES
NUTRITION  
RD Screen Pt seen for:   
 
[]  MST for     []   MD Consult [x]        Supplements  [x]   PO intake check  
[]        Food Allergies  []   Food Preferences/tolerances [x]        Rescreen  []   Education []        Diet order clarification []   Other  
[]  LOS Nutrition Prescription:  
9/18: F/u. Intakes declining. Pt still consuming ONS. Added Ensure compact TID and Magic cup BID. Will continue to monitor. Noted palliative note. New A1c taken 6.8, , 154, 160 mg/dL. Noted BG POC checks stopped, non-essential meds stopped. Pt on nearly continuous BiPAP making intakes more difficult. 9/12: F/U. Pt continues with 50-60L hiflow. Pt intakes vary between 10-75% of meals. Pt on regular diet to encourage PO intakes. Pt likes salt and didn't like when diet was lower sodium. Encourage pt to order meals to select what he likes. Continue ONS for added kcal/pro. No updated labs. Update skin integrity in WOCN tab. Last weight taken 1 week ago. Continue to monitor POC.   
 
9/6: Follow up. Pt appetite continues fair/good. Please continue to document PO intakes. Pt still on HiFlow, unable to wean. Last BM 9/4. Stage 1 sacral p/i. Continue ONS to support PO intakes. Pt on regular diet. BG rarely being monitored- on 9/4 fasting , 2/2 respiratory status. No point of care checks, no daily labs. Pt down 7 lbs. No edema. Continue to follow per protocol. Monitor intakes, weight.  
 
8/31: 80 yr old male admitted for Acute on chronic respiratory failure. Hx pulmonary fibrosis, CAD, DM, COPD. Pt eating well. Appetite good, intakes >75%. Pt with HiFlow NC @ 60L/min. Recommend limiting fluids to 2 L a day or less. Weight stable. No n/v. Last BM 8/29. No food allergies. Monitor intakes, BG - 251, 78 mg/dL. No labs since 8/27. Monitor POC. Encourage adequate intake of meals and supplements Assessment:  
Information obtained from: Chart/patient Cultural, Restorationist and ethnic food preferences:  
[x]  None  
[]  Identified and addressed Diet: Regular PO Intake:   [x] Good           [] Fair         []  Poor Patient Vitals for the past 100 hrs: 
 % Diet Eaten 09/18/18 1401 0 % 09/18/18 0856 0 % 09/16/18 0820 65 %  
09/14/18 1838 10 % 09/14/18 1500 20 % Wt Readings from Last 5 Encounters:  
09/15/18 75.3 kg (165 lb 14.4 oz) 10/20/17 80.7 kg (178 lb)  
06/22/17 83.5 kg (184 lb) 04/25/17 81.6 kg (180 lb)  
02/21/17 85.3 kg (188 lb) Body mass index is 23.8 kg/(m^2). Skin: sacrum stage 1 BM: 9/8 ABD: WNL Estimated Daily Nutrition Requirements: 
Kcals/day: 1953 Kcals/day (BMR(1502x1.3)) Protein: 80 g (-96g/day(1.0-1.2g/kg)) Fluid:  1950 mL Based On: [place] 1898 Weight Used:  Actual weight 79.6 kg Weight Changes:  
[x]   Loss 
[]   Gain 
[]   Stable Nutrition Problems Identified 
[]     None 
[]     Specified food preferences  
[]     Dislikes supplements             
[]     Allergies []     Difficulty chewing    
[]     Dentition  
[]     Nausea/Vomiting 
[]    Constipation []    Diarrhea Nutrition Diagnosis:  
  
Problem:  Increased energy expendeture Etiology: related to increased energy needs Signs/Symptoms: as evidenced by respiratory status - HiFlow 60, Pulm fibrosis Intervention:  
[]    Obtained/adjusted food preferences/tolerances and/or snacks options []    Dislikes supplements will try a substitution []    Modify diet for food allergies []    Adjust texture due to difficulty chewing  
[]    Encourage Fresh vegetables, whole grains, general healthful diet  
[]    Educated patient 
[]    Rescreen per screening protocol [x]    Add Supplements Goal: Pt will consume >50% of meals and ONS within 3-5 days Monitoring/Evaluation:  
Education & Discharge Needs 
[] Nutrition related discharge needs addressed:  
[x] Supplements (on d/c instruction &/or coupons provided) [] Education [] No nutrition related discharge needs at this time Rescreen: [x]  At Nutrition Risk  
       []  Not at Nutrition Risk, rescreen per screening protocol Nakul Sarmiento RD Pager: [phone]

## 2018-09-18 NOTE — PROGRESS NOTES
Problem: Gas Exchange - Impaired Goal: *Absence of hypoxia Outcome: Not Progressing Towards Goal 
Dr Leilani Bolaños at bedside to yoly

## 2018-09-18 NOTE — PROGRESS NOTES
Bedside and Verbal shift change report given to [place] (oncoming nurse) by Orval Sacks (offgoing nurse). Report included the following information SBAR, Kardex, ED Summary, Intake/Output, MAR, Recent Results and Med Rec Status. 1900: Bedside and Verbal shift change report given to [place] (oncoming nurse) by [place] (offgoing nurse). Report included the following information SBAR, Kardex, ED Summary, Intake/Output, MAR, Recent Results and Cardiac Rhythm NSR.

## 2018-09-19 NOTE — ROUTINE PROCESS
Bedside and Verbal shift change report given to Ashkan Maurer (oncoming nurse) by Jonatan Bartlett RN (offgoing nurse). Report included the following information SBAR, Kardex, Intake/Output, MAR, Accordion and Recent Results.

## 2018-09-19 NOTE — ROUTINE PROCESS
{[facility] BEDSIDE_VERBAL_RECORDED_WRITTEN:[id number]::\"Bedside\"} shift change report given to *** (oncoming nurse) by *** (offgoing nurse). Report included the following information {SBAR REPORTS RLOI:[id number]}.

## 2018-09-19 NOTE — PROGRESS NOTES
Medical Progress Note NAME: Carolyn Cha :  1935 MRM:  [id number] Date/Time: 2018  8:38 AM 
 
  
Assessment / Plan:  
 
Acute on chronic resp failure / Pulm fibrosis:  On high flow this AM.  CXR  w/o sig changes. -- cont LABA/ICS and duoneb 
 -- high flow O2 and BiPAP prn 
   
NSTEMI/CAD:  Type 2 MI from hypoxia. -- cont ASA, metoprolol 
   
DM type 2:  A1c shows good control. 
   
Anxiety:  Due to current medical issues. -- continue ativan 
  
 
 
  
Subjective: Chief Complaint:  About the same. Sob the same. occ cough, no sputum. ROS: 
(bold if positive, if negative) CoughSOB/ROB Objective:  
 
 
Vitals:  
 
 
  
Last 24hrs VS reviewed since prior progress note. Most recent are: 
 
Visit Vitals  /63 (BP 1 Location: Left arm, BP Patient Position: At rest)  Pulse 92  Temp 97.9 °F (36.6 °C)  Resp (!) 32  
 Ht 5' 10\" (1.778 m)  Wt 74.1 kg (163 lb 4.8 oz)  SpO2 (!) 88%  BMI 23.43 kg/m2 SpO2 Readings from Last 6 Encounters:  
18 (!) 88% 17 96% 10/13/14 94% 14 95% 14 93% 14 96% O2 Flow Rate (L/min): 50 l/min Intake/Output Summary (Last 24 hours) at 18 1450 Last data filed at 18 2211 Gross per 24 hour Intake              120 ml Output              850 ml Net             -730 ml Exam:  
 
Physical Exam: 
 
Gen:  Well-developed, well-nourished, in no acute distress HEENT:  Pink conjunctivae, hearing intact to voice, moist mucous membranes Resp:  No accessory muscle use, fine crackles Card:  No murmurs, normal S1, S2 without thrills or peripheral edema Abd:  Soft, non-tender, non-distended, normoactive bowel sounds are present Neuro: Face symmetric, tongue midline, speech fluent, follows commands appropriately Psych:  alert Telemetry reviewed:   normal sinus rhythm Medications Reviewed: (see below) Lab Data Reviewed: (see below) ______________________________________________________________________ Medications:  
 
Current Facility-Administered Medications Medication Dose Route Frequency  LORazepam (ATIVAN) injection 0.5 mg  0.5 mg IntraVENous PCL  LORazepam (ATIVAN) injection 1 mg  1 mg IntraVENous QHS  LORazepam (ATIVAN) injection 0.5 mg  0.5 mg IntraVENous Q2H PRN  
 sodium chloride-aloe vera spry   Nasal PRN  
 artificial tears (dextran 70-hypromellose) (NATURAL BALANCE) 0.1-0.3 % ophthalmic solution 2 Drop  2 Drop Both Eyes PRN  
 morphine injection 2 mg  2 mg IntraVENous Q1H PRN  
 fluticasone (FLONASE) 50 mcg/actuation nasal spray 2 Spray  2 Spray Both Nostrils DAILY  guaiFENesin ER (MUCINEX) tablet 600 mg  600 mg Oral Q12H  
 arformoterol (BROVANA) neb solution 15 mcg  15 mcg Nebulization BID RT  
 sodium chloride (NS) flush 5-10 mL  5-10 mL IntraVENous Q8H  
 sodium chloride (NS) flush 5-10 mL  5-10 mL IntraVENous PRN  
 albuterol-ipratropium (DUO-NEB) 2.5 MG-0.5 MG/3 ML  3 mL Nebulization Q4H RT  
 budesonide (PULMICORT) 500 mcg/2 ml nebulizer suspension  500 mcg Nebulization BID RT  
 gabapentin (NEURONTIN) capsule 300 mg  300 mg Oral DAILY WITH LUNCH  
 gabapentin (NEURONTIN) capsule 600 mg  600 mg Oral QHS  pirfenidone tab 801 mg   (Patient Supplied)  801 mg Oral TID  tamsulosin (FLOMAX) capsule 0.4 mg  0.4 mg Oral DAILY  enoxaparin (LOVENOX) injection 40 mg  40 mg SubCUTAneous Q24H  
 metoprolol tartrate (LOPRESSOR) tablet 12.5 mg  12.5 mg Oral BID  pantoprazole (PROTONIX) tablet 40 mg  40 mg Oral ACB Lab Review:  
 
Recent Labs  
   09/18/18 
 0153 WBC  11.7* HGB  13.9 HCT  42.0 PLT  274 Recent Labs  
   09/18/18 0153 NA  134* K  4.3 CL  98  
CO2  29 GLU  164* BUN  20  
CREA  0.86 CA  9.1 MG  1.7 Lab Results Component Value Date/Time  Glucose (POC) 223 (H) 09/18/2018 11:50 AM  
 Glucose (POC) 148 (H) 09/18/2018 06:32 AM  
 Glucose (POC) 157 (H) 09/17/2018 09:00 PM  
 Glucose (POC) 199 (H) 09/17/2018 04:11 PM  
 Glucose (POC) 158 (H) 09/17/2018 12:05 PM  
 
 
 
Total time spent with patient: 15 Minutes Care Plan discussed with: Patient and Family Discussed:  Care Plan Prophylaxis:  Lovenox Disposition:  TBD 
        
___________________________________________________ Attending Physician: Cheryl Lim MD

## 2018-09-19 NOTE — PROGRESS NOTES
Palliative Medicine Consult David: [phone] ([phone]) Patient Name: Olegario Jackson YOB: 1935 Date of Initial Consult: 8/27/18 Reason for Consult: Goals-of-care Requesting Provider: Joce Amezcua MD 
Primary Care Physician: Julieta Brooke MD 
 
 SUMMARY:  
Olegario Jackson is a 80 y.o. with a end-stage COPD and pulmonary fibrosis, O2-dependent at 20L/minute at baseline; CAD who was admitted on 8/25/2018 from home with a diagnosis of acute-on-chronic respiratory failure and NSTEMI due to tachycardia and hypoxia. Current medical issues leading to Palliative Medicine involvement include: shortness of breath, anxiety/agitation, confusion in setting of steroids and hypoxia, goals of care. PALLIATIVE DIAGNOSES:  
1. Shortness of breath 2. Anxiety 3. Debility 4. Goals of care 5. Pulmonary fibrosis PLAN:  
1. Dennise([facility]) and I met with patient, daughter and spouse. Patient intermittently sleeping and attempting to talk with us with BIPAP in place. He felt like his nite was not great but his wife states he did sleep. He has been reluctant to use morphine because he felt a little sleepy after prior dose. Asked if anything else that can be provided for air hunger. Discussed morphine really the best for treating air hunger. Attempted to reassure him it is ok to use but ultimately, it is his decision if he wants to use it 2. Clinically, patient clearly declining. Appearing more and more fatigued and the difficult to use HFNC at this time. 3. GOC-he remains not ready to make transition to comfort care/hospice but he is allowing us to adjust medications for his comfort and stop nonessential medications. Not ready to stop the BIPAP and/or HFNC at this time but clinically this is becoming less effective. Continue to see daily and adjust medications. 4. AMD-spouse remains MPOA and AMD in the chart 5. Code status-DNR per prior discussions.  If he survives this stay, will need DDNR at discharge 6. Symptom management-continue ativan  1 mg IV qhs and 0.5 mg at 1600. In addition, changed the ativan to 0.5 mg IV every 2 hours prn anxiety. In addition, continue morphine 2 mg IV every 1 hour prn sob/pain. Once again reassured him on the use of morphine for his air hunger/comfort. 7. Psychosocial-patient still with great support from family. Children have been at bedside lots of this hospitalization. 8. Discussed with bedside nurse, Dr. Anika Malik, Pulmonary and CM. 9. Communicated plan of care with: Palliative IDT 
 
 
 GOALS OF CARE / TREATMENT PREFERENCES:  
 
GOALS OF CARE: continue current therapies with goal of returning home TREATMENT PREFERENCES:  
Code Status: DNR Advance Care Planning: 
Advance Care Planning 2018 Patient's Healthcare Decision Maker is: Named in scanned ACP document Primary Decision Maker Name Naif Wakefield Primary Decision Maker Phone Number [phone] Primary Decision Maker Relationship to Patient Spouse Secondary Decision Maker Name Son Denia Morales and/or dtr Marla Vickers Secondary Decision Maker Phone Number [phone], [phone] Confirm Advance Directive Yes, on file Other Instructions: Other: As far as possible, the palliative care team has discussed with patient / health care proxy about goals of care / treatment preferences for patient. HISTORY:  
 
History obtained from: patient, wife, chart CHIEF COMPLAINT: anxiety(at admissions) HPI/SUBJECTIVE: The patient is:  
[x] Verbal and participatory -  
[] Non-participatory due to:  
 
Patient with BIPAP in place. Fatigued and difficult time speaking with us.  
 
-patient with no new complaints except that anxiety/air hunger remains. Denies any pain Clinical Pain Assessment (nonverbal scale for severity on nonverbal patients): Duration: for how long has pt been experiencing pain (e.g., 2 days, 1 month, years) Frequency: how often pain is an issue (e.g., several times per day, once every few days, constant) FUNCTIONAL ASSESSMENT:  
 
Palliative Performance Scale (PPS): PPS: 30 
 
 
 PSYCHOSOCIAL/SPIRITUAL SCREENING:  
 
Palliative IDT has assessed this patient for cultural preferences / practices and a referral made as appropriate to needs (Cultural Services, Patient Advocacy, Ethics, etc.) Advance Care Planning: 
Advance Care Planning 2018 Patient's Healthcare Decision Maker is: Named in scanned ACP document Primary Decision Maker Name Yaw Almanza Primary Decision Maker Phone Number [phone] Primary Decision Maker Relationship to Patient Spouse Secondary Decision Maker Name Son Glenny Lacey and/or dtr Jamel Brown Secondary Decision Maker Phone Number [phone], [phone] Confirm Advance Directive Yes, on file Any spiritual / Lutheran concerns: 
[] Yes /  [x] No 
 
Caregiver Burnout: 
[] Yes /  [x] No /  [] No Caregiver Present Anticipatory grief assessment:  
[x] Normal  / [] Maladaptive :  
 
ESAS Anxiety: Anxiety: 4 ESAS Depression:    
 
 
 REVIEW OF SYSTEMS:  
 
Positive and pertinent negative findings in ROS are noted above in HPI. The following systems were [] reviewed / [] unable to be reviewed as noted in HPI Other findings are noted below. Systems: constitutional, ears/nose/mouth/throat, respiratory, gastrointestinal, genitourinary, musculoskeletal, integumentary, neurologic, psychiatric, endocrine. Positive findings noted below. Modified ESAS Completed by: provider Anxiety: 4 Nausea: 0 Anorexia: 0 Constipation: No  
  Stool Occurrence(s): 1 PHYSICAL EXAM:  
 
From RN flowsheet: 
Wt Readings from Last 3 Encounters:  
18 163 lb 4.8 oz (74.1 kg) 10/20/17 178 lb (80.7 kg) 17 184 lb (83.5 kg) Blood pressure 114/60, pulse 91, temperature 98.1 °F (36.7 °C), resp.  rate 30, height 5' 10\" (1.778 m), weight 163 lb 4.8 oz (74.1 kg), SpO2 93 %. Pain Scale 1: Numeric (0 - 10) Pain Intensity 1: 0 Last bowel movement, if known: today Constitutional: awake but more fatigued, BIPAP in place, drifts off to sleep at times Eyes: pupils equal, anicteric ENMT: no nasal discharge, moist mucous membranes Cardiovascular: regular rhythm, distal pulses intact Respiratory: breathing mild-moderately labored with talking, symmetric Gastrointestinal: soft non-tender, +bowel sounds Musculoskeletal: no deformity, no tenderness to palpation Skin: warm, dry Neurologic: following commands, moving all extremities Psychiatric:anxious/sob especially when attempting to talk Other: 
 
 
 HISTORY:  
 
Active Problems: 
  CAD (coronary artery disease) () Overview: cardiac stentd (4) COPD (chronic obstructive pulmonary disease) ([address] (2/4/2014) DM II (diabetes mellitus, type II), controlled ([address] (2/4/2014) Pulmonary fibrosis (HCC) () Acute on chronic respiratory failure ([address] (8/26/2018) Past Medical History:  
Diagnosis Date  CAD (coronary artery disease) 2004  
 4 stents placed in heart  Chronic obstructive pulmonary disease ([address] EMPHYSEMA SEEN ON CXR  
 Colon polyp Three tubular adenomas excised colonoscopically on 3/17/2011.  COPD (chronic obstructive pulmonary disease) ([address] 2/4/2014  Diverticulosis of colon (without mention of hemorrhage)  DM (diabetes mellitus) ([address] 4/3/2012  GERD (gastroesophageal reflux disease) [address]  History of pneumothorax 2/2014  Hypercholesteremia  Pneumonia 2004  PUD (peptic ulcer disease) 1955  Pulmonary fibrosis ([address] Past Surgical History:  
Procedure Laterality Date  ENDOSCOPY, COLON, DIAGNOSTIC  3/2011 Polyps. Tics. Rep 3 yrs.  HX CORONARY STENT PLACEMENT  2004  
 4 cardiac stents  HX GI  circa 2000 Laparoscopic cholecystectomy. [address] left inguinal hernia repair  HX LUMBAR LAMINECTOMY  11/20/2013 L4 L5 Laminectomy  HX OTHER SURGICAL  2/7/2014 LVATS, bronch, talc pleurodesis, Resection of ruptured bulla  HX TONSILLECTOMY  age 29 Family History Problem Relation Age of Onset  Adopted: Yes  Other Other   
  patient was adopted History reviewed, no pertinent family history. Social History Substance Use Topics  Smoking status: Former Smoker Packs/day: 1.50 Years: 36.00 Types: Cigarettes Quit date: 1/1/1987  Smokeless tobacco: Never Used  Alcohol use No  
 
Allergies Allergen Reactions  Bactrim [Sulfamethoprim] Unknown (comments) FLU LIKE SYMPTOMS  
 Statins-Hmg-Coa Reductase Inhibitors Myalgia  Sulfa (Sulfonamide Antibiotics) Other (comments) Bactrim causes flu like symptoms Current Facility-Administered Medications Medication Dose Route Frequency  LORazepam (ATIVAN) injection 0.5 mg  0.5 mg IntraVENous PCL  LORazepam (ATIVAN) injection 1 mg  1 mg IntraVENous QHS  LORazepam (ATIVAN) injection 0.5 mg  0.5 mg IntraVENous Q2H PRN  
 sodium chloride-aloe vera spry   Nasal PRN  
 artificial tears (dextran 70-hypromellose) (NATURAL BALANCE) 0.1-0.3 % ophthalmic solution 2 Drop  2 Drop Both Eyes PRN  
 morphine injection 2 mg  2 mg IntraVENous Q1H PRN  
 fluticasone (FLONASE) 50 mcg/actuation nasal spray 2 Spray  2 Spray Both Nostrils DAILY  guaiFENesin ER (MUCINEX) tablet 600 mg  600 mg Oral Q12H  
 arformoterol (BROVANA) neb solution 15 mcg  15 mcg Nebulization BID RT  
 sodium chloride (NS) flush 5-10 mL  5-10 mL IntraVENous Q8H  
 sodium chloride (NS) flush 5-10 mL  5-10 mL IntraVENous PRN  
 albuterol-ipratropium (DUO-NEB) 2.5 MG-0.5 MG/3 ML  3 mL Nebulization Q4H RT  
 budesonide (PULMICORT) 500 mcg/2 ml nebulizer suspension  500 mcg Nebulization BID RT  
  gabapentin (NEURONTIN) capsule 300 mg  300 mg Oral DAILY WITH LUNCH  
 gabapentin (NEURONTIN) capsule 600 mg  600 mg Oral QHS  pirfenidone tab 801 mg   (Patient Supplied)  801 mg Oral TID  tamsulosin (FLOMAX) capsule 0.4 mg  0.4 mg Oral DAILY  enoxaparin (LOVENOX) injection 40 mg  40 mg SubCUTAneous Q24H  
 metoprolol tartrate (LOPRESSOR) tablet 12.5 mg  12.5 mg Oral BID  pantoprazole (PROTONIX) tablet 40 mg  40 mg Oral ACB  
 
 
 
 LAB AND IMAGING FINDINGS:  
 
Lab Results Component Value Date/Time WBC 11.7 (H) 09/18/2018 01:53 AM  
 HGB 13.9 09/18/2018 01:53 AM  
 PLATELET 358 88/80/8728 01:53 AM  
 
Lab Results Component Value Date/Time Sodium 134 (L) 09/18/2018 01:53 AM  
 Potassium 4.3 09/18/2018 01:53 AM  
 Chloride 98 09/18/2018 01:53 AM  
 CO2 29 09/18/2018 01:53 AM  
 BUN 20 09/18/2018 01:53 AM  
 Creatinine 0.86 09/18/2018 01:53 AM  
 Calcium 9.1 09/18/2018 01:53 AM  
 Magnesium 1.7 09/18/2018 01:53 AM  
 Phosphorus 3.9 09/04/2018 04:04 AM  
  
Lab Results Component Value Date/Time AST (SGOT) 18 01/26/2018 02:19 PM  
 Alk. phosphatase 27 (L) 01/26/2018 02:19 PM  
 Protein, total 7.6 01/26/2018 02:19 PM  
 Albumin 4.3 01/26/2018 02:19 PM  
 Globulin 3.6 02/05/2014 04:36 AM  
 
Lab Results Component Value Date/Time INR 1.0 11/01/2013 04:13 PM  
 Prothrombin time 10.4 11/01/2013 04:13 PM  
  
No results found for: IRON, FE, TIBC, IBCT, PSAT, FERR Lab Results Component Value Date/Time pH 7.39 02/04/2014 02:00 AM  
 PCO2 31 (L) 02/04/2014 02:00 AM  
 PO2 90 02/04/2014 02:00 AM  
 
No components found for: [place] No results found for: CPK, CKMB Total time: 35 
Counseling / coordination time, spent as noted above:30 
> 50% counseling / coordination?: yes Prolonged service was provided for  []30 min   []75 min in face to face time in the presence of the patient, spent as noted above. Time Start:  
Time End: Note: this can only be billed with 89995 (initial) or 46952 (follow up). If multiple start / stop times, list each separately.

## 2018-09-19 NOTE — PROGRESS NOTES
Palliative care note reviewed. I went in to meet with the patient and his wife. Patient reached for my hand and I sat with him and his wife and offered support. Pt's wife very appreciative and grateful with mention of palliative and pulmonary and the care that they provided thus far. I will continue to follow.  CHUCK Santos

## 2018-09-19 NOTE — PROGRESS NOTES
Follow up visit with Mr. And Mrs Josseline Meals. Per staff Mr. Ingrid Bentley, had a bit of a rough night. Currently he is on Bi-Pap and at first appeared to be sleeping. His wife shared about her time as a Nurse mostly at [employer] [facility] though she Filled in here at [facility]. She spoke of the [employer] [facility] Gary Salcido that touched her while she worked at [facility]. She spoke of their love of God and the tremendous support from family both blood and chosen family. Lasha appear to wake during the visit, he reached out and took my hand. His wife asked him if it would be ok for me to pray, sh affirmed with a gentle hand squeeze. Provide spiritual presence and prayer. Advised of  Availability. Visited by: Leila Casarez [address] [place] [address] ([phone])

## 2018-09-19 NOTE — PROGRESS NOTES
Palliative Medicine Code Status: DNR Advance Care Planning: 
Advance Care Planning 2018 Patient's Healthcare Decision Maker is: Named in scanned ACP document Primary Decision Maker Name Yaw Almanza Primary Decision Maker Phone Number [phone] Primary Decision Maker Relationship to Patient Spouse Secondary Decision Maker Name Son Glenny Lacey and/or dtr Jamel Brown Secondary Decision Maker Phone Number [phone], [phone] Confirm Advance Directive Yes, on file Patient / Family Encounter Documentation Participants (names): Pt, wife Danielito Ruano, dtr Abel Mes[place], Palliative Medicine (Dr. Enio Yoder, [address]) Narrative: Pt was awake in bed with bipap in place, able to communicate to limited extent, wife was primary spokesperson. Wife reports pt tolerated being off bipap for approximately 45 mins Monday, only 30 mins yesterday. Pt indicated he had a difficult night last night. Wife shared that pt remains reluctant to use Morphine despite education re: how it might ease respiratory symptoms. Pt aware that medication remains available if he wishes to use it though decision remains his. Psychosocial Issues Identified/ Resilience Factors: Coping with terminal condition, pt very much wanting to remain in control as long as he is able. Family is very devoted, wife rarely leaves bedside, dtr is in town from [place]. Goals of Care / Plan: Continue current measures. Pt is now requiring almost continuous bipap. Palliative team will continue to follow for ongoing support. Discussed with Dr. Saeed Mckee and with Care Manager. Thank you for including Palliative Medicine in the care of Mr. Farrah Sifuentes. Juliet Cabrera LCSW, [facility] 
[phone])

## 2018-09-19 NOTE — ROUTINE PROCESS
Bedside and Verbal shift change report given to Carl Palacios (oncoming nurse) by Janelle HANDY (offgoing nurse). Report included the following information SBAR, Kardex, Intake/Output, Recent Results and Cardiac Rhythm NSR.

## 2018-09-19 NOTE — PROGRESS NOTES
Physical Therapy: 
Patient continues to have significant medical complexity and decline. Now requiring continuous BiPAP. He is no longer appropriative for PT due to his medical status. We will complete the order. Thank you Alexis Perkins PT,DPT,NCS

## 2018-09-20 NOTE — PROGRESS NOTES
Palliative Medicine Code Status: DNR Advance Care Planning: 
Advance Care Planning 2018 Patient's Healthcare Decision Maker is: Named in scanned ACP document Primary Decision Maker Name Jeremy Martínez Primary Decision Maker Phone Number [phone] Primary Decision Maker Relationship to Patient Spouse Secondary Decision Maker Name Andre Hanson and/or dtr Emily Harvey Secondary Decision Maker Phone Number [phone], [phone] Confirm Advance Directive Yes, on file Patient / Family Encounter Documentation Participants (names): Pt, wife Nayeli Cole, dtr Deng Flores, Palliative Medicine (Dr. Ruben Ayala, [address] [facility]) Narrative:  Supportive visit to pt and family at bedside. Pt was resting in bed on bipap, roused when greeted, was able to engage in limited conversation, stated he felt \"better\" today, stated \"I've felt good the last few days. \"  Pt is now requiring continuous bipap, required several prn meds overnight including morphine. Medication regimen was reviewed with pt and family. Wife aware that medication needs will likely increase as symptoms worsen, stated she does not want to \"jump the gun\" with sedating meds. Pt's close friend \"Dr. Benita Woodall" is expected to visit this weekend. Psychosocial Issues Identified/ Resilience Factors:  Family aware of pt's declining condition/approaching death, remain very devoted to pt and supportive of his wishes. Goals of Care / Plan:  Continue current plan of care. Discussed with RN. SW will continue to follow for support to pt and family. Thank you for including Palliative Medicine in the care of Mr. Lor Loving. Juliet Cabrera LCSW, [facility]- 
[phone])

## 2018-09-20 NOTE — PROGRESS NOTES
Problem: Pressure Injury - Risk of 
Goal: *Prevention of pressure injury Document Vasile Scale and appropriate interventions in the flowsheet. Outcome: Progressing Towards Goal 
Pressure Injury Interventions: 
Sensory Interventions: Discuss PT/OT consult with provider Moisture Interventions: Absorbent underpads, Apply protective barrier, creams and emollients Activity Interventions: PT/OT evaluation Mobility Interventions: HOB 30 degrees or less, PT/OT evaluation Nutrition Interventions: Document food/fluid/supplement intake Friction and Shear Interventions: Lift sheet Turned to right side Foam in place to bridge of nose and Bipap

## 2018-09-20 NOTE — PROGRESS NOTES
Palliative Medicine Consult David: [phone]) Patient Name: Hanna Gomez YOB: 1935 Date of Initial Consult: 8/27/18 Reason for Consult: Goals-of-care Requesting Provider: Shira Johnson MD 
Primary Care Physician: Sola Diaz MD 
 
 SUMMARY:  
Hanna Gomez is a 80 y.o. with a end-stage COPD and pulmonary fibrosis, O2-dependent at 20L/minute at baseline; CAD who was admitted on 8/25/2018 from home with a diagnosis of acute-on-chronic respiratory failure and NSTEMI due to tachycardia and hypoxia. Current medical issues leading to Palliative Medicine involvement include: shortness of breath, anxiety/agitation, confusion in setting of steroids and hypoxia, goals of care. PALLIATIVE DIAGNOSES:  
1. Shortness of breath 2. Anxiety 3. Debility 4. Goals of care 5. Pulmonary fibrosis PLAN:  
1. Dennise([facility]) and I met with patient, daughter and spouse. Patient remains on BIPAP. Does open his eyes at times and attempts to talk with us thru the mask. States [place] a good day\". Discussed with his spouse and patient with increasing need for medication to control anxiety/air hunger. Clearly declining and she is aware of clinical situation. Not eating/drinking very much because can barely remove the BIPAP without being SOB. 2. GOC-not ready to stop HFNC and/or BIPAP but allowing medication adjustments. His wife clearly understands we may have to consider scheduled medications to assist with symptom management. He may ultimately become more and more sedated to control his air hunger/anxiety 3. AMD-spouse remains MPOA and AMD in the chart 4. Code status-DNR per prior discussions. If he survives this stay, will need DDNR at discharge 5. Symptom management-continue ativan 1 mg IV qhs and 0.5 mg at 1600. In addition, changed the ativan to 0.5 mg IV every 2 hours prn anxiety. Continue morphine 2 mg IV every 1 hour prn sob/pain.  He has used 4 doses over the last 24 hours. Explained to his wife, if this need continues, will consider scheduling the morphine. She understands but hopeful we don't have to do that yet. Patient is awake and participates with the discussion and not ready for scheduled morphine. 6. Psychosocial-patient still with great support from family. Children have been at bedside lots of this hospitalization. 7. Discussed with bedside nurse, Dr. Marcellus Le and CM. Also discussed with nursing staff in general about the situation. This has been very difficult on everybody caring for him because nobody wants to see him suffer. We will continue to honor his wishes as much as possible with medications but his wife knows that as this progresses, she will need to make decisions and she is clear also on making sure he is comfortable. 8. Communicated plan of care with: Palliative IDT 
 
 
 GOALS OF CARE / TREATMENT PREFERENCES:  
 
GOALS OF CARE: continue current therapies with goal of returning home TREATMENT PREFERENCES:  
Code Status: DNR Advance Care Planning: 
Advance Care Planning 2018 Patient's Healthcare Decision Maker is: Named in scanned ACP document Primary Decision Maker Name Niki Person Primary Decision Maker Phone Number [phone] Primary Decision Maker Relationship to Patient Spouse Secondary Decision Maker Name Son Davis Mishra and/or dtr Jazzy Pastrana Secondary Decision Maker Phone Number [phone], [phone] Confirm Advance Directive Yes, on file Other Instructions: Other: As far as possible, the palliative care team has discussed with patient / health care proxy about goals of care / treatment preferences for patient. HISTORY:  
 
History obtained from: patient, wife, chart CHIEF COMPLAINT: anxiety(at admission) HPI/SUBJECTIVE: The patient is:  
[x] Verbal and participatory -  
[] Non-participatory due to: Patient with BIPAP in place. Fatigued and difficult time speaking with us.  
 
-patient with no new complaints except that anxiety/air hunger remains. Denies any pain -more fatigued and sleeping the majority of the visit. Clinical Pain Assessment (nonverbal scale for severity on nonverbal patients): Duration: for how long has pt been experiencing pain (e.g., 2 days, 1 month, years) Frequency: how often pain is an issue (e.g., several times per day, once every few days, constant) FUNCTIONAL ASSESSMENT:  
 
Palliative Performance Scale (PPS): PPS: 30 
 
 
 PSYCHOSOCIAL/SPIRITUAL SCREENING:  
 
Palliative IDT has assessed this patient for cultural preferences / practices and a referral made as appropriate to needs (Cultural Services, Patient Advocacy, Ethics, etc.) Advance Care Planning: 
Advance Care Planning 2018 Patient's Healthcare Decision Maker is: Named in scanned ACP document Primary Decision Maker Name Radha Feliz Primary Decision Maker Phone Number [phone] Primary Decision Maker Relationship to Patient Spouse Secondary Decision Maker Name Andre Zafarar Shon and/or dtjaja Callejas Secondary Decision Maker Phone Number [phone], [phone] Confirm Advance Directive Yes, on file Any spiritual / Adventism concerns: 
[] Yes /  [x] No 
 
Caregiver Burnout: 
[] Yes /  [x] No /  [] No Caregiver Present Anticipatory grief assessment:  
[x] Normal  / [] Maladaptive :  
 
ESAS Anxiety: Anxiety: 4 ESAS Depression:    
 
 
 REVIEW OF SYSTEMS:  
 
Positive and pertinent negative findings in ROS are noted above in HPI. The following systems were [] reviewed / [] unable to be reviewed as noted in HPI Other findings are noted below. Systems: constitutional, ears/nose/mouth/throat, respiratory, gastrointestinal, genitourinary, musculoskeletal, integumentary, neurologic, psychiatric, endocrine. Positive findings noted below. Modified ESAS Completed by: provider Anxiety: 4 Nausea: 0 Anorexia: 0 Constipation: No  
  Stool Occurrence(s): 1 PHYSICAL EXAM:  
 
From RN flowsheet: 
Wt Readings from Last 3 Encounters:  
09/19/18 163 lb 4.8 oz (74.1 kg) 10/20/17 178 lb (80.7 kg) 06/22/17 184 lb (83.5 kg) Blood pressure 127/68, pulse 88, temperature 97.4 °F (36.3 °C), resp. rate 30, height 5' 10\" (1.778 m), weight 163 lb 4.8 oz (74.1 kg), SpO2 92 %. Pain Scale 1: Visual 
Pain Intensity 1: 0 Last bowel movement, if known: today Constitutional: awake at times but very fatigued, BIPAP in place, drifts off to sleep at times Eyes: pupils equal, anicteric ENMT: no nasal discharge, moist mucous membranes Cardiovascular: regular rhythm, distal pulses intact Respiratory: breathing mild-moderately labored with talking, symmetric Gastrointestinal: soft non-tender, +bowel sounds Musculoskeletal: no deformity, no tenderness to palpation Skin: warm, dry Neurologic: following commands, moving all extremities Psychiatric:anxious/sob especially when attempting to talk Other: 
 
 
 HISTORY:  
 
Active Problems: 
  CAD (coronary artery disease) () Overview: cardiac stentd (4) COPD (chronic obstructive pulmonary disease) ([address] (2/4/2014) DM II (diabetes mellitus, type II), controlled ([address] (2/4/2014) Pulmonary fibrosis (HCC) () Acute on chronic respiratory failure ([address] (8/26/2018) Past Medical History:  
Diagnosis Date  CAD (coronary artery disease) 2004  
 4 stents placed in heart  Chronic obstructive pulmonary disease ([address] EMPHYSEMA SEEN ON CXR  
 Colon polyp Three tubular adenomas excised colonoscopically on 3/17/2011.  COPD (chronic obstructive pulmonary disease) ([address] 2/4/2014  Diverticulosis of colon (without mention of hemorrhage)  DM (diabetes mellitus) ([address] 4/3/2012  GERD (gastroesophageal reflux disease)  [address] [facility]  
  History of pneumothorax 2/2014  Hypercholesteremia  Pneumonia 2004  PUD (peptic ulcer disease) 1955  Pulmonary fibrosis ([facility] Past Surgical History:  
Procedure Laterality Date  ENDOSCOPY, COLON, DIAGNOSTIC  3/2011 Polyps. Tics. Rep 3 yrs.  HX CORONARY STENT PLACEMENT  2004  
 4 cardiac stents  HX GI  circa 2000 Laparoscopic cholecystectomy. [address] [place] [address] left inguinal hernia repair  HX LUMBAR LAMINECTOMY  11/20/2013 L4 L5 Laminectomy  HX OTHER SURGICAL  2/7/2014 LVATS, bronch, talc pleurodesis, Resection of ruptured bulla  HX TONSILLECTOMY  age 29 Family History Problem Relation Age of Onset  Adopted: Yes  Other Other   
  patient was adopted History reviewed, no pertinent family history. Social History Substance Use Topics  Smoking status: Former Smoker Packs/day: 1.50 Years: 36.00 Types: Cigarettes Quit date: 1/1/1987  Smokeless tobacco: Never Used  Alcohol use No  
 
Allergies Allergen Reactions  Bactrim [Sulfamethoprim] Unknown (comments) FLU LIKE SYMPTOMS  
 Statins-Hmg-Coa Reductase Inhibitors Myalgia  Sulfa (Sulfonamide Antibiotics) Other (comments) Bactrim causes flu like symptoms Current Facility-Administered Medications Medication Dose Route Frequency  LORazepam (ATIVAN) injection 0.5 mg  0.5 mg IntraVENous PCL  LORazepam (ATIVAN) injection 1 mg  1 mg IntraVENous QHS  LORazepam (ATIVAN) injection 0.5 mg  0.5 mg IntraVENous Q2H PRN  
 sodium chloride-aloe vera spry   Nasal PRN  
 artificial tears (dextran 70-hypromellose) (NATURAL BALANCE) 0.1-0.3 % ophthalmic solution 2 Drop  2 Drop Both Eyes PRN  
 morphine injection 2 mg  2 mg IntraVENous Q1H PRN  
 fluticasone (FLONASE) 50 mcg/actuation nasal spray 2 Spray  2 Spray Both Nostrils DAILY  guaiFENesin ER (MUCINEX) tablet 600 mg  600 mg Oral Q12H  arformoterol (BROVANA) neb solution 15 mcg  15 mcg Nebulization BID RT  
 sodium chloride (NS) flush 5-10 mL  5-10 mL IntraVENous Q8H  
 sodium chloride (NS) flush 5-10 mL  5-10 mL IntraVENous PRN  
 albuterol-ipratropium (DUO-NEB) 2.5 MG-0.5 MG/3 ML  3 mL Nebulization Q4H RT  
 budesonide (PULMICORT) 500 mcg/2 ml nebulizer suspension  500 mcg Nebulization BID RT  
 gabapentin (NEURONTIN) capsule 300 mg  300 mg Oral DAILY WITH LUNCH  
 gabapentin (NEURONTIN) capsule 600 mg  600 mg Oral QHS  pirfenidone tab 801 mg   (Patient Supplied)  801 mg Oral TID  tamsulosin (FLOMAX) capsule 0.4 mg  0.4 mg Oral DAILY  enoxaparin (LOVENOX) injection 40 mg  40 mg SubCUTAneous Q24H  
 metoprolol tartrate (LOPRESSOR) tablet 12.5 mg  12.5 mg Oral BID  pantoprazole (PROTONIX) tablet 40 mg  40 mg Oral ACB  
 
 
 
 LAB AND IMAGING FINDINGS:  
 
Lab Results Component Value Date/Time WBC 11.7 (H) 09/18/2018 01:53 AM  
 HGB 13.9 09/18/2018 01:53 AM  
 PLATELET 620 53/87/8996 01:53 AM  
 
Lab Results Component Value Date/Time Sodium 134 (L) 09/18/2018 01:53 AM  
 Potassium 4.3 09/18/2018 01:53 AM  
 Chloride 98 09/18/2018 01:53 AM  
 CO2 29 09/18/2018 01:53 AM  
 BUN 20 09/18/2018 01:53 AM  
 Creatinine 0.86 09/18/2018 01:53 AM  
 Calcium 9.1 09/18/2018 01:53 AM  
 Magnesium 1.7 09/18/2018 01:53 AM  
 Phosphorus 3.9 09/04/2018 04:04 AM  
  
Lab Results Component Value Date/Time AST (SGOT) 18 01/26/2018 02:19 PM  
 Alk. phosphatase 27 (L) 01/26/2018 02:19 PM  
 Protein, total 7.6 01/26/2018 02:19 PM  
 Albumin 4.3 01/26/2018 02:19 PM  
 Globulin 3.6 02/05/2014 04:36 AM  
 
Lab Results Component Value Date/Time INR 1.0 11/01/2013 04:13 PM  
 Prothrombin time 10.4 11/01/2013 04:13 PM  
  
No results found for: IRON, FE, TIBC, IBCT, PSAT, FERR Lab Results Component Value Date/Time  pH 7.39 02/04/2014 02:00 AM  
 PCO2 31 (L) 02/04/2014 02:00 AM  
 PO2 90 02/04/2014 02:00 AM  
 
 No components found for: [place] No results found for: CPK, CKMB Total time: 35 
Counseling / coordination time, spent as noted above:30 
> 50% counseling / coordination?: yes Prolonged service was provided for  []30 min   []75 min in face to face time in the presence of the patient, spent as noted above. Time Start:  
Time End:  
Note: this can only be billed with 51790 (initial) or 66755 (follow up). If multiple start / stop times, list each separately.

## 2018-09-20 NOTE — ROUTINE PROCESS
1952 
Patient just attempted to get out of bed. Wife and son described it as the patient was panicking and grabbing at the mask. Wife stated she thinks we may need to be more aggressive with the prns. [id number] Patient wife encouraged use of prn's every 2 hours. Bedside and Verbal shift change report given to Tony (oncoming nurse) by James Van (offgoing nurse). Report included the following information SBAR, Kardex, ED Summary, Procedure Summary, Intake/Output, MAR, Recent Results and Cardiac Rhythm NSR.

## 2018-09-20 NOTE — PROGRESS NOTES
Medical Progress Note NAME: Gianni Escoto :  1935 MRM:  [id number] Date/Time: 2018  12:20 PM 
 
  
Assessment / Plan:  
 
Acute on chronic resp failure / Pulm fibrosis:  CXR  w/o sig changes. BiPAP dependent at this point. Had poor night and increased tachypnea and anxiety. -- cont LABA/ICS and duoneb 
 -- continue BiPAP 
   
NSTEMI/CAD:  Type 2 MI from hypoxia. -- cont ASA, metoprolol 
   
DM type 2:  A1c shows good control. 
   
Anxiety:  Due to current medical issues. -- continue ativan 
  
Wife is aware that patient is worsening and now BIPAP dependent. Aware that patient is declining and not likely to survive this hospitalization. Subjective: Chief Complaint:  Lethargic. Wife at bedside reports patient had bad night with anxiety and tachypnea. Unable to get comfortable. Required medications. ROS: 
Unable to obtain. Objective:  
 
 
Vitals:  
 
 
  
Last 24hrs VS reviewed since prior progress note. Most recent are: 
 
Visit Vitals  /58 (BP 1 Location: Left arm, BP Patient Position: At rest;Supine)  Pulse 88  Temp 98.1 °F (36.7 °C)  Resp 30  
 Ht 5' 10\" (1.778 m)  Wt 74.1 kg (163 lb 4.8 oz)  SpO2 93%  BMI 23.43 kg/m2 SpO2 Readings from Last 6 Encounters:  
18 93% 17 96% 10/13/14 94% 14 95% 14 93% 14 96% O2 Flow Rate (L/min): 50 l/min Intake/Output Summary (Last 24 hours) at 18 1220 Last data filed at 18 4216 Gross per 24 hour Intake              390 ml Output                0 ml Net              390 ml Exam:  
 
Physical Exam: 
 
Gen:  Chronically ill appearing on BiPAP HEENT:  Pink conjunctivae, dry mucous membranes Resp:  No accessory muscle use, fine crackles Card:  No murmurs, normal S1, S2 without thrills or peripheral edema Abd:  Soft, non-tender, non-distended, normoactive bowel sounds are present Neuro: Face symmetric Psych:  somnolent Telemetry reviewed:   normal sinus rhythm Medications Reviewed: (see below) Lab Data Reviewed: (see below) 
 
______________________________________________________________________ Medications:  
 
Current Facility-Administered Medications Medication Dose Route Frequency  LORazepam (ATIVAN) injection 0.5 mg  0.5 mg IntraVENous PCL  LORazepam (ATIVAN) injection 1 mg  1 mg IntraVENous QHS  LORazepam (ATIVAN) injection 0.5 mg  0.5 mg IntraVENous Q2H PRN  
 sodium chloride-aloe vera spry   Nasal PRN  
 artificial tears (dextran 70-hypromellose) (NATURAL BALANCE) 0.1-0.3 % ophthalmic solution 2 Drop  2 Drop Both Eyes PRN  
 morphine injection 2 mg  2 mg IntraVENous Q1H PRN  
 fluticasone (FLONASE) 50 mcg/actuation nasal spray 2 Spray  2 Spray Both Nostrils DAILY  guaiFENesin ER (MUCINEX) tablet 600 mg  600 mg Oral Q12H  
 arformoterol (BROVANA) neb solution 15 mcg  15 mcg Nebulization BID RT  
 sodium chloride (NS) flush 5-10 mL  5-10 mL IntraVENous Q8H  
 sodium chloride (NS) flush 5-10 mL  5-10 mL IntraVENous PRN  
 albuterol-ipratropium (DUO-NEB) 2.5 MG-0.5 MG/3 ML  3 mL Nebulization Q4H RT  
 budesonide (PULMICORT) 500 mcg/2 ml nebulizer suspension  500 mcg Nebulization BID RT  
 gabapentin (NEURONTIN) capsule 300 mg  300 mg Oral DAILY WITH LUNCH  
 gabapentin (NEURONTIN) capsule 600 mg  600 mg Oral QHS  pirfenidone tab 801 mg   (Patient Supplied)  801 mg Oral TID  tamsulosin (FLOMAX) capsule 0.4 mg  0.4 mg Oral DAILY  enoxaparin (LOVENOX) injection 40 mg  40 mg SubCUTAneous Q24H  
 metoprolol tartrate (LOPRESSOR) tablet 12.5 mg  12.5 mg Oral BID  pantoprazole (PROTONIX) tablet 40 mg  40 mg Oral ACB Lab Review:  
 
Recent Labs  
   09/18/18 
 0153 WBC  11.7* HGB  13.9 HCT  42.0 PLT  274 Recent Labs  
   09/18/18 
 0153 NA  134* K  4.3 CL  98  
CO2  29 GLU  164* BUN  20  
CREA  0.86 CA  9.1 MG  1.7 Lab Results Component Value Date/Time Glucose (POC) 223 (H) 09/18/2018 11:50 AM  
 Glucose (POC) 148 (H) 09/18/2018 06:32 AM  
 Glucose (POC) 157 (H) 09/17/2018 09:00 PM  
 Glucose (POC) 199 (H) 09/17/2018 04:11 PM  
 Glucose (POC) 158 (H) 09/17/2018 12:05 PM  
 
 
 
Total time spent with patient: 15 Minutes Care Plan discussed with: Wife Discussed:  Care Plan Prophylaxis:  Lovenox Disposition:  TBD 
        
___________________________________________________ Attending Physician: Maile Gorman MD

## 2018-09-20 NOTE — ROUTINE PROCESS
0118 
Patient respirations seem to be gradually increasing through out the night from 30's to upper 40's. Average pulse ox is around 90% on bipap. [address] Patient's wife has requested prn ativan multiple times tonight as well as morphine. Seems more so tonight than previous shifts. Wife states the prn's have had little impact on the patient if any. 
 
8266 Improvement in respirations noted. Respirations now 24 and patient seems to be resting. 
 
0122 Bedside and Verbal shift change report given to Tony (oncoming nurse) by Brenna Nguyen (offgoing nurse). Report included the following information SBAR, Kardex, ED Summary, Procedure Summary, Intake/Output, MAR, Recent Results and Cardiac Rhythm NSR.

## 2018-09-20 NOTE — PROGRESS NOTES
Inquired about ordering an Ethics Consult related to staff concern regarding patient condition and care decisions. 4414:  Spoke to Dr. Duog Lyn. He is assured patient is making his care decisions clearly heard. No Ethics consult was initiated.

## 2018-09-21 NOTE — PROGRESS NOTES
Bedside and Verbal shift change report given to Tony (oncoming nurse) by Tavo Marcano (offgoing nurse). Report included the following information SBAR, Kardex, ED Summary, Intake/Output, MAR, Accordion, Recent Results and Cardiac Rhythm NSR.  
 
 
1900: Bedside and Verbal shift change report given to Tavo Marcano (oncoming nurse) by Tony (offgoing nurse). Report included the following information SBAR, Kardex, ED Summary, Intake/Output, MAR and Med Rec Status.

## 2018-09-21 NOTE — PROGRESS NOTES
Palliative Medicine Consult David: [phone]) Patient Name: Claudia Mejia YOB: 1935 Date of Initial Consult: 8/27/18 Reason for Consult: Goals-of-care Requesting Provider: Ligia Treviño MD 
Primary Care Physician: Zohra Rodriguez MD 
 
 SUMMARY:  
Claudia Mejia is a 80 y.o. with a end-stage COPD and pulmonary fibrosis, O2-dependent at 20L/minute at baseline; CAD who was admitted on 8/25/2018 from home with a diagnosis of acute-on-chronic respiratory failure and NSTEMI due to tachycardia and hypoxia. Current medical issues leading to Palliative Medicine involvement include: shortness of breath, anxiety/agitation, confusion in setting of steroids and hypoxia, goals of care. PALLIATIVE DIAGNOSES:  
1. Shortness of breath-progressive with air hunger 2. Anxiety 3. Debility 4. Goals of care 5. Pulmonary fibrosis PLAN:  
1. Met with patient(sleeping), spouse, son and daughter. Patient has required 9-10 doses of morphine and 6-7 dose of ativan in the last 24 hours. Anytime, he arouses, he becomes anxious/SOB and essentially requiring medications continuous. 2. Talked with family outside the room. Patient now to the point that medications need more scheduled and PCA likely most effective. Family in agreement. They do understand that he likely will not arouse very much anymore. We discussed removal of the BIPAP when he appears sleeping/comfortable but will allow them to dictate when they want to proceed with its removal. Will want to be sure he is comfortable/sedated before it is removed because do not want him to struggle with his breathing. 3. GOC-essentially transition to comfort care. Discussed we could place inpatient hospice consult but will manage his symptoms like he is under hospice care. For now, hold off on inpatient hospice consult 4. AMD-spouse remains MPOA and AMD in the chart 5. Code status-DNR per prior discussions. If he survives this stay, will need DDNR at discharge 6. Symptom management-attempted to order morphine PCA but still a shortage for PCAs. Will start dilaudid PCA->0.2 mg continuous and 0.1 mg every 6 minutes. Family understands that they can push the bolus dose if he appears in distress. Will also start ativan 1 mg every 4 hours scheduled and every 15 minutes prn. Comfort order set placed for fever, SOB, secretions 7. Psychosocial-patient still with great support from family. Children have been at bedside lots of this hospitalization. 8. Discussed with bedside nurse, Dr. Meryle Bolder and CM. 9. Communicated plan of care with: Palliative IDT 
 
 
 GOALS OF CARE / TREATMENT PREFERENCES:  
 
GOALS OF CARE: Patient/Health Care Proxy Stated Goals: Comfort TREATMENT PREFERENCES:  
Code Status: DNR Advance Care Planning: 
Advance Care Planning 2018 Patient's Healthcare Decision Maker is: Named in scanned ACP document Primary Decision Maker Name Omar Carrillo Primary Decision Maker Phone Number [phone] Primary Decision Maker Relationship to Patient Spouse Secondary Decision Maker Name Andre Lee Persons and/or dtr Yahir Mcintyre Secondary Decision Maker Phone Number [phone], [phone] Confirm Advance Directive Yes, on file Medical Interventions: Comfort measures Other Instructions: Other: As far as possible, the palliative care team has discussed with patient / health care proxy about goals of care / treatment preferences for patient. HISTORY:  
 
History obtained from: patient, wife, chart CHIEF COMPLAINT: anxiety(at admission) HPI/SUBJECTIVE: The patient is:  
[x] Verbal and participatory -  
[] Non-participatory due to:  
 
Patient with BIPAP in place. Fatigued and difficult time speaking with us.  
 
-patient with no new complaints except that anxiety/air hunger remains. Denies any pain -more fatigued and sleeping the majority of the visit. -he is not awake at this time. BIPAP in place Clinical Pain Assessment (nonverbal scale for severity on nonverbal patients): Duration: for how long has pt been experiencing pain (e.g., 2 days, 1 month, years) Frequency: how often pain is an issue (e.g., several times per day, once every few days, constant) FUNCTIONAL ASSESSMENT:  
 
Palliative Performance Scale (PPS): PPS: 30 
 
 
 PSYCHOSOCIAL/SPIRITUAL SCREENING:  
 
Palliative IDT has assessed this patient for cultural preferences / practices and a referral made as appropriate to needs (Cultural Services, Patient Advocacy, Ethics, etc.) Advance Care Planning: 
Advance Care Planning 2018 Patient's Healthcare Decision Maker is: Named in scanned ACP document Primary Decision Maker Name Halie Phan Primary Decision Maker Phone Number [phone] Primary Decision Maker Relationship to Patient Spouse Secondary Decision Maker Name Son Ingrid Catalan and/or dtr Matt Martin Secondary Decision Maker Phone Number [phone], [phone] Confirm Advance Directive Yes, on file Any spiritual / Yazidism concerns: 
[] Yes /  [x] No 
 
Caregiver Burnout: 
[] Yes /  [x] No /  [] No Caregiver Present Anticipatory grief assessment:  
[x] Normal  / [] Maladaptive :  
 
ESAS Anxiety: Anxiety: 4 ESAS Depression:    
 
 
 REVIEW OF SYSTEMS:  
 
Positive and pertinent negative findings in ROS are noted above in HPI. The following systems were [] reviewed / [] unable to be reviewed as noted in HPI Other findings are noted below. Systems: constitutional, ears/nose/mouth/throat, respiratory, gastrointestinal, genitourinary, musculoskeletal, integumentary, neurologic, psychiatric, endocrine. Positive findings noted below. Modified ESAS Completed by: provider Anxiety: 4 Nausea: 0 Anorexia: 0   Constipation: No  
 Stool Occurrence(s): 1 PHYSICAL EXAM:  
 
From RN flowsheet: 
Wt Readings from Last 3 Encounters:  
09/19/18 163 lb 4.8 oz (74.1 kg) 10/20/17 178 lb (80.7 kg) 06/22/17 184 lb (83.5 kg) Blood pressure 118/65, pulse 92, temperature 98.8 °F (37.1 °C), resp. rate 23, height 5' 10\" (1.778 m), weight 163 lb 4.8 oz (74.1 kg), SpO2 95 %. Pain Scale 1: Visual 
Pain Intensity 1: 0 Last bowel movement, if known: today Constitutional: not awake, BIPAP in place, just received medications Eyes: pupils equal, anicteric ENMT: no nasal discharge, moist mucous membranes Cardiovascular: regular rhythm, distal pulses intact Respiratory: breathing mild-moderately labored with talking, symmetric Gastrointestinal: soft non-tender, +bowel sounds Musculoskeletal: no deformity, no tenderness to palpation Skin: warm, dry Neurologic: following commands, moving all extremities Psychiatric:sleeping Other: 
 
 
 HISTORY:  
 
Active Problems: 
  CAD (coronary artery disease) () Overview: cardiac stentd (4) COPD (chronic obstructive pulmonary disease) ([address] (2/4/2014) DM II (diabetes mellitus, type II), controlled ([address] (2/4/2014) Pulmonary fibrosis (HCC) () Acute on chronic respiratory failure ([address] (8/26/2018) Past Medical History:  
Diagnosis Date  CAD (coronary artery disease) 2004  
 4 stents placed in heart  Chronic obstructive pulmonary disease ([address] EMPHYSEMA SEEN ON CXR  
 Colon polyp Three tubular adenomas excised colonoscopically on 3/17/2011.  COPD (chronic obstructive pulmonary disease) ([address] 2/4/2014  Diverticulosis of colon (without mention of hemorrhage)  DM (diabetes mellitus) ([address] 4/3/2012  GERD (gastroesophageal reflux disease) [address]  History of pneumothorax 2/2014  Hypercholesteremia  Pneumonia 2004  PUD (peptic ulcer disease) 1955  Pulmonary fibrosis ([address] Past Surgical History: Procedure Laterality Date  ENDOSCOPY, COLON, DIAGNOSTIC  3/2011 Polyps. Tics. Rep 3 yrs.  HX CORONARY STENT PLACEMENT  2004  
 4 cardiac stents  HX GI  circa 2000 Laparoscopic cholecystectomy. [address] left inguinal hernia repair  HX LUMBAR LAMINECTOMY  11/20/2013 L4 L5 Laminectomy  HX OTHER SURGICAL  2/7/2014 LVATS, bronch, talc pleurodesis, Resection of ruptured bulla  HX TONSILLECTOMY  age 29 Family History Problem Relation Age of Onset  Adopted: Yes  Other Other   
  patient was adopted History reviewed, no pertinent family history. Social History Substance Use Topics  Smoking status: Former Smoker Packs/day: 1.50 Years: 36.00 Types: Cigarettes Quit date: 1/1/1987  Smokeless tobacco: Never Used  Alcohol use No  
 
Allergies Allergen Reactions  Bactrim [Sulfamethoprim] Unknown (comments) FLU LIKE SYMPTOMS  
 Statins-Hmg-Coa Reductase Inhibitors Myalgia  Sulfa (Sulfonamide Antibiotics) Other (comments) Bactrim causes flu like symptoms Current Facility-Administered Medications Medication Dose Route Frequency  LORazepam (ATIVAN) injection 1 mg  1 mg IntraVENous Q4H  
 LORazepam (ATIVAN) injection 1 mg  1 mg IntraVENous Q15MIN PRN  
 ketorolac (TORADOL) injection 30 mg  30 mg IntraVENous Q8H PRN  
 scopolamine (TRANSDERM-SCOP) 1 mg over 3 days 1 Patch  1 Patch TransDERmal Q72H PRN  
 glycopyrrolate (ROBINUL) injection 0.2 mg  0.2 mg IntraVENous Q4H PRN  
 HYDROmorphone (PF) 15 mg/30 ml (DILAUDID) PCA   IntraVENous CONTINUOUS  
 sodium chloride-aloe vera spry   Nasal PRN  
 morphine injection 2 mg  2 mg IntraVENous Q1H PRN  
 sodium chloride (NS) flush 5-10 mL  5-10 mL IntraVENous PRN  
 
 
 
 LAB AND IMAGING FINDINGS:  
 
Lab Results Component Value Date/Time WBC 11.7 (H) 09/18/2018 01:53 AM  
 HGB 13.9 09/18/2018 01:53 AM  
 PLATELET 524 31/81/7977 01:53 AM  
 
Lab Results Component Value Date/Time Sodium 134 (L) 09/18/2018 01:53 AM  
 Potassium 4.3 09/18/2018 01:53 AM  
 Chloride 98 09/18/2018 01:53 AM  
 CO2 29 09/18/2018 01:53 AM  
 BUN 20 09/18/2018 01:53 AM  
 Creatinine 0.86 09/18/2018 01:53 AM  
 Calcium 9.1 09/18/2018 01:53 AM  
 Magnesium 1.7 09/18/2018 01:53 AM  
 Phosphorus 3.9 09/04/2018 04:04 AM  
  
Lab Results Component Value Date/Time AST (SGOT) 18 01/26/2018 02:19 PM  
 Alk. phosphatase 27 (L) 01/26/2018 02:19 PM  
 Protein, total 7.6 01/26/2018 02:19 PM  
 Albumin 4.3 01/26/2018 02:19 PM  
 Globulin 3.6 02/05/2014 04:36 AM  
 
Lab Results Component Value Date/Time INR 1.0 11/01/2013 04:13 PM  
 Prothrombin time 10.4 11/01/2013 04:13 PM  
  
No results found for: IRON, FE, TIBC, IBCT, PSAT, FERR Lab Results Component Value Date/Time pH 7.39 02/04/2014 02:00 AM  
 PCO2 31 (L) 02/04/2014 02:00 AM  
 PO2 90 02/04/2014 02:00 AM  
 
No components found for: [place] No results found for: CPK, CKMB Total time: 35 
Counseling / coordination time, spent as noted above:30 
> 50% counseling / coordination?: yes Prolonged service was provided for  []30 min   []75 min in face to face time in the presence of the patient, spent as noted above. Time Start:  
Time End:  
Note: this can only be billed with 09002 (initial) or 97093 (follow up). If multiple start / stop times, list each separately.

## 2018-09-21 NOTE — PROGRESS NOTES
Problem: Gas Exchange - Impaired Goal: *Absence of hypoxia Outcome: Not Progressing Towards Goal 
Bipap dependent. Problem: Pressure Injury - Risk of 
Goal: *Prevention of pressure injury Document Vasile Scale and appropriate interventions in the flowsheet. Outcome: Progressing Towards Goal 
Pressure Injury Interventions: 
Sensory Interventions: Keep linens dry and wrinkle-free Moisture Interventions: Absorbent underpads, Apply protective barrier, creams and emollients Activity Interventions: Assess need for specialty bed Mobility Interventions: Pressure redistribution bed/mattress (bed type) Nutrition Interventions: Document food/fluid/supplement intake Friction and Shear Interventions: Lift sheet Wife at bedside compliant with turning and repositioning so far today.

## 2018-09-21 NOTE — PROGRESS NOTES
Medical Progress Note NAME: Joey Hendrickson :  1935 MRM:  [id number] Date/Time: 2018  9:17 AM 
 
  
Assessment / Plan:  
 
Acute on chronic resp failure / Pulm fibrosis:  CXR  w/o sig changes. BiPAP dependent at this point. Wife reports patient is requiring more medications to help with air hunger and anxiety. Wife aware that patient will not survive this hospitalization. -- cont LABA/ICS and duoneb 
 -- continue BiPAP 
   
NSTEMI/CAD:  Type 2 MI from hypoxia. -- cont ASA, metoprolol 
   
DM type 2:  A1c shows good control. 
   
Anxiety:  Due to current medical issues. -- continue ativan 
  
 
  
Subjective: Chief Complaint:  Lethargic. Increased bouts of air hunger and anxiety overnight. ROS: 
Unable to obtain. Objective:  
 
 
Vitals:  
 
 
  
Last 24hrs VS reviewed since prior progress note. Most recent are: 
 
Visit Vitals  /74 (BP 1 Location: Left arm, BP Patient Position: Supine)  Pulse 90  Temp 98.4 °F (36.9 °C)  Resp 22  
 Ht 5' 10\" (1.778 m)  Wt 74.1 kg (163 lb 4.8 oz)  SpO2 97%  BMI 23.43 kg/m2 SpO2 Readings from Last 6 Encounters:  
18 97% 17 96% 10/13/14 94% 14 95% 14 93% 14 96% O2 Flow Rate (L/min): 50 l/min Intake/Output Summary (Last 24 hours) at 18 0896 Last data filed at 18 9771 Gross per 24 hour Intake                0 ml Output                0 ml Net                0 ml Exam:  
 
Physical Exam: 
 
Gen:  Chronically ill appearing on BiPAP HEENT:  Pink conjunctivae, dry mucous membranes Resp:  No accessory muscle use, fine crackles Card:  No murmurs, normal S1, S2 without thrills or peripheral edema Abd:  Soft, non-tender, non-distended, normoactive bowel sounds are present Neuro: Face symmetric Psych:  somnolent Telemetry reviewed:   normal sinus rhythm Medications Reviewed: (see below) Lab Data Reviewed: (see below) 
 
______________________________________________________________________ Medications:  
 
Current Facility-Administered Medications Medication Dose Route Frequency  LORazepam (ATIVAN) injection 0.5 mg  0.5 mg IntraVENous PCL  LORazepam (ATIVAN) injection 1 mg  1 mg IntraVENous QHS  LORazepam (ATIVAN) injection 0.5 mg  0.5 mg IntraVENous Q2H PRN  
 sodium chloride-aloe vera spry   Nasal PRN  
 artificial tears (dextran 70-hypromellose) (NATURAL BALANCE) 0.1-0.3 % ophthalmic solution 2 Drop  2 Drop Both Eyes PRN  
 morphine injection 2 mg  2 mg IntraVENous Q1H PRN  
 fluticasone (FLONASE) 50 mcg/actuation nasal spray 2 Spray  2 Spray Both Nostrils DAILY  guaiFENesin ER (MUCINEX) tablet 600 mg  600 mg Oral Q12H  
 arformoterol (BROVANA) neb solution 15 mcg  15 mcg Nebulization BID RT  
 sodium chloride (NS) flush 5-10 mL  5-10 mL IntraVENous Q8H  
 sodium chloride (NS) flush 5-10 mL  5-10 mL IntraVENous PRN  
 albuterol-ipratropium (DUO-NEB) 2.5 MG-0.5 MG/3 ML  3 mL Nebulization Q4H RT  
 budesonide (PULMICORT) 500 mcg/2 ml nebulizer suspension  500 mcg Nebulization BID RT  
 gabapentin (NEURONTIN) capsule 300 mg  300 mg Oral DAILY WITH LUNCH  
 gabapentin (NEURONTIN) capsule 600 mg  600 mg Oral QHS  pirfenidone tab 801 mg   (Patient Supplied)  801 mg Oral TID  tamsulosin (FLOMAX) capsule 0.4 mg  0.4 mg Oral DAILY  enoxaparin (LOVENOX) injection 40 mg  40 mg SubCUTAneous Q24H  
 metoprolol tartrate (LOPRESSOR) tablet 12.5 mg  12.5 mg Oral BID  pantoprazole (PROTONIX) tablet 40 mg  40 mg Oral ACB Lab Review: No results for input(s): WBC, HGB, HCT, PLT, HGBEXT, HCTEXT, PLTEXT, HGBEXT, HCTEXT, PLTEXT in the last 72 hours. No results for input(s): NA, K, CL, CO2, GLU, BUN, CREA, CA, MG, PHOS, ALB, TBIL, TBILI, SGOT, ALT, INR in the last 72 hours. No lab exists for component: INREXT, INREXT Lab Results Component Value Date/Time Glucose (POC) 223 (H) 09/18/2018 11:50 AM  
 Glucose (POC) 148 (H) 09/18/2018 06:32 AM  
 Glucose (POC) 157 (H) 09/17/2018 09:00 PM  
 Glucose (POC) 199 (H) 09/17/2018 04:11 PM  
 Glucose (POC) 158 (H) 09/17/2018 12:05 PM  
 
 
 
Total time spent with patient: 10 Minutes Care Plan discussed with: Wife Discussed:  Care Plan Prophylaxis:  Lovenox Disposition:  TBD 
        
___________________________________________________ Attending Physician: Blas Beverly MD

## 2018-09-22 NOTE — PROGRESS NOTES
Palliative Medicine Consult David: [phone]) Patient Name: Joanna Carlson YOB: 1935 Date of Initial Consult: 8/27/18 Reason for Consult: Goals-of-care Requesting Provider: Aroldo Reagan MD 
Primary Care Physician: Kathy Kimble MD 
 
 SUMMARY:  
Joanna Carlson is a 80 y.o. with a end-stage COPD and pulmonary fibrosis, O2-dependent at 20L/minute at baseline; CAD who was admitted on 8/25/2018 from home with a diagnosis of acute-on-chronic respiratory failure and NSTEMI due to tachycardia and hypoxia. Current medical issues leading to Palliative Medicine involvement include: shortness of breath, anxiety/agitation, confusion in setting of steroids and hypoxia, goals of care. PALLIATIVE DIAGNOSES:  
1. Shortness of breath-progressive with air hunger 2. Anxiety 3. Debility 4. Goals of care 5. Pulmonary fibrosis PLAN:  
1. Met with patient(sleeping), spouse, and daughter. Patient essentially unresponsive but occasional times of restlessness. Needed a couple doses of prn ativan in addition to the scheduled. Davey coming this afternoon and Dr. Juan Chao friend tomorrow. Spouse would like to wait on stopping BIPAP until family can see him one more time. Plan on compassionate removal of BIPAP tomorrow if ok with his spouse 2. GOC-essentially transition to comfort care. Discussed we could place inpatient hospice consult but will manage his symptoms like he is under hospice care. For now, hold off on inpatient hospice consult. At this point, probably the less people spouse has to talk with will be best.  
3. AMD-spouse remains MPOA and AMD in the chart 4. Code status-DNR per prior discussions. If he survives this stay, will need DDNR at discharge 5. Symptom management-continue dilaudid PCA 0.2 mg basal and 0.1 mg every 6 minutes. Has used 5 bolus doses. Continue dilaudid PCA.  Because of the anxiety, will increase the ativan to 1 mg every 2 hours scheduled and every 15 minutes prn.  
6. Psychosocial-patient still with great support from family. Children have been at bedside lots of this hospitalization. 7. Discussed with bedside nurse, Dr. Matt Ramsay 8. Communicated plan of care with: Palliative IDT 
 
 
 GOALS OF CARE / TREATMENT PREFERENCES:  
 
GOALS OF CARE: Patient/Health Care Proxy Stated Goals: Comfort TREATMENT PREFERENCES:  
Code Status: DNR Advance Care Planning: 
Advance Care Planning 2018 Patient's Healthcare Decision Maker is: Named in scanned ACP document Primary Decision Maker Name Farzaneh Anton Primary Decision Maker Phone Number [phone] Primary Decision Maker Relationship to Patient Spouse Secondary Decision Maker Name Son Peyton Whitney and/or dtr Iggy Morales Secondary Decision Maker Phone Number [phone], [phone] Confirm Advance Directive Yes, on file Medical Interventions: Comfort measures Other Instructions: Other: As far as possible, the palliative care team has discussed with patient / health care proxy about goals of care / treatment preferences for patient. HISTORY:  
 
History obtained from: patient, wife, chart CHIEF COMPLAINT: anxiety(at admission) HPI/SUBJECTIVE: The patient is:  
[x] Verbal and participatory -  
[] Non-participatory due to:  
 
Patient with BIPAP in place. Fatigued and difficult time speaking with us.  
 
-patient with no new complaints except that anxiety/air hunger remains. Denies any pain -more fatigued and sleeping the majority of the visit. -he is not awake at this time. BIPAP in place -patient not responsive this afternoon. Appears comfortable. Clinical Pain Assessment (nonverbal scale for severity on nonverbal patients): Duration: for how long has pt been experiencing pain (e.g., 2 days, 1 month, years) Frequency: how often pain is an issue (e.g., several times per day, once every few days, constant) FUNCTIONAL ASSESSMENT:  
 
Palliative Performance Scale (PPS): PPS: 30 
 
 
 PSYCHOSOCIAL/SPIRITUAL SCREENING:  
 
Palliative IDT has assessed this patient for cultural preferences / practices and a referral made as appropriate to needs (Cultural Services, Patient Advocacy, Ethics, etc.) Advance Care Planning: 
Advance Care Planning 2018 Patient's Healthcare Decision Maker is: Named in scanned ACP document Primary Decision Maker Name Rosanne Stern Primary Decision Maker Phone Number [phone] Primary Decision Maker Relationship to Patient Spouse Secondary Decision Maker Name Andre Avalos and/or dtr Noel Wilson Secondary Decision Maker Phone Number TG:  [phone], [phone] Confirm Advance Directive Yes, on file Any spiritual / Mandaeism concerns: 
[] Yes /  [x] No 
 
Caregiver Burnout: 
[] Yes /  [x] No /  [] No Caregiver Present Anticipatory grief assessment:  
[x] Normal  / [] Maladaptive :  
 
ESAS Anxiety: Anxiety: 4 ESAS Depression:    
 
 
 REVIEW OF SYSTEMS:  
 
Positive and pertinent negative findings in ROS are noted above in HPI. The following systems were [] reviewed / [] unable to be reviewed as noted in HPI Other findings are noted below. Systems: constitutional, ears/nose/mouth/throat, respiratory, gastrointestinal, genitourinary, musculoskeletal, integumentary, neurologic, psychiatric, endocrine. Positive findings noted below. Modified ESAS Completed by: provider Anxiety: 4 Nausea: 0 Anorexia: 0 Constipation: No  
  Stool Occurrence(s): 1 PHYSICAL EXAM:  
 
From RN flowsheet: 
Wt Readings from Last 3 Encounters:  
18 163 lb 4.8 oz (74.1 kg) 10/20/17 178 lb (80.7 kg) 17 184 lb (83.5 kg) Blood pressure 125/60, pulse (P) 93, temperature (P) 96.4 °F (35.8 °C), resp. rate (P) 30, height 5' 10\" (1.778 m), weight 163 lb 4.8 oz (74.1 kg), SpO2 (P) 93 %. Pain Scale 1: Adult Nonverbal Pain Scale Pain Intensity 1: 0 Last bowel movement, if known: today Constitutional: not awake, BIPAP in place, appears comfortable right now Eyes: pupils equal, anicteric ENMT: no nasal discharge, moist mucous membranes Cardiovascular: regular rhythm, distal pulses intact Respiratory: breathing mildly labored, symmetric Gastrointestinal: soft non-tender, +bowel sounds Musculoskeletal: no deformity, no tenderness to palpation Skin: warm, dry Neurologic: not following commands Psychiatric:sleeping Other: 
 
 
 HISTORY:  
 
Active Problems: 
  CAD (coronary artery disease) () Overview: cardiac stentd (4) COPD (chronic obstructive pulmonary disease) ([address] (2/4/2014) DM II (diabetes mellitus, type II), controlled ([address] (2/4/2014) Pulmonary fibrosis (HCC) () Acute on chronic respiratory failure ([address] (8/26/2018) Past Medical History:  
Diagnosis Date  CAD (coronary artery disease) 2004  
 4 stents placed in heart  Chronic obstructive pulmonary disease ([address] EMPHYSEMA SEEN ON CXR  
 Colon polyp Three tubular adenomas excised colonoscopically on 3/17/2011.  COPD (chronic obstructive pulmonary disease) ([address] 2/4/2014  Diverticulosis of colon (without mention of hemorrhage)  DM (diabetes mellitus) ([address] 4/3/2012  GERD (gastroesophageal reflux disease) [address] [facility] [address]  History of pneumothorax 2/2014  Hypercholesteremia  Pneumonia 2004  PUD (peptic ulcer disease) 1955  Pulmonary fibrosis ([address] Past Surgical History:  
Procedure Laterality Date  ENDOSCOPY, COLON, DIAGNOSTIC  3/2011 Polyps. Tics. Rep 3 yrs.  HX CORONARY STENT PLACEMENT  2004  
 4 cardiac stents  HX GI  circa 2000 Laparoscopic cholecystectomy. [address] [place] [address] left inguinal hernia repair  HX LUMBAR LAMINECTOMY  11/20/2013 L4 L5 Laminectomy  HX OTHER SURGICAL  2/7/2014 LVATS, bronch, talc pleurodesis, Resection of ruptured bulla  HX TONSILLECTOMY  age 29 Family History Problem Relation Age of Onset  Adopted: Yes  Other Other   
  patient was adopted History reviewed, no pertinent family history. Social History Substance Use Topics  Smoking status: Former Smoker Packs/day: 1.50 Years: 36.00 Types: Cigarettes Quit date: 1/1/1987  Smokeless tobacco: Never Used  Alcohol use No  
 
Allergies Allergen Reactions  Bactrim [Sulfamethoprim] Unknown (comments) FLU LIKE SYMPTOMS  
 Statins-Hmg-Coa Reductase Inhibitors Myalgia  Sulfa (Sulfonamide Antibiotics) Other (comments) Bactrim causes flu like symptoms Current Facility-Administered Medications Medication Dose Route Frequency  LORazepam (ATIVAN) injection 1 mg  1 mg IntraVENous Q2H  
 LORazepam (ATIVAN) injection 1 mg  1 mg IntraVENous Q15MIN PRN  
 ketorolac (TORADOL) injection 30 mg  30 mg IntraVENous Q8H PRN  
 scopolamine (TRANSDERM-SCOP) 1 mg over 3 days 1 Patch  1 Patch TransDERmal Q72H PRN  
 glycopyrrolate (ROBINUL) injection 0.2 mg  0.2 mg IntraVENous Q4H PRN  
 HYDROmorphone (PF) 15 mg/30 ml (DILAUDID) PCA   IntraVENous CONTINUOUS  
 sodium chloride-aloe vera spry   Nasal PRN  
 morphine injection 2 mg  2 mg IntraVENous Q1H PRN  
 sodium chloride (NS) flush 5-10 mL  5-10 mL IntraVENous PRN  
 
 
 
 LAB AND IMAGING FINDINGS:  
 
Lab Results Component Value Date/Time WBC 11.7 (H) 09/18/2018 01:53 AM  
 HGB 13.9 09/18/2018 01:53 AM  
 PLATELET 248 69/72/1824 01:53 AM  
 
Lab Results Component Value Date/Time  Sodium 134 (L) 09/18/2018 01:53 AM  
 Potassium 4.3 09/18/2018 01:53 AM  
 Chloride 98 09/18/2018 01:53 AM  
 CO2 29 09/18/2018 01:53 AM  
 BUN 20 09/18/2018 01:53 AM  
 Creatinine 0.86 09/18/2018 01:53 AM  
 Calcium 9.1 09/18/2018 01:53 AM  
 Magnesium 1.7 09/18/2018 01:53 AM  
 Phosphorus 3.9 09/04/2018 04:04 AM  
  
Lab Results Component Value Date/Time AST (SGOT) 18 01/26/2018 02:19 PM  
 Alk. phosphatase 27 (L) 01/26/2018 02:19 PM  
 Protein, total 7.6 01/26/2018 02:19 PM  
 Albumin 4.3 01/26/2018 02:19 PM  
 Globulin 3.6 02/05/2014 04:36 AM  
 
Lab Results Component Value Date/Time INR 1.0 11/01/2013 04:13 PM  
 Prothrombin time 10.4 11/01/2013 04:13 PM  
  
No results found for: IRON, FE, TIBC, IBCT, PSAT, FERR Lab Results Component Value Date/Time pH 7.39 02/04/2014 02:00 AM  
 PCO2 31 (L) 02/04/2014 02:00 AM  
 PO2 90 02/04/2014 02:00 AM  
 
No components found for: [place] No results found for: CPK, CKMB Total time: 35 
Counseling / coordination time, spent as noted above:30 
> 50% counseling / coordination?: yes Prolonged service was provided for  []30 min   []75 min in face to face time in the presence of the patient, spent as noted above. Time Start:  
Time End:  
Note: this can only be billed with 46149 (initial) or 33804 (follow up). If multiple start / stop times, list each separately.

## 2018-09-22 NOTE — PROGRESS NOTES
SHIFT CHANGE: 
7:18 AM Report received from Huang Harding RN. SBAR, Kardex, Procedure Summary, Intake/Output, MAR, Recent Results, Med Rec Status and Cardiac Rhythm NSR were discussed. SHIFT SUMMARY: 
 
 
 
END OF SHIFT REPORT: 
7:30 PM  Bedside and Verbal shift change report given to Shamika Menjivar RN (oncoming nurse) by Aminta Blake RN (offgoing nurse). Report included the following information SBAR, Kardex, Procedure Summary, Intake/Output, MAR, Med Rec Status and Cardiac Rhythm NSR.

## 2018-09-22 NOTE — ROUTINE PROCESS
0104 
Patients respirations seem to have increased and a little irregular. 7937 Pulse ox was as high as 95% at one point tonight. Now it is averaging 90% but has been at mid 80's. 1920 Bedside and Verbal shift change report given to Tiera (oncoming nurse) by Jj Blackmon (offgoing nurse). Report included the following information SBAR, Kardex, ED Summary, Procedure Summary, Intake/Output, MAR, Recent Results and Cardiac Rhythm NSR.

## 2018-09-22 NOTE — PROGRESS NOTES
Medical Progress Note NAME: Drake Severance :  1935 MRM:  [id number] Date/Time: 2018  9:17 AM 
 
 
  
Subjective: Chief Complaint: Family at the bedside. Pt not responsive Pt seen and examined. Family states that pt is lethargic and opens eyes but is not interacting verbally with family and not eating or drinking. Remains on biPAP continuously. They do feel he is agitated at times. ROS: 
Unable to obtain. Objective:  
 
 
Vitals:  
 
 
  
Last 24hrs VS reviewed since prior progress note. Most recent are: 
 
Visit Vitals  /60 (BP 1 Location: Left arm, BP Patient Position: At rest)  Pulse 92  Temp 96.4 °F (35.8 °C)  Resp 30  
 Ht 5' 10\" (1.778 m)  Wt 74.1 kg (163 lb 4.8 oz)  SpO2 93%  BMI 23.43 kg/m2 SpO2 Readings from Last 6 Encounters:  
18 93% 17 96% 10/13/14 94% 14 95% 14 93% 14 96% O2 Flow Rate (L/min): 50 l/min Intake/Output Summary (Last 24 hours) at 18 1705 Last data filed at 18 8004 Gross per 24 hour Intake                0 ml Output                0 ml Net                0 ml Exam:  
 
Physical Exam: 
 
Gen:  Chronically ill appearing on BiPAP HEENT:  Pink conjunctivae, dry mucous membranes Resp:  No accessory muscle use, fine crackles diffusely Card:  No murmurs, normal S1, S2 without thrills or peripheral edema Abd:  Soft, non-tender, non-distended, normoactive bowel sounds are present Neuro: Face symmetric Psych:  Somnolent. Arouses when stomach palpated but not following commands or speaking Medications Reviewed: (see below) Lab Data Reviewed: (see below) 
 
______________________________________________________________________ Medications:  
 
Current Facility-Administered Medications Medication Dose Route Frequency  LORazepam (ATIVAN) injection 1 mg  1 mg IntraVENous Q2H  
  LORazepam (ATIVAN) injection 1 mg  1 mg IntraVENous Q15MIN PRN  
 ketorolac (TORADOL) injection 30 mg  30 mg IntraVENous Q8H PRN  
 scopolamine (TRANSDERM-SCOP) 1 mg over 3 days 1 Patch  1 Patch TransDERmal Q72H PRN  
 glycopyrrolate (ROBINUL) injection 0.2 mg  0.2 mg IntraVENous Q4H PRN  
 HYDROmorphone (PF) 15 mg/30 ml (DILAUDID) PCA   IntraVENous CONTINUOUS  
 sodium chloride-aloe vera spry   Nasal PRN  
 morphine injection 2 mg  2 mg IntraVENous Q1H PRN  
 sodium chloride (NS) flush 5-10 mL  5-10 mL IntraVENous PRN Lab Review: No results for input(s): WBC, HGB, HCT, PLT, HGBEXT, HCTEXT, PLTEXT, HGBEXT, HCTEXT, PLTEXT in the last 72 hours. No results for input(s): NA, K, CL, CO2, GLU, BUN, CREA, CA, MG, PHOS, ALB, TBIL, TBILI, SGOT, ALT, INR in the last 72 hours. No lab exists for component: INREXT, INREXT Lab Results Component Value Date/Time Glucose (POC) 223 (H) 09/18/2018 11:50 AM  
 Glucose (POC) 148 (H) 09/18/2018 06:32 AM  
 Glucose (POC) 157 (H) 09/17/2018 09:00 PM  
 Glucose (POC) 199 (H) 09/17/2018 04:11 PM  
 Glucose (POC) 158 (H) 09/17/2018 12:05 PM  
 
  
Assessment / Plan:  
Acute on chronic resp failure / Pulm fibrosis:  CXR 9/18 w/o sig changes. BiPAP dependent at this point 
-palliative on board; comfort measures in place  
-plan is for possible off titration of biPAP tomorrow with the understanding that pt will likely pass. Goal is comfort and medications have been uptitrated by the palliative care team  
   
NSTEMI/CAD:  Type 2 MI from hypoxia. -not taking PO; stop ASA and metoprolol  
   
DM type 2:  A1c 6.8 
-goal comfort; stop ISS and BG checks  
   
Anxiety:  Due to current medical issues. 
-continue ativan; frequency uptitrated by palliative Total time spent with patient: 25 Minutes Care Plan discussed with: Wife Discussed:  Care Plan Prophylaxis:  Lovenox Disposition:  TBD 
        
 ___________________________________________________ Attending Physician: Merlene Gomez MD

## 2018-09-23 NOTE — PROGRESS NOTES
is responding to a staff request in room 334. Pt and family (wife, grand kids, children, and friends). Listening presence, active listening, words of support and prayer provided.  remained allowing pt's wife to process pt's decline and process her anticipatory grief. Chaplain Chacha Mckinney M.Div, M.S, [address] [facility] available at [phone]

## 2018-09-23 NOTE — PROGRESS NOTES
Palliative Medicine Consult David: [phone]) Patient Name: Hendrick Hashimoto YOB: 1935 Date of Initial Consult: 8/27/18 Reason for Consult: Goals-of-care Requesting Provider: Calvin Farias MD 
Primary Care Physician: Suleiman Ware MD 
 
 SUMMARY:  
Hendrick Hashimoto is a 80 y.o. with a end-stage COPD and pulmonary fibrosis, O2-dependent at 20L/minute at baseline; CAD who was admitted on 8/25/2018 from home with a diagnosis of acute-on-chronic respiratory failure and NSTEMI due to tachycardia and hypoxia. Current medical issues leading to Palliative Medicine involvement include: shortness of breath, anxiety/agitation, confusion in setting of steroids and hypoxia, goals of care. PALLIATIVE DIAGNOSES:  
1. Shortness of breath-progressive with air hunger 2. Anxiety 3. Debility 4. Goals of care 5. Pulmonary fibrosis PLAN:  
1. Met with patient(sleeping), spouse, daughter, son and grandchildren. Patient remains unresponsive. Family has all seen the patient and friend/Dr. Vasile Rboin at the bedside. Sats have been in the 70s most of the nite despite the BIPAP. 2. GOC- transition to comfort care. 3. AMD-spouse remains MPOA and AMD in the chart 4. Code status-DNR per prior discussions. If he survives this stay, will need DDNR at discharge 5. Symptom management-continue dilaudid PCA 0.2 mg basal and 0.1 mg every 6 minutes. Will premedicate with morphine 4 mg/ativan 2 mg IV and then compassionately remove the BIPAP. Will turn off monitor and have family focus on patient. Discussed this plan with his wife who supports. Have medication available for symptoms every 15 minutes. 6. Psychosocial-patient still with great support from family. Children have been at bedside lots of this hospitalization. 7. Discussed with bedside nurse(Tiera), Dr. Fadia Maher 8.  Communicated plan of care with: Palliative IDT 
 
 
 GOALS OF CARE / TREATMENT PREFERENCES:  
 
 GOALS OF CARE: Patient/Health Care Proxy Stated Goals: Comfort TREATMENT PREFERENCES:  
Code Status: DNR Advance Care Planning: 
Advance Care Planning 2018 Patient's Healthcare Decision Maker is: Named in scanned ACP document Primary Decision Maker Name Arlet Conn Primary Decision Maker Phone Number [phone] Primary Decision Maker Relationship to Patient Spouse Secondary Decision Maker Name Son Carlos Goodrich and/or dtr Tre Chandler Secondary Decision Maker Phone Number [phone], [phone] Confirm Advance Directive Yes, on file Medical Interventions: Comfort measures Other Instructions: Other: As far as possible, the palliative care team has discussed with patient / health care proxy about goals of care / treatment preferences for patient. HISTORY:  
 
History obtained from: patient, wife, chart CHIEF COMPLAINT: anxiety(at admission) HPI/SUBJECTIVE: The patient is:  
[x] Verbal and participatory -  
[] Non-participatory due to:  
 
Patient with BIPAP in place. Fatigued and difficult time speaking with us.  
 
-patient with no new complaints except that anxiety/air hunger remains. Denies any pain -more fatigued and sleeping the majority of the visit. -he is not awake at this time. BIPAP in place -patient not responsive this afternoon. Appears comfortable. -unresponsive but breathing a little labored. Clinical Pain Assessment (nonverbal scale for severity on nonverbal patients): Duration: for how long has pt been experiencing pain (e.g., 2 days, 1 month, years) Frequency: how often pain is an issue (e.g., several times per day, once every few days, constant) FUNCTIONAL ASSESSMENT:  
 
Palliative Performance Scale (PPS): PPS: 30 
 
 
 PSYCHOSOCIAL/SPIRITUAL SCREENING:  
 
Palliative IDT has assessed this patient for cultural preferences / practices and a referral made as appropriate to needs (Cultural Services, Patient Advocacy, Ethics, etc.) Advance Care Planning: 
Advance Care Planning 2018 Patient's Healthcare Decision Maker is: Named in scanned ACP document Primary Decision Maker Name Sergio Santana Primary Decision Maker Phone Number [phone] Primary Decision Maker Relationship to Patient Spouse Secondary Decision Maker Name Son Helen Jon and/or dtr Nadya Almaguer Secondary Decision Maker Phone Number [phone], [phone] Confirm Advance Directive Yes, on file Any spiritual / Spiritism concerns: 
[] Yes /  [x] No 
 
Caregiver Burnout: 
[] Yes /  [x] No /  [] No Caregiver Present Anticipatory grief assessment:  
[x] Normal  / [] Maladaptive :  
 
ESAS Anxiety: Anxiety: 4 ESAS Depression:    
 
 
 REVIEW OF SYSTEMS:  
 
Positive and pertinent negative findings in ROS are noted above in HPI. The following systems were [] reviewed / [] unable to be reviewed as noted in HPI Other findings are noted below. Systems: constitutional, ears/nose/mouth/throat, respiratory, gastrointestinal, genitourinary, musculoskeletal, integumentary, neurologic, psychiatric, endocrine. Positive findings noted below. Modified ESAS Completed by: provider Anxiety: 4 Nausea: 0 Anorexia: 0 Constipation: No  
  Stool Occurrence(s): 1 PHYSICAL EXAM:  
 
From RN flowsheet: 
Wt Readings from Last 3 Encounters:  
18 163 lb 4.8 oz (74.1 kg) 10/20/17 178 lb (80.7 kg) 17 184 lb (83.5 kg) Blood pressure 136/55, pulse (!) 103, temperature 97.1 °F (36.2 °C), resp. rate 27, height 5' 10\" (1.778 m), weight 163 lb 4.8 oz (74.1 kg), SpO2 (!) 76 %. Pain Scale 1: Adult Nonverbal Pain Scale Pain Intensity 1: 0 Last bowel movement, if known: today Constitutional: not awake, BIPAP in place, breathing slightly labored Eyes: pupils equal, anicteric ENMT: no nasal discharge, moist mucous membranes Cardiovascular: regular rhythm, distal pulses intact Respiratory: breathing mildly labored, symmetric Gastrointestinal: soft non-tender, +bowel sounds Musculoskeletal: no deformity, no tenderness to palpation Skin: warm, dry Neurologic: not following commands Psychiatric:unresponsive Other: 
 
 
 HISTORY:  
 
Active Problems: 
  CAD (coronary artery disease) () Overview: cardiac stentd (4) COPD (chronic obstructive pulmonary disease) ([address] (2/4/2014) DM II (diabetes mellitus, type II), controlled ([address] (2/4/2014) Pulmonary fibrosis (HCC) () Acute on chronic respiratory failure ([address] (8/26/2018) Past Medical History:  
Diagnosis Date  CAD (coronary artery disease) 2004  
 4 stents placed in heart  Chronic obstructive pulmonary disease ([address] EMPHYSEMA SEEN ON CXR  
 Colon polyp Three tubular adenomas excised colonoscopically on 3/17/2011.  COPD (chronic obstructive pulmonary disease) ([address] 2/4/2014  Diverticulosis of colon (without mention of hemorrhage)  DM (diabetes mellitus) ([address] 4/3/2012  GERD (gastroesophageal reflux disease) [address]  History of pneumothorax 2/2014  Hypercholesteremia  Pneumonia 2004  PUD (peptic ulcer disease) 1955  Pulmonary fibrosis ([address] Past Surgical History:  
Procedure Laterality Date  ENDOSCOPY, COLON, DIAGNOSTIC  3/2011 Polyps. Tics. Rep 3 yrs.  HX CORONARY STENT PLACEMENT  2004  
 4 cardiac stents  HX GI  circa 2000 Laparoscopic cholecystectomy. [address] left inguinal hernia repair  HX LUMBAR LAMINECTOMY  11/20/2013 L4 L5 Laminectomy  HX OTHER SURGICAL  2/7/2014 LVATS, bronch, talc pleurodesis, Resection of ruptured bulla  HX TONSILLECTOMY  age 29 Family History Problem Relation Age of Onset  Adopted: Yes  Other Other   
  patient was adopted History reviewed, no pertinent family history. Social History Substance Use Topics  Smoking status: Former Smoker Packs/day: 1.50 Years: 36.00 Types: Cigarettes Quit date: 1/1/1987  Smokeless tobacco: Never Used  Alcohol use No  
 
Allergies Allergen Reactions  Bactrim [Sulfamethoprim] Unknown (comments) FLU LIKE SYMPTOMS  
 Statins-Hmg-Coa Reductase Inhibitors Myalgia  Sulfa (Sulfonamide Antibiotics) Other (comments) Bactrim causes flu like symptoms Current Facility-Administered Medications Medication Dose Route Frequency  morphine injection 4 mg  4 mg IntraVENous ONCE  
 LORazepam (ATIVAN) 2 mg/mL injection 1 mg  1 mg IntraVENous ONCE  
 morphine injection 2 mg  2 mg IntraVENous Q15MIN PRN  
 LORazepam (ATIVAN) injection 1 mg  1 mg IntraVENous Q2H  
 LORazepam (ATIVAN) injection 1 mg  1 mg IntraVENous Q15MIN PRN  
 ketorolac (TORADOL) injection 30 mg  30 mg IntraVENous Q8H PRN  
 scopolamine (TRANSDERM-SCOP) 1 mg over 3 days 1 Patch  1 Patch TransDERmal Q72H PRN  
 glycopyrrolate (ROBINUL) injection 0.2 mg  0.2 mg IntraVENous Q4H PRN  
 HYDROmorphone (PF) 15 mg/30 ml (DILAUDID) PCA   IntraVENous CONTINUOUS  
 sodium chloride-aloe vera spry   Nasal PRN  
 sodium chloride (NS) flush 5-10 mL  5-10 mL IntraVENous PRN  
 
 
 
 LAB AND IMAGING FINDINGS:  
 
Lab Results Component Value Date/Time WBC 11.7 (H) 09/18/2018 01:53 AM  
 HGB 13.9 09/18/2018 01:53 AM  
 PLATELET 103 55/89/0150 01:53 AM  
 
Lab Results Component Value Date/Time Sodium 134 (L) 09/18/2018 01:53 AM  
 Potassium 4.3 09/18/2018 01:53 AM  
 Chloride 98 09/18/2018 01:53 AM  
 CO2 29 09/18/2018 01:53 AM  
 BUN 20 09/18/2018 01:53 AM  
 Creatinine 0.86 09/18/2018 01:53 AM  
 Calcium 9.1 09/18/2018 01:53 AM  
 Magnesium 1.7 09/18/2018 01:53 AM  
 Phosphorus 3.9 09/04/2018 04:04 AM  
  
Lab Results Component Value Date/Time  AST (SGOT) 18 01/26/2018 02:19 PM  
 Alk. phosphatase 27 (L) 01/26/2018 02:19 PM  
 Protein, total 7.6 01/26/2018 02:19 PM  
 Albumin 4.3 01/26/2018 02:19 PM  
 Globulin 3.6 02/05/2014 04:36 AM  
 
Lab Results Component Value Date/Time INR 1.0 11/01/2013 04:13 PM  
 Prothrombin time 10.4 11/01/2013 04:13 PM  
  
No results found for: IRON, FE, TIBC, IBCT, PSAT, FERR Lab Results Component Value Date/Time pH 7.39 02/04/2014 02:00 AM  
 PCO2 31 (L) 02/04/2014 02:00 AM  
 PO2 90 02/04/2014 02:00 AM  
 
No components found for: [place] No results found for: CPK, CKMB Total time: 35 
Counseling / coordination time, spent as noted above:30 
> 50% counseling / coordination?: yes Prolonged service was provided for  []30 min   []75 min in face to face time in the presence of the patient, spent as noted above. Time Start:  
Time End:  
Note: this can only be billed with 97236 (initial) or 17073 (follow up). If multiple start / stop times, list each separately.

## 2018-09-23 NOTE — PROGRESS NOTES
[employer] Provider Death Note Called to examine patient who has . No response to verbal and tactile stimuli. No respiratory effort. Absent heart sounds and pulses. Pupils fixed and dilated. Patient pronounced dead at 1300

## 2018-09-23 NOTE — PROGRESS NOTES
is responding to notification of pt's death in room 334.  offered support with pt's family and friends. Prayer offered by bedside with pt's family. Chaplain Marlene Polanco M.Div, M.S, [facility] Spiritual Care available at [facility]([id number])

## 2018-09-23 NOTE — PROGRESS NOTES
SHIFT CHANGE: 
1930 Bedside and Verbal shift change report given to Jenifer HANDY (oncoming nurse) by Moris Og (offgoing nurse). Report included the following information SBAR, Kardex, MAR and Recent Results. SHIFT SUMMARY: 
1930  PCA cleared. Patient resting, not responsive, wife at bedside. Patient's sats in the 66's, RT was called to see if any adjustments could be made on bipap for patient comfort. Patient's wife refused ativan on a few occasions due to respiratory status. END OF SHIFT REPORT: 
0730  Bedside and Verbal shift change report given to Tony Courtney (oncoming nurse) by Millie Cortez (offgoing nurse). Report included the following information SBAR, Kardex, Intake/Output and MAR.

## 2018-09-23 NOTE — PROGRESS NOTES
SHIFT CHANGE: 
7:30 AM Report received from Jeanne Robles RN. SBAR, Intake/Output, MAR, Med Rec Status and Cardiac Rhythm ST were discussed. SHIFT SUMMARY: 
8:00 AM  O2 sats hanging in the 70's and HR elevated. Patient is diaphoretic. Per wife at bedside, plan is to remove Bipap today. Would like him placed on hi-flow O2. 
 
12:40 PM  Patient given morphine and Ativan as ordered. Taken off Bipap with assistance of Dr. Donna Kessler and placed on 2L NC. 
 
1:20 PM  Dilaudid PCA wasted with Andressa Qureshi RN. 6mL wasted. 1:30 PM  Patient bathed with assistance of his wife Kassandra Alejandre and all lines removed. Post mortem care provided. Patient's wife had taken off his wedding ring a few weeks ago and was wearing it.    
 
END OF SHIFT REPORT:

## 2018-10-05 NOTE — DISCHARGE SUMMARY
Physician Discharge Summary Patient ID: Jose Baca [id number] 
89 y.o. 
1935 Admit date: 2018 Discharge date and time: 2018 Admission Diagnoses: Acute on chronic respiratory failure [address] Acute on chronic respiratory failure ([facility] Discharge Diagnoses/Hospital Course Jose Baca is a 80 y.o. with a end-stage COPD and pulmonary fibrosis, O2-dependent at 20L/minute at baseline; CAD who was admitted on 2018 from home with a diagnosis of acute-on-chronic respiratory failure and NSTEMI due to tachycardia and hypoxia. Despite maximum medical efforts pt did not improve. He remained on biPAP and continued to deteriorate. He was transitioned to comfort measures but his family and  shortly after being transitioned off biPAP 
  
PCP: Sandi Castaneda MD  
 
Consults: Palliative, pulmonary, cardiology  Approximate time spent in patient care on day of discharge: 31 min Signed: 
Mouna Maher MD 
10/4/2018 11:09 PM

## 2022-12-12 NOTE — CONSULTS
Palliative Medicine Consult David: [phone]) Patient Name: Daniel Burton YOB: 1935 Date of Initial Consult: 8/27/18 Reason for Consult: Goals-of-care Requesting Provider: Debbie Sue MD 
Primary Care Physician: Karishma Avery MD 
 
 SUMMARY:  
Daniel Burton is a 80 y.o. with a end-stage COPD and pulmonary fibrosis, O2-dependent at 20L/minute at baseline; CAD who was admitted on 8/25/2018 from home with a diagnosis of acute-on-chronic respiratory failure and NSTEMI due to tachycardia and hypoxia. Current medical issues leading to Palliative Medicine involvement include: shortness of breath, anxiety/agitation, confusion in setting of steroids and hypoxia, goals of care. PALLIATIVE DIAGNOSES:  
1. Shortness of breath 2. Anxiety 3. Confusion/altered mental status 4. Debility 5. Goals of care PLAN:  
1. Continue lorazepam 2-mg IV/PO every 6 hours as needed for anxiety, agitation 2. Continue IV morphine at 1-mg every 4 hours as needed; if beneficial & requiring multiple doses / day, would liberalize frequency to q2-3hr PRN and consider scheduled doses 3. Continue to establish rapport with pt / family 4. As per prior discussions, family not ready for hospice 5. Case discussed with Dr. Ila Carrillo about pt status, plans, outlook 6. Communicated plan of care with: Palliative IDT 
 
 
 GOALS OF CARE / TREATMENT PREFERENCES:  
 
GOALS OF CARE: continue current therapies with goal of returning home TREATMENT PREFERENCES:  
Code Status: DNR Advance Care Planning: 
Advance Care Planning 8/27/2018 Patient's Healthcare Decision Maker is: Named in scanned ACP document Primary Decision Maker Name Angel Agustin Primary Decision Maker Phone Number [phone] Primary Decision Maker Relationship to Patient Spouse Secondary Decision Maker Name Son Harpal Sales and/or dtr Kathryn Peralta Secondary Decision Maker Phone Number [phone], [phone] Confirm Advance Directive Yes, on file Other Instructions: Other: As far as possible, the palliative care team has discussed with patient / health care proxy about goals of care / treatment preferences for patient. HISTORY:  
 
History obtained from: patient, wife, chart CHIEF COMPLAINT: anxiety HPI/SUBJECTIVE: The patient is:  
[x] Verbal and participatory -  
[] Non-participatory due to:  
 
Pt denies pain, nausea. Not sob at this time. He does have anxiety periodically, when he gets ativan this does help him. Ate meal earlier today. He and wife had no questions. Clinical Pain Assessment (nonverbal scale for severity on nonverbal patients): Duration: for how long has pt been experiencing pain (e.g., 2 days, 1 month, years) Frequency: how often pain is an issue (e.g., several times per day, once every few days, constant) FUNCTIONAL ASSESSMENT:  
 
Palliative Performance Scale (PPS): PPS: 50 PSYCHOSOCIAL/SPIRITUAL SCREENING:  
 
Palliative IDT has assessed this patient for cultural preferences / practices and a referral made as appropriate to needs (Cultural Services, Patient Advocacy, Ethics, etc.) Advance Care Planning: 
Advance Care Planning 2018 Patient's Healthcare Decision Maker is: Named in scanned ACP document Primary Decision Maker Name Gracie Simmons Primary Decision Maker Phone Number [phone] Primary Decision Maker Relationship to Patient Spouse Secondary Decision Maker Name Andre Casillas and/or dtr Tomas Morfin Secondary Decision Maker Phone Number [phone], [phone] Confirm Advance Directive Yes, on file Any spiritual / Jainism concerns: 
[] Yes /  [x] No 
 
Caregiver Burnout: 
[] Yes /  [x] No /  [] No Caregiver Present Anticipatory grief assessment: [] Normal  / [x] Maladaptive : component of denial?    
 
ESAS Anxiety: Anxiety: 4 ESAS Depression:    
 
 
 REVIEW OF SYSTEMS:  
 
Positive and pertinent negative findings in ROS are noted above in HPI. The following systems were [] reviewed / [] unable to be reviewed as noted in HPI Other findings are noted below. Systems: constitutional, ears/nose/mouth/throat, respiratory, gastrointestinal, genitourinary, musculoskeletal, integumentary, neurologic, psychiatric, endocrine. Positive findings noted below. Modified ESAS Completed by: provider Anxiety: 4 Nausea: 0 Anorexia: 0 Constipation: No  
  Stool Occurrence(s): 1 PHYSICAL EXAM:  
 
From RN flowsheet: 
Wt Readings from Last 3 Encounters:  
08/25/18 77.1 kg (170 lb) 10/20/17 80.7 kg (178 lb)  
06/22/17 83.5 kg (184 lb) Blood pressure 143/83, pulse 98, temperature 97.6 °F (36.4 °C), resp. rate (!) 42, height 5' 10\" (1.778 m), weight 77.1 kg (170 lb), SpO2 (!) 79 %. Pain Scale 1: Numeric (0 - 10) Pain Intensity 1: 0 Last bowel movement, if known: today Constitutional: sitting up in bed, hi-blayne nasal cannula in place Eyes: pupils equal, anicteric ENMT: no nasal discharge, moist mucous membranes Cardiovascular: regular rhythm, distal pulses intact Respiratory: breathing not labored, symmetric Gastrointestinal: soft non-tender, +bowel sounds Musculoskeletal: no deformity, no tenderness to palpation Skin: warm, dry Neurologic: following commands, moving all extremities Psychiatric: restricted affect, no obvious hallucinations Other: 
 
 
 HISTORY:  
 
Active Problems: 
  CAD (coronary artery disease) () Overview: cardiac stentd (4) COPD (chronic obstructive pulmonary disease) ([facility] (2/4/2014) DM II (diabetes mellitus, type II), controlled ([facility] [address] (2/4/2014) Pulmonary fibrosis (HCC) () Acute on chronic respiratory failure ([facility] (8/26/2018) Past Medical History: Diagnosis Date  CAD (coronary artery disease) 2004  
 4 stents placed in heart  Chronic obstructive pulmonary disease ([facility] [address] EMPHYSEMA SEEN ON CXR  
 Colon polyp Three tubular adenomas excised colonoscopically on 3/17/2011.  COPD (chronic obstructive pulmonary disease) ([facility] [address] 2/4/2014  Diverticulosis of colon (without mention of hemorrhage)  DM (diabetes mellitus) ([facility] [address] 4/3/2012  GERD (gastroesophageal reflux disease) [address]  History of pneumothorax 2/2014  Hypercholesteremia  Pneumonia 2004  PUD (peptic ulcer disease) 1955  Pulmonary fibrosis ([facility] Past Surgical History:  
Procedure Laterality Date  ENDOSCOPY, COLON, DIAGNOSTIC  3/2011 Polyps. Tics. Rep 3 yrs.  HX CORONARY STENT PLACEMENT  2004  
 4 cardiac stents  HX GI  circa 2000 Laparoscopic cholecystectomy. [address] left inguinal hernia repair  HX LUMBAR LAMINECTOMY  11/20/2013 L4 L5 Laminectomy  HX OTHER SURGICAL  2/7/2014 LVATS, bronch, talc pleurodesis, Resection of ruptured bulla  HX TONSILLECTOMY  age 29 Family History Problem Relation Age of Onset  Adopted: Yes  Other Other   
  patient was adopted History reviewed, no pertinent family history. Social History Substance Use Topics  Smoking status: Former Smoker Packs/day: 1.50 Years: 36.00 Types: Cigarettes Quit date: 1/1/1987  Smokeless tobacco: Never Used  Alcohol use No  
 
Allergies Allergen Reactions  Bactrim [Sulfamethoprim] Unknown (comments) FLU LIKE SYMPTOMS  
 Statins-Hmg-Coa Reductase Inhibitors Myalgia  Sulfa (Sulfonamide Antibiotics) Other (comments) Bactrim causes flu like symptoms Current Facility-Administered Medications Medication Dose Route Frequency  methylPREDNISolone (PF) (SOLU-MEDROL) injection 40 mg  40 mg IntraVENous Q12H  
 arformoterol (BROVANA) neb solution 15 mcg  15 mcg Nebulization BID RT  
  LORazepam (ATIVAN) injection 2 mg  2 mg IntraVENous Q6H PRN  
 LORazepam (ATIVAN) tablet 2 mg  2 mg Oral Q6H PRN  
 morphine injection 1 mg  1 mg IntraVENous Q4H PRN  
 sodium chloride (NS) flush 5-10 mL  5-10 mL IntraVENous Q8H  
 sodium chloride (NS) flush 5-10 mL  5-10 mL IntraVENous PRN  
 albuterol-ipratropium (DUO-NEB) 2.5 MG-0.5 MG/3 ML  3 mL Nebulization Q4H RT  
 cetirizine (ZYRTEC) tablet 10 mg  10 mg Oral DAILY  finasteride (PROSCAR) tablet 5 mg  5 mg Oral DAILY  budesonide (PULMICORT) 500 mcg/2 ml nebulizer suspension  500 mcg Nebulization BID RT  
 gabapentin (NEURONTIN) capsule 300 mg  300 mg Oral DAILY WITH LUNCH  
 gabapentin (NEURONTIN) capsule 600 mg  600 mg Oral QHS  pirfenidone tab 801 mg   (Patient Supplied)  801 mg Oral TID  tamsulosin (FLOMAX) capsule 0.4 mg  0.4 mg Oral DAILY  enoxaparin (LOVENOX) injection 40 mg  40 mg SubCUTAneous Q24H  
 aspirin chewable tablet 81 mg  81 mg Oral DAILY  metoprolol tartrate (LOPRESSOR) tablet 12.5 mg  12.5 mg Oral BID  pantoprazole (PROTONIX) tablet 40 mg  40 mg Oral ACB  
 
 
 
 LAB AND IMAGING FINDINGS:  
 
Lab Results Component Value Date/Time WBC 7.6 08/26/2018 01:06 PM  
 HGB 13.2 08/26/2018 01:06 PM  
 PLATELET 686 39/69/8356 01:06 PM  
 
Lab Results Component Value Date/Time Sodium 132 (L) 08/27/2018 12:30 AM  
 Potassium 4.9 08/27/2018 12:30 AM  
 Chloride 98 08/27/2018 12:30 AM  
 CO2 26 08/27/2018 12:30 AM  
 BUN 24 (H) 08/27/2018 12:30 AM  
 Creatinine 1.25 08/27/2018 12:30 AM  
 Calcium 9.0 08/27/2018 12:30 AM  
 Magnesium 2.0 08/27/2018 12:30 AM  
 Phosphorus 2.7 02/05/2014 04:36 AM  
  
Lab Results Component Value Date/Time AST (SGOT) 18 01/26/2018 02:19 PM  
 Alk. phosphatase 27 (L) 01/26/2018 02:19 PM  
 Protein, total 7.6 01/26/2018 02:19 PM  
 Albumin 4.3 01/26/2018 02:19 PM  
 Globulin 3.6 02/05/2014 04:36 AM  
 
Lab Results Component Value Date/Time  INR 1.0 11/01/2013 04:13 PM  
 Prothrombin time 10.4 11/01/2013 04:13 PM  
  
No results found for: IRON, FE, TIBC, IBCT, PSAT, FERR Lab Results Component Value Date/Time pH 7.39 02/04/2014 02:00 AM  
 PCO2 31 (L) 02/04/2014 02:00 AM  
 PO2 90 02/04/2014 02:00 AM  
 
No components found for: [place] No results found for: CPK, CKMB Total time:  
Counseling / coordination time, spent as noted above:  
> 50% counseling / coordination?:  
 
Prolonged service was provided for  []30 min   []75 min in face to face time in the presence of the patient, spent as noted above. Time Start:  
Time End:  
Note: this can only be billed with 54236 (initial) or 31029 (follow up). If multiple start / stop times, list each separately. Cyclosporine Pregnancy And Lactation Text: This medication is Pregnancy Category C and it isn't know if it is safe during pregnancy. This medication is excreted in breast milk.